# Patient Record
Sex: MALE | Race: WHITE | NOT HISPANIC OR LATINO | Employment: UNEMPLOYED | URBAN - METROPOLITAN AREA
[De-identification: names, ages, dates, MRNs, and addresses within clinical notes are randomized per-mention and may not be internally consistent; named-entity substitution may affect disease eponyms.]

---

## 2018-12-26 ENCOUNTER — HOSPITAL ENCOUNTER (INPATIENT)
Facility: MEDICAL CENTER | Age: 43
LOS: 14 days | DRG: 233 | End: 2019-01-10
Attending: EMERGENCY MEDICINE | Admitting: INTERNAL MEDICINE
Payer: MEDICAID

## 2018-12-26 ENCOUNTER — APPOINTMENT (OUTPATIENT)
Dept: RADIOLOGY | Facility: MEDICAL CENTER | Age: 43
DRG: 233 | End: 2018-12-26
Attending: EMERGENCY MEDICINE
Payer: MEDICAID

## 2018-12-26 DIAGNOSIS — I21.4 NSTEMI (NON-ST ELEVATED MYOCARDIAL INFARCTION) (HCC): ICD-10-CM

## 2018-12-26 DIAGNOSIS — Z95.1 S/P CABG X 3: ICD-10-CM

## 2018-12-26 LAB
ANION GAP SERPL CALC-SCNC: 13 MMOL/L (ref 0–11.9)
BASOPHILS # BLD AUTO: 0.4 % (ref 0–1.8)
BASOPHILS # BLD: 0.05 K/UL (ref 0–0.12)
BNP SERPL-MCNC: 197 PG/ML (ref 0–100)
BUN SERPL-MCNC: 15 MG/DL (ref 8–22)
CA-I SERPL-SCNC: 1.2 MMOL/L (ref 1.1–1.3)
CALCIUM SERPL-MCNC: 9.1 MG/DL (ref 8.5–10.5)
CHLORIDE SERPL-SCNC: 104 MMOL/L (ref 96–112)
CO2 SERPL-SCNC: 20 MMOL/L (ref 20–33)
CREAT SERPL-MCNC: 0.59 MG/DL (ref 0.5–1.4)
EOSINOPHIL # BLD AUTO: 0.12 K/UL (ref 0–0.51)
EOSINOPHIL NFR BLD: 0.9 % (ref 0–6.9)
ERYTHROCYTE [DISTWIDTH] IN BLOOD BY AUTOMATED COUNT: 43.9 FL (ref 35.9–50)
GLUCOSE SERPL-MCNC: 232 MG/DL (ref 65–99)
HCT VFR BLD AUTO: 43.7 % (ref 42–52)
HGB BLD-MCNC: 14.7 G/DL (ref 14–18)
IMM GRANULOCYTES # BLD AUTO: 0.06 K/UL (ref 0–0.11)
IMM GRANULOCYTES NFR BLD AUTO: 0.4 % (ref 0–0.9)
LYMPHOCYTES # BLD AUTO: 3.35 K/UL (ref 1–4.8)
LYMPHOCYTES NFR BLD: 24 % (ref 22–41)
MAGNESIUM SERPL-MCNC: 1.9 MG/DL (ref 1.5–2.5)
MCH RBC QN AUTO: 31.2 PG (ref 27–33)
MCHC RBC AUTO-ENTMCNC: 33.6 G/DL (ref 33.7–35.3)
MCV RBC AUTO: 92.8 FL (ref 81.4–97.8)
MONOCYTES # BLD AUTO: 1.05 K/UL (ref 0–0.85)
MONOCYTES NFR BLD AUTO: 7.5 % (ref 0–13.4)
NEUTROPHILS # BLD AUTO: 9.35 K/UL (ref 1.82–7.42)
NEUTROPHILS NFR BLD: 66.8 % (ref 44–72)
NRBC # BLD AUTO: 0 K/UL
NRBC BLD-RTO: 0 /100 WBC
PLATELET # BLD AUTO: 220 K/UL (ref 164–446)
PMV BLD AUTO: 11.6 FL (ref 9–12.9)
POTASSIUM SERPL-SCNC: 3.4 MMOL/L (ref 3.6–5.5)
RBC # BLD AUTO: 4.71 M/UL (ref 4.7–6.1)
SODIUM SERPL-SCNC: 137 MMOL/L (ref 135–145)
TROPONIN I SERPL-MCNC: 6.02 NG/ML (ref 0–0.04)
WBC # BLD AUTO: 14 K/UL (ref 4.8–10.8)

## 2018-12-26 PROCEDURE — 84484 ASSAY OF TROPONIN QUANT: CPT

## 2018-12-26 PROCEDURE — 83735 ASSAY OF MAGNESIUM: CPT

## 2018-12-26 PROCEDURE — 80048 BASIC METABOLIC PNL TOTAL CA: CPT

## 2018-12-26 PROCEDURE — 85025 COMPLETE CBC W/AUTO DIFF WBC: CPT

## 2018-12-26 PROCEDURE — 82330 ASSAY OF CALCIUM: CPT

## 2018-12-26 PROCEDURE — 83880 ASSAY OF NATRIURETIC PEPTIDE: CPT

## 2018-12-26 PROCEDURE — 99285 EMERGENCY DEPT VISIT HI MDM: CPT

## 2018-12-26 PROCEDURE — 93005 ELECTROCARDIOGRAM TRACING: CPT | Performed by: EMERGENCY MEDICINE

## 2018-12-26 PROCEDURE — 99244 OFF/OP CNSLTJ NEW/EST MOD 40: CPT | Performed by: INTERNAL MEDICINE

## 2018-12-26 PROCEDURE — 71045 X-RAY EXAM CHEST 1 VIEW: CPT

## 2018-12-26 ASSESSMENT — PAIN SCALES - GENERAL: PAINLEVEL_OUTOF10: 0

## 2018-12-27 ENCOUNTER — APPOINTMENT (OUTPATIENT)
Dept: RADIOLOGY | Facility: MEDICAL CENTER | Age: 43
DRG: 233 | End: 2018-12-27
Attending: NURSE PRACTITIONER
Payer: MEDICAID

## 2018-12-27 ENCOUNTER — APPOINTMENT (OUTPATIENT)
Dept: CARDIOLOGY | Facility: MEDICAL CENTER | Age: 43
DRG: 233 | End: 2018-12-27
Attending: EMERGENCY MEDICINE
Payer: MEDICAID

## 2018-12-27 PROBLEM — R55 POSTURAL DIZZINESS WITH PRESYNCOPE: Status: ACTIVE | Noted: 2018-12-27

## 2018-12-27 PROBLEM — E87.6 HYPOKALEMIA: Status: ACTIVE | Noted: 2018-12-27

## 2018-12-27 PROBLEM — R42 POSTURAL DIZZINESS WITH PRESYNCOPE: Status: ACTIVE | Noted: 2018-12-27

## 2018-12-27 PROBLEM — I50.20 SYSTOLIC CONGESTIVE HEART FAILURE (HCC): Status: ACTIVE | Noted: 2018-12-27

## 2018-12-27 PROBLEM — E11.9 TYPE 2 DIABETES MELLITUS WITHOUT COMPLICATION (HCC): Status: ACTIVE | Noted: 2018-12-27

## 2018-12-27 PROBLEM — I21.4 NSTEMI (NON-ST ELEVATED MYOCARDIAL INFARCTION) (HCC): Status: ACTIVE | Noted: 2018-12-27

## 2018-12-27 PROBLEM — I47.10 SVT (SUPRAVENTRICULAR TACHYCARDIA) (HCC): Status: ACTIVE | Noted: 2018-12-27

## 2018-12-27 LAB
ABO GROUP BLD: NORMAL
ABO GROUP BLD: NORMAL
ACT BLD: 169 SEC (ref 74–137)
ALBUMIN SERPL BCP-MCNC: 3.3 G/DL (ref 3.2–4.9)
ALBUMIN/GLOB SERPL: 0.8 G/DL
ALP SERPL-CCNC: 99 U/L (ref 30–99)
ALT SERPL-CCNC: 37 U/L (ref 2–50)
AMPHET UR QL SCN: NEGATIVE
ANION GAP SERPL CALC-SCNC: 9 MMOL/L (ref 0–11.9)
APTT PPP: 46.5 SEC (ref 24.7–36)
APTT PPP: 73.1 SEC (ref 24.7–36)
AST SERPL-CCNC: 24 U/L (ref 12–45)
BARBITURATES UR QL SCN: NEGATIVE
BASOPHILS # BLD AUTO: 0.3 % (ref 0–1.8)
BASOPHILS # BLD: 0.03 K/UL (ref 0–0.12)
BENZODIAZ UR QL SCN: NEGATIVE
BILIRUB SERPL-MCNC: 0.7 MG/DL (ref 0.1–1.5)
BLD GP AB SCN SERPL QL: NORMAL
BUN SERPL-MCNC: 14 MG/DL (ref 8–22)
BZE UR QL SCN: NEGATIVE
CALCIUM SERPL-MCNC: 8.6 MG/DL (ref 8.5–10.5)
CANNABINOIDS UR QL SCN: POSITIVE
CHLORIDE SERPL-SCNC: 106 MMOL/L (ref 96–112)
CHOLEST SERPL-MCNC: 79 MG/DL (ref 100–199)
CO2 SERPL-SCNC: 21 MMOL/L (ref 20–33)
CREAT SERPL-MCNC: 0.51 MG/DL (ref 0.5–1.4)
EKG IMPRESSION: NORMAL
EOSINOPHIL # BLD AUTO: 0.1 K/UL (ref 0–0.51)
EOSINOPHIL NFR BLD: 0.9 % (ref 0–6.9)
ERYTHROCYTE [DISTWIDTH] IN BLOOD BY AUTOMATED COUNT: 44 FL (ref 35.9–50)
EST. AVERAGE GLUCOSE BLD GHB EST-MCNC: 298 MG/DL
GLOBULIN SER CALC-MCNC: 3.9 G/DL (ref 1.9–3.5)
GLUCOSE BLD-MCNC: 187 MG/DL (ref 65–99)
GLUCOSE BLD-MCNC: 214 MG/DL (ref 65–99)
GLUCOSE BLD-MCNC: 260 MG/DL (ref 65–99)
GLUCOSE SERPL-MCNC: 251 MG/DL (ref 65–99)
HBA1C MFR BLD: 12 % (ref 0–5.6)
HCT VFR BLD AUTO: 37.4 % (ref 42–52)
HDLC SERPL-MCNC: 22 MG/DL
HGB BLD-MCNC: 12.8 G/DL (ref 14–18)
IMM GRANULOCYTES # BLD AUTO: 0.05 K/UL (ref 0–0.11)
IMM GRANULOCYTES NFR BLD AUTO: 0.5 % (ref 0–0.9)
INR PPP: 1.14 (ref 0.87–1.13)
LDLC SERPL CALC-MCNC: 42 MG/DL
LV EJECT FRACT  99904: 25
LV EJECT FRACT MOD 2C 99903: 23.49
LV EJECT FRACT MOD 4C 99902: 27.47
LV EJECT FRACT MOD BP 99901: 25.14
LYMPHOCYTES # BLD AUTO: 2.93 K/UL (ref 1–4.8)
LYMPHOCYTES NFR BLD: 27.2 % (ref 22–41)
MAGNESIUM SERPL-MCNC: 1.7 MG/DL (ref 1.5–2.5)
MCH RBC QN AUTO: 31.6 PG (ref 27–33)
MCHC RBC AUTO-ENTMCNC: 34.2 G/DL (ref 33.7–35.3)
MCV RBC AUTO: 92.3 FL (ref 81.4–97.8)
METHADONE UR QL SCN: NEGATIVE
MONOCYTES # BLD AUTO: 0.85 K/UL (ref 0–0.85)
MONOCYTES NFR BLD AUTO: 7.9 % (ref 0–13.4)
NEUTROPHILS # BLD AUTO: 6.81 K/UL (ref 1.82–7.42)
NEUTROPHILS NFR BLD: 63.2 % (ref 44–72)
NRBC # BLD AUTO: 0 K/UL
NRBC BLD-RTO: 0 /100 WBC
OPIATES UR QL SCN: NEGATIVE
OXYCODONE UR QL SCN: NEGATIVE
PCP UR QL SCN: NEGATIVE
PLATELET # BLD AUTO: 183 K/UL (ref 164–446)
PMV BLD AUTO: 11.9 FL (ref 9–12.9)
POTASSIUM SERPL-SCNC: 3.3 MMOL/L (ref 3.6–5.5)
PROPOXYPH UR QL SCN: NEGATIVE
PROT SERPL-MCNC: 7.2 G/DL (ref 6–8.2)
PROTHROMBIN TIME: 14.7 SEC (ref 12–14.6)
RBC # BLD AUTO: 4.05 M/UL (ref 4.7–6.1)
RH BLD: NORMAL
RH BLD: NORMAL
SODIUM SERPL-SCNC: 136 MMOL/L (ref 135–145)
TRIGL SERPL-MCNC: 73 MG/DL (ref 0–149)
TROPONIN I SERPL-MCNC: 6.12 NG/ML (ref 0–0.04)
TROPONIN I SERPL-MCNC: 6.13 NG/ML (ref 0–0.04)
TROPONIN I SERPL-MCNC: 6.57 NG/ML (ref 0–0.04)
TSH SERPL DL<=0.005 MIU/L-ACNC: 1.49 UIU/ML (ref 0.38–5.33)
WBC # BLD AUTO: 10.8 K/UL (ref 4.8–10.8)

## 2018-12-27 PROCEDURE — 96365 THER/PROPH/DIAG IV INF INIT: CPT

## 2018-12-27 PROCEDURE — 93571 IV DOP VEL&/PRESS C FLO 1ST: CPT

## 2018-12-27 PROCEDURE — 96366 THER/PROPH/DIAG IV INF ADDON: CPT

## 2018-12-27 PROCEDURE — 82962 GLUCOSE BLOOD TEST: CPT

## 2018-12-27 PROCEDURE — 700102 HCHG RX REV CODE 250 W/ 637 OVERRIDE(OP): Performed by: NURSE PRACTITIONER

## 2018-12-27 PROCEDURE — 86901 BLOOD TYPING SEROLOGIC RH(D): CPT

## 2018-12-27 PROCEDURE — 99152 MOD SED SAME PHYS/QHP 5/>YRS: CPT | Performed by: INTERNAL MEDICINE

## 2018-12-27 PROCEDURE — 93458 L HRT ARTERY/VENTRICLE ANGIO: CPT

## 2018-12-27 PROCEDURE — 4A023N7 MEASUREMENT OF CARDIAC SAMPLING AND PRESSURE, LEFT HEART, PERCUTANEOUS APPROACH: ICD-10-PCS | Performed by: INTERNAL MEDICINE

## 2018-12-27 PROCEDURE — 700111 HCHG RX REV CODE 636 W/ 250 OVERRIDE (IP): Performed by: STUDENT IN AN ORGANIZED HEALTH CARE EDUCATION/TRAINING PROGRAM

## 2018-12-27 PROCEDURE — 99223 1ST HOSP IP/OBS HIGH 75: CPT | Mod: AI,GC | Performed by: INTERNAL MEDICINE

## 2018-12-27 PROCEDURE — 96368 THER/DIAG CONCURRENT INF: CPT

## 2018-12-27 PROCEDURE — 80053 COMPREHEN METABOLIC PANEL: CPT

## 2018-12-27 PROCEDURE — 86900 BLOOD TYPING SEROLOGIC ABO: CPT

## 2018-12-27 PROCEDURE — 360979 HCHG DIAGNOSTIC CATH

## 2018-12-27 PROCEDURE — 80061 LIPID PANEL: CPT

## 2018-12-27 PROCEDURE — 700117 HCHG RX CONTRAST REV CODE 255: Performed by: INTERNAL MEDICINE

## 2018-12-27 PROCEDURE — 99153 MOD SED SAME PHYS/QHP EA: CPT

## 2018-12-27 PROCEDURE — 86850 RBC ANTIBODY SCREEN: CPT

## 2018-12-27 PROCEDURE — 93571 IV DOP VEL&/PRESS C FLO 1ST: CPT | Mod: 26,LD | Performed by: INTERNAL MEDICINE

## 2018-12-27 PROCEDURE — 700105 HCHG RX REV CODE 258: Performed by: INTERNAL MEDICINE

## 2018-12-27 PROCEDURE — 93880 EXTRACRANIAL BILAT STUDY: CPT

## 2018-12-27 PROCEDURE — 83036 HEMOGLOBIN GLYCOSYLATED A1C: CPT

## 2018-12-27 PROCEDURE — 85025 COMPLETE CBC W/AUTO DIFF WBC: CPT

## 2018-12-27 PROCEDURE — 85730 THROMBOPLASTIN TIME PARTIAL: CPT

## 2018-12-27 PROCEDURE — 96372 THER/PROPH/DIAG INJ SC/IM: CPT

## 2018-12-27 PROCEDURE — 93306 TTE W/DOPPLER COMPLETE: CPT | Mod: 26 | Performed by: INTERNAL MEDICINE

## 2018-12-27 PROCEDURE — 4A033BC MEASUREMENT OF ARTERIAL PRESSURE, CORONARY, PERCUTANEOUS APPROACH: ICD-10-PCS | Performed by: INTERNAL MEDICINE

## 2018-12-27 PROCEDURE — 96375 TX/PRO/DX INJ NEW DRUG ADDON: CPT

## 2018-12-27 PROCEDURE — C1894 INTRO/SHEATH, NON-LASER: HCPCS

## 2018-12-27 PROCEDURE — 93306 TTE W/DOPPLER COMPLETE: CPT

## 2018-12-27 PROCEDURE — 700111 HCHG RX REV CODE 636 W/ 250 OVERRIDE (IP): Performed by: NURSE PRACTITIONER

## 2018-12-27 PROCEDURE — 700111 HCHG RX REV CODE 636 W/ 250 OVERRIDE (IP)

## 2018-12-27 PROCEDURE — 770022 HCHG ROOM/CARE - ICU (200)

## 2018-12-27 PROCEDURE — C1769 GUIDE WIRE: HCPCS

## 2018-12-27 PROCEDURE — 307093 HCHG TR BAND RADIAL

## 2018-12-27 PROCEDURE — 700111 HCHG RX REV CODE 636 W/ 250 OVERRIDE (IP): Performed by: INTERNAL MEDICINE

## 2018-12-27 PROCEDURE — 84443 ASSAY THYROID STIM HORMONE: CPT

## 2018-12-27 PROCEDURE — C1887 CATHETER, GUIDING: HCPCS

## 2018-12-27 PROCEDURE — A9270 NON-COVERED ITEM OR SERVICE: HCPCS | Performed by: NURSE PRACTITIONER

## 2018-12-27 PROCEDURE — 700102 HCHG RX REV CODE 250 W/ 637 OVERRIDE(OP): Performed by: EMERGENCY MEDICINE

## 2018-12-27 PROCEDURE — 71046 X-RAY EXAM CHEST 2 VIEWS: CPT

## 2018-12-27 PROCEDURE — 84484 ASSAY OF TROPONIN QUANT: CPT

## 2018-12-27 PROCEDURE — 80307 DRUG TEST PRSMV CHEM ANLYZR: CPT

## 2018-12-27 PROCEDURE — A9270 NON-COVERED ITEM OR SERVICE: HCPCS | Performed by: STUDENT IN AN ORGANIZED HEALTH CARE EDUCATION/TRAINING PROGRAM

## 2018-12-27 PROCEDURE — 93458 L HRT ARTERY/VENTRICLE ANGIO: CPT | Mod: 26 | Performed by: INTERNAL MEDICINE

## 2018-12-27 PROCEDURE — 93970 EXTREMITY STUDY: CPT

## 2018-12-27 PROCEDURE — 99152 MOD SED SAME PHYS/QHP 5/>YRS: CPT

## 2018-12-27 PROCEDURE — 83735 ASSAY OF MAGNESIUM: CPT

## 2018-12-27 PROCEDURE — 85610 PROTHROMBIN TIME: CPT

## 2018-12-27 PROCEDURE — A9270 NON-COVERED ITEM OR SERVICE: HCPCS | Performed by: EMERGENCY MEDICINE

## 2018-12-27 PROCEDURE — 700101 HCHG RX REV CODE 250

## 2018-12-27 PROCEDURE — 85347 COAGULATION TIME ACTIVATED: CPT

## 2018-12-27 PROCEDURE — B2111ZZ FLUOROSCOPY OF MULTIPLE CORONARY ARTERIES USING LOW OSMOLAR CONTRAST: ICD-10-PCS | Performed by: INTERNAL MEDICINE

## 2018-12-27 PROCEDURE — 700102 HCHG RX REV CODE 250 W/ 637 OVERRIDE(OP): Performed by: STUDENT IN AN ORGANIZED HEALTH CARE EDUCATION/TRAINING PROGRAM

## 2018-12-27 RX ORDER — ATORVASTATIN CALCIUM 80 MG/1
80 TABLET, FILM COATED ORAL DAILY
Status: DISCONTINUED | OUTPATIENT
Start: 2018-12-27 | End: 2018-12-29

## 2018-12-27 RX ORDER — DEXMEDETOMIDINE HYDROCHLORIDE 4 UG/ML
0-1.5 INJECTION, SOLUTION INTRAVENOUS CONTINUOUS
Status: DISCONTINUED | OUTPATIENT
Start: 2018-12-28 | End: 2018-12-28

## 2018-12-27 RX ORDER — POTASSIUM CHLORIDE 20 MEQ/1
40 TABLET, EXTENDED RELEASE ORAL ONCE
Status: COMPLETED | OUTPATIENT
Start: 2018-12-27 | End: 2018-12-27

## 2018-12-27 RX ORDER — ONDANSETRON 2 MG/ML
INJECTION INTRAMUSCULAR; INTRAVENOUS
Status: COMPLETED
Start: 2018-12-27 | End: 2018-12-27

## 2018-12-27 RX ORDER — ALPRAZOLAM 0.25 MG/1
0.25 TABLET ORAL EVERY 6 HOURS PRN
Status: DISCONTINUED | OUTPATIENT
Start: 2018-12-27 | End: 2018-12-29

## 2018-12-27 RX ORDER — SODIUM CHLORIDE 9 MG/ML
INJECTION, SOLUTION INTRAVENOUS
Status: ACTIVE
Start: 2018-12-27 | End: 2018-12-28

## 2018-12-27 RX ORDER — LIDOCAINE HYDROCHLORIDE 20 MG/ML
INJECTION, SOLUTION INFILTRATION; PERINEURAL
Status: COMPLETED
Start: 2018-12-27 | End: 2018-12-27

## 2018-12-27 RX ORDER — MAGNESIUM SULFATE 1 G/100ML
1 INJECTION INTRAVENOUS ONCE
Status: COMPLETED | OUTPATIENT
Start: 2018-12-27 | End: 2018-12-27

## 2018-12-27 RX ORDER — INSULIN GLARGINE 100 [IU]/ML
5 INJECTION, SOLUTION SUBCUTANEOUS EVERY EVENING
Status: DISCONTINUED | OUTPATIENT
Start: 2018-12-27 | End: 2018-12-28

## 2018-12-27 RX ORDER — INSULIN GLARGINE 100 [IU]/ML
7 INJECTION, SOLUTION SUBCUTANEOUS EVERY EVENING
Status: DISCONTINUED | OUTPATIENT
Start: 2018-12-27 | End: 2018-12-27

## 2018-12-27 RX ORDER — SODIUM CHLORIDE 9 MG/ML
INJECTION, SOLUTION INTRAVENOUS CONTINUOUS
Status: DISCONTINUED | OUTPATIENT
Start: 2018-12-27 | End: 2018-12-27

## 2018-12-27 RX ORDER — ADENOSINE 3 MG/ML
INJECTION, SOLUTION INTRAVENOUS
Status: COMPLETED
Start: 2018-12-27 | End: 2018-12-27

## 2018-12-27 RX ORDER — VERAPAMIL HYDROCHLORIDE 2.5 MG/ML
INJECTION, SOLUTION INTRAVENOUS
Status: COMPLETED
Start: 2018-12-27 | End: 2018-12-27

## 2018-12-27 RX ORDER — HEPARIN SODIUM,PORCINE 1000/ML
VIAL (ML) INJECTION
Status: COMPLETED
Start: 2018-12-27 | End: 2018-12-27

## 2018-12-27 RX ORDER — HYDRALAZINE HYDROCHLORIDE 20 MG/ML
10 INJECTION INTRAMUSCULAR; INTRAVENOUS EVERY 4 HOURS PRN
Status: DISCONTINUED | OUTPATIENT
Start: 2018-12-27 | End: 2018-12-28

## 2018-12-27 RX ORDER — ACETAMINOPHEN 325 MG/1
650 TABLET ORAL EVERY 6 HOURS PRN
Status: DISCONTINUED | OUTPATIENT
Start: 2018-12-27 | End: 2018-12-28

## 2018-12-27 RX ORDER — POLYETHYLENE GLYCOL 3350 17 G/17G
1 POWDER, FOR SOLUTION ORAL
Status: DISCONTINUED | OUTPATIENT
Start: 2018-12-27 | End: 2018-12-28

## 2018-12-27 RX ORDER — BISACODYL 10 MG
10 SUPPOSITORY, RECTAL RECTAL
Status: DISCONTINUED | OUTPATIENT
Start: 2018-12-27 | End: 2018-12-28

## 2018-12-27 RX ORDER — GUAIFENESIN/DEXTROMETHORPHAN 100-10MG/5
10 SYRUP ORAL EVERY 6 HOURS PRN
Status: DISCONTINUED | OUTPATIENT
Start: 2018-12-27 | End: 2018-12-28

## 2018-12-27 RX ORDER — MIDAZOLAM HYDROCHLORIDE 1 MG/ML
INJECTION INTRAMUSCULAR; INTRAVENOUS
Status: COMPLETED
Start: 2018-12-27 | End: 2018-12-27

## 2018-12-27 RX ORDER — NICOTINE 21 MG/24HR
21 PATCH, TRANSDERMAL 24 HOURS TRANSDERMAL
Status: DISCONTINUED | OUTPATIENT
Start: 2018-12-27 | End: 2018-12-28

## 2018-12-27 RX ORDER — SODIUM CHLORIDE 9 MG/ML
INJECTION, SOLUTION INTRAVENOUS CONTINUOUS
Status: DISCONTINUED | OUTPATIENT
Start: 2018-12-27 | End: 2018-12-28

## 2018-12-27 RX ORDER — POTASSIUM CHLORIDE 7.45 MG/ML
10 INJECTION INTRAVENOUS
Status: COMPLETED | OUTPATIENT
Start: 2018-12-27 | End: 2018-12-27

## 2018-12-27 RX ORDER — SODIUM CHLORIDE 9 MG/ML
INJECTION, SOLUTION INTRAVENOUS CONTINUOUS
Status: ACTIVE | OUTPATIENT
Start: 2018-12-27 | End: 2018-12-27

## 2018-12-27 RX ORDER — AMOXICILLIN 250 MG
2 CAPSULE ORAL 2 TIMES DAILY
Status: DISCONTINUED | OUTPATIENT
Start: 2018-12-27 | End: 2018-12-28

## 2018-12-27 RX ORDER — HEPARIN SODIUM 1000 [USP'U]/ML
6000 INJECTION, SOLUTION INTRAVENOUS; SUBCUTANEOUS ONCE
Status: COMPLETED | OUTPATIENT
Start: 2018-12-27 | End: 2018-12-27

## 2018-12-27 RX ORDER — HEPARIN SODIUM 1000 [USP'U]/ML
3200 INJECTION, SOLUTION INTRAVENOUS; SUBCUTANEOUS PRN
Status: DISCONTINUED | OUTPATIENT
Start: 2018-12-27 | End: 2018-12-28

## 2018-12-27 RX ORDER — DEXTROSE MONOHYDRATE 25 G/50ML
25 INJECTION, SOLUTION INTRAVENOUS
Status: DISCONTINUED | OUTPATIENT
Start: 2018-12-27 | End: 2018-12-27

## 2018-12-27 RX ORDER — ASPIRIN 81 MG/1
81 TABLET, CHEWABLE ORAL DAILY
Status: DISCONTINUED | OUTPATIENT
Start: 2018-12-27 | End: 2018-12-29

## 2018-12-27 RX ORDER — DEXTROSE MONOHYDRATE 25 G/50ML
25 INJECTION, SOLUTION INTRAVENOUS
Status: DISCONTINUED | OUTPATIENT
Start: 2018-12-27 | End: 2018-12-28

## 2018-12-27 RX ADMIN — SODIUM CHLORIDE: 9 INJECTION, SOLUTION INTRAVENOUS at 15:15

## 2018-12-27 RX ADMIN — LIDOCAINE HYDROCHLORIDE 30 ML: 20 SOLUTION OROPHARYNGEAL at 02:00

## 2018-12-27 RX ADMIN — INSULIN GLARGINE 5 UNITS: 100 INJECTION, SOLUTION SUBCUTANEOUS at 17:34

## 2018-12-27 RX ADMIN — MIDAZOLAM HYDROCHLORIDE 2 MG: 1 INJECTION, SOLUTION INTRAMUSCULAR; INTRAVENOUS at 13:54

## 2018-12-27 RX ADMIN — POTASSIUM CHLORIDE 10 MEQ: 7.46 INJECTION, SOLUTION INTRAVENOUS at 07:00

## 2018-12-27 RX ADMIN — INSULIN GLARGINE 5 UNITS: 100 INJECTION, SOLUTION SUBCUTANEOUS at 05:31

## 2018-12-27 RX ADMIN — ACETAMINOPHEN 650 MG: 325 TABLET, FILM COATED ORAL at 20:50

## 2018-12-27 RX ADMIN — FENTANYL CITRATE 100 MCG: 50 INJECTION, SOLUTION INTRAMUSCULAR; INTRAVENOUS at 13:53

## 2018-12-27 RX ADMIN — HEPARIN SODIUM 1450 UNITS/HR: 5000 INJECTION, SOLUTION INTRAVENOUS at 04:23

## 2018-12-27 RX ADMIN — INSULIN LISPRO 5 UNITS: 100 INJECTION, SOLUTION INTRAVENOUS; SUBCUTANEOUS at 23:50

## 2018-12-27 RX ADMIN — VERAPAMIL HYDROCHLORIDE 2.5 MG: 2.5 INJECTION, SOLUTION INTRAVENOUS at 13:39

## 2018-12-27 RX ADMIN — INSULIN LISPRO 5 UNITS: 100 INJECTION, SOLUTION INTRAVENOUS; SUBCUTANEOUS at 17:35

## 2018-12-27 RX ADMIN — INSULIN LISPRO 3 UNITS: 100 INJECTION, SOLUTION INTRAVENOUS; SUBCUTANEOUS at 15:23

## 2018-12-27 RX ADMIN — ADENOSINE 3 MG: 3 INJECTION, SOLUTION INTRAVENOUS at 14:30

## 2018-12-27 RX ADMIN — ATORVASTATIN CALCIUM 80 MG: 80 TABLET, FILM COATED ORAL at 06:09

## 2018-12-27 RX ADMIN — HEPARIN SODIUM 1600 UNITS/HR: 5000 INJECTION, SOLUTION INTRAVENOUS at 23:51

## 2018-12-27 RX ADMIN — ALPRAZOLAM 0.25 MG: 0.25 TABLET ORAL at 20:50

## 2018-12-27 RX ADMIN — IOHEXOL 46 ML: 350 INJECTION, SOLUTION INTRAVENOUS at 14:45

## 2018-12-27 RX ADMIN — NICOTINE 21 MG: 21 PATCH, EXTENDED RELEASE TRANSDERMAL at 06:09

## 2018-12-27 RX ADMIN — HEPARIN SODIUM: 200 INJECTION, SOLUTION INTRAVENOUS at 13:30

## 2018-12-27 RX ADMIN — MIDAZOLAM HYDROCHLORIDE 2 MG: 1 INJECTION, SOLUTION INTRAMUSCULAR; INTRAVENOUS at 13:53

## 2018-12-27 RX ADMIN — ONDANSETRON: 2 INJECTION INTRAMUSCULAR; INTRAVENOUS at 13:30

## 2018-12-27 RX ADMIN — METOPROLOL TARTRATE 25 MG: 25 TABLET, FILM COATED ORAL at 17:32

## 2018-12-27 RX ADMIN — ASPIRIN 81 MG: 81 TABLET, CHEWABLE ORAL at 15:26

## 2018-12-27 RX ADMIN — POTASSIUM CHLORIDE 40 MEQ: 1500 TABLET, EXTENDED RELEASE ORAL at 15:29

## 2018-12-27 RX ADMIN — POTASSIUM CHLORIDE 10 MEQ: 7.46 INJECTION, SOLUTION INTRAVENOUS at 06:15

## 2018-12-27 RX ADMIN — MAGNESIUM SULFATE IN DEXTROSE 1 G: 10 INJECTION, SOLUTION INTRAVENOUS at 06:10

## 2018-12-27 RX ADMIN — POTASSIUM CHLORIDE 10 MEQ: 7.46 INJECTION, SOLUTION INTRAVENOUS at 04:34

## 2018-12-27 RX ADMIN — HEPARIN SODIUM 3200 UNITS: 1000 INJECTION, SOLUTION INTRAVENOUS; SUBCUTANEOUS at 23:52

## 2018-12-27 RX ADMIN — METOPROLOL TARTRATE 25 MG: 25 TABLET, FILM COATED ORAL at 04:27

## 2018-12-27 RX ADMIN — POTASSIUM CHLORIDE 10 MEQ: 7.46 INJECTION, SOLUTION INTRAVENOUS at 05:00

## 2018-12-27 RX ADMIN — LIDOCAINE HYDROCHLORIDE: 20 INJECTION, SOLUTION INFILTRATION; PERINEURAL at 13:39

## 2018-12-27 RX ADMIN — POTASSIUM CHLORIDE 40 MEQ: 1500 TABLET, EXTENDED RELEASE ORAL at 05:31

## 2018-12-27 RX ADMIN — NITROGLYCERIN 10 ML: 20 INJECTION INTRAVENOUS at 13:38

## 2018-12-27 RX ADMIN — HEPARIN SODIUM 6000 UNITS: 1000 INJECTION, SOLUTION INTRAVENOUS; SUBCUTANEOUS at 04:23

## 2018-12-27 RX ADMIN — HEPARIN SODIUM: 1000 INJECTION, SOLUTION INTRAVENOUS; SUBCUTANEOUS at 13:38

## 2018-12-27 ASSESSMENT — ENCOUNTER SYMPTOMS
MYALGIAS: 0
CHOKING: 0
DIZZINESS: 1
VOMITING: 0
FALLS: 0
WEAKNESS: 1
COUGH: 0
APNEA: 0
HEADACHES: 1
FOCAL WEAKNESS: 0
TINGLING: 0
DOUBLE VISION: 0
SHORTNESS OF BREATH: 0
CONSTIPATION: 0
BACK PAIN: 0
SPUTUM PRODUCTION: 0
SPEECH CHANGE: 0
ORTHOPNEA: 0
EYE DISCHARGE: 0
NECK PAIN: 0
PND: 0
HEARTBURN: 0
NAUSEA: 0
HEMOPTYSIS: 0
SORE THROAT: 0
PSYCHIATRIC NEGATIVE: 1
WEIGHT LOSS: 0
LOSS OF CONSCIOUSNESS: 0
ABDOMINAL PAIN: 0
CLAUDICATION: 0
DIAPHORESIS: 0
STRIDOR: 0
EYE PAIN: 0
BLURRED VISION: 0
CHEST TIGHTNESS: 0
WHEEZING: 0
SENSORY CHANGE: 0
DIARRHEA: 0
CHILLS: 0
BLOOD IN STOOL: 0
TREMORS: 0
FEVER: 0
PALPITATIONS: 0

## 2018-12-27 ASSESSMENT — PATIENT HEALTH QUESTIONNAIRE - PHQ9
1. LITTLE INTEREST OR PLEASURE IN DOING THINGS: NOT AT ALL
SUM OF ALL RESPONSES TO PHQ9 QUESTIONS 1 AND 2: 0
2. FEELING DOWN, DEPRESSED, IRRITABLE, OR HOPELESS: NOT AT ALL

## 2018-12-27 ASSESSMENT — PAIN SCALES - GENERAL
PAINLEVEL_OUTOF10: 0
PAINLEVEL_OUTOF10: 4

## 2018-12-27 ASSESSMENT — LIFESTYLE VARIABLES: EVER_SMOKED: YES

## 2018-12-27 NOTE — SENIOR ADMIT NOTE
"[facility] INTERNAL MEDICINE SENIOR ADMIT NOTE:    Patient ID:   Name:             Rikki Khalil   YOB: 1975  Age:                 43 y.o.  male   MRN:               [id number]                                                          Chief Complaint:       Near-syncope    History of Present Illness:    Mr. Khalil is a 43 y.o. male with a PMHx of DM type II (HbA1c was 7.8  2-3 months ago per pt), s/p ileostomy and reversal for diverticulitis and fistula - resolved for 4 years, was transferred form [facility].   On 12/25 - he had intermittent left jaw and left periorbital pain, lasted 20-30 mins overall, slight ache in left shoulder. No associated autonomic disturbances. Non-exertional.   On 12/26, ~ noon while standing by his car, he had non-exertional \"sudden rush of energy going up from his feet to his head, then suddenly lost energy and feel like he was about to fall. Lasted for ~ 7 secs. 4.5 hrs later, he had a similar episode that lasted ~ 7 secs. He went to [facility] ER, where he had 5 other similar near-syncopal episodes each lasting 6-7 secs. A few of these were witnessed by [facility] ER RN/staff, and he was told that he looked pale during these episodes. The last three episodes were noted when he was on cardiac monitor his HR was found to be between 110-212 during these episodes. He was found to be in non-sustained SVT per ERP in [facility]. IV diltiazem vs Flecainide was given. Troponin was elevated at 4 and hence he was transferred to [facility].    No episodes since he has been in [facility] ER. He has been ambulating to the rest room with no similar episodes. Denies palpitations / chest pain / SOB.     ROS: As in HPI. Back pain chronic. Nil else    Vitals : HR 100s, regular.  /92 mm Hg. Sats 96% on RA, RR 12/min    LABS: Positive for elevated Troponin 6.02 --> 6.12. . WBC 14, Plts 220, MCV 92.8, Na 137, K 3.4, Chloride 104, Bicarb 20, glucose 232, BUN/Creat 15/0.59, Magnesium " 1.9, ionized calcium 1.2.  UDS - positive for cannabinoid.      IMAGING: CXR - Linear densities in the LLL and RUL. EKG - Sinus tachycardia, , QTc 479, Q waves in lead II, III, aVF, up-sloping ST in V2-V3, reviewed by Dr. iY.  Urgent Echo done 12/27 here in ER --. EF 25% with global hypokinesia, RVSP 28.    Active Ambulatory Problems     Diagnosis Date Noted   • Intra-abdominal abscess (HCC) 05/21/2015   • Sepsis (HCC) 06/01/2015   • Colonic diverticular abscess 06/01/2015   • Diverticulitis of colon 09/29/2015     Resolved Ambulatory Problems     Diagnosis Date Noted   • No Resolved Ambulatory Problems     Past Medical History:   Diagnosis Date   • Arthritis    • Back pain    • Bowel obstruction    • Glaucoma    • Scoliosis    • Type II or unspecified type diabetes mellitus without mention of complication, not stated as uncontrolled      PAST MEDICAL HISTORY :  Diabetes Type II - diet and exercise control  Nil else    PAST SURGICAL HISTORY :  S/p ostomy following diverticulitis with fistula, ostomy reversed - 4 years ago    MEDICATIONS :   None    SOCIAL HISTORY :  Lives with wife  Smokes Marijuana  Tobacco - 1 ppd, 32 pack-years  EtOH - denies    PHYSICAL EXAM  Vitals:   Weight/BMI: Body mass index is 28.48 kg/m².  Blood pressure 151/89, pulse (!) 103, temperature 36.6 °C (97.9 °F), resp. rate (!) 9, height 1.829 m (6'), weight 95.3 kg (210 lb), SpO2 98 %.  Vitals:    12/27/18 0130 12/27/18 0145 12/27/18 0200 12/27/18 0230   BP:       Pulse:  (!) 108 (!) 104 (!) 103   Resp:  (!) 9     Temp:       SpO2: 96% 99% 99% 98%   Weight:       Height:         Oxygen Therapy:  Pulse Oximetry: 98 %    Physical Exam   Constitutional: He is oriented to person, place, and time. No distress.   HENT:   Head: Normocephalic and atraumatic.   Mouth/Throat: Oropharynx is clear and moist. No oropharyngeal exudate.   Eyes: Pupils are equal, round, and reactive to light. Conjunctivae are normal. Right eye exhibits no discharge.  Left eye exhibits no discharge. No scleral icterus.   Neck: Normal range of motion. Neck supple.   Cardiovascular: Normal heart sounds.    No murmur heard.  Tachycardia, sinus   Pulmonary/Chest: Effort normal and breath sounds normal. No respiratory distress. He has no wheezes. He has no rales.   Abdominal: Soft. Bowel sounds are normal. He exhibits no distension. There is no tenderness. There is no rebound and no guarding.   Musculoskeletal: Normal range of motion. He exhibits no edema or tenderness.   Lymphadenopathy:     He has no cervical adenopathy.   Neurological: He is alert and oriented to person, place, and time. GCS score is 15.   No gross focal deficits   Skin: Skin is warm and dry. No rash noted. He is not diaphoretic. No erythema.   Psychiatric: Mood, memory, affect and judgment normal.        ASSESSMENT:  # NSTEMI  # Elevated Troponin  # ? Supraventricular tachycardia  # Borderline HTN in ER  # Diabetes Mellitus type II with hyperglycemia      PLAN:  # Admit telemetry  # Metoprolol BID  # Echo  # Aspirin (had 325 mg aspirin in [facility]), will start 81 form 12/27   # Atorvastatin  # Heparin  # Possible coronary  angiogram am  # Trend Trops and EKGs   # Cardiology on board  # NPO  # Smoking cessation advised  # Lantus 5 units with ISS and hypoglycemia protocol      Please refer to Intern Dr. Siddiqui's H&P for complete admission details.

## 2018-12-27 NOTE — ASSESSMENT & PLAN NOTE
Currently denies shortness of breath  No lower extremity swelling  U tox screen negative except for cannabinoid  Echo shows ejection fraction of 25%, global hypokinesis  Because of systolic heart failure likely in the setting of ischemic event versus toxin induced versus familiar  Plan:  -Continue management of NSTEMI  -Currently not in acute exacerbation

## 2018-12-27 NOTE — ASSESSMENT & PLAN NOTE
Reported episode of SVT on monitor to 212 at outside facility.  Received dose of diltiazem at outside facility resulted in success for for rate control and cessation of his symptoms.    -Continue metoprolol 25 mg twice daily  -Continue to monitor

## 2018-12-27 NOTE — H&P
"      Internal Medicine Admitting History and Physical    Note Author: Leighton Siddiqui M.D.       Name Rikki Khalil     1975   Age/Sex 43 y.o. male   MRN [id number]   Code Status DNAR/DNI     After 5PM or if no immediate response to page, please call for cross-coverage  Attending/Team: Dr. Quintero/Steven See Patient List for primary contact information  Call [phone] to page    1st Call - Day Intern (R1):   Dr. Tavarez 2nd Call - Day Sr. Resident (R2/R3):   Dr. Adamson       Chief Complaint:   Presyncope/Syncope    HPI:  Mr. Khalil is a 43 y.o. male with past medical history of diabetes type 2, chronic back pain secondary to scoliosis, s/p ileostomy and reversal for diverticulitis and fistula who was was brought in by midflight for SVT from [facility].  Patient says on  he had intermittent left jaw, slight ache in left shoulder and left periorbital pain, which lasted lasted 20-30 mins overall.  He did not have chest pain, nausea vomiting, dizziness, diaphoresis with this episode.  On , he had non-exertional \"sudden wave of energy from my feet up to my head which quickly went back down\".  Episode lasted for 6-7 seconds and he feels lower extremity weakness during the episode. 4.5 hrs later, he had a similar episode that lasted ~ 7 secs. This time, he fell down but did not hit his head.  He went to [facility] ER, where he had 5 other similar near-syncopal episodes each lasting 6-7 secs. A few of these were witnessed by [facility] ER RN/staff, and he was told that he looked pale during these episodes. The last three episodes were noted when he was on cardiac monitor his HR was found to be between 110-212 during these episodes. He was found to be in non-sustained SVT per ERP in [facility]. IV diltiazem was given. Troponin was elevated at 4.  Patient was transferred to [facility] for evaluation of cardiac etiology for symptoms.. No episodes since he has been in [facility] ER. He has " been ambulating to the rest room with no similar episodes. Denies palpitations /chest pain/SOB.     In the ED, /89 HR [id number], RR 20 saturating to 98% on room air, afebrile.  Troponin was 6.12 which trended down to 6.02, BNP was 197, magnesium 1.9.  BMP showed hypokalemia at 3.4, elevated blood glucose 232.  Urine drug screen positive for cannabinoids.  CBC was significant for elevated WBC 14.  EKG showed sinus tachycardia, , Q waves in inferior leads upsloping ST  V2 to V3.  Echocardiography showed EF 25% with global hypokinesia.  Chest x-ray was significant for left lower pole of atelectasis versus scarring.  Cardiology was consulted by the outside facility and also by ERP, who recommended starting metoprolol twice daily, keep him NPO for angiography in the morning.    Review of Systems   Constitutional: Negative for chills, diaphoresis, fever, malaise/fatigue and weight loss.   HENT: Negative for congestion, hearing loss, sore throat and tinnitus.    Eyes: Negative for blurred vision, double vision, pain and discharge.   Respiratory: Negative for cough, hemoptysis, sputum production, shortness of breath, wheezing and stridor.    Cardiovascular: Positive for chest pain. Negative for palpitations, orthopnea, claudication, leg swelling and PND.   Gastrointestinal: Negative for abdominal pain, blood in stool, constipation, diarrhea, heartburn, melena, nausea and vomiting.   Musculoskeletal: Negative for back pain, falls, joint pain, myalgias and neck pain.   Skin: Negative for rash.   Neurological: Positive for dizziness, weakness and headaches. Negative for tingling, tremors, sensory change, speech change, focal weakness and loss of consciousness.   Psychiatric/Behavioral: Negative.              Past Medical History (Chronic medical problem, known complications and current treatment)    Past Medical History:   Diagnosis Date   • Arthritis     All joints   • Back pain    • Bowel obstruction (HCC)    •  "Glaucoma     monika eyes   • NSTEMI (non-ST elevated myocardial infarction) ([facility]) 12/27/2018   • Scoliosis    • Type II or unspecified type diabetes mellitus without mention of complication, not stated as uncontrolled     insulin and oral meds         Past Surgical History:  Past Surgical History:   Procedure Laterality Date   • ILEOSTOMY  9/29/2015    Procedure: ILEOSTOMY TAKE DOWN;  Surgeon: Carter Kumar M.D.;  Location: SURGERY [facility];  Service:    • LOW ANTERIOR RESECTION ROBOTIC XI  7/14/2015    Procedure: Robotic low anterior resection, takedown enterocutaneous fistula, omental flap, ileostomy creation;  Surgeon: Carter Kumar M.D.;  Location: SURGERY [facility];  Service:    • COLOSTOMY CREATION LAPAROSCOPIC  7/14/2015    Procedure: COLOSTOMY CREATION LAPAROSCOPIC FOR: LAP ILEOSTOMY;  Surgeon: Carter Kumar M.D.;  Location: SURGERY [facility];  Service:        Current Outpatient Medications:  Home Medications     Reviewed by Catherine White (Pharmacy Tech) on 12/26/18 at 2304  Med List Status: Complete   Medication Last Dose Status        Patient Latrell Taking any Medications                       Medication Allergy/Sensitivities:  Allergies   Allergen Reactions   • Penicillins Unspecified     \"Blue baby\"  RXN= as a child  Tolerated ceftriaxone 6/2015         Family History (mandatory)   Family History   Problem Relation Age of Onset   • Hypertension Mother    • Heart Disease Mother    • Stroke Mother    • No Known Problems Father        Social History (mandatory)   Social History     Social History   • Marital status:      Spouse name: N/A   • Number of children: N/A   • Years of education: N/A     Occupational History   • Not on file.     Social History Main Topics   • Smoking status: Current Every Day Smoker     Packs/day: 1.50     Years: 24.00     Types: Cigarettes   • Smokeless tobacco: Never Used   • Alcohol use No   • Drug use: Yes     Types: Inhaled      Comment: " marijuana daily - LAST USED 9/28/2015 0900   • Sexual activity: Not on file     Other Topics Concern   • Not on file     Social History Narrative   • No narrative on file     Living situation: Lives in [place] [facility] with wife.   PCP : Pcp Not In Computer    Physical Exam     Vitals:    12/27/18 0430 12/27/18 0500 12/27/18 0530 12/27/18 0555   BP:    143/92   Pulse: (!) 107 (!) 110 97 68   Resp: (!) 10   18   Temp:    36.8 °C (98.2 °F)   TempSrc:    Temporal   SpO2: 96% 94% 95% 99%   Weight:    90.4 kg (199 lb 4.7 oz)   Height:         Body mass index is 27.03 kg/m².  /92   Pulse 68   Temp 36.8 °C (98.2 °F) (Temporal)   Resp 18   Ht 1.829 m (6')   Wt 90.4 kg (199 lb 4.7 oz)   SpO2 99%   BMI 27.03 kg/m²   O2 therapy: Pulse Oximetry: 99 %, O2 (LPM): 0, O2 Delivery: None (Room Air)    Physical Exam   Constitutional: He is oriented to person, place, and time and well-developed, well-nourished, and in no distress. No distress.   HENT:   Head: Normocephalic and atraumatic.   Right Ear: External ear normal.   Left Ear: External ear normal.   Nose: Nose normal.   Mouth/Throat: Oropharynx is clear and moist. No oropharyngeal exudate.   Eyes: Pupils are equal, round, and reactive to light. Conjunctivae and EOM are normal. Right eye exhibits no discharge. Left eye exhibits no discharge. No scleral icterus.   Neck: Normal range of motion. Neck supple. No JVD present. No tracheal deviation present. No thyromegaly present.   Cardiovascular: Normal rate, regular rhythm and intact distal pulses.  Exam reveals no gallop and no friction rub.    No murmur heard.  Pulmonary/Chest: Effort normal and breath sounds normal. No stridor. No respiratory distress. He has no rales. He exhibits no tenderness.   Abdominal: Soft. Bowel sounds are normal. He exhibits no distension. There is no tenderness. There is no rebound.   Musculoskeletal: Normal range of motion. He exhibits no edema, tenderness or deformity.   Lymphadenopathy:     He  has no cervical adenopathy.   Neurological: He is alert and oriented to person, place, and time. He has normal reflexes. He displays normal reflexes. No cranial nerve deficit. Gait normal. Coordination normal. GCS score is 15.   Skin: Skin is warm and dry. No rash noted. He is not diaphoretic. No erythema. No pallor.   Psychiatric: Mood, memory, affect and judgment normal.   Nursing note and vitals reviewed.        Data Review       Old Records Request:   Completed  Current Records review/summary: Completed    Lab Data Review:  Recent Results (from the past 24 hour(s))   EKG    Collection Time: 18  9:59 PM   Result Value Ref Range    Report       [facility] Emergency Dept.    Test Date:  2018  Pt Name:    SY LEDESMA               Department: ER  MRN:        [id number]                      Room:       [facility]  Gender:     Male                         Technician: [id number]  :        1975                   Requested By:JORGE LUIS HERNANDEZ  Order #:    [id number]                    Reading MD: Jorge Luis Hernandez MD    Measurements  Intervals                                Axis  Rate:       111                          P:          66  UT:         160                          QRS:        6  QRSD:       116                          T:          22  QT:         352  QTc:        479    Interpretive Statements  SINUS TACHYCARDIA    Electronically Signed On 2018 1:57:41 PST by Jorge Luis Hernandez MD     CBC WITH DIFFERENTIAL    Collection Time: 18 10:10 PM   Result Value Ref Range    WBC 14.0 (H) 4.8 - 10.8 K/uL    RBC 4.71 4.70 - 6.10 M/uL    Hemoglobin 14.7 14.0 - 18.0 g/dL    Hematocrit 43.7 42.0 - 52.0 %    MCV 92.8 81.4 - 97.8 fL    MCH 31.2 27.0 - 33.0 pg    MCHC 33.6 (L) 33.7 - 35.3 g/dL    RDW 43.9 35.9 - 50.0 fL    Platelet Count 220 164 - 446 K/uL    MPV 11.6 9.0 - 12.9 fL    Neutrophils-Polys 66.80 44.00 - 72.00 %    Lymphocytes 24.00 22.00 - 41.00 %    Monocytes 7.50 0.00 -  13.40 %    Eosinophils 0.90 0.00 - 6.90 %    Basophils 0.40 0.00 - 1.80 %    Immature Granulocytes 0.40 0.00 - 0.90 %    Nucleated RBC 0.00 /100 WBC    Neutrophils (Absolute) 9.35 (H) 1.82 - 7.42 K/uL    Lymphs (Absolute) 3.35 1.00 - 4.80 K/uL    Monos (Absolute) 1.05 (H) 0.00 - 0.85 K/uL    Eos (Absolute) 0.12 0.00 - 0.51 K/uL    Baso (Absolute) 0.05 0.00 - 0.12 K/uL    Immature Granulocytes (abs) 0.06 0.00 - 0.11 K/uL    NRBC (Absolute) 0.00 K/uL   BASIC METABOLIC PANEL    Collection Time: 12/26/18 10:10 PM   Result Value Ref Range    Sodium 137 135 - 145 mmol/L    Potassium 3.4 (L) 3.6 - 5.5 mmol/L    Chloride 104 96 - 112 mmol/L    Co2 20 20 - 33 mmol/L    Glucose 232 (H) 65 - 99 mg/dL    Bun 15 8 - 22 mg/dL    Creatinine 0.59 0.50 - 1.40 mg/dL    Calcium 9.1 8.5 - 10.5 mg/dL    Anion Gap 13.0 (H) 0.0 - 11.9   BTYPE NATRIURETIC PEPTIDE    Collection Time: 12/26/18 10:10 PM   Result Value Ref Range    B Natriuretic Peptide 197 (H) 0 - 100 pg/mL   MAGNESIUM    Collection Time: 12/26/18 10:10 PM   Result Value Ref Range    Magnesium 1.9 1.5 - 2.5 mg/dL   TROPONIN    Collection Time: 12/26/18 10:10 PM   Result Value Ref Range    Troponin I 6.02 (H) 0.00 - 0.04 ng/mL   IONIZED CALCIUM    Collection Time: 12/26/18 10:10 PM   Result Value Ref Range    Ionized Calcium 1.2 1.1 - 1.3 mmol/L   ESTIMATED GFR    Collection Time: 12/26/18 10:10 PM   Result Value Ref Range    GFR If African American >60 >60 mL/min/1.73 m 2    GFR If Non African American >60 >60 mL/min/1.73 m 2   TROPONIN    Collection Time: 12/27/18 12:10 AM   Result Value Ref Range    Troponin I 6.12 (H) 0.00 - 0.04 ng/mL   EC-ECHOCARDIOGRAM COMPLETE W/O CONT    Collection Time: 12/27/18 12:23 AM   Result Value Ref Range    Eject.Frac. MOD BP 25.14     Eject.Frac. MOD 4C 27.47     Eject.Frac. MOD 2C 23.49     Left Ventrical Ejection Fraction 25    URINE DRUG SCREEN    Collection Time: 12/27/18  1:20 AM   Result Value Ref Range    Amphetamines Urine Negative  Negative    Barbiturates Negative Negative    Benzodiazepines Negative Negative    Cocaine Metabolite Negative Negative    Methadone Negative Negative    Opiates Negative Negative    Oxycodone Negative Negative    Phencyclidine -Pcp Negative Negative    Propoxyphene Negative Negative    Cannabinoid Metab Positive (A) Negative   CBC with Differential    Collection Time: 12/27/18  4:28 AM   Result Value Ref Range    WBC 10.8 4.8 - 10.8 K/uL    RBC 4.05 (L) 4.70 - 6.10 M/uL    Hemoglobin 12.8 (L) 14.0 - 18.0 g/dL    Hematocrit 37.4 (L) 42.0 - 52.0 %    MCV 92.3 81.4 - 97.8 fL    MCH 31.6 27.0 - 33.0 pg    MCHC 34.2 33.7 - 35.3 g/dL    RDW 44.0 35.9 - 50.0 fL    Platelet Count 183 164 - 446 K/uL    MPV 11.9 9.0 - 12.9 fL    Neutrophils-Polys 63.20 44.00 - 72.00 %    Lymphocytes 27.20 22.00 - 41.00 %    Monocytes 7.90 0.00 - 13.40 %    Eosinophils 0.90 0.00 - 6.90 %    Basophils 0.30 0.00 - 1.80 %    Immature Granulocytes 0.50 0.00 - 0.90 %    Nucleated RBC 0.00 /100 WBC    Neutrophils (Absolute) 6.81 1.82 - 7.42 K/uL    Lymphs (Absolute) 2.93 1.00 - 4.80 K/uL    Monos (Absolute) 0.85 0.00 - 0.85 K/uL    Eos (Absolute) 0.10 0.00 - 0.51 K/uL    Baso (Absolute) 0.03 0.00 - 0.12 K/uL    Immature Granulocytes (abs) 0.05 0.00 - 0.11 K/uL    NRBC (Absolute) 0.00 K/uL   Comp Metabolic Panel (CMP)    Collection Time: 12/27/18  4:28 AM   Result Value Ref Range    Sodium 136 135 - 145 mmol/L    Potassium 3.3 (L) 3.6 - 5.5 mmol/L    Chloride 106 96 - 112 mmol/L    Co2 21 20 - 33 mmol/L    Anion Gap 9.0 0.0 - 11.9    Glucose 251 (H) 65 - 99 mg/dL    Bun 14 8 - 22 mg/dL    Creatinine 0.51 0.50 - 1.40 mg/dL    Calcium 8.6 8.5 - 10.5 mg/dL    AST(SGOT) 24 12 - 45 U/L    ALT(SGPT) 37 2 - 50 U/L    Alkaline Phosphatase 99 30 - 99 U/L    Total Bilirubin 0.7 0.1 - 1.5 mg/dL    Albumin 3.3 3.2 - 4.9 g/dL    Total Protein 7.2 6.0 - 8.2 g/dL    Globulin 3.9 (H) 1.9 - 3.5 g/dL    A-G Ratio 0.8 g/dL   TSH (Thyroid Stimulating Hormone)     Collection Time: 12/27/18  4:28 AM   Result Value Ref Range    TSH 1.490 0.380 - 5.330 uIU/mL   Lipid Profile (Lipid Panel) Fasting    Collection Time: 12/27/18  4:28 AM   Result Value Ref Range    Cholesterol,Tot 79 (L) 100 - 199 mg/dL    Triglycerides 73 0 - 149 mg/dL    HDL 22 (A) >=40 mg/dL    LDL 42 <100 mg/dL   Magnesium    Collection Time: 12/27/18  4:28 AM   Result Value Ref Range    Magnesium 1.7 1.5 - 2.5 mg/dL   TROPONIN    Collection Time: 12/27/18  4:28 AM   Result Value Ref Range    Troponin I 6.57 (H) 0.00 - 0.04 ng/mL   ESTIMATED GFR    Collection Time: 12/27/18  4:28 AM   Result Value Ref Range    GFR If African American >60 >60 mL/min/1.73 m 2    GFR If Non African American >60 >60 mL/min/1.73 m 2       Imaging/Procedures Review:    Independant Imaging Review: Completed  EC-ECHOCARDIOGRAM COMPLETE W/O CONT   Final Result      DX-CHEST-PORTABLE (1 VIEW)   Final Result      Linear densities in the left lower lung likely represent atelectasis or scarring.               EKG:   EKG Independant Review: Completed  QTc:479, HR: 111, Normal Sinus Rhythm,  Q waves in inferior leads, and mild ST elevations V1 to V3.    Records reviewed and summarized in current documentation :  Yes  [employer] teaching service handout given to patient:  No         Assessment/Plan     * NSTEMI (non-ST elevated myocardial infarction) (HCC)- (present on admission)   Assessment & Plan    HEART Score: 6 pts. 12-16.6% risk of MACE in 6 weeks.  I do highly suspicious event today prior to presenting complaint onset: Chest pain, left shoulder pain left jaw pain.  Troponin 4.3 at outside facility 6.12 then 6.02.  EKG with evidence of Q waves in inferior leads, and mild ST elevations V1 to V3.   Echo 25% with global hypokinesia.       -Cardiology following.  Recommends angiography in the morning  -Trend troponins and EKGs  -Received aspirin 325 in outside facility, start aspirin 81 mg daily  -Start atorvastatin 80 mg daily  -Start metoprolol  25 mg twice daily  -Patient needs to quit smoking.  Smoking cessation counseling given at bedside he is pre-contemplative  -Continue to monitor     Postural dizziness with presyncope- (present on admission)   Assessment & Plan    Likely secondary to arrhythmias/ischemia.  No recurrence of events since receiving diltiazem at outside facility.  No recurrence of the event here.      -Continue treatment for NSTEMI  -Cardiology following: Appreciate his recommendations  -Continue to monitor     Type 2 diabetes mellitus without complication (HCC)- (present on admission)   Assessment & Plan    Reports to be managed with diet and exercise.  His A1c began the year was 11.8% last check according to him was 4 months ago 7.8%.  Finger stick checks at home, fasting blood glucose usually 120-150.    -NPO for angio in AM  -Diabetic diet afterwards  -Lantus 5 units & low correctional SSI  -Hypoglycemic protocol with Accu-Cheks  -Continue to monitor     SVT (supraventricular tachycardia) (HCC)- (present on admission)   Assessment & Plan    Reported episode of SVT on monitor to 212 at outside facility.  Received dose of diltiazem at outside facility resulted in success for for rate control and cessation of his symptoms.    -Continue metoprolol 25 mg twice daily  -Continue to monitor     Hypokalemia- (present on admission)   Assessment & Plan    Potassium 3.4 on admission.  Received supplementation and also facility.    -10 mg potassium IV push  -K-Dur 40 mg daily  -Continue to monitor  -Keep K above 4           Anticipated Hospital stay:  >2 midnights        Quality Measures  Quality-Core Measures   Reviewed items::  Labs reviewed, EKG reviewed, Medications reviewed and Radiology images reviewed  Thomas catheter::  No Thomas  DVT prophylaxis pharmacological::  Heparin    PCP: Pcp Not In Computer

## 2018-12-27 NOTE — PROCEDURES
Cardiac Catheterization report    12/27/2018  2:32 PM    Referring MD:     Primary Care Provider: Pcp Not In Computer    Indication for procedure: Non-ST elevation myocardial infarction    Procedure:  · Coronary arteriograms  · Left heart catheterization  · FFR of LAD    Complications:  None.    EBL: <10 CC    Specimens: None    Procedure:  The risks and benefits of cardiac catheterization and possible intervention were explained to the patient including death, heart attack, stroke, and emergency surgery.  The patient verbalized understanding and wished to proceed.  The patient was brought to the cardiac catheterization laboratory in the fasting state and prepped and draped in the usual sterile fashion.  The right wrist was locally anesthetized with lidocaine and the right radial artery was cannulated with 5/6-Armenian equipment and standard radial cocktail was given.  Coronary angiography was performed using JR 4 and JL 3.5  diagnostic catheters in the usual fashion, results mentioned below.  JR 4 catheter was used to cross the aortic valve to perform  left heart catheterization.    1.  Left main coronary artery:  Normal.  2.  Left anterior descending artery: mid to distal LAD has 70-80% stenosis. Large first diagonal has 80% stenosis.  3.  Left circumflex coronary artery:  Gives one large first marginal branch which has luminal irregularities. Mid LCX has a chronic total occlusion.  4.  Right coronary artery:  Occluded proximal portion, small trickle of flow is noted. Distal RCA gets collaterals from RCA.  This is a right dominant system.  LVEDP: 21    IFR/FFR  Given the patient presentation we elected to perform FFR. A 6 Fr EBU 3.5 guiding catheter was used to cannulate the left main coronary ostia. A [place] pressure guide wire was introduced into coronary artery and normalized in the proximal portion. Then the wire was advanced to cross the stenosis in the LAD coronary artery. 120 mcg of intracoronary  adenosine was given and FFR measurements were taken in the usual fashion. The measured FFR value was 0.73, significant.    Once all the views were obtained, all wires and catheters were removed from the patient without difficulty.  A Vasc-Band was placed over the right radial artery and the radial artery sheath was removed without difficulty.      Complications:  There was no evidence of hematoma or other complications.  The patient was transferred to the recovery area in stable condition.     Impression:  Triple vessel CAD as described above.    Recommendations:  CABG.  Complex PCI with impella support is an alternative second line option.    Sedation time:  I supervised moderate sedation over a trained independent nursing staff,   Start time 13:45  End time 14:27    MARTINE Briones  [facility]

## 2018-12-27 NOTE — ED NOTES
Lab called with critical result of trop 6.02 at 2304. Critical lab result read back.   Dr. Martini notified of critical lab result at 2304.  Critical lab result read back by Dr. Queen.

## 2018-12-27 NOTE — ASSESSMENT & PLAN NOTE
HEART Score: 6 pts. 12-16.6% risk of MACE in 6 weeks.  eKG with evidence of Q waves in inferior leads, and mild ST elevations V1 to V3.   Echo 25% with global hypokinesia.   Troponin peaked: 6.57 to 6.13,     Plan  -Continue high intensity statins, aspirin, and heparin drip  -Possible cardiac catheterization today,   -Remain n.p.o.

## 2018-12-27 NOTE — PROGRESS NOTES
Cardiology Follow Up Progress Note    Date of Service  12/27/2018    Attending Physician  Delmar Quintero M.D.    Reason for consultation     Non-STEMI, troponin peaked at 6.57, near syncope, echo 12/27/18 revealed LVEF 25%, no significant valvular abnormalities    History of   Diabetes type 2, chronic back pain secondary to scoliosis, diverticulitis status post ileostomy and reversal, tobacco abuse      Admitted for     Transferred from [facility] with supraventricular tachycardia, left jaw pain, shoulder pain lasted 20-30 minutes, no chest pain, no nausea, no vomiting, no dizziness, no diaphoresis    Interim Events    12/27/18, maintaining normal sinus rhythm, no recurrent SVT, LHC revealed 3 v CAD       Review of Systems  Review of Systems   Respiratory: Negative for apnea, cough, choking, chest tightness, shortness of breath, wheezing and stridor.    Cardiovascular: Negative for chest pain.       Vital signs in last 24 hours  Temp:  [36.6 °C (97.9 °F)-37 °C (98.6 °F)] 37 °C (98.6 °F)  Pulse:  [] 105  Resp:  [7-43] 16  BP: (138-151)/(89-93) 138/93    Physical Exam  Physical Exam   Constitutional: He is oriented to person, place, and time.   HENT:   Head: Normocephalic.   Eyes: Conjunctivae are normal.   Neck: No thyromegaly present.   Cardiovascular: Normal rate and regular rhythm.    Pulses:       Carotid pulses are 2+ on the right side, and 2+ on the left side.       Radial pulses are 2+ on the right side, and 2+ on the left side.   Pulmonary/Chest: He has no wheezes.   Abdominal: Soft.   Musculoskeletal: He exhibits no edema.   Neurological: He is alert and oriented to person, place, and time.   Skin: Skin is warm and dry.       Lab Review  Lab Results   Component Value Date/Time    WBC 10.8 12/27/2018 04:28 AM    RBC 4.05 (L) 12/27/2018 04:28 AM    HEMOGLOBIN 12.8 (L) 12/27/2018 04:28 AM    HEMATOCRIT 37.4 (L) 12/27/2018 04:28 AM    MCV 92.3 12/27/2018 04:28 AM    MCH 31.6 12/27/2018 04:28  AM    MCHC 34.2 12/27/2018 04:28 AM    MPV 11.9 12/27/2018 04:28 AM      Lab Results   Component Value Date/Time    SODIUM 136 12/27/2018 04:28 AM    POTASSIUM 3.3 (L) 12/27/2018 04:28 AM    CHLORIDE 106 12/27/2018 04:28 AM    CO2 21 12/27/2018 04:28 AM    GLUCOSE 251 (H) 12/27/2018 04:28 AM    BUN 14 12/27/2018 04:28 AM    CREATININE 0.51 12/27/2018 04:28 AM      Lab Results   Component Value Date/Time    ASTSGOT 24 12/27/2018 04:28 AM    ALTSGPT 37 12/27/2018 04:28 AM     Lab Results   Component Value Date/Time    CHOLSTRLTOT 79 (L) 12/27/2018 04:28 AM    LDL 42 12/27/2018 04:28 AM    HDL 22 (A) 12/27/2018 04:28 AM    TRIGLYCERIDE 73 12/27/2018 04:28 AM    TROPONINI 6.13 (H) 12/27/2018 08:02 AM       Recent Labs      12/26/18   2210   BNPBTYPENAT  197*         Assessment  Non-STEMI, troponin peaked at 6  New cardiomyopathy, LVEF 25%  Near syncope  Nonsustained supraventricular tachycardia  Diabetes type 2, poorly controlled, A1c 12  Utox positive for cannabinoid  TSH normal  Low K, replete, repeat BMP      Plan  S/p [facility] 12/27/18, triple vessel disease  CABG tomorrow at 7:30 am, appreciate CTS  Replete potassium

## 2018-12-27 NOTE — PROGRESS NOTES
Pt transported to floor from ED @ 0550. Pt placed on tele monitor. Monitor room notified. Pt admitted and assessed. Call light and belongings within reach. POC discussed with patient and patient wife at bedside.

## 2018-12-27 NOTE — ED PROVIDER NOTES
ED Provider Note    Scribed for Jorge Luis Queen M.D. by Braulio Paez. 12/26/2018  10:42 PM    Means of arrival: Med Flight  History obtained from: Patient  History limited by: None    CHIEF COMPLAINT  Chief Complaint   Patient presents with   • Chest Pain     transfer s/p SVT; in ST now       HPI    Rikki Khalil is a 43 y.o. male with history of chronic back pain secondary to scoliosis and diabetes presenting with intermittent dizziness onset this morning at 11:30 AM. He states he has had over 3 episodes of the dizziness with associated near syncopal events. No alleviating or exacerbating factors are identified at this time. The patient initially went [place] [facility] [place] and was found to be having intermittent episodes of SVT, causing his dizziness. He states prior to the dizziness today, he experienced diffuse left sided pain and worsening back pain last night, but reports it resolved on its own. The patient denies any recent associated fevers or leg swelling. He reports to smoke marijuana.     REVIEW OF SYSTEMS  See HPI for further details.   Pertinent positives include: dizziness or near syncope  Pertinent negative include: fevers or leg swelling.  10 + review of systems otherwise negative     PAST MEDICAL HISTORY   has a past medical history of Arthritis; Back pain; Bowel obstruction; Glaucoma; Scoliosis; and Type II or unspecified type diabetes mellitus without mention of complication, not stated as uncontrolled.    SOCIAL HISTORY  Social History     Social History Main Topics   • Smoking status: Current Every Day Smoker     Packs/day: 1.50     Years: 24.00     Types: Cigarettes   • Smokeless tobacco: Never Used   • Alcohol use No   • Drug use: Yes     Types: Inhaled      Comment: marijuana daily - LAST USED 9/28/2015 0900       SURGICAL HISTORY   has a past surgical history that includes low anterior resection robotic xi (7/14/2015); colostomy creation laparoscopic (7/14/2015);  "and ileostomy (9/29/2015).    CURRENT MEDICATIONS  Home Medications     Reviewed by Catherine White (Pharmacy Tech) on 12/26/18 at 2304  Med List Status: Complete   Medication Last Dose Status        Patient Latrell Taking any Medications                       ALLERGIES  Allergies   Allergen Reactions   • Penicillins Unspecified     \"Blue baby\"  RXN= as a child  Tolerated ceftriaxone 6/2015       PHYSICAL EXAM  VITAL SIGNS: /89   Pulse (!) 110   Temp 36.6 °C (97.9 °F)   Resp 20   Ht 1.829 m (6')   Wt 95.3 kg (210 lb)   BMI 28.48 kg/m²     SpO2: I interpret this pulse oximetry as normal  Constitutional: Well developed, Well nourished, No distress, Non-toxic appearance.   HENT: Normocephalic, Atraumatic, Bilateral external ears normal, Oropharynx moist, No oral exudates.   Eyes: PERRLA, EOMI, Conjunctiva normal, No discharge.   Neck: No tenderness, Supple, No stridor. No gross thyroid megaly.   Lymphatic: No lymphadenopathy noted.   Cardiovascular: Distant heart sounds, Tachycardic heart rate, Normal rhythm.   Thorax & Lungs: Clear to auscultation bilaterally, No respiratory distress, No wheezing, No crackles.   Abdomen: Soft, No tenderness, No masses, No pulsatile masses.   Skin: Warm, Dry, No erythema, No rash.   Extremities:, No edema No cyanosis.   Musculoskeletal: No tenderness to palpation or major deformities noted.  Intact distal pulses  Neurologic: Awake, alert. Moves all extremities spontaneously.  Psychiatric: Affect normal, Judgment normal, Mood normal.       DIAGNOSTIC STUDIES / PROCEDURES    EKG  EKG Interpretation:  EKG (my interpretation): Sinus Tachycardia, rate 111, axis normal, no ST or T-wave abnormalities,  normal EKG, is similar to prior.  Relevant clinical issues: Near Syncope    LABORATORY  Results for orders placed or performed during the hospital encounter of 12/26/18   CBC WITH DIFFERENTIAL   Result Value Ref Range    WBC 14.0 (H) 4.8 - 10.8 K/uL    RBC 4.71 4.70 - 6.10 M/uL    " Hemoglobin 14.7 14.0 - 18.0 g/dL    Hematocrit 43.7 42.0 - 52.0 %    MCV 92.8 81.4 - 97.8 fL    MCH 31.2 27.0 - 33.0 pg    MCHC 33.6 (L) 33.7 - 35.3 g/dL    RDW 43.9 35.9 - 50.0 fL    Platelet Count 220 164 - 446 K/uL    MPV 11.6 9.0 - 12.9 fL    Neutrophils-Polys 66.80 44.00 - 72.00 %    Lymphocytes 24.00 22.00 - 41.00 %    Monocytes 7.50 0.00 - 13.40 %    Eosinophils 0.90 0.00 - 6.90 %    Basophils 0.40 0.00 - 1.80 %    Immature Granulocytes 0.40 0.00 - 0.90 %    Nucleated RBC 0.00 /100 WBC    Neutrophils (Absolute) 9.35 (H) 1.82 - 7.42 K/uL    Lymphs (Absolute) 3.35 1.00 - 4.80 K/uL    Monos (Absolute) 1.05 (H) 0.00 - 0.85 K/uL    Eos (Absolute) 0.12 0.00 - 0.51 K/uL    Baso (Absolute) 0.05 0.00 - 0.12 K/uL    Immature Granulocytes (abs) 0.06 0.00 - 0.11 K/uL    NRBC (Absolute) 0.00 K/uL   BASIC METABOLIC PANEL   Result Value Ref Range    Sodium 137 135 - 145 mmol/L    Potassium 3.4 (L) 3.6 - 5.5 mmol/L    Chloride 104 96 - 112 mmol/L    Co2 20 20 - 33 mmol/L    Glucose 232 (H) 65 - 99 mg/dL    Bun 15 8 - 22 mg/dL    Creatinine 0.59 0.50 - 1.40 mg/dL    Calcium 9.1 8.5 - 10.5 mg/dL    Anion Gap 13.0 (H) 0.0 - 11.9   BTYPE NATRIURETIC PEPTIDE   Result Value Ref Range    B Natriuretic Peptide 197 (H) 0 - 100 pg/mL   MAGNESIUM   Result Value Ref Range    Magnesium 1.9 1.5 - 2.5 mg/dL   TROPONIN   Result Value Ref Range    Troponin I 6.02 (H) 0.00 - 0.04 ng/mL   IONIZED CALCIUM   Result Value Ref Range    Ionized Calcium 1.2 1.1 - 1.3 mmol/L   ESTIMATED GFR   Result Value Ref Range    GFR If African American >60 >60 mL/min/1.73 m 2    GFR If Non African American >60 >60 mL/min/1.73 m 2   EKG   Result Value Ref Range    Report       [facility]    Test Date:  2018  Pt Name:    SY LEDESMA               Department: ER  MRN:        [id number]                      Room:        11  Gender:     Male                         Technician: [id number]  :        1975                    Requested By:THEODORA HERNANDEZ  Order #:    [id number]                    Reading MD:    Measurements  Intervals                                Axis  Rate:       111                          P:          66  ND:         160                          QRS:        6  QRSD:       116                          T:          22  QT:         352  QTc:        479    Interpretive Statements  SINUS TACHYCARDIA  NONSPECIFIC INTRAVENTRICULAR CONDUCTION DELAY  INFERIOR INFARCT, AGE INDETERMINATE  Compared to ECG 06/03/2015 08:06:26  Intraventricular conduction delay now present  Myocardial infarct finding now present  Sinus rhythm no longer present         RADIOLOGY    DX-CHEST-PORTABLE (1 VIEW)   Final Result      Linear densities in the left lower lung likely represent atelectasis or scarring.      EC-ECHOCARDIOGRAM COMPLETE W/ CONT    (Results Pending)     Chest XR, 1 view (my read): No focal infiltrates or large effusion.  Normal mediastinum. No prior films were immediately available for comparison.  Impression: Normal chest x-ray      CHART REVIEW  Pertinent medical chart information was reviewed and reveals: Patient was found to have Troponin of 4.13 at transferring facility. He additionally had a potassium of 3.2, repleated at outside hospital, otherwise unremarkable.       COURSE & MEDICAL DECISION MAKING  Pertinent Labs & Imaging studies reviewed. (See chart for details)    Differential diagnoses include but not limited to: Arrhythmia, ACS, cardiomyopathy, drug abuse, anemia, orthostatic hypotension, electrolyte abnormality    10:42 PM - Patient seen and examined at bedside. Discussed plan of care, including labs, imaging, and admit. Patient agrees to the plan of care. Ordered for DX-Chest, Estimated GFR, Magnesium, Troponin, Ionized Calcium, CBC with differential, BMP, BNP, and EKG to evaluate his symptoms.     10:53 PM - Paged Cardiology.    10:56 PM - Consult with Dr. Yi, Cardiology, who agrees to see the patient. He  also recommends echocardiogram.     10:57 PM - Paged [facility] .     11:06 PM - Consult with [facility] , who agrees to admit the patient.     Vitals:    12/26/18 2159 12/26/18 2201 12/26/18 2308   BP:  151/89    Pulse:  (!) 110    Resp:  20 (!) 10   Temp:  36.6 °C (97.9 °F)    SpO2:   98%   Weight: 95.3 kg (210 lb)     Height: 1.829 m (6')         Medications - No data to display    43-year-old male presenting as transfer from outside facility for elevated troponin.  Presented to outside hospital for dizziness and near syncope, found to have elevated troponin, also in SVT which resolved with diltiazem.  On arrival, patient hemodynamic stable, slight tachycardia, no acute exam findings.  Patient denies any chest pain or acute symptoms at this time.  States he could walk to the hospital right now as he feels fine.  EKG without any ischemic changes, sinus tachycardia, no evidence of SVT.  Troponin elevated to 6, up from 4 at outside facility.  Patient denies drug abuse however history reportedly endorse methamphetamine abuse to other providers.  Currently without signs of myocarditis, heart failure, infectious process, arrhythmia.  Dr. Yi with cardiology was consulted by outside hospital prior to arrival, consulted again here, will follow patient, recommends echocardiogram.  Consulted medicine for admission.  Patient hemoglobin stable and comfortable at time of admission.    DISPOSITION  Patient will be admitted to [facility] , in guarded condition.    FINAL IMPRESSION  Visit Diagnoses     ICD-10-CM   1. NSTEMI (non-ST elevated myocardial infarction) ([facility]) I21.4        Braulio DOOLEY (Francois), am scribing for, and in the presence of, Jorge Luis Queen M.D..    Electronically signed by: Braulio Paez (Francois), 12/26/2018    Jorge Luis DOOLEY M.D. personally performed the services described in this documentation, as scribed by Braulio Paez in my presence, and it is both accurate and  complete.    C.    The note accurately reflects work and decisions made by me.  Jorge Luis Queen  12/27/2018  12:38 AM

## 2018-12-27 NOTE — ASSESSMENT & PLAN NOTE
Reports to be managed with diet and exercise.  His A1c began the year was 11.8% last check according to him was 4 months ago 7.8%.  Finger stick checks at home, fasting blood glucose usually 120-150.  Plan  -Lantus 5 units & low correctional SSI  -Hypoglycemic protocol with Accu-Cheks  -Continue to monitor

## 2018-12-27 NOTE — PROGRESS NOTES
Patient back from cath lab at 1500  Instructions to restart heparin drip at 1630.  TR band to right wrist with 13 ml of air. Post op vital started.

## 2018-12-27 NOTE — PROGRESS NOTES
Films reviewed  Patient seen at bedside, family present    Scheduled for CABG in the AM 0730  Pre-op orders placed. Patient to transfer to [facility] for further prep and education    See consult note from Dr. Barrios for details.

## 2018-12-27 NOTE — PROGRESS NOTES
Patient to go to cath lab today. [facility] IM team are diagnosing NSTEMI, Cardiology consult last night diagnosing the same.    Echocardiogram posted at 0023 this morning reveals an ejection fraction of 25%, global hypokinesis. There is no HF diagnosis as of the time of this note's filing.    Fariba ALVARADO RN, [facility] ext. [phone] M-F

## 2018-12-27 NOTE — CONSULTS
"Cardiology Consult Note    DOS: 12/26/2018    Consulting physician: Jorge Luis Queen MD    Chief complaint/Reason for consult: Positive cardiac enzymes    HPI:  Patient is a 44 yo WM. He has a history of prior diverticulitis and ileostomy (now s/p take down), type II DM which he says he controls through diet. He was in his usual state of health until yesterday evening around midnight when he had sudden onset L sided jaw ache radiating down to L shoulder and some tingling in the L arm. He said this only lasted a few minutes. Went away on its own. Subsequently fast forward to earlier today around noon. He said \"something washed over me\" and his legs became incredibly week and subsequently had near syncopal event. This was over in a matter of seconds. He had this again a few hours later. Told his friend who works at the [employer] in [facility] with him to take him to ED. There he had a couple more episodes. On telemetry he was found to have nonsustained SVT per ED doc there. IV dilt was given. Troponin was positive and he was transferred to [facility] for further evaluation.    ROS (+ highlighted in red):  Constitutional: Fevers/chills/fatigue/weightloss  HEENT: Blurry vision/eye pain/sore throat/hearing loss  Respiratory: Shortness of breath/cough  Cardiovascular: Chest pain/palpitations/edema/orthopnea/syncope  GI: Nausea/vomitting/diarrhea  MSK: Arthralgias/myagias/muscle weakness  Skin: Rash/sores  Neurological: Numbness/tremors/vertigo  Endocrine: Excessive thirst/polyuria/cold intolerance/heat intolerance  Psych: Depression/anxiety    Past Medical History:   Diagnosis Date   • Arthritis     All joints   • Back pain    • Bowel obstruction    • Glaucoma     monika eyes   • Scoliosis    • Type II or unspecified type diabetes mellitus without mention of complication, not stated as uncontrolled     insulin and oral meds       Past Surgical History:   Procedure Laterality Date   • ILEOSTOMY  9/29/2015    Procedure: " "ILEOSTOMY TAKE DOWN;  Surgeon: Carter Kumar M.D.;  Location: SURGERY [facility];  Service:    • LOW ANTERIOR RESECTION ROBOTIC XI  7/14/2015    Procedure: Robotic low anterior resection, takedown enterocutaneous fistula, omental flap, ileostomy creation;  Surgeon: Carter Kumar M.D.;  Location: SURGERY [facility];  Service:    • COLOSTOMY CREATION LAPAROSCOPIC  7/14/2015    Procedure: COLOSTOMY CREATION LAPAROSCOPIC FOR: LAP ILEOSTOMY;  Surgeon: Carter Kumar M.D.;  Location: SURGERY [facility];  Service:        Social History     Social History   • Marital status:      Spouse name: N/A   • Number of children: N/A   • Years of education: N/A     Occupational History   • Not on file.     Social History Main Topics   • Smoking status: Current Every Day Smoker     Packs/day: 1.50     Years: 24.00     Types: Cigarettes   • Smokeless tobacco: Never Used   • Alcohol use No   • Drug use: Yes     Types: Inhaled      Comment: marijuana daily - LAST USED 9/28/2015 0900   • Sexual activity: Not on file     Other Topics Concern   • Not on file     Social History Narrative   • No narrative on file       No family history on file.    Allergies   Allergen Reactions   • Penicillins Unspecified     \"Blue baby\"  RXN= as a child  Tolerated ceftriaxone 6/2015       No current facility-administered medications for this encounter.      No current outpatient prescriptions on file.       Physical Exam:  Vitals:    12/26/18 2159 12/26/18 2201 12/26/18 2308   BP:  151/89    Pulse:  (!) 110    Resp:  20 (!) 10   Temp:  36.6 °C (97.9 °F)    SpO2:   98%   Weight: 95.3 kg (210 lb)     Height: 1.829 m (6')       General appearance: NAD, conversant, very poor dentition  Eyes: anicteric sclerae, moist conjunctivae; no lid-lag; PERRLA  HENT: Atraumatic; oropharynx clear with moist mucous membranes and no mucosal ulcerations; normal hard and soft palate  Neck: Trachea midline; FROM, supple, no thyromegaly or " lymphadenopathy  Lungs: CTA, with normal respiratory effort and no intercostal retractions  CV: RRR, no MRGs, no JVD   Abdomen: Soft, non-tender; no masses or HSM  Extremities: No peripheral edema or extremity lymphadenopathy  Skin: Normal temperature, turgor and texture; no rash, ulcers or subcutaneous nodules  Psych: Appropriate affect, alert and oriented to person, place and time    Data:  Labs reviewed    CXR interpreted by me:  WNL    EKG interpreted by me:   Sinus tachycardia    Impression/Plan:  1)NSTEMI  2)?SVT  3)T2DM    -Unclear cause of elevated troponin, but significantly elevated and had some event causing jaw pain about midnight last night  -I would recommend empiric treatment for ACS with ASA/statin/parenteral anticoagulation  -Echo  -Agree with Utox  -I am also unable to find any tracings of his nonsustained SVT  -Not unreasonable to start PO beta blockers (i.e. Metop BID) for him  -If his troponins continue to trend up consistent with ACS event, then I think reasonable to pursue coronary angiography to define his coronary anatomy    Rain Yi MD

## 2018-12-27 NOTE — ED TRIAGE NOTES
[place] [facility] [place] [facility] [place] multiple episodes of SVT.  Pt was seen in ER for evaluation after he was near syncopal at home the previous night.  He describes feeling very weak and  diaphoretic at that time.  Pt was found to go in and out of SVT intermittently at transferring facility; he received a total of 25mg diltiazem and on arrival is in sinus tach rate 106.  Trop 4.1 at transferring facility.  Pt received 325 ASA prior to arrival.  Pt denies chest pain, SOB and denies feeling dizzy or weak at this time.

## 2018-12-27 NOTE — PROGRESS NOTES
me Rikki Khalil     1975   Age/Sex 43 y.o. male   MRN [id number]   Code Status DNAR/DNI      After 5PM or if no immediate response to page, please call for cross-coverage  Attending/Team: Dr. Quintero/Steven See Patient List for primary contact information  Call [phone] to page    1st Call - Day Intern (R1):   Dr. Tavarez 2nd Call - Day Sr. Resident (R2/R3):   Dr. Adamson          Reason for interval visit  (Principal Problem)   NSTEMI (non-ST elevated myocardial infarction) (HCC)    Interval Problem Daily Status Update  (24 hours)   No acute overnight event  Has history of heart attack in the mother in the young age (?),  Who passed at age 65  No any other family history of cardiac disease  Denies use of alcohol, no illegal drugs  Takes marijuana and has been doing this since age 15  History of diabetes type 2 but not on medication    Cardiac echo shows ejection fraction of 25%, with global hypokinesis    Plan is cardiac catheterization today  QTc 479  Patient is on appropriate medications including statins beta-blockers and heparin drip    ROS  Pertinent negatives: No chest pain, shortness of breath, vomiting, lower extremity swelling  Patient admits to mild to moderate nausea  10 point review of system done and negative otherwise    Consultants/Specialty  Cardiology  PCP: @PCP    Disposition  Likely will go home after cardiac catheterization      Quality-Core Measures  Reviewed items::  Labs reviewed, EKG reviewed, Medications reviewed and Radiology images reviewed  Thomas catheter::  No Thomas  DVT prophylaxis pharmacological::  Heparin         Physical Exam       Vitals:    18 0500 18 0530 18 0555 18 0817   BP:   143/92 138/93   Pulse: (!) 110 97 68 (!) 105   Resp:   18 16   Temp:   36.8 °C (98.2 °F) 37 °C (98.6 °F)   TempSrc:   Temporal Temporal   SpO2: 94% 95% 99% 99%   Weight:   90.4 kg (199 lb 4.7 oz)    Height:         Body mass index is 27.03 kg/m². Weight: 90.4 kg  (199 lb 4.7 oz)  Oxygen Therapy:  Pulse Oximetry: 99 %, O2 (LPM): 0, O2 Delivery: None (Room Air)    Physical Exam  General: Fairly young male, not in distress,   HEENT: Normocephalic, atraumatic, no jaundice or scleral icterus, no pallor, No cervical lymphadenopathy, no thyromegaly,  No tonsillar exudate, no throat erythyema,    Respiratory:lungs clear to auscultation b/l, breath sounds vesicular, air entry adequate b/l,no wheezing, rales or crackles  Cardiac:Regular, rate and rhythm,  S1/S2 present, no M/R/G  GI: abdomen non distended, soft, non tender, no organomegaly, bowel sounds normoactive  Neuro: AAOX4,   Psychiatry: Good judgment, good mood  Msk/Extremities: radial, dorsalis pedis pulses 2+b/l, no joint swelling or redness, ROM intact, no lower  extremity edema  Skin: No rash      Lab Data Review:       12/27/2018  11:34 AM    Recent Labs      12/26/18 2210 12/27/18 0428   SODIUM  137  136   POTASSIUM  3.4*  3.3*   CHLORIDE  104  106   CO2  20  21   BUN  15  14   CREATININE  0.59  0.51   MAGNESIUM  1.9  1.7   CALCIUM  9.1  8.6       Recent Labs      12/26/18 2210 12/27/18 0428   ALTSGPT   --   37   ASTSGOT   --   24   ALKPHOSPHAT   --   99   TBILIRUBIN   --   0.7   GLUCOSE  232*  251*       Recent Labs      12/26/18 2210 12/27/18 0428 12/27/18   0802   RBC  4.71  4.05*   --    HEMOGLOBIN  14.7  12.8*   --    HEMATOCRIT  43.7  37.4*   --    PLATELETCT  220  183   --    PROTHROMBTM   --    --   14.7*   APTT   --    --   73.1*   INR   --    --   1.14*       Recent Labs      12/26/18 2210 12/27/18 0428   WBC  14.0*  10.8   NEUTSPOLYS  66.80  63.20   LYMPHOCYTES  24.00  27.20   MONOCYTES  7.50  7.90   EOSINOPHILS  0.90  0.90   BASOPHILS  0.40  0.30   ASTSGOT   --   24   ALTSGPT   --   37   ALKPHOSPHAT   --   99   TBILIRUBIN   --   0.7           Assessment/Plan     * NSTEMI (non-ST elevated myocardial infarction) (HCC)- (present on admission)   Assessment & Plan    HEART Score: 6 pts.  12-16.6% risk of MACE in 6 weeks.  eKG with evidence of Q waves in inferior leads, and mild ST elevations V1 to V3.   Echo 25% with global hypokinesia.   Troponin peaked: 6.57 to 6.13,     Plan  -Continue high intensity statins, aspirin, and heparin drip  -Possible cardiac catheterization today,   -Remain n.p.o.     Systolic congestive heart failure (HCC)   Assessment & Plan    Currently denies shortness of breath  No lower extremity swelling  U tox screen negative except for cannabinoid  Echo shows ejection fraction of 25%, global hypokinesis  Because of systolic heart failure likely in the setting of ischemic event versus toxin induced versus familiar  Plan:  -Continue management of NSTEMI  -Currently not in acute exacerbation         Postural dizziness with presyncope- (present on admission)   Assessment & Plan    Likely secondary to arrhythmias/ischemia.    Currently denies any symptoms  Plan  -Treatment of N STEMI  -Continue to monitor on     Type 2 diabetes mellitus without complication (HCC)- (present on admission)   Assessment & Plan    Reports to be managed with diet and exercise.  His A1c began the year was 11.8% last check according to him was 4 months ago 7.8%.  Finger stick checks at home, fasting blood glucose usually 120-150.  Plan  -Lantus 5 units & low correctional SSI  -Hypoglycemic protocol with Accu-Cheks  -Continue to monitor     SVT (supraventricular tachycardia) (HCC)- (present on admission)   Assessment & Plan    Reported episode of SVT on monitor to 212 at outside facility.  Received dose of diltiazem at outside facility resulted in success for for rate control and cessation of his symptoms.    -Continue metoprolol 25 mg twice daily  -Continue to monitor     Hypokalemia- (present on admission)   Assessment & Plan    Potassium of 3.3 today  Plan  -We will replete, keep potassium greater than 4

## 2018-12-27 NOTE — ASSESSMENT & PLAN NOTE
Likely secondary to arrhythmias/ischemia.    Currently denies any symptoms  Plan  -Treatment of N STEMI  -Continue to monitor on

## 2018-12-28 ENCOUNTER — APPOINTMENT (OUTPATIENT)
Dept: CARDIOLOGY | Facility: MEDICAL CENTER | Age: 43
DRG: 233 | End: 2018-12-28
Attending: THORACIC SURGERY (CARDIOTHORACIC VASCULAR SURGERY)
Payer: MEDICAID

## 2018-12-28 ENCOUNTER — APPOINTMENT (OUTPATIENT)
Dept: RADIOLOGY | Facility: MEDICAL CENTER | Age: 43
DRG: 233 | End: 2018-12-28
Attending: THORACIC SURGERY (CARDIOTHORACIC VASCULAR SURGERY)
Payer: MEDICAID

## 2018-12-28 PROBLEM — Z99.11 VENTILATOR DEPENDENT (HCC): Status: ACTIVE | Noted: 2018-12-28

## 2018-12-28 PROBLEM — I50.21 ACUTE SYSTOLIC CONGESTIVE HEART FAILURE (HCC): Status: ACTIVE | Noted: 2018-12-27

## 2018-12-28 PROBLEM — Z95.1 S/P CABG X 3: Status: ACTIVE | Noted: 2018-12-28

## 2018-12-28 LAB
ACT BLD: 120 SEC (ref 74–137)
ACT BLD: 362 SEC (ref 74–137)
ACTION RANGE TRIGGERED IACRT: NO
ACTION RANGE TRIGGERED IACRT: YES
ACTION RANGE TRIGGERED IACRT: YES
ANION GAP SERPL CALC-SCNC: 5 MMOL/L (ref 0–11.9)
APTT PPP: 31.6 SEC (ref 24.7–36)
APTT PPP: 34 SEC (ref 24.7–36)
BARCODED ABORH UBTYP: 6200
BARCODED PRD CODE UBPRD: NORMAL
BARCODED UNIT NUM UBUNT: NORMAL
BASE EXCESS BLDA CALC-SCNC: -2 MMOL/L (ref -4–3)
BASE EXCESS BLDA CALC-SCNC: -3 MMOL/L (ref -4–3)
BASE EXCESS BLDA CALC-SCNC: -3 MMOL/L (ref -4–3)
BASE EXCESS BLDA CALC-SCNC: -4 MMOL/L (ref -4–3)
BASE EXCESS BLDA CALC-SCNC: -5 MMOL/L (ref -4–3)
BASE EXCESS BLDA CALC-SCNC: -5 MMOL/L (ref -4–3)
BASE EXCESS BLDA CALC-SCNC: -6 MMOL/L (ref -4–3)
BASE EXCESS BLDA CALC-SCNC: -7 MMOL/L (ref -4–3)
BASOPHILS # BLD AUTO: 0.3 % (ref 0–1.8)
BASOPHILS # BLD: 0.03 K/UL (ref 0–0.12)
BODY TEMPERATURE: ABNORMAL DEGREES
BUN SERPL-MCNC: 14 MG/DL (ref 8–22)
CA-I BLD ISE-SCNC: 1.1 MMOL/L (ref 1.1–1.3)
CA-I BLD ISE-SCNC: 1.14 MMOL/L (ref 1.1–1.3)
CA-I BLD ISE-SCNC: 1.17 MMOL/L (ref 1.1–1.3)
CA-I BLD ISE-SCNC: 1.26 MMOL/L (ref 1.1–1.3)
CA-I BLD ISE-SCNC: 1.26 MMOL/L (ref 1.1–1.3)
CA-I BLD ISE-SCNC: 1.51 MMOL/L (ref 1.1–1.3)
CALCIUM SERPL-MCNC: 8.9 MG/DL (ref 8.5–10.5)
CHLORIDE SERPL-SCNC: 105 MMOL/L (ref 96–112)
CO2 BLDA-SCNC: 20 MMOL/L (ref 20–33)
CO2 BLDA-SCNC: 20 MMOL/L (ref 20–33)
CO2 BLDA-SCNC: 21 MMOL/L (ref 20–33)
CO2 BLDA-SCNC: 22 MMOL/L (ref 20–33)
CO2 BLDA-SCNC: 23 MMOL/L (ref 20–33)
CO2 BLDA-SCNC: 24 MMOL/L (ref 20–33)
CO2 BLDA-SCNC: 24 MMOL/L (ref 20–33)
CO2 BLDA-SCNC: 25 MMOL/L (ref 20–33)
CO2 SERPL-SCNC: 22 MMOL/L (ref 20–33)
COMPONENT P 8504P: NORMAL
CREAT SERPL-MCNC: 0.57 MG/DL (ref 0.5–1.4)
EKG IMPRESSION: NORMAL
EOSINOPHIL # BLD AUTO: 0.2 K/UL (ref 0–0.51)
EOSINOPHIL NFR BLD: 2.1 % (ref 0–6.9)
ERYTHROCYTE [DISTWIDTH] IN BLOOD BY AUTOMATED COUNT: 44.7 FL (ref 35.9–50)
ERYTHROCYTE [DISTWIDTH] IN BLOOD BY AUTOMATED COUNT: 46 FL (ref 35.9–50)
GLUCOSE BLD-MCNC: 105 MG/DL (ref 65–99)
GLUCOSE BLD-MCNC: 141 MG/DL (ref 65–99)
GLUCOSE BLD-MCNC: 157 MG/DL (ref 65–99)
GLUCOSE BLD-MCNC: 184 MG/DL (ref 65–99)
GLUCOSE BLD-MCNC: 202 MG/DL (ref 65–99)
GLUCOSE BLD-MCNC: 214 MG/DL (ref 65–99)
GLUCOSE BLD-MCNC: 220 MG/DL (ref 65–99)
GLUCOSE BLD-MCNC: 229 MG/DL (ref 65–99)
GLUCOSE BLD-MCNC: 239 MG/DL (ref 65–99)
GLUCOSE BLD-MCNC: 286 MG/DL (ref 65–99)
GLUCOSE BLD-MCNC: 59 MG/DL (ref 65–99)
GLUCOSE BLD-MCNC: 95 MG/DL (ref 65–99)
GLUCOSE SERPL-MCNC: 235 MG/DL (ref 65–99)
HCO3 BLDA-SCNC: 18.9 MMOL/L (ref 17–25)
HCO3 BLDA-SCNC: 19.4 MMOL/L (ref 17–25)
HCO3 BLDA-SCNC: 19.8 MMOL/L (ref 17–25)
HCO3 BLDA-SCNC: 20.6 MMOL/L (ref 17–25)
HCO3 BLDA-SCNC: 21.9 MMOL/L (ref 17–25)
HCO3 BLDA-SCNC: 22.2 MMOL/L (ref 17–25)
HCO3 BLDA-SCNC: 22.9 MMOL/L (ref 17–25)
HCO3 BLDA-SCNC: 23.5 MMOL/L (ref 17–25)
HCT VFR BLD AUTO: 36.7 % (ref 42–52)
HCT VFR BLD AUTO: 37.9 % (ref 42–52)
HCT VFR BLD CALC: 23 % (ref 42–52)
HCT VFR BLD CALC: 27 % (ref 42–52)
HCT VFR BLD CALC: 32 % (ref 42–52)
HCT VFR BLD CALC: 35 % (ref 42–52)
HCT VFR BLD CALC: 37 % (ref 42–52)
HCT VFR BLD CALC: 38 % (ref 42–52)
HGB BLD-MCNC: 10.9 G/DL (ref 14–18)
HGB BLD-MCNC: 11.9 G/DL (ref 14–18)
HGB BLD-MCNC: 12 G/DL (ref 14–18)
HGB BLD-MCNC: 12.6 G/DL (ref 14–18)
HGB BLD-MCNC: 12.6 G/DL (ref 14–18)
HGB BLD-MCNC: 12.9 G/DL (ref 14–18)
HGB BLD-MCNC: 7.8 G/DL (ref 14–18)
HGB BLD-MCNC: 9.2 G/DL (ref 14–18)
HOROWITZ INDEX BLDA+IHG-RTO: 119 MM[HG]
HOROWITZ INDEX BLDA+IHG-RTO: 129 MM[HG]
HOROWITZ INDEX BLDA+IHG-RTO: 174 MM[HG]
HOROWITZ INDEX BLDA+IHG-RTO: 255 MM[HG]
HOROWITZ INDEX BLDA+IHG-RTO: 264 MM[HG]
HOROWITZ INDEX BLDA+IHG-RTO: 338 MM[HG]
HOROWITZ INDEX BLDA+IHG-RTO: 60 MM[HG]
HOROWITZ INDEX BLDA+IHG-RTO: 83 MM[HG]
IMM GRANULOCYTES # BLD AUTO: 0.04 K/UL (ref 0–0.11)
IMM GRANULOCYTES NFR BLD AUTO: 0.4 % (ref 0–0.9)
INR PPP: 1.3 (ref 0.87–1.13)
INST. QUALIFIED PATIENT IIQPT: YES
LV EJECT FRACT  99904: 25
LV EJECT FRACT MOD 2C 99903: 25.18
LV EJECT FRACT MOD 4C 99902: 29.43
LV EJECT FRACT MOD BP 99901: 21.72
LYMPHOCYTES # BLD AUTO: 3.16 K/UL (ref 1–4.8)
LYMPHOCYTES NFR BLD: 32.4 % (ref 22–41)
MAGNESIUM SERPL-MCNC: 2.9 MG/DL (ref 1.5–2.5)
MCH RBC QN AUTO: 30.9 PG (ref 27–33)
MCH RBC QN AUTO: 31.6 PG (ref 27–33)
MCHC RBC AUTO-ENTMCNC: 32.7 G/DL (ref 33.7–35.3)
MCHC RBC AUTO-ENTMCNC: 33.2 G/DL (ref 33.7–35.3)
MCV RBC AUTO: 94.6 FL (ref 81.4–97.8)
MCV RBC AUTO: 95 FL (ref 81.4–97.8)
MONOCYTES # BLD AUTO: 0.65 K/UL (ref 0–0.85)
MONOCYTES NFR BLD AUTO: 6.7 % (ref 0–13.4)
NEUTROPHILS # BLD AUTO: 5.66 K/UL (ref 1.82–7.42)
NEUTROPHILS NFR BLD: 58.1 % (ref 44–72)
NRBC # BLD AUTO: 0 K/UL
NRBC BLD-RTO: 0 /100 WBC
O2/TOTAL GAS SETTING VFR VENT: 100 %
O2/TOTAL GAS SETTING VFR VENT: 100 %
O2/TOTAL GAS SETTING VFR VENT: 21 %
O2/TOTAL GAS SETTING VFR VENT: 50 %
O2/TOTAL GAS SETTING VFR VENT: 65 %
O2/TOTAL GAS SETTING VFR VENT: 80 %
PCO2 BLDA: 32 MMHG (ref 26–37)
PCO2 BLDA: 38.8 MMHG (ref 26–37)
PCO2 BLDA: 39.4 MMHG (ref 26–37)
PCO2 BLDA: 40.2 MMHG (ref 26–37)
PCO2 BLDA: 40.4 MMHG (ref 26–37)
PCO2 BLDA: 40.5 MMHG (ref 26–37)
PCO2 BLDA: 42.8 MMHG (ref 26–37)
PCO2 BLDA: 47.4 MMHG (ref 26–37)
PCO2 TEMP ADJ BLDA: 32 MMHG (ref 26–37)
PCO2 TEMP ADJ BLDA: 37.8 MMHG (ref 26–37)
PCO2 TEMP ADJ BLDA: 38.7 MMHG (ref 26–37)
PCO2 TEMP ADJ BLDA: 39.4 MMHG (ref 26–37)
PCO2 TEMP ADJ BLDA: 39.5 MMHG (ref 26–37)
PCO2 TEMP ADJ BLDA: 40.4 MMHG (ref 26–37)
PCO2 TEMP ADJ BLDA: 42.8 MMHG (ref 26–37)
PCO2 TEMP ADJ BLDA: 47.4 MMHG (ref 26–37)
PH BLDA: 7.28 [PH] (ref 7.4–7.5)
PH BLDA: 7.3 [PH] (ref 7.4–7.5)
PH BLDA: 7.32 [PH] (ref 7.4–7.5)
PH BLDA: 7.35 [PH] (ref 7.4–7.5)
PH BLDA: 7.36 [PH] (ref 7.4–7.5)
PH BLDA: 7.39 [PH] (ref 7.4–7.5)
PH TEMP ADJ BLDA: 7.29 [PH] (ref 7.4–7.5)
PH TEMP ADJ BLDA: 7.3 [PH] (ref 7.4–7.5)
PH TEMP ADJ BLDA: 7.32 [PH] (ref 7.4–7.5)
PH TEMP ADJ BLDA: 7.32 [PH] (ref 7.4–7.5)
PH TEMP ADJ BLDA: 7.33 [PH] (ref 7.4–7.5)
PH TEMP ADJ BLDA: 7.36 [PH] (ref 7.4–7.5)
PH TEMP ADJ BLDA: 7.37 [PH] (ref 7.4–7.5)
PH TEMP ADJ BLDA: 7.39 [PH] (ref 7.4–7.5)
PLATELET # BLD AUTO: 143 K/UL (ref 164–446)
PLATELET # BLD AUTO: 160 K/UL (ref 164–446)
PLATELET # BLD AUTO: 178 K/UL (ref 164–446)
PLATELET # BLD AUTO: 183 K/UL (ref 164–446)
PMV BLD AUTO: 11.4 FL (ref 9–12.9)
PMV BLD AUTO: 11.7 FL (ref 9–12.9)
PO2 BLDA: 103 MMHG (ref 64–87)
PO2 BLDA: 119 MMHG (ref 64–87)
PO2 BLDA: 166 MMHG (ref 64–87)
PO2 BLDA: 211 MMHG (ref 64–87)
PO2 BLDA: 48 MMHG (ref 64–87)
PO2 BLDA: 71 MMHG (ref 64–87)
PO2 BLDA: 83 MMHG (ref 64–87)
PO2 BLDA: 87 MMHG (ref 64–87)
PO2 TEMP ADJ BLDA: 100 MMHG (ref 64–87)
PO2 TEMP ADJ BLDA: 115 MMHG (ref 64–87)
PO2 TEMP ADJ BLDA: 166 MMHG (ref 64–87)
PO2 TEMP ADJ BLDA: 211 MMHG (ref 64–87)
PO2 TEMP ADJ BLDA: 48 MMHG (ref 64–87)
PO2 TEMP ADJ BLDA: 71 MMHG (ref 64–87)
PO2 TEMP ADJ BLDA: 80 MMHG (ref 64–87)
PO2 TEMP ADJ BLDA: 85 MMHG (ref 64–87)
POTASSIUM BLD-SCNC: 4 MMOL/L (ref 3.6–5.5)
POTASSIUM BLD-SCNC: 4.1 MMOL/L (ref 3.6–5.5)
POTASSIUM BLD-SCNC: 4.2 MMOL/L (ref 3.6–5.5)
POTASSIUM BLD-SCNC: 4.5 MMOL/L (ref 3.6–5.5)
POTASSIUM BLD-SCNC: 5 MMOL/L (ref 3.6–5.5)
POTASSIUM BLD-SCNC: 5.8 MMOL/L (ref 3.6–5.5)
POTASSIUM SERPL-SCNC: 4.1 MMOL/L (ref 3.6–5.5)
POTASSIUM SERPL-SCNC: 4.5 MMOL/L (ref 3.6–5.5)
PRODUCT TYPE UPROD: NORMAL
PROTHROMBIN TIME: 16.3 SEC (ref 12–14.6)
RBC # BLD AUTO: 3.88 M/UL (ref 4.7–6.1)
RBC # BLD AUTO: 3.99 M/UL (ref 4.7–6.1)
SAO2 % BLDA: 100 % (ref 93–99)
SAO2 % BLDA: 80 % (ref 93–99)
SAO2 % BLDA: 94 % (ref 93–99)
SAO2 % BLDA: 95 % (ref 93–99)
SAO2 % BLDA: 96 % (ref 93–99)
SAO2 % BLDA: 98 % (ref 93–99)
SAO2 % BLDA: 98 % (ref 93–99)
SAO2 % BLDA: 99 % (ref 93–99)
SODIUM BLD-SCNC: 133 MMOL/L (ref 135–145)
SODIUM BLD-SCNC: 135 MMOL/L (ref 135–145)
SODIUM BLD-SCNC: 137 MMOL/L (ref 135–145)
SODIUM BLD-SCNC: 138 MMOL/L (ref 135–145)
SODIUM BLD-SCNC: 139 MMOL/L (ref 135–145)
SODIUM BLD-SCNC: 140 MMOL/L (ref 135–145)
SODIUM SERPL-SCNC: 132 MMOL/L (ref 135–145)
SPECIMEN DRAWN FROM PATIENT: ABNORMAL
UNIT STATUS USTAT: NORMAL
WBC # BLD AUTO: 23 K/UL (ref 4.8–10.8)
WBC # BLD AUTO: 9.7 K/UL (ref 4.8–10.8)

## 2018-12-28 PROCEDURE — 700105 HCHG RX REV CODE 258: Performed by: PHYSICIAN ASSISTANT

## 2018-12-28 PROCEDURE — 82330 ASSAY OF CALCIUM: CPT | Mod: 91

## 2018-12-28 PROCEDURE — 500734 HCHG INSERT, STEALTH: Performed by: THORACIC SURGERY (CARDIOTHORACIC VASCULAR SURGERY)

## 2018-12-28 PROCEDURE — 84295 ASSAY OF SERUM SODIUM: CPT | Mod: 91

## 2018-12-28 PROCEDURE — 700102 HCHG RX REV CODE 250 W/ 637 OVERRIDE(OP): Performed by: CLINICAL NURSE SPECIALIST

## 2018-12-28 PROCEDURE — 93010 ELECTROCARDIOGRAM REPORT: CPT | Performed by: INTERNAL MEDICINE

## 2018-12-28 PROCEDURE — C1894 INTRO/SHEATH, NON-LASER: HCPCS | Performed by: THORACIC SURGERY (CARDIOTHORACIC VASCULAR SURGERY)

## 2018-12-28 PROCEDURE — 71045 X-RAY EXAM CHEST 1 VIEW: CPT

## 2018-12-28 PROCEDURE — 99291 CRITICAL CARE FIRST HOUR: CPT | Performed by: INTERNAL MEDICINE

## 2018-12-28 PROCEDURE — 500312 HCHG CONNECTOR, BLAKE DRAIN 1-WAY: Performed by: THORACIC SURGERY (CARDIOTHORACIC VASCULAR SURGERY)

## 2018-12-28 PROCEDURE — 700101 HCHG RX REV CODE 250: Performed by: THORACIC SURGERY (CARDIOTHORACIC VASCULAR SURGERY)

## 2018-12-28 PROCEDURE — 85027 COMPLETE CBC AUTOMATED: CPT

## 2018-12-28 PROCEDURE — 501519 HCHG SUTURE, E PACK: Performed by: THORACIC SURGERY (CARDIOTHORACIC VASCULAR SURGERY)

## 2018-12-28 PROCEDURE — 160048 HCHG OR STATISTICAL LEVEL 1-5: Performed by: THORACIC SURGERY (CARDIOTHORACIC VASCULAR SURGERY)

## 2018-12-28 PROCEDURE — 700102 HCHG RX REV CODE 250 W/ 637 OVERRIDE(OP): Performed by: NURSE PRACTITIONER

## 2018-12-28 PROCEDURE — P9045 ALBUMIN (HUMAN), 5%, 250 ML: HCPCS | Performed by: NURSE PRACTITIONER

## 2018-12-28 PROCEDURE — 06BP4ZZ EXCISION OF RIGHT SAPHENOUS VEIN, PERCUTANEOUS ENDOSCOPIC APPROACH: ICD-10-PCS | Performed by: THORACIC SURGERY (CARDIOTHORACIC VASCULAR SURGERY)

## 2018-12-28 PROCEDURE — 02100Z9 BYPASS CORONARY ARTERY, ONE ARTERY FROM LEFT INTERNAL MAMMARY, OPEN APPROACH: ICD-10-PCS | Performed by: THORACIC SURGERY (CARDIOTHORACIC VASCULAR SURGERY)

## 2018-12-28 PROCEDURE — 700111 HCHG RX REV CODE 636 W/ 250 OVERRIDE (IP): Performed by: CLINICAL NURSE SPECIALIST

## 2018-12-28 PROCEDURE — 36430 TRANSFUSION BLD/BLD COMPNT: CPT

## 2018-12-28 PROCEDURE — 5A02110 ASSISTANCE WITH CARDIAC OUTPUT USING BALLOON PUMP, INTERMITTENT: ICD-10-PCS | Performed by: THORACIC SURGERY (CARDIOTHORACIC VASCULAR SURGERY)

## 2018-12-28 PROCEDURE — C9248 INJ, CLEVIDIPINE BUTYRATE: HCPCS

## 2018-12-28 PROCEDURE — 700105 HCHG RX REV CODE 258: Performed by: THORACIC SURGERY (CARDIOTHORACIC VASCULAR SURGERY)

## 2018-12-28 PROCEDURE — 85347 COAGULATION TIME ACTIVATED: CPT

## 2018-12-28 PROCEDURE — P9034 PLATELETS, PHERESIS: HCPCS

## 2018-12-28 PROCEDURE — 160042 HCHG SURGERY MINUTES - EA ADDL 1 MIN LEVEL 5: Performed by: THORACIC SURGERY (CARDIOTHORACIC VASCULAR SURGERY)

## 2018-12-28 PROCEDURE — 85610 PROTHROMBIN TIME: CPT

## 2018-12-28 PROCEDURE — 110372 HCHG SHELL REV 278: Performed by: THORACIC SURGERY (CARDIOTHORACIC VASCULAR SURGERY)

## 2018-12-28 PROCEDURE — 021109W BYPASS CORONARY ARTERY, TWO ARTERIES FROM AORTA WITH AUTOLOGOUS VENOUS TISSUE, OPEN APPROACH: ICD-10-PCS | Performed by: THORACIC SURGERY (CARDIOTHORACIC VASCULAR SURGERY)

## 2018-12-28 PROCEDURE — 93005 ELECTROCARDIOGRAM TRACING: CPT | Performed by: THORACIC SURGERY (CARDIOTHORACIC VASCULAR SURGERY)

## 2018-12-28 PROCEDURE — 94150 VITAL CAPACITY TEST: CPT

## 2018-12-28 PROCEDURE — 503000 HCHG SUTURE, OHS: Performed by: THORACIC SURGERY (CARDIOTHORACIC VASCULAR SURGERY)

## 2018-12-28 PROCEDURE — 700102 HCHG RX REV CODE 250 W/ 637 OVERRIDE(OP): Performed by: THORACIC SURGERY (CARDIOTHORACIC VASCULAR SURGERY)

## 2018-12-28 PROCEDURE — C1725 CATH, TRANSLUMIN NON-LASER: HCPCS | Performed by: THORACIC SURGERY (CARDIOTHORACIC VASCULAR SURGERY)

## 2018-12-28 PROCEDURE — A6402 STERILE GAUZE <= 16 SQ IN: HCPCS | Performed by: THORACIC SURGERY (CARDIOTHORACIC VASCULAR SURGERY)

## 2018-12-28 PROCEDURE — 700111 HCHG RX REV CODE 636 W/ 250 OVERRIDE (IP)

## 2018-12-28 PROCEDURE — C1898 LEAD, PMKR, OTHER THAN TRANS: HCPCS | Performed by: THORACIC SURGERY (CARDIOTHORACIC VASCULAR SURGERY)

## 2018-12-28 PROCEDURE — 85730 THROMBOPLASTIN TIME PARTIAL: CPT

## 2018-12-28 PROCEDURE — 85025 COMPLETE CBC W/AUTO DIFF WBC: CPT

## 2018-12-28 PROCEDURE — 84132 ASSAY OF SERUM POTASSIUM: CPT | Mod: 91

## 2018-12-28 PROCEDURE — 82947 ASSAY GLUCOSE BLOOD QUANT: CPT

## 2018-12-28 PROCEDURE — 700101 HCHG RX REV CODE 250: Performed by: NURSE PRACTITIONER

## 2018-12-28 PROCEDURE — 82962 GLUCOSE BLOOD TEST: CPT | Mod: 91

## 2018-12-28 PROCEDURE — 700105 HCHG RX REV CODE 258

## 2018-12-28 PROCEDURE — 770022 HCHG ROOM/CARE - ICU (200)

## 2018-12-28 PROCEDURE — 160031 HCHG SURGERY MINUTES - 1ST 30 MINS LEVEL 5: Performed by: THORACIC SURGERY (CARDIOTHORACIC VASCULAR SURGERY)

## 2018-12-28 PROCEDURE — 93325 DOPPLER ECHO COLOR FLOW MAPG: CPT

## 2018-12-28 PROCEDURE — 37799 UNLISTED PX VASCULAR SURGERY: CPT

## 2018-12-28 PROCEDURE — 500378 HCHG DRAIN, J-VAC ROUND 19FR: Performed by: THORACIC SURGERY (CARDIOTHORACIC VASCULAR SURGERY)

## 2018-12-28 PROCEDURE — 700111 HCHG RX REV CODE 636 W/ 250 OVERRIDE (IP): Performed by: THORACIC SURGERY (CARDIOTHORACIC VASCULAR SURGERY)

## 2018-12-28 PROCEDURE — A9270 NON-COVERED ITEM OR SERVICE: HCPCS | Performed by: CLINICAL NURSE SPECIALIST

## 2018-12-28 PROCEDURE — 500896 HCHG PACK, VASCULAR (FOR ROOM 10): Performed by: THORACIC SURGERY (CARDIOTHORACIC VASCULAR SURGERY)

## 2018-12-28 PROCEDURE — 503001 HCHG PERFUSION: Performed by: THORACIC SURGERY (CARDIOTHORACIC VASCULAR SURGERY)

## 2018-12-28 PROCEDURE — 700111 HCHG RX REV CODE 636 W/ 250 OVERRIDE (IP): Performed by: ANESTHESIOLOGY

## 2018-12-28 PROCEDURE — A9270 NON-COVERED ITEM OR SERVICE: HCPCS | Performed by: THORACIC SURGERY (CARDIOTHORACIC VASCULAR SURGERY)

## 2018-12-28 PROCEDURE — A9270 NON-COVERED ITEM OR SERVICE: HCPCS | Performed by: NURSE PRACTITIONER

## 2018-12-28 PROCEDURE — 500890 HCHG PACK, OPEN HEART: Performed by: THORACIC SURGERY (CARDIOTHORACIC VASCULAR SURGERY)

## 2018-12-28 PROCEDURE — 700105 HCHG RX REV CODE 258: Performed by: NURSE PRACTITIONER

## 2018-12-28 PROCEDURE — 160009 HCHG ANES TIME/MIN: Performed by: THORACIC SURGERY (CARDIOTHORACIC VASCULAR SURGERY)

## 2018-12-28 PROCEDURE — 85014 HEMATOCRIT: CPT | Mod: 91

## 2018-12-28 PROCEDURE — 80048 BASIC METABOLIC PNL TOTAL CA: CPT

## 2018-12-28 PROCEDURE — C1751 CATH, INF, PER/CENT/MIDLINE: HCPCS | Performed by: THORACIC SURGERY (CARDIOTHORACIC VASCULAR SURGERY)

## 2018-12-28 PROCEDURE — 5A1221Z PERFORMANCE OF CARDIAC OUTPUT, CONTINUOUS: ICD-10-PCS | Performed by: THORACIC SURGERY (CARDIOTHORACIC VASCULAR SURGERY)

## 2018-12-28 PROCEDURE — 500767 HCHG KIT, ENDOSCOPIC VEIN HARVEST: Performed by: THORACIC SURGERY (CARDIOTHORACIC VASCULAR SURGERY)

## 2018-12-28 PROCEDURE — 500359 HCHG DILATORS, PARSONNET (OHS): Performed by: THORACIC SURGERY (CARDIOTHORACIC VASCULAR SURGERY)

## 2018-12-28 PROCEDURE — 700101 HCHG RX REV CODE 250: Performed by: CLINICAL NURSE SPECIALIST

## 2018-12-28 PROCEDURE — B24BZZ4 ULTRASONOGRAPHY OF HEART WITH AORTA, TRANSESOPHAGEAL: ICD-10-PCS | Performed by: THORACIC SURGERY (CARDIOTHORACIC VASCULAR SURGERY)

## 2018-12-28 PROCEDURE — 501745 HCHG WIRE, SURGICAL STEEL: Performed by: THORACIC SURGERY (CARDIOTHORACIC VASCULAR SURGERY)

## 2018-12-28 PROCEDURE — 30233H0 TRANSFUSION OF AUTOLOGOUS WHOLE BLOOD INTO PERIPHERAL VEIN, PERCUTANEOUS APPROACH: ICD-10-PCS | Performed by: THORACIC SURGERY (CARDIOTHORACIC VASCULAR SURGERY)

## 2018-12-28 PROCEDURE — 94002 VENT MGMT INPAT INIT DAY: CPT

## 2018-12-28 PROCEDURE — 82803 BLOOD GASES ANY COMBINATION: CPT | Mod: 91

## 2018-12-28 PROCEDURE — C1729 CATH, DRAINAGE: HCPCS | Performed by: THORACIC SURGERY (CARDIOTHORACIC VASCULAR SURGERY)

## 2018-12-28 PROCEDURE — 502240 HCHG MISC OR SUPPLY RC 0272: Performed by: THORACIC SURGERY (CARDIOTHORACIC VASCULAR SURGERY)

## 2018-12-28 PROCEDURE — P9047 ALBUMIN (HUMAN), 25%, 50ML: HCPCS

## 2018-12-28 PROCEDURE — 85049 AUTOMATED PLATELET COUNT: CPT | Mod: 91

## 2018-12-28 PROCEDURE — 83735 ASSAY OF MAGNESIUM: CPT

## 2018-12-28 PROCEDURE — 500385 HCHG DRAIN, PLEUROVAC ADUL: Performed by: THORACIC SURGERY (CARDIOTHORACIC VASCULAR SURGERY)

## 2018-12-28 PROCEDURE — 5A1223Z PERFORMANCE OF CARDIAC PACING, CONTINUOUS: ICD-10-PCS | Performed by: THORACIC SURGERY (CARDIOTHORACIC VASCULAR SURGERY)

## 2018-12-28 PROCEDURE — 700101 HCHG RX REV CODE 250

## 2018-12-28 PROCEDURE — 501445 HCHG STAPLER, SKIN DISP: Performed by: THORACIC SURGERY (CARDIOTHORACIC VASCULAR SURGERY)

## 2018-12-28 PROCEDURE — 700111 HCHG RX REV CODE 636 W/ 250 OVERRIDE (IP): Performed by: NURSE PRACTITIONER

## 2018-12-28 DEVICE — LOCATORS AC VEIN GRAFT (OHS) - 10/BX  SCANLAN #250-1001-83: Type: IMPLANTABLE DEVICE | Status: FUNCTIONAL

## 2018-12-28 RX ORDER — ACETAMINOPHEN 650 MG/1
650 SUPPOSITORY RECTAL EVERY 4 HOURS PRN
Status: DISCONTINUED | OUTPATIENT
Start: 2018-12-28 | End: 2018-12-29

## 2018-12-28 RX ORDER — MIDAZOLAM HYDROCHLORIDE 1 MG/ML
INJECTION INTRAMUSCULAR; INTRAVENOUS
Status: COMPLETED
Start: 2018-12-28 | End: 2018-12-28

## 2018-12-28 RX ORDER — ALUMINA, MAGNESIA, AND SIMETHICONE 2400; 2400; 240 MG/30ML; MG/30ML; MG/30ML
30 SUSPENSION ORAL EVERY 4 HOURS PRN
Status: DISCONTINUED | OUTPATIENT
Start: 2018-12-28 | End: 2018-12-29

## 2018-12-28 RX ORDER — PROMETHAZINE HYDROCHLORIDE 25 MG/1
25 SUPPOSITORY RECTAL EVERY 6 HOURS PRN
Status: DISCONTINUED | OUTPATIENT
Start: 2018-12-28 | End: 2019-01-10 | Stop reason: HOSPADM

## 2018-12-28 RX ORDER — OXYCODONE HYDROCHLORIDE 10 MG/1
10 TABLET ORAL
Status: DISCONTINUED | OUTPATIENT
Start: 2018-12-28 | End: 2018-12-28

## 2018-12-28 RX ORDER — NITROGLYCERIN 20 MG/100ML
0-100 INJECTION INTRAVENOUS CONTINUOUS
Status: DISCONTINUED | OUTPATIENT
Start: 2018-12-28 | End: 2018-12-31

## 2018-12-28 RX ORDER — TRAMADOL HYDROCHLORIDE 50 MG/1
50 TABLET ORAL EVERY 4 HOURS PRN
Status: DISCONTINUED | OUTPATIENT
Start: 2018-12-28 | End: 2018-12-30

## 2018-12-28 RX ORDER — AMOXICILLIN 250 MG
2 CAPSULE ORAL 2 TIMES DAILY
Status: DISCONTINUED | OUTPATIENT
Start: 2018-12-29 | End: 2018-12-29

## 2018-12-28 RX ORDER — BACLOFEN 10 MG/1
10 TABLET ORAL ONCE
Status: COMPLETED | OUTPATIENT
Start: 2018-12-28 | End: 2018-12-28

## 2018-12-28 RX ORDER — POTASSIUM CHLORIDE 7.45 MG/ML
10 INJECTION INTRAVENOUS ONCE
Status: COMPLETED | OUTPATIENT
Start: 2018-12-28 | End: 2018-12-28

## 2018-12-28 RX ORDER — ACETAMINOPHEN 325 MG/1
650 TABLET ORAL EVERY 4 HOURS PRN
Status: DISCONTINUED | OUTPATIENT
Start: 2018-12-28 | End: 2018-12-29

## 2018-12-28 RX ORDER — ALBUMIN, HUMAN INJ 5% 5 %
25 SOLUTION INTRAVENOUS ONCE
Status: COMPLETED | OUTPATIENT
Start: 2018-12-28 | End: 2018-12-28

## 2018-12-28 RX ORDER — SODIUM CHLORIDE 9 MG/ML
INJECTION, SOLUTION INTRAVENOUS
Status: COMPLETED
Start: 2018-12-28 | End: 2018-12-28

## 2018-12-28 RX ORDER — POLYETHYLENE GLYCOL 3350 17 G/17G
1 POWDER, FOR SOLUTION ORAL
Status: DISCONTINUED | OUTPATIENT
Start: 2018-12-29 | End: 2018-12-29

## 2018-12-28 RX ORDER — SODIUM CHLORIDE, SODIUM GLUCONATE, SODIUM ACETATE, POTASSIUM CHLORIDE AND MAGNESIUM CHLORIDE 526; 502; 368; 37; 30 MG/100ML; MG/100ML; MG/100ML; MG/100ML; MG/100ML
INJECTION, SOLUTION INTRAVENOUS PRN
Status: DISCONTINUED | OUTPATIENT
Start: 2018-12-28 | End: 2019-01-05

## 2018-12-28 RX ORDER — DEXTROSE MONOHYDRATE 25 G/50ML
25 INJECTION, SOLUTION INTRAVENOUS PRN
Status: ACTIVE | OUTPATIENT
Start: 2018-12-28 | End: 2018-12-29

## 2018-12-28 RX ORDER — MIDAZOLAM HYDROCHLORIDE 1 MG/ML
2 INJECTION INTRAMUSCULAR; INTRAVENOUS ONCE
Status: COMPLETED | OUTPATIENT
Start: 2018-12-28 | End: 2018-12-28

## 2018-12-28 RX ORDER — BISACODYL 10 MG
10 SUPPOSITORY, RECTAL RECTAL
Status: DISCONTINUED | OUTPATIENT
Start: 2018-12-29 | End: 2018-12-29

## 2018-12-28 RX ORDER — CLOPIDOGREL BISULFATE 75 MG/1
75 TABLET ORAL DAILY
Status: DISCONTINUED | OUTPATIENT
Start: 2018-12-29 | End: 2018-12-29

## 2018-12-28 RX ORDER — DEXMEDETOMIDINE HYDROCHLORIDE 4 UG/ML
0-1.5 INJECTION, SOLUTION INTRAVENOUS CONTINUOUS
Status: DISCONTINUED | OUTPATIENT
Start: 2018-12-28 | End: 2018-12-29

## 2018-12-28 RX ORDER — MAGNESIUM SULFATE 1 G/100ML
1 INJECTION INTRAVENOUS
Status: COMPLETED | OUTPATIENT
Start: 2018-12-28 | End: 2018-12-30

## 2018-12-28 RX ORDER — MORPHINE SULFATE 4 MG/ML
4 INJECTION, SOLUTION INTRAMUSCULAR; INTRAVENOUS
Status: DISCONTINUED | OUTPATIENT
Start: 2018-12-28 | End: 2018-12-28

## 2018-12-28 RX ORDER — MIDAZOLAM HYDROCHLORIDE 1 MG/ML
2 INJECTION INTRAMUSCULAR; INTRAVENOUS
Status: DISCONTINUED | OUTPATIENT
Start: 2018-12-28 | End: 2018-12-28

## 2018-12-28 RX ORDER — SODIUM CHLORIDE, SODIUM LACTATE, POTASSIUM CHLORIDE, CALCIUM CHLORIDE 600; 310; 30; 20 MG/100ML; MG/100ML; MG/100ML; MG/100ML
INJECTION, SOLUTION INTRAVENOUS
Status: ACTIVE
Start: 2018-12-28 | End: 2018-12-29

## 2018-12-28 RX ORDER — MAGNESIUM HYDROXIDE 1200 MG/15ML
LIQUID ORAL
Status: COMPLETED | OUTPATIENT
Start: 2018-12-28 | End: 2018-12-28

## 2018-12-28 RX ORDER — PAPAVERINE HYDROCHLORIDE 30 MG/ML
INJECTION INTRAMUSCULAR; INTRAVENOUS
Status: DISCONTINUED | OUTPATIENT
Start: 2018-12-28 | End: 2018-12-28 | Stop reason: HOSPADM

## 2018-12-28 RX ORDER — BACLOFEN 10 MG/1
10 TABLET ORAL 3 TIMES DAILY
Status: DISCONTINUED | OUTPATIENT
Start: 2018-12-29 | End: 2018-12-29

## 2018-12-28 RX ORDER — SODIUM CHLORIDE 9 MG/ML
INJECTION, SOLUTION INTRAVENOUS CONTINUOUS
Status: DISCONTINUED | OUTPATIENT
Start: 2018-12-28 | End: 2019-01-10 | Stop reason: HOSPADM

## 2018-12-28 RX ORDER — DIPHENHYDRAMINE HCL 25 MG
25 TABLET ORAL
Status: DISCONTINUED | OUTPATIENT
Start: 2018-12-28 | End: 2018-12-29

## 2018-12-28 RX ORDER — ONDANSETRON 2 MG/ML
4 INJECTION INTRAMUSCULAR; INTRAVENOUS EVERY 6 HOURS PRN
Status: DISCONTINUED | OUTPATIENT
Start: 2018-12-28 | End: 2019-01-10 | Stop reason: HOSPADM

## 2018-12-28 RX ORDER — MORPHINE SULFATE 10 MG/ML
5 INJECTION, SOLUTION INTRAMUSCULAR; INTRAVENOUS EVERY 4 HOURS PRN
Status: DISCONTINUED | OUTPATIENT
Start: 2018-12-28 | End: 2018-12-29

## 2018-12-28 RX ORDER — SODIUM CHLORIDE 9 MG/ML
INJECTION, SOLUTION INTRAVENOUS
Status: ACTIVE
Start: 2018-12-28 | End: 2018-12-28

## 2018-12-28 RX ORDER — LIDOCAINE 50 MG/G
1 PATCH TOPICAL EVERY 24 HOURS
Status: DISCONTINUED | OUTPATIENT
Start: 2018-12-28 | End: 2019-01-10 | Stop reason: HOSPADM

## 2018-12-28 RX ORDER — OXYCODONE HYDROCHLORIDE 5 MG/1
5 TABLET ORAL
Status: DISCONTINUED | OUTPATIENT
Start: 2018-12-28 | End: 2018-12-29

## 2018-12-28 RX ORDER — HYDROCODONE BITARTRATE AND ACETAMINOPHEN 5; 325 MG/1; MG/1
1-2 TABLET ORAL EVERY 6 HOURS PRN
Status: DISCONTINUED | OUTPATIENT
Start: 2018-12-28 | End: 2018-12-29

## 2018-12-28 RX ADMIN — METOPROLOL TARTRATE 12.5 MG: 25 TABLET, FILM COATED ORAL at 05:39

## 2018-12-28 RX ADMIN — SODIUM CHLORIDE: 9 INJECTION, SOLUTION INTRAVENOUS at 05:49

## 2018-12-28 RX ADMIN — MORPHINE SULFATE 2 MG: 4 INJECTION INTRAVENOUS at 15:38

## 2018-12-28 RX ADMIN — POTASSIUM CHLORIDE 10 MEQ: 7.46 INJECTION, SOLUTION INTRAVENOUS at 18:40

## 2018-12-28 RX ADMIN — BACLOFEN 10 MG: 10 TABLET ORAL at 20:58

## 2018-12-28 RX ADMIN — ALBUMIN (HUMAN) 25 G: 12.5 INJECTION, SOLUTION INTRAVENOUS at 15:00

## 2018-12-28 RX ADMIN — ALPRAZOLAM 0.25 MG: 0.25 TABLET ORAL at 20:26

## 2018-12-28 RX ADMIN — INSULIN HUMAN 1 UNITS: 100 INJECTION, SOLUTION PARENTERAL at 23:21

## 2018-12-28 RX ADMIN — INSULIN HUMAN 2 UNITS: 100 INJECTION, SOLUTION PARENTERAL at 22:08

## 2018-12-28 RX ADMIN — MIDAZOLAM HYDROCHLORIDE 1 MG: 1 INJECTION, SOLUTION INTRAMUSCULAR; INTRAVENOUS at 14:01

## 2018-12-28 RX ADMIN — HYDROCODONE BITARTRATE AND ACETAMINOPHEN 2 TABLET: 5; 325 TABLET ORAL at 19:13

## 2018-12-28 RX ADMIN — INSULIN LISPRO 3 UNITS: 100 INJECTION, SOLUTION INTRAVENOUS; SUBCUTANEOUS at 06:00

## 2018-12-28 RX ADMIN — DEXMEDETOMIDINE HYDROCHLORIDE 0.8 MCG/KG/HR: 100 INJECTION, SOLUTION INTRAVENOUS at 14:44

## 2018-12-28 RX ADMIN — SODIUM CHLORIDE, SODIUM GLUCONATE, SODIUM ACETATE, POTASSIUM CHLORIDE AND MAGNESIUM CHLORIDE 1000 ML: 526; 502; 368; 37; 30 INJECTION, SOLUTION INTRAVENOUS at 13:45

## 2018-12-28 RX ADMIN — MUPIROCIN 1 APPLICATION: 20 OINTMENT TOPICAL at 18:39

## 2018-12-28 RX ADMIN — SODIUM CHLORIDE 4 UNITS/HR: 9 INJECTION, SOLUTION INTRAVENOUS at 22:22

## 2018-12-28 RX ADMIN — OXYCODONE HYDROCHLORIDE 10 MG: 10 TABLET ORAL at 17:11

## 2018-12-28 RX ADMIN — PHENYLEPHRINE HYDROCHLORIDE 10 MCG/MIN: 10 INJECTION INTRAVENOUS at 15:26

## 2018-12-28 RX ADMIN — SODIUM CHLORIDE: 9 INJECTION, SOLUTION INTRAVENOUS at 14:05

## 2018-12-28 RX ADMIN — MORPHINE SULFATE 2 MG: 4 INJECTION INTRAVENOUS at 15:11

## 2018-12-28 RX ADMIN — ONDANSETRON 4 MG: 2 INJECTION INTRAMUSCULAR; INTRAVENOUS at 23:11

## 2018-12-28 RX ADMIN — OXYCODONE HYDROCHLORIDE 5 MG: 5 TABLET ORAL at 22:30

## 2018-12-28 RX ADMIN — VANCOMYCIN HYDROCHLORIDE 1500 MG: 100 INJECTION, POWDER, LYOPHILIZED, FOR SOLUTION INTRAVENOUS at 20:02

## 2018-12-28 RX ADMIN — MUPIROCIN 1 APPLICATION: 20 OINTMENT TOPICAL at 05:39

## 2018-12-28 RX ADMIN — SODIUM BICARBONATE 50 MEQ: 84 INJECTION, SOLUTION INTRAVENOUS at 13:27

## 2018-12-28 RX ADMIN — MORPHINE SULFATE 5 MG: 10 INJECTION INTRAVENOUS at 20:57

## 2018-12-28 RX ADMIN — MIDAZOLAM HYDROCHLORIDE 2 MG: 1 INJECTION, SOLUTION INTRAMUSCULAR; INTRAVENOUS at 12:57

## 2018-12-28 RX ADMIN — LIDOCAINE 1 PATCH: 50 PATCH TOPICAL at 21:25

## 2018-12-28 RX ADMIN — MAGNESIUM SULFATE IN DEXTROSE 1 G: 10 INJECTION, SOLUTION INTRAVENOUS at 13:49

## 2018-12-28 RX ADMIN — POTASSIUM CHLORIDE 10 MEQ: 7.46 INJECTION, SOLUTION INTRAVENOUS at 13:30

## 2018-12-28 RX ADMIN — FENTANYL CITRATE 50 MCG: 50 INJECTION, SOLUTION INTRAMUSCULAR; INTRAVENOUS at 18:17

## 2018-12-28 RX ADMIN — TRAMADOL HYDROCHLORIDE 50 MG: 50 TABLET, COATED ORAL at 21:24

## 2018-12-28 RX ADMIN — MIDAZOLAM HYDROCHLORIDE 2 MG: 1 INJECTION, SOLUTION INTRAMUSCULAR; INTRAVENOUS at 13:00

## 2018-12-28 ASSESSMENT — COPD QUESTIONNAIRES
COPD SCREENING SCORE: 2
COPD SCREENING SCORE: 2
DO YOU EVER COUGH UP ANY MUCUS OR PHLEGM?: NO/ONLY WITH OCCASIONAL COLDS OR INFECTIONS
DURING THE PAST 4 WEEKS HOW MUCH DID YOU FEEL SHORT OF BREATH: NONE/LITTLE OF THE TIME
HAVE YOU SMOKED AT LEAST 100 CIGARETTES IN YOUR ENTIRE LIFE: YES
DO YOU EVER COUGH UP ANY MUCUS OR PHLEGM?: NO/ONLY WITH OCCASIONAL COLDS OR INFECTIONS
HAVE YOU SMOKED AT LEAST 100 CIGARETTES IN YOUR ENTIRE LIFE: YES
DURING THE PAST 4 WEEKS HOW MUCH DID YOU FEEL SHORT OF BREATH: NONE/LITTLE OF THE TIME

## 2018-12-28 ASSESSMENT — LIFESTYLE VARIABLES: EVER_SMOKED: YES

## 2018-12-28 ASSESSMENT — PAIN SCALES - GENERAL
PAINLEVEL_OUTOF10: 4
PAINLEVEL_OUTOF10: 8
PAINLEVEL_OUTOF10: 10
PAINLEVEL_OUTOF10: 10
PAINLEVEL_OUTOF10: 0
PAINLEVEL_OUTOF10: 9
PAINLEVEL_OUTOF10: 1
PAINLEVEL_OUTOF10: 10
PAINLEVEL_OUTOF10: 9
PAINLEVEL_OUTOF10: 3
PAINLEVEL_OUTOF10: 9

## 2018-12-28 ASSESSMENT — PULMONARY FUNCTION TESTS: FVC: 1300

## 2018-12-28 NOTE — PROCEDURES
CENTRAL VENOUS/PA CATHETER   Start time 0750  End time 0800    Site:    Subclavian    Laterality:   right    Catheter:   Triple Lumen    Blood return all ports, all ports flushed:  Yes    All elements of maximal sterile barrier techniques met: Yes    Occlusive dressing, antimicrobial disc:  Yes    Ultrasound Guidance & Interpretation:  No     CXR for placement & absence of complications: Pending    Complications:  None    Additional Notes:         Tomas Jeff M.D.  12/28/2018  10:23 AM

## 2018-12-28 NOTE — CONSULTS
PULMONARY AND CRITICAL CARE MEDICINE CONSULTATION    Date of Consultation:  12/28/2018    Requesting Physician:  Tomas Barrios MD    Consulting Physician:  Hieu Vallejo MD    Reason for Consultation: Postoperative ventilator management and critical care management in gentleman who has undergone coronary artery bypass grafting    Chief Complaint: Postoperative ventilator management    History of Present Illness:    I was kindly asked to see and evaluate Rikki Khalil, a 43 y.o. male for evaluation and management of the above problem.    This gentleman has a history of chronic back pain and diabetes mellitus type 2.  He was admitted to [facility] after he presented to an outside facility with chest pain.  He apparently had nonsustained SVT at the outside facility.  He was transferred to [facility] for subspecialty care.  His troponin was elevated.  Echocardiogram was abnormal with an ejection fraction of 25%.  He underwent coronary angiography which revealed significant coronary artery disease.  Today Dr. Barrios has taken to the operating theater and performed three-vessel coronary bypass grafting.  He has now been returned to the cardiac surgery unit.  He is critically ill on full mechanical ventilatory support, inotropic support with epinephrine and an intra-aortic balloon pump is in place.    Medications Prior to Admission:    No current facility-administered medications on file prior to encounter.      No current outpatient prescriptions on file prior to encounter.       Current Medications:      Current Facility-Administered Medications:   •  Respiratory Care per Protocol, , Nebulization, Continuous RT, Tomas Barrios M.D.  •  NS infusion, , Intravenous, Continuous, Tomas Barrios M.D.  •  Potassium Serum (K), , , Q6H(S) **AND** K+ Scale: Goal of 4.5, 1 Each, Intravenous, Q6HRS, Tomas Barrios M.D.  •  Pharmacy Consult Request ...Pain Management Review 1 Each, 1 Each, Other, PRN, Tomas Barrios M.D.  •   [START ON 12/29/2018] senna-docusate (PERICOLACE or SENOKOT S) 8.6-50 MG per tablet 2 Tab, 2 Tab, Oral, BID **AND** [START ON 12/29/2018] polyethylene glycol/lytes (MIRALAX) PACKET 1 Packet, 1 Packet, Oral, QDAY PRN **AND** [START ON 12/29/2018] magnesium hydroxide (MILK OF MAGNESIA) suspension 30 mL, 30 mL, Oral, QDAY PRN **AND** [START ON 12/29/2018] bisacodyl (DULCOLAX) suppository 10 mg, 10 mg, Rectal, QDAY PRN, Tomas Barrios M.D.  •  electrolyte-A (PLASMALYTE-A) infusion, , Intravenous, PRN, Tomas Barrios M.D.  •  [START ON 12/29/2018] enoxaparin (LOVENOX) inj 40 mg, 40 mg, Subcutaneous, QDAY, Tomas Barrios M.D.  •  vancomycin 1,500 mg in  mL IVPB, 1.5 g, Intravenous, Once, Tomas Barrios M.D.  •  mupirocin (BACTROBAN) 2 % ointment 1 Application, 1 Application, Topical, BID, Tomas Barrios M.D.  •  Magnesium Sulfate in D5W IVPB premix 1 g, 1 g, Intravenous, QDAY, Tomas Barrios M.D.  •  [START ON 12/29/2018] metoprolol (LOPRESSOR) tablet 12.5 mg, 12.5 mg, Oral, BID **FOLLOWED BY** [START ON 12/30/2018] metoprolol (LOPRESSOR) tablet 25 mg, 25 mg, Oral, BID, Tomas Barrios M.D.  •  [START ON 12/29/2018] clopidogrel (PLAVIX) tablet 75 mg, 75 mg, Oral, DAILY, Tomas Barrios M.D.  •  clevidipine (CLEVIPREX) IV emulsion, 1-21 mg/hr, Intravenous, Continuous, Tomas Barrios M.D.  •  nitroglycerin 50 mg in D5W 250 ml infusion, 0-100 mcg/min, Intravenous, Continuous, Tomas Barrios M.D.  •  oxyCODONE immediate-release (ROXICODONE) tablet 5 mg, 5 mg, Oral, Q3HRS PRN, Tomas Barrios M.D.  •  oxyCODONE immediate release (ROXICODONE) tablet 10 mg, 10 mg, Oral, Q3HRS PRN, Tomas Barrios M.D.  •  tramadol (ULTRAM) 50 MG tablet 50 mg, 50 mg, Oral, Q4HRS PRN, Tomas Barrios M.D.  •  midazolam (VERSED) 2 MG/2ML injection 2 mg, 2 mg, Intravenous, Q HOUR PRN, Tomas Barrios M.D.  •  dexmedetomidine (PRECEDEX) 400 mcg/100mL NS premix infusion, 0-1.5 mcg/kg/hr, Intravenous, Continuous, Tomas Barrios M.D.  •   sodium bicarbonate 8.4 % injection 50 mEq, 50 mEq, Intravenous, Q HOUR PRN, Tomas Barrios M.D.  •  morphine (pf) 4 mg/ml injection 4 mg, 4 mg, Intravenous, Q HOUR PRN, Tomas Barrios M.D.  •  ondansetron (ZOFRAN) syringe/vial injection 4 mg, 4 mg, Intravenous, Q6HRS PRN **OR** prochlorperazine (COMPAZINE) injection 10 mg, 10 mg, Intravenous, Q6HRS PRN **OR** promethazine (PHENERGAN) suppository 25 mg, 25 mg, Rectal, Q6HRS PRN, Tomas Barrios M.D.  •  acetaminophen (TYLENOL) tablet 650 mg, 650 mg, Oral, Q4HRS PRN **OR** acetaminophen (TYLENOL) suppository 650 mg, 650 mg, Rectal, Q4HRS PRN, Tomas Barrios M.D.  •  mag hydrox-al hydrox-simeth (MAALOX PLUS ES or MYLANTA DS) suspension 30 mL, 30 mL, Oral, Q4HRS PRN, Tomas Barrios M.D.  •  diphenhydrAMINE (BENADRYL) tablet/capsule 25 mg, 25 mg, Oral, HS PRN - MR X 1, Tomas Barrios M.D.  •  HYDROcodone-acetaminophen (NORCO) 5-325 MG per tablet 1-2 Tab, 1-2 Tab, Oral, Q6HRS PRN, Tomas Barrios M.D.  •  SODIUM CHLORIDE 0.9 % IV SOLN, , , ,   •  MIDAZOLAM HCL 5 MG/5ML INJ SOLN, , , ,   •  potassium chloride (KCL) ivpb 10 mEq, 10 mEq, Intravenous, Once, Tabitha Wick, A.P.N.  •  SODIUM CHLORIDE 0.9 % IV SOLN, , , ,   •  LACTATED RINGERS IV SOLN, , , ,   •  atorvastatin (LIPITOR) tablet 80 mg, 80 mg, Oral, DAILY, Yvette Orozco M.D., Stopped at 12/28/18 0600  •  aspirin (ASA) chewable tab 81 mg, 81 mg, Oral, DAILY, Magda Claros A.P.N., Stopped at 12/28/18 0600  •  ALPRAZolam (XANAX) tablet 0.25 mg, 0.25 mg, Oral, Q6HRS PRN, Tabitha Wick, A.P.N., 0.25 mg at 12/27/18 2050    Allergies:    Penicillins    Past Surgical History:    Past Surgical History:   Procedure Laterality Date   • ILEOSTOMY  9/29/2015    Procedure: ILEOSTOMY TAKE DOWN;  Surgeon: Carter Kumar M.D.;  Location: SURGERY [facility];  Service:    • LOW ANTERIOR RESECTION ROBOTIC XI  7/14/2015    Procedure: Robotic low anterior resection, takedown enterocutaneous fistula, omental  flap, ileostomy creation;  Surgeon: Carter Kumar M.D.;  Location: SURGERY [facility];  Service:    • COLOSTOMY CREATION LAPAROSCOPIC  7/14/2015    Procedure: COLOSTOMY CREATION LAPAROSCOPIC FOR: LAP ILEOSTOMY;  Surgeon: Carter Kumar M.D.;  Location: SURGERY [facility];  Service:        Past Medical History:    Past Medical History:   Diagnosis Date   • Arthritis     All joints   • Back pain    • Bowel obstruction ([facility])    • Glaucoma     monika eyes   • NSTEMI (non-ST elevated myocardial infarction) ([facility]) 12/27/2018   • Scoliosis    • Systolic congestive heart failure ([facility]) 12/27/2018   • Type II or unspecified type diabetes mellitus without mention of complication, not stated as uncontrolled     insulin and oral meds       Social History:    Social History     Social History   • Marital status:      Spouse name: N/A   • Number of children: N/A   • Years of education: N/A     Occupational History   • Not on file.     Social History Main Topics   • Smoking status: Current Every Day Smoker     Packs/day: 1.50     Years: 24.00     Types: Cigarettes   • Smokeless tobacco: Never Used   • Alcohol use No   • Drug use: Yes     Types: Inhaled      Comment: marijuana daily - LAST USED 9/28/2015 0900   • Sexual activity: Not on file     Other Topics Concern   • Not on file     Social History Narrative   • No narrative on file       Family History:    Family History   Problem Relation Age of Onset   • Hypertension Mother    • Heart Disease Mother    • Stroke Mother    • No Known Problems Father        Review of System:    Review of Systems   Unable to perform ROS: Acuity of condition       Physical Examination:    /86   Pulse 95   Temp 36.6 °C (97.9 °F) (Temporal)   Resp 14   Ht 1.829 m (6')   Wt 92.4 kg (203 lb 11.3 oz)   SpO2 100%   BMI 27.63 kg/m²   Physical Exam   Constitutional:   On ventilator   HENT:   Head: Normocephalic and atraumatic.   Right Ear: External ear normal.   Left Ear:  External ear normal.   Nose: Nose normal.   Mouth/Throat: Oropharynx is clear and moist.   Eyes: Pupils are equal, round, and reactive to light. Conjunctivae are normal. Right eye exhibits no discharge. Left eye exhibits no discharge.   Neck: Neck supple. No JVD present. No tracheal deviation present.   Cardiovascular: Intact distal pulses.    No murmur heard.  Sinus rhythm   Pulmonary/Chest: He has no wheezes. He has no rales.   Dressings are in place.  Chest tubes are in place.   Abdominal: Soft. Bowel sounds are normal. He exhibits no distension. There is no tenderness. There is no rebound.   Musculoskeletal: He exhibits no edema, tenderness or deformity.   No clubbing or cyanosis   Neurological:   Sedated on dexmedetomidine   Skin: Skin is warm and dry. No rash noted. He is not diaphoretic. No erythema.       Laboratory Data:    Recent Labs      12/28/18   0935  12/28/18   1025  12/28/18   1113   ISTATAPH  7.315*  7.324*  7.303*   ISTATAPCO2  40.4*  42.8*  47.4*   ISTATAPO2  166*  48*  211*   ISTATATCO2  22  24  25   [id number]  99  80*  100*   ISTATARTHCO3  20.6  22.2  23.5   ISTATARTBE  -5*  -4  -3   ISTATTEMP  37.0 C  37.0 C  37.0 C   ISTATFIO2  65  80  80   ISTATSPEC  Arterial  Arterial  Arterial   ISTATAPHTC  7.315*  7.324*  7.303*   [id number]  166*  48*  211*     Recent Labs      12/26/18   2210  12/27/18   0428  12/28/18   0545  12/28/18   1045   WBC  14.0*  10.8  9.7   --    RBC  4.71  4.05*  3.99*   --    HEMOGLOBIN  14.7  12.8*  12.6*   --    HEMATOCRIT  43.7  37.4*  37.9*   --    MCV  92.8  92.3  95.0   --    MCH  31.2  31.6  31.6   --    MCHC  33.6*  34.2  33.2*   --    RDW  43.9  44.0  46.0   --    PLATELETCT  220  183  178  143*   MPV  11.6  11.9  11.7   --      Recent Labs      12/26/18   2210  12/27/18   0428  12/28/18   0545   SODIUM  137  136  132*   POTASSIUM  3.4*  3.3*  4.5   CHLORIDE  104  106  105   CO2  20  21  22   GLUCOSE  232*  251*  235*   BUN  15  14  14   CREATININE  0.59  0.51   0.57   CALCIUM  9.1  8.6  8.9     Recent Labs      12/26/18   2210   BNPBTYPENAT  197*     Recent Labs      12/26/18   2210  12/27/18   0010  12/27/18   0428  12/27/18   0802   TROPONINI  6.02*  6.12*  6.57*  6.13*   BNPBTYPENAT  197*   --    --    --      Recent Labs      12/27/18   0120   METHADONE  Negative   OPIATES  Negative   CANNABINOID  Positive*   AMPHUR  Negative       Imaging:    I personally viewed the CXR and CT scan images as well as reviewed the radiology interpretation reports.    DX-CHEST-PORTABLE (1 VIEW)   Final Result      1. Postop chest with well-positioned lines and tubes.   2. Small left effusion and left basilar atelectasis.      DX-CHEST-2 VIEWS   Final Result      1.  Bibasilar atelectasis.      2.  Borderline cardiomegaly.      US-VEIN MAPPING LOWER EXTREMITY BILAT   Final Result      US-CAROTID DOPPLER BILAT   Final Result      EC-ECHOCARDIOGRAM COMPLETE W/O CONT   Final Result      DX-CHEST-PORTABLE (1 VIEW)   Final Result      Linear densities in the left lower lung likely represent atelectasis or scarring.          Assessment and Plan:    * NSTEMI (non-ST elevated myocardial infarction) (HCC)- (present on admission)   Assessment & Plan    Continue aspirin and Plavix  Anticipate starting metoprolol, 12.5 mg twice daily tomorrow  Continue high intensity statin     Ventilator dependent (HCC)   Assessment & Plan    Continue vent support  All appropriate ventilator bundles are in effect  I am adjusting his ventilator based upon arterial blood gas analysis, analysis of ventilator waveforms and airway mechanics and hemodynamics     S/P CABG x 3   Assessment & Plan    On 12/28 by Dr. Barrios     Acute systolic congestive heart failure ([facility])   Assessment & Plan    EF 25%  Titrating epinephrine drip to maintain blood pressure  Intra-aortic balloon pump is in place     Type 2 diabetes mellitus without complication ([facility])- (present on admission)   Assessment & Plan    Poorly controlled prior to  admission with glycohemoglobin of 12  Titrating insulin drip to achieve glycemic control     SVT (supraventricular tachycardia) (HCC)- (present on admission)   Assessment & Plan    History of       I have assessed and reassessed his respiratory status and made ventilator adjustments based upon arterial blood gas analysis as well as analysis of ventilator waveforms and airway mechanics as well as his hemodynamics.  I have assessed and reassessed his blood pressure, hemodynamics and cardiovascular status with titration of an epinephrine drip.  I have assessed and reassessed his neurologic status with titration of dexmedetomidine.  He is at increased risk for worsening respiratory and cardiovascular system dysfunction.    High risk of deterioration and worsening vital organ dysfunction and death without the above critical care interventions.    Thank you for allowing me to participate in the care of this gentleman.  I will continue to follow him with great interest.    Critical Care Time: 35 minutes  [id number]  No time overlap  Time excludes procedures  Discussed with RN, RT, anesthesiologist    Hieu Vallejo MD  Pulmonary and Critical Care Medicine

## 2018-12-28 NOTE — PROGRESS NOTES
Pt escorted to XRay with transport and RN and to unit via wheelchair.  Pt tolerated well.  Heparin gtt infusing, no signs of bleeding noted.  Right radial cath site clean and soft.  Monitor in place.  All needs met, whiteboards updated.  Call light within reach, hourly rounding in place.

## 2018-12-28 NOTE — PROCEDURES
CENTRAL VENOUS/PA CATHETER   Start time 0800  End time 0810    Site:    Internal Jugular    Laterality:   right    Catheter:   Cordis w/ PA catheter    Blood return all ports, all ports flushed:  Yes    All elements of maximal sterile barrier techniques met: Yes    Occlusive dressing, antimicrobial disc:  Yes    Ultrasound Guidance & Interpretation:  No     CXR for placement & absence of complications: Pending    Complications:  None    Additional Notes:         Tomas Jeff M.D.  12/28/2018  10:25 AM

## 2018-12-28 NOTE — ASSESSMENT & PLAN NOTE
Vent rate well controlled this week but has sinus tach ? Reasons    On beta blockers  Sinus tach is slowing down

## 2018-12-28 NOTE — CARE PLAN
Problem: Pre Op  Goal: Optimal preparation for CABG/Heart Valve surgery    Intervention: Pre Op education to patient/significant other. Provide patient [facility] Patient Guideline for Cardiac Surgery (See Pt. Ed.)  Education provided to patient and wife, Norma, bedside. Given CABG Open Heart Packet for reference. All questions answered at this time.     Baseline IS 3000

## 2018-12-28 NOTE — CARE PLAN
Problem: Pre Op  Goal: Optimal preparation for CABG/Heart Valve surgery  Outcome: PROGRESSING AS EXPECTED    Intervention: Pre Op education to patient/significant other. Provide patient [facility] Patient Guideline for Cardiac Surgery (See Pt. Ed.)  Education provided to patient and wife beside.   Intervention: Consents obtained for Surgery, Anesthesia and Transfusion/Bloodless Program Participant  Blood transfusion and surgery consent signed and in chart. Anesthesia consent will be sent with patient down to pre op.   Intervention: Pre op checklist completed. Admit profile completed. Advanced directive verified.  Completed.   Intervention: Pre op labs per MD order: CBC, CMP, INR, PTT, COD if not done in past 72 hours.  HbA1C if diabetic.  Completed.   Intervention: Pre op diagnostics per MD order (EKG, CXR, Bilateral carotid doppler study and vein mapping)  Completed.   Intervention: Baseline assessment documented to include IS volume, weight, bilateral BP and peripheral pulses.  Baseline IS 3000    Weight and bilateral blood pressures charted.     Peripheral pulses charted.   Intervention: NPO at midnight except cardiac medications. (No ASA, coumadin or Plavix)  Completed.   Intervention: Shower with chlorhexidine x 2  Completed.   Intervention: Prep with clippers  Completed.   Intervention: Remove dentures, valuables and jewelry  Completed.   Intervention: Determine if patient is taking Beta Blockers  Patient given 12.5mg metoprolol this am.   Intervention: Cardiac/BP meds and Mupirocin ointment given prior to surgery. Verify orders for hold or administer of anticoags.  Completed.   Intervention: If diabetic, administer insulin night before surgery  Completed.   Intervention: If diabetic, FSBS in am and follow sliding scale if indicated  Completed.   Intervention: Pre op antibiotics sent to surgery with patient per MD order (surgery to administer)  Will be completed with day shift RN.   Intervention: Medical record sent  to surgery with current H&P  Will be completed with day shift RN, will be send to OR.   Intervention: Transport to surgery with cardiac monitor and oxygen  Will be completed with day shift RN.

## 2018-12-28 NOTE — PROGRESS NOTES
CABG today. Echo reveals EF of 25%. Cardiology are diagnosing cardiomyopathy. No diagnosis of HF in any notes at this time.    Should patient acquire a diagnosis of HF, he will require a seven calendar day HF f/u appointment upon discharge.    Hospital schedulers are here seven days a week, [phone] and can be reached at extension [phone], they will handle the seven day follow up appointment for you if you call them!    Fariba ALVARADO RN, [employer] ext. [phone] M-F

## 2018-12-28 NOTE — PROCEDURES
ARTERIAL LINE  Start time 0735  End time 0740      Site:    Radial    Laterality:   left    Complications: None    Additional notes:  Arrow 20g followed by 6in 20g cath.          Tomas Jeff M.D.  12/28/2018  10:22 AM

## 2018-12-28 NOTE — PROGRESS NOTES
Bedside report received from YAO Godinez. Wife, Norma, at bedside. Plan of care discussed and all questions answered at this time.

## 2018-12-28 NOTE — ASSESSMENT & PLAN NOTE
Doing much better- still slow but gaining strengh  Good candidate for rehab acute post hospitalization

## 2018-12-28 NOTE — OP REPORT
DATE OF SERVICE:  12/28/2018    PREOPERATIVE DIAGNOSES:  Hypertension, diabetes mellitus, hemoglobin A1c 12,   cigarette smoking, myocardial infarction inferior wall, coronary artery   disease, angina.    POSTOPERATIVE DIAGNOSES:  Hypertension, diabetes mellitus, hemoglobin A1c 12,   cigarette smoking, myocardial infarction inferior wall, coronary artery   disease, angina.    OPERATIONS:  Coronary artery bypass grafting x3, left SANDRA to distal LAD,   reverse saphenous vein graft to distal diagonal, reverse saphenous vein graft   to the distal right coronary artery, intraaortic balloon pump, endoscopic vein   harvest, right leg and thigh.    SURGEON:  Tomas Barrios MD    ASSISTANT:  LINDSEY Thomas    COMPLICATIONS:  None.    ANESTHESIA:  General anesthetic.    ANESTHESIOLOGIST:  Tomas Jeff MD    DESCRIPTION OF PROCEDURE:  Patient was brought to the operating room, placed   in a supine position upon the table.  After adequate general anesthesia,   patient was prepped and draped in usual sterile manner.  Elbows were protected   to avoid ulnar neuropathy, chest wall expansion to avoid ulnar neuropathy,   phrenic nerve protectors used to protect the phrenic nerve, removed at the end   of case.    Phrenic nerve was normal and was not damaged.  It was in continuity and was   normal at the end of the case.    Tabitha Wick harvested the vein from the right leg and thigh using endoscopic   vein technique.  Side branch secured using clips, leg closed with multilayer   Vicryl and Dexon technique.    Tabitha Wick assisted the case from start to finish.    Midline sternal skin incision was carried down to sternum, divided with saw.    Left SANDRA was harvested, prepared for anastomosis.  Heparin given, ACT checked.    Patient cannulated with 6.5 arterial cannula, 2-stage venous return   catheter, antegrade cardioplegia began cardiopulmonary bypass, cooled to 34   degrees, crossclamp applied, cardioplegia delivered.   Heart topically cooled   with cold saline, ice slush.  Phrenic nerves were protected.  Good vessels   noted.    Left SANDRA being harvest was skeletonized.  It was good flow, inflow/outflow.    Good arrest was noted.  Cardioplegia technique including 1000 mL antegrade and   then 200-300 mL after each distal anastomosis to the root and to the graft.    We also gave warm blood through the grafts after crossclamp was removed to   give perfusion to ischemic areas.    It should be noted on DONNA that the inferior wall was not moving well   consistent with his inferolateral myocardial infarction.    Reverse saphenous vein grafts were separately placed to the distal right   coronary artery and to the diagonal using 7-0 running Prolene technique.    Left SANDRA was sewn to the distal LAD using 7-0 running Prolene technique.    Crossclamp removed, side clamp placed.  Two proximal anastomoses were   performed using 6-0 running Prolene technique.    Coronary flow was given down the grafts, as we were sewing in each individual   graft from the pump lines.    Patient was weaned from cardiopulmonary bypass first attempt.  Lines were   removed and doubly secured.  Protamine delivered.  Meticulous hemostasis   present.  AC locators and chest tubes, 19 Christian tube, and 2 sets of   ventricular pacers and 1 set of atrial pacers were placed for safety.  First   set of ventricular pacers were in the inferior wall and we felt did not work   quite as well; therefore, anterior wall pacers were placed.    Patient was weaned from cardiopulmonary bypass on the first attempt.  The   lines all were removed and doubly secured.    Meticulous hemostasis was then observed throughout the field x5 mediastinal   wound rotational inspections.    Methodical wound examination was done for foreign objects, none were found.    Counts were correct.    Balloon pump was placed preoperatively before we opened the chest using   Seldinger wire technique in right common  femoral artery.  No difficulty was   encountered.  Good distal pulses maintained.    ST segments are normal, good hemostasis.  Cardiodynamics are excellent and DONNA   of the heart shows that the inferior wall now is moving quite well with the   new right coronary artery bypass graft.  Rescue of this area has been   successful.    With good cardiodynamics, closure begun.    Sternum closed with wire followed by linea alba and pectus fascia closure with   running 0 Vicryl sutures in double-layer technique followed by staples   followed by Prevena dressing.    ESTIMATED BLOOD LOSS:  50 mL Cell Saver protocol.    I am in the room as the Prevena dressing is being placed and I am dictating a   note here in the room with the patient.    Family conference will be held.    Patient will be transferred to ICU.       ____________________________________     MD OMI PIRES / IRIHS    DD:  12/28/2018 12:17:09  DT:  12/28/2018 13:20:51    D#:  [id number]  Job#:  [id number]

## 2018-12-28 NOTE — ASSESSMENT & PLAN NOTE
Continue vent support  All appropriate ventilator bundles are in effect  I am adjusting his ventilator based upon arterial blood gas analysis, analysis of ventilator waveforms and airway mechanics and hemodynamics

## 2018-12-28 NOTE — ASSESSMENT & PLAN NOTE
Poorly controlled prior to admission with glycohemoglobin of 12  Continue sliding scale insulin- goal sugar > 80< 180    Change to outp regimen

## 2018-12-29 ENCOUNTER — APPOINTMENT (OUTPATIENT)
Dept: RADIOLOGY | Facility: MEDICAL CENTER | Age: 43
DRG: 233 | End: 2018-12-29
Attending: NURSE PRACTITIONER
Payer: MEDICAID

## 2018-12-29 ENCOUNTER — APPOINTMENT (OUTPATIENT)
Dept: RADIOLOGY | Facility: MEDICAL CENTER | Age: 43
DRG: 233 | End: 2018-12-29
Attending: INTERNAL MEDICINE
Payer: MEDICAID

## 2018-12-29 ENCOUNTER — APPOINTMENT (OUTPATIENT)
Dept: RADIOLOGY | Facility: MEDICAL CENTER | Age: 43
DRG: 233 | End: 2018-12-29
Attending: THORACIC SURGERY (CARDIOTHORACIC VASCULAR SURGERY)
Payer: MEDICAID

## 2018-12-29 PROBLEM — J96.01 ACUTE HYPOXEMIC RESPIRATORY FAILURE (HCC): Status: ACTIVE | Noted: 2018-12-29

## 2018-12-29 PROBLEM — J98.11 ATELECTASIS: Status: ACTIVE | Noted: 2018-12-29

## 2018-12-29 LAB
ACTION RANGE TRIGGERED IACRT: NO
ACTION RANGE TRIGGERED IACRT: NO
ACTION RANGE TRIGGERED IACRT: YES
ANION GAP SERPL CALC-SCNC: 8 MMOL/L (ref 0–11.9)
BASE EXCESS BLDA CALC-SCNC: -5 MMOL/L (ref -4–3)
BASE EXCESS BLDA CALC-SCNC: -7 MMOL/L (ref -4–3)
BASE EXCESS BLDA CALC-SCNC: -8 MMOL/L (ref -4–3)
BODY TEMPERATURE: ABNORMAL DEGREES
BUN SERPL-MCNC: 14 MG/DL (ref 8–22)
CA-I BLD ISE-SCNC: 1.13 MMOL/L (ref 1.1–1.3)
CALCIUM SERPL-MCNC: 8.3 MG/DL (ref 8.5–10.5)
CHLORIDE SERPL-SCNC: 105 MMOL/L (ref 96–112)
CO2 BLDA-SCNC: 17 MMOL/L (ref 20–33)
CO2 BLDA-SCNC: 18 MMOL/L (ref 20–33)
CO2 BLDA-SCNC: 21 MMOL/L (ref 20–33)
CO2 SERPL-SCNC: 21 MMOL/L (ref 20–33)
CREAT SERPL-MCNC: 0.58 MG/DL (ref 0.5–1.4)
EKG IMPRESSION: NORMAL
ERYTHROCYTE [DISTWIDTH] IN BLOOD BY AUTOMATED COUNT: 45.3 FL (ref 35.9–50)
GLUCOSE BLD-MCNC: 103 MG/DL (ref 65–99)
GLUCOSE BLD-MCNC: 105 MG/DL (ref 65–99)
GLUCOSE BLD-MCNC: 107 MG/DL (ref 65–99)
GLUCOSE BLD-MCNC: 110 MG/DL (ref 65–99)
GLUCOSE BLD-MCNC: 117 MG/DL (ref 65–99)
GLUCOSE BLD-MCNC: 123 MG/DL (ref 65–99)
GLUCOSE BLD-MCNC: 127 MG/DL (ref 65–99)
GLUCOSE BLD-MCNC: 137 MG/DL (ref 65–99)
GLUCOSE BLD-MCNC: 154 MG/DL (ref 65–99)
GLUCOSE BLD-MCNC: 161 MG/DL (ref 65–99)
GLUCOSE BLD-MCNC: 166 MG/DL (ref 65–99)
GLUCOSE BLD-MCNC: 168 MG/DL (ref 65–99)
GLUCOSE BLD-MCNC: 67 MG/DL (ref 65–99)
GLUCOSE BLD-MCNC: 77 MG/DL (ref 65–99)
GLUCOSE BLD-MCNC: 81 MG/DL (ref 65–99)
GLUCOSE BLD-MCNC: 85 MG/DL (ref 65–99)
GLUCOSE BLD-MCNC: 91 MG/DL (ref 65–99)
GLUCOSE BLD-MCNC: 96 MG/DL (ref 65–99)
GLUCOSE SERPL-MCNC: 76 MG/DL (ref 65–99)
HCO3 BLDA-SCNC: 16.5 MMOL/L (ref 17–25)
HCO3 BLDA-SCNC: 17.2 MMOL/L (ref 17–25)
HCO3 BLDA-SCNC: 20 MMOL/L (ref 17–25)
HCT VFR BLD AUTO: 36.3 % (ref 42–52)
HCT VFR BLD CALC: 33 % (ref 42–52)
HGB BLD-MCNC: 11.2 G/DL (ref 14–18)
HGB BLD-MCNC: 12 G/DL (ref 14–18)
HOROWITZ INDEX BLDA+IHG-RTO: 353 MM[HG]
HOROWITZ INDEX BLDA+IHG-RTO: 49 MM[HG]
HOROWITZ INDEX BLDA+IHG-RTO: 73 MM[HG]
INST. QUALIFIED PATIENT IIQPT: YES
MCH RBC QN AUTO: 31.4 PG (ref 27–33)
MCHC RBC AUTO-ENTMCNC: 33.1 G/DL (ref 33.7–35.3)
MCV RBC AUTO: 95 FL (ref 81.4–97.8)
O2/TOTAL GAS SETTING VFR VENT: 100 %
O2/TOTAL GAS SETTING VFR VENT: 100 %
O2/TOTAL GAS SETTING VFR VENT: 15 %
PCO2 BLDA: 27.8 MMHG (ref 26–37)
PCO2 BLDA: 32 MMHG (ref 26–37)
PCO2 BLDA: 35.3 MMHG (ref 26–37)
PCO2 TEMP ADJ BLDA: 28 MMHG (ref 26–37)
PCO2 TEMP ADJ BLDA: 33.1 MMHG (ref 26–37)
PCO2 TEMP ADJ BLDA: 35.3 MMHG (ref 26–37)
PH BLDA: 7.34 [PH] (ref 7.4–7.5)
PH BLDA: 7.36 [PH] (ref 7.4–7.5)
PH BLDA: 7.38 [PH] (ref 7.4–7.5)
PH TEMP ADJ BLDA: 7.33 [PH] (ref 7.4–7.5)
PH TEMP ADJ BLDA: 7.36 [PH] (ref 7.4–7.5)
PH TEMP ADJ BLDA: 7.38 [PH] (ref 7.4–7.5)
PLATELET # BLD AUTO: 176 K/UL (ref 164–446)
PMV BLD AUTO: 11.5 FL (ref 9–12.9)
PO2 BLDA: 49 MMHG (ref 64–87)
PO2 BLDA: 53 MMHG (ref 64–87)
PO2 BLDA: 73 MMHG (ref 64–87)
PO2 TEMP ADJ BLDA: 50 MMHG (ref 64–87)
PO2 TEMP ADJ BLDA: 53 MMHG (ref 64–87)
PO2 TEMP ADJ BLDA: 77 MMHG (ref 64–87)
POTASSIUM BLD-SCNC: 4.4 MMOL/L (ref 3.6–5.5)
POTASSIUM SERPL-SCNC: 4 MMOL/L (ref 3.6–5.5)
POTASSIUM SERPL-SCNC: 4.2 MMOL/L (ref 3.6–5.5)
RBC # BLD AUTO: 3.82 M/UL (ref 4.7–6.1)
SAO2 % BLDA: 85 % (ref 93–99)
SAO2 % BLDA: 86 % (ref 93–99)
SAO2 % BLDA: 94 % (ref 93–99)
SODIUM BLD-SCNC: 131 MMOL/L (ref 135–145)
SODIUM SERPL-SCNC: 134 MMOL/L (ref 135–145)
SPECIMEN DRAWN FROM PATIENT: ABNORMAL
WBC # BLD AUTO: 19.2 K/UL (ref 4.8–10.8)

## 2018-12-29 PROCEDURE — 31645 BRNCHSC W/THER ASPIR 1ST: CPT | Performed by: INTERNAL MEDICINE

## 2018-12-29 PROCEDURE — 302132 K THERMIA MOTOR: Performed by: INTERNAL MEDICINE

## 2018-12-29 PROCEDURE — 0B958ZZ DRAINAGE OF RIGHT MIDDLE LOBE BRONCHUS, VIA NATURAL OR ARTIFICIAL OPENING ENDOSCOPIC: ICD-10-PCS | Performed by: INTERNAL MEDICINE

## 2018-12-29 PROCEDURE — 700105 HCHG RX REV CODE 258

## 2018-12-29 PROCEDURE — 700101 HCHG RX REV CODE 250: Performed by: INTERNAL MEDICINE

## 2018-12-29 PROCEDURE — 94640 AIRWAY INHALATION TREATMENT: CPT

## 2018-12-29 PROCEDURE — 700105 HCHG RX REV CODE 258: Performed by: NURSE PRACTITIONER

## 2018-12-29 PROCEDURE — 71045 X-RAY EXAM CHEST 1 VIEW: CPT

## 2018-12-29 PROCEDURE — 87102 FUNGUS ISOLATION CULTURE: CPT

## 2018-12-29 PROCEDURE — 87070 CULTURE OTHR SPECIMN AEROBIC: CPT

## 2018-12-29 PROCEDURE — 700102 HCHG RX REV CODE 250 W/ 637 OVERRIDE(OP): Performed by: CLINICAL NURSE SPECIALIST

## 2018-12-29 PROCEDURE — 700102 HCHG RX REV CODE 250 W/ 637 OVERRIDE(OP): Performed by: THORACIC SURGERY (CARDIOTHORACIC VASCULAR SURGERY)

## 2018-12-29 PROCEDURE — 99292 CRITICAL CARE ADDL 30 MIN: CPT | Mod: 25 | Performed by: INTERNAL MEDICINE

## 2018-12-29 PROCEDURE — A9270 NON-COVERED ITEM OR SERVICE: HCPCS | Performed by: CLINICAL NURSE SPECIALIST

## 2018-12-29 PROCEDURE — 0B968ZZ DRAINAGE OF RIGHT LOWER LOBE BRONCHUS, VIA NATURAL OR ARTIFICIAL OPENING ENDOSCOPIC: ICD-10-PCS | Performed by: INTERNAL MEDICINE

## 2018-12-29 PROCEDURE — 700102 HCHG RX REV CODE 250 W/ 637 OVERRIDE(OP): Performed by: INTERNAL MEDICINE

## 2018-12-29 PROCEDURE — 84132 ASSAY OF SERUM POTASSIUM: CPT

## 2018-12-29 PROCEDURE — 700111 HCHG RX REV CODE 636 W/ 250 OVERRIDE (IP): Performed by: NURSE PRACTITIONER

## 2018-12-29 PROCEDURE — 0B948ZZ DRAINAGE OF RIGHT UPPER LOBE BRONCHUS, VIA NATURAL OR ARTIFICIAL OPENING ENDOSCOPIC: ICD-10-PCS | Performed by: INTERNAL MEDICINE

## 2018-12-29 PROCEDURE — 87184 SC STD DISK METHOD PER PLATE: CPT

## 2018-12-29 PROCEDURE — 93005 ELECTROCARDIOGRAM TRACING: CPT | Performed by: THORACIC SURGERY (CARDIOTHORACIC VASCULAR SURGERY)

## 2018-12-29 PROCEDURE — 700102 HCHG RX REV CODE 250 W/ 637 OVERRIDE(OP): Performed by: NURSE PRACTITIONER

## 2018-12-29 PROCEDURE — P9047 ALBUMIN (HUMAN), 25%, 50ML: HCPCS

## 2018-12-29 PROCEDURE — 0B9B8ZZ DRAINAGE OF LEFT LOWER LOBE BRONCHUS, VIA NATURAL OR ARTIFICIAL OPENING ENDOSCOPIC: ICD-10-PCS | Performed by: INTERNAL MEDICINE

## 2018-12-29 PROCEDURE — 31500 INSERT EMERGENCY AIRWAY: CPT | Performed by: INTERNAL MEDICINE

## 2018-12-29 PROCEDURE — 84295 ASSAY OF SERUM SODIUM: CPT

## 2018-12-29 PROCEDURE — 87116 MYCOBACTERIA CULTURE: CPT

## 2018-12-29 PROCEDURE — 82330 ASSAY OF CALCIUM: CPT

## 2018-12-29 PROCEDURE — 700111 HCHG RX REV CODE 636 W/ 250 OVERRIDE (IP): Performed by: CLINICAL NURSE SPECIALIST

## 2018-12-29 PROCEDURE — 87015 SPECIMEN INFECT AGNT CONCNTJ: CPT

## 2018-12-29 PROCEDURE — C9248 INJ, CLEVIDIPINE BUTYRATE: HCPCS | Performed by: THORACIC SURGERY (CARDIOTHORACIC VASCULAR SURGERY)

## 2018-12-29 PROCEDURE — 0B988ZZ DRAINAGE OF LEFT UPPER LOBE BRONCHUS, VIA NATURAL OR ARTIFICIAL OPENING ENDOSCOPIC: ICD-10-PCS | Performed by: INTERNAL MEDICINE

## 2018-12-29 PROCEDURE — 31500 INSERT EMERGENCY AIRWAY: CPT

## 2018-12-29 PROCEDURE — 85014 HEMATOCRIT: CPT

## 2018-12-29 PROCEDURE — 5A1945Z RESPIRATORY VENTILATION, 24-96 CONSECUTIVE HOURS: ICD-10-PCS | Performed by: INTERNAL MEDICINE

## 2018-12-29 PROCEDURE — 37799 UNLISTED PX VASCULAR SURGERY: CPT

## 2018-12-29 PROCEDURE — A9270 NON-COVERED ITEM OR SERVICE: HCPCS | Performed by: THORACIC SURGERY (CARDIOTHORACIC VASCULAR SURGERY)

## 2018-12-29 PROCEDURE — 87205 SMEAR GRAM STAIN: CPT

## 2018-12-29 PROCEDURE — 700111 HCHG RX REV CODE 636 W/ 250 OVERRIDE (IP): Performed by: THORACIC SURGERY (CARDIOTHORACIC VASCULAR SURGERY)

## 2018-12-29 PROCEDURE — 93010 ELECTROCARDIOGRAM REPORT: CPT | Performed by: INTERNAL MEDICINE

## 2018-12-29 PROCEDURE — 700111 HCHG RX REV CODE 636 W/ 250 OVERRIDE (IP): Performed by: INTERNAL MEDICINE

## 2018-12-29 PROCEDURE — 88112 CYTOPATH CELL ENHANCE TECH: CPT

## 2018-12-29 PROCEDURE — 87206 SMEAR FLUORESCENT/ACID STAI: CPT

## 2018-12-29 PROCEDURE — A9270 NON-COVERED ITEM OR SERVICE: HCPCS | Performed by: NURSE PRACTITIONER

## 2018-12-29 PROCEDURE — 87186 SC STD MICRODIL/AGAR DIL: CPT

## 2018-12-29 PROCEDURE — A9270 NON-COVERED ITEM OR SERVICE: HCPCS | Performed by: STUDENT IN AN ORGANIZED HEALTH CARE EDUCATION/TRAINING PROGRAM

## 2018-12-29 PROCEDURE — 304861 HCHG PRESS.SUPPORT DEVICE(FEMOSTOP)

## 2018-12-29 PROCEDURE — 82962 GLUCOSE BLOOD TEST: CPT | Mod: 91

## 2018-12-29 PROCEDURE — 302978 HCHG BRONCHOSCOPY-DIAGNOSTIC

## 2018-12-29 PROCEDURE — 31624 DX BRONCHOSCOPE/LAVAGE: CPT | Performed by: INTERNAL MEDICINE

## 2018-12-29 PROCEDURE — 80048 BASIC METABOLIC PNL TOTAL CA: CPT

## 2018-12-29 PROCEDURE — 0B9J8ZX DRAINAGE OF LEFT LOWER LUNG LOBE, VIA NATURAL OR ARTIFICIAL OPENING ENDOSCOPIC, DIAGNOSTIC: ICD-10-PCS | Performed by: INTERNAL MEDICINE

## 2018-12-29 PROCEDURE — 0BH17EZ INSERTION OF ENDOTRACHEAL AIRWAY INTO TRACHEA, VIA NATURAL OR ARTIFICIAL OPENING: ICD-10-PCS | Performed by: INTERNAL MEDICINE

## 2018-12-29 PROCEDURE — 87077 CULTURE AEROBIC IDENTIFY: CPT

## 2018-12-29 PROCEDURE — 94002 VENT MGMT INPAT INIT DAY: CPT

## 2018-12-29 PROCEDURE — 99291 CRITICAL CARE FIRST HOUR: CPT | Mod: 25 | Performed by: INTERNAL MEDICINE

## 2018-12-29 PROCEDURE — 700101 HCHG RX REV CODE 250: Performed by: CLINICAL NURSE SPECIALIST

## 2018-12-29 PROCEDURE — 302155 K THERMIA BLANKET 24X30: Performed by: INTERNAL MEDICINE

## 2018-12-29 PROCEDURE — 700102 HCHG RX REV CODE 250 W/ 637 OVERRIDE(OP): Performed by: STUDENT IN AN ORGANIZED HEALTH CARE EDUCATION/TRAINING PROGRAM

## 2018-12-29 PROCEDURE — 770022 HCHG ROOM/CARE - ICU (200)

## 2018-12-29 PROCEDURE — 0BCB8ZZ EXTIRPATION OF MATTER FROM LEFT LOWER LOBE BRONCHUS, VIA NATURAL OR ARTIFICIAL OPENING ENDOSCOPIC: ICD-10-PCS | Performed by: INTERNAL MEDICINE

## 2018-12-29 PROCEDURE — 94003 VENT MGMT INPAT SUBQ DAY: CPT

## 2018-12-29 PROCEDURE — A9270 NON-COVERED ITEM OR SERVICE: HCPCS | Performed by: INTERNAL MEDICINE

## 2018-12-29 PROCEDURE — 82803 BLOOD GASES ANY COMBINATION: CPT | Mod: 91

## 2018-12-29 PROCEDURE — 700111 HCHG RX REV CODE 636 W/ 250 OVERRIDE (IP)

## 2018-12-29 PROCEDURE — 85027 COMPLETE CBC AUTOMATED: CPT

## 2018-12-29 PROCEDURE — 88305 TISSUE EXAM BY PATHOLOGIST: CPT

## 2018-12-29 RX ORDER — ALPRAZOLAM 0.5 MG/1
0.5 TABLET ORAL 4 TIMES DAILY PRN
Status: DISCONTINUED | OUTPATIENT
Start: 2018-12-29 | End: 2018-12-29

## 2018-12-29 RX ORDER — FUROSEMIDE 10 MG/ML
40 INJECTION INTRAMUSCULAR; INTRAVENOUS
Status: DISCONTINUED | OUTPATIENT
Start: 2018-12-29 | End: 2018-12-30

## 2018-12-29 RX ORDER — ALBUMIN (HUMAN) 12.5 G/50ML
SOLUTION INTRAVENOUS
Status: COMPLETED
Start: 2018-12-29 | End: 2018-12-29

## 2018-12-29 RX ORDER — FUROSEMIDE 10 MG/ML
40 INJECTION INTRAMUSCULAR; INTRAVENOUS ONCE
Status: COMPLETED | OUTPATIENT
Start: 2018-12-29 | End: 2018-12-29

## 2018-12-29 RX ORDER — DEXMEDETOMIDINE HYDROCHLORIDE 4 UG/ML
.1-1.5 INJECTION, SOLUTION INTRAVENOUS CONTINUOUS
Status: DISCONTINUED | OUTPATIENT
Start: 2018-12-29 | End: 2018-12-31

## 2018-12-29 RX ORDER — HYDROCODONE BITARTRATE AND ACETAMINOPHEN 5; 325 MG/1; MG/1
1-2 TABLET ORAL EVERY 6 HOURS PRN
Status: DISCONTINUED | OUTPATIENT
Start: 2018-12-29 | End: 2019-01-03

## 2018-12-29 RX ORDER — ACETAMINOPHEN 325 MG/1
650 TABLET ORAL EVERY 4 HOURS PRN
Status: DISCONTINUED | OUTPATIENT
Start: 2018-12-29 | End: 2019-01-10 | Stop reason: HOSPADM

## 2018-12-29 RX ORDER — POLYETHYLENE GLYCOL 3350 17 G/17G
1 POWDER, FOR SOLUTION ORAL
Status: DISCONTINUED | OUTPATIENT
Start: 2018-12-29 | End: 2019-01-09

## 2018-12-29 RX ORDER — POTASSIUM CHLORIDE 20 MEQ/1
20 TABLET, EXTENDED RELEASE ORAL 2 TIMES DAILY
Status: DISCONTINUED | OUTPATIENT
Start: 2018-12-29 | End: 2018-12-29

## 2018-12-29 RX ORDER — BACLOFEN 10 MG/1
10 TABLET ORAL 3 TIMES DAILY
Status: DISCONTINUED | OUTPATIENT
Start: 2018-12-29 | End: 2019-01-03

## 2018-12-29 RX ORDER — LISINOPRIL 5 MG/1
5 TABLET ORAL
Status: DISCONTINUED | OUTPATIENT
Start: 2018-12-30 | End: 2019-01-01

## 2018-12-29 RX ORDER — BISACODYL 10 MG
10 SUPPOSITORY, RECTAL RECTAL
Status: DISCONTINUED | OUTPATIENT
Start: 2018-12-29 | End: 2019-01-09

## 2018-12-29 RX ORDER — ASPIRIN 81 MG/1
81 TABLET, CHEWABLE ORAL DAILY
Status: DISCONTINUED | OUTPATIENT
Start: 2018-12-30 | End: 2019-01-09

## 2018-12-29 RX ORDER — ATORVASTATIN CALCIUM 80 MG/1
80 TABLET, FILM COATED ORAL DAILY
Status: DISCONTINUED | OUTPATIENT
Start: 2018-12-30 | End: 2019-01-09

## 2018-12-29 RX ORDER — FAMOTIDINE 20 MG/1
20 TABLET, FILM COATED ORAL EVERY 12 HOURS
Status: DISCONTINUED | OUTPATIENT
Start: 2018-12-29 | End: 2019-01-03

## 2018-12-29 RX ORDER — ALBUMIN (HUMAN) 12.5 G/50ML
25 SOLUTION INTRAVENOUS ONCE
Status: COMPLETED | OUTPATIENT
Start: 2018-12-29 | End: 2018-12-29

## 2018-12-29 RX ORDER — ALUMINA, MAGNESIA, AND SIMETHICONE 2400; 2400; 240 MG/30ML; MG/30ML; MG/30ML
30 SUSPENSION ORAL EVERY 4 HOURS PRN
Status: DISCONTINUED | OUTPATIENT
Start: 2018-12-29 | End: 2019-01-10 | Stop reason: HOSPADM

## 2018-12-29 RX ORDER — IPRATROPIUM BROMIDE AND ALBUTEROL SULFATE 2.5; .5 MG/3ML; MG/3ML
3 SOLUTION RESPIRATORY (INHALATION)
Status: DISCONTINUED | OUTPATIENT
Start: 2018-12-29 | End: 2019-01-10 | Stop reason: HOSPADM

## 2018-12-29 RX ORDER — SUCCINYLCHOLINE CHLORIDE 20 MG/ML
100 INJECTION INTRAMUSCULAR; INTRAVENOUS ONCE
Status: COMPLETED | OUTPATIENT
Start: 2018-12-29 | End: 2018-12-29

## 2018-12-29 RX ORDER — ETOMIDATE 2 MG/ML
20 INJECTION INTRAVENOUS ONCE
Status: COMPLETED | OUTPATIENT
Start: 2018-12-29 | End: 2018-12-29

## 2018-12-29 RX ORDER — ACETAMINOPHEN 650 MG/1
650 SUPPOSITORY RECTAL EVERY 4 HOURS PRN
Status: DISCONTINUED | OUTPATIENT
Start: 2018-12-29 | End: 2019-01-10 | Stop reason: HOSPADM

## 2018-12-29 RX ORDER — LISINOPRIL 5 MG/1
5 TABLET ORAL
Status: DISCONTINUED | OUTPATIENT
Start: 2018-12-29 | End: 2018-12-29

## 2018-12-29 RX ORDER — PROPOFOL 10 MG/ML
50 INJECTION, EMULSION INTRAVENOUS ONCE
Status: COMPLETED | OUTPATIENT
Start: 2018-12-29 | End: 2018-12-29

## 2018-12-29 RX ORDER — ALPRAZOLAM 0.5 MG/1
0.5 TABLET ORAL 4 TIMES DAILY PRN
Status: DISCONTINUED | OUTPATIENT
Start: 2018-12-29 | End: 2019-01-03

## 2018-12-29 RX ORDER — POTASSIUM CHLORIDE 7.45 MG/ML
10 INJECTION INTRAVENOUS ONCE
Status: COMPLETED | OUTPATIENT
Start: 2018-12-29 | End: 2018-12-29

## 2018-12-29 RX ORDER — SODIUM CHLORIDE 9 MG/ML
INJECTION, SOLUTION INTRAVENOUS
Status: COMPLETED
Start: 2018-12-29 | End: 2018-12-29

## 2018-12-29 RX ORDER — IPRATROPIUM BROMIDE AND ALBUTEROL SULFATE 2.5; .5 MG/3ML; MG/3ML
3 SOLUTION RESPIRATORY (INHALATION)
Status: DISCONTINUED | OUTPATIENT
Start: 2018-12-29 | End: 2019-01-03

## 2018-12-29 RX ORDER — DIPHENHYDRAMINE HCL 25 MG
25 TABLET ORAL
Status: DISCONTINUED | OUTPATIENT
Start: 2018-12-29 | End: 2019-01-05

## 2018-12-29 RX ORDER — OXYCODONE HYDROCHLORIDE 5 MG/1
5 TABLET ORAL
Status: DISCONTINUED | OUTPATIENT
Start: 2018-12-29 | End: 2019-01-03

## 2018-12-29 RX ORDER — MORPHINE SULFATE 15 MG/1
15 TABLET, FILM COATED, EXTENDED RELEASE ORAL EVERY 12 HOURS
Status: DISCONTINUED | OUTPATIENT
Start: 2018-12-29 | End: 2018-12-29

## 2018-12-29 RX ORDER — CLOPIDOGREL BISULFATE 75 MG/1
75 TABLET ORAL DAILY
Status: DISCONTINUED | OUTPATIENT
Start: 2018-12-30 | End: 2019-01-09

## 2018-12-29 RX ORDER — AMOXICILLIN 250 MG
2 CAPSULE ORAL 2 TIMES DAILY
Status: DISCONTINUED | OUTPATIENT
Start: 2018-12-29 | End: 2019-01-09

## 2018-12-29 RX ADMIN — STANDARDIZED SENNA CONCENTRATE AND DOCUSATE SODIUM 2 TABLET: 8.6; 5 TABLET, FILM COATED ORAL at 17:56

## 2018-12-29 RX ADMIN — POTASSIUM CHLORIDE 10 MEQ: 7.46 INJECTION, SOLUTION INTRAVENOUS at 01:40

## 2018-12-29 RX ADMIN — OXYCODONE HYDROCHLORIDE 5 MG: 5 TABLET ORAL at 06:25

## 2018-12-29 RX ADMIN — LIDOCAINE 1 PATCH: 50 PATCH TOPICAL at 21:15

## 2018-12-29 RX ADMIN — CLOPIDOGREL 75 MG: 75 TABLET, FILM COATED ORAL at 05:33

## 2018-12-29 RX ADMIN — MAGNESIUM SULFATE IN DEXTROSE 1 G: 10 INJECTION, SOLUTION INTRAVENOUS at 12:00

## 2018-12-29 RX ADMIN — ATORVASTATIN CALCIUM 80 MG: 80 TABLET, FILM COATED ORAL at 05:33

## 2018-12-29 RX ADMIN — PHENYLEPHRINE HYDROCHLORIDE 75 MCG/MIN: 10 INJECTION INTRAVENOUS at 17:51

## 2018-12-29 RX ADMIN — PROCHLORPERAZINE EDISYLATE 10 MG: 5 INJECTION INTRAMUSCULAR; INTRAVENOUS at 00:04

## 2018-12-29 RX ADMIN — INSULIN HUMAN 3 UNITS: 100 INJECTION, SUSPENSION SUBCUTANEOUS at 23:22

## 2018-12-29 RX ADMIN — IPRATROPIUM BROMIDE AND ALBUTEROL SULFATE 3 ML: .5; 3 SOLUTION RESPIRATORY (INHALATION) at 22:28

## 2018-12-29 RX ADMIN — MORPHINE SULFATE 5 MG: 10 INJECTION INTRAVENOUS at 03:15

## 2018-12-29 RX ADMIN — MUPIROCIN 1 APPLICATION: 20 OINTMENT TOPICAL at 06:30

## 2018-12-29 RX ADMIN — HYDROCODONE BITARTRATE AND ACETAMINOPHEN 2 TABLET: 5; 325 TABLET ORAL at 02:49

## 2018-12-29 RX ADMIN — DEXMEDETOMIDINE HYDROCHLORIDE 0.5 MCG/KG/HR: 100 INJECTION, SOLUTION INTRAVENOUS at 16:03

## 2018-12-29 RX ADMIN — SODIUM CHLORIDE 500 ML: 9 INJECTION, SOLUTION INTRAVENOUS at 17:37

## 2018-12-29 RX ADMIN — ETOMIDATE 20 MG: 2 INJECTION INTRAVENOUS at 16:02

## 2018-12-29 RX ADMIN — MORPHINE SULFATE 5 MG: 10 INJECTION INTRAVENOUS at 07:23

## 2018-12-29 RX ADMIN — MORPHINE SULFATE 5 MG: 10 INJECTION INTRAVENOUS at 16:07

## 2018-12-29 RX ADMIN — MUPIROCIN 1 APPLICATION: 20 OINTMENT TOPICAL at 17:57

## 2018-12-29 RX ADMIN — BACLOFEN 10 MG: 10 TABLET ORAL at 11:24

## 2018-12-29 RX ADMIN — CLEVIPIDINE 6 MG/HR: 0.5 EMULSION INTRAVENOUS at 10:46

## 2018-12-29 RX ADMIN — ASPIRIN 81 MG: 81 TABLET, CHEWABLE ORAL at 05:33

## 2018-12-29 RX ADMIN — IPRATROPIUM BROMIDE AND ALBUTEROL SULFATE 3 ML: .5; 3 SOLUTION RESPIRATORY (INHALATION) at 18:41

## 2018-12-29 RX ADMIN — FENTANYL CITRATE 50 MCG/HR: 50 INJECTION, SOLUTION INTRAMUSCULAR; INTRAVENOUS at 19:55

## 2018-12-29 RX ADMIN — METOPROLOL TARTRATE 12.5 MG: 25 TABLET, FILM COATED ORAL at 05:20

## 2018-12-29 RX ADMIN — BACLOFEN 10 MG: 10 TABLET ORAL at 05:33

## 2018-12-29 RX ADMIN — INSULIN HUMAN 3 UNITS: 100 INJECTION, SUSPENSION SUBCUTANEOUS at 11:25

## 2018-12-29 RX ADMIN — ALBUMIN (HUMAN) 25 G: 12.5 SOLUTION INTRAVENOUS at 17:10

## 2018-12-29 RX ADMIN — OXYCODONE HYDROCHLORIDE 5 MG: 5 TABLET ORAL at 11:24

## 2018-12-29 RX ADMIN — SODIUM CHLORIDE 250 ML: 9 INJECTION, SOLUTION INTRAVENOUS at 17:36

## 2018-12-29 RX ADMIN — ALPRAZOLAM 0.25 MG: 0.25 TABLET ORAL at 08:48

## 2018-12-29 RX ADMIN — PROPOFOL 20 MCG/KG/MIN: 10 INJECTION, EMULSION INTRAVENOUS at 23:22

## 2018-12-29 RX ADMIN — PROPOFOL 50 MG: 10 INJECTION, EMULSION INTRAVENOUS at 16:14

## 2018-12-29 RX ADMIN — CLEVIPIDINE 6 MG/HR: 0.5 EMULSION INTRAVENOUS at 13:50

## 2018-12-29 RX ADMIN — STANDARDIZED SENNA CONCENTRATE AND DOCUSATE SODIUM 2 TABLET: 8.6; 5 TABLET, FILM COATED ORAL at 05:33

## 2018-12-29 RX ADMIN — HYDROCODONE BITARTRATE AND ACETAMINOPHEN 2 TABLET: 5; 325 TABLET ORAL at 08:47

## 2018-12-29 RX ADMIN — BACLOFEN 10 MG: 10 TABLET ORAL at 17:55

## 2018-12-29 RX ADMIN — POTASSIUM BICARBONATE 25 MEQ: 25 TABLET, EFFERVESCENT ORAL at 17:56

## 2018-12-29 RX ADMIN — FENTANYL CITRATE 100 MCG: 50 INJECTION, SOLUTION INTRAMUSCULAR; INTRAVENOUS at 16:12

## 2018-12-29 RX ADMIN — HYDROCODONE BITARTRATE AND ACETAMINOPHEN 2 TABLET: 5; 325 TABLET ORAL at 15:30

## 2018-12-29 RX ADMIN — SUCCINYLCHOLINE CHLORIDE 100 MG: 20 INJECTION, SOLUTION INTRAMUSCULAR; INTRAVENOUS at 16:04

## 2018-12-29 RX ADMIN — CLEVIPIDINE 10 MG/HR: 0.5 EMULSION INTRAVENOUS at 07:15

## 2018-12-29 RX ADMIN — TRAMADOL HYDROCHLORIDE 50 MG: 50 TABLET, COATED ORAL at 05:33

## 2018-12-29 RX ADMIN — ONDANSETRON 4 MG: 2 INJECTION INTRAMUSCULAR; INTRAVENOUS at 18:31

## 2018-12-29 RX ADMIN — LISINOPRIL 5 MG: 5 TABLET ORAL at 07:30

## 2018-12-29 RX ADMIN — FUROSEMIDE 40 MG: 10 INJECTION, SOLUTION INTRAMUSCULAR; INTRAVENOUS at 17:56

## 2018-12-29 RX ADMIN — ALPRAZOLAM 0.5 MG: 0.5 TABLET ORAL at 15:33

## 2018-12-29 RX ADMIN — MORPHINE SULFATE 15 MG: 15 TABLET, EXTENDED RELEASE ORAL at 07:30

## 2018-12-29 RX ADMIN — FAMOTIDINE 20 MG: 20 TABLET ORAL at 17:55

## 2018-12-29 RX ADMIN — FUROSEMIDE 40 MG: 10 INJECTION, SOLUTION INTRAMUSCULAR; INTRAVENOUS at 08:20

## 2018-12-29 RX ADMIN — POTASSIUM CHLORIDE 10 MEQ: 7.46 INJECTION, SOLUTION INTRAVENOUS at 07:28

## 2018-12-29 RX ADMIN — ALBUMIN (HUMAN) 25 G: 5 SOLUTION INTRAVENOUS at 17:10

## 2018-12-29 RX ADMIN — PROPOFOL 30 MCG/KG/MIN: 10 INJECTION, EMULSION INTRAVENOUS at 16:45

## 2018-12-29 RX ADMIN — PHENYLEPHRINE HYDROCHLORIDE: 10 INJECTION INTRAVENOUS at 16:55

## 2018-12-29 RX ADMIN — ENOXAPARIN SODIUM 40 MG: 100 INJECTION SUBCUTANEOUS at 17:56

## 2018-12-29 ASSESSMENT — PAIN SCALES - GENERAL
PAINLEVEL_OUTOF10: 7
PAINLEVEL_OUTOF10: 8
PAINLEVEL_OUTOF10: 6
PAINLEVEL_OUTOF10: 8
PAINLEVEL_OUTOF10: 6
PAINLEVEL_OUTOF10: 8
PAINLEVEL_OUTOF10: 9
PAINLEVEL_OUTOF10: 7
PAINLEVEL_OUTOF10: 9
PAINLEVEL_OUTOF10: 7
PAINLEVEL_OUTOF10: 9
PAINLEVEL_OUTOF10: 10

## 2018-12-29 ASSESSMENT — COGNITIVE AND FUNCTIONAL STATUS - GENERAL
MOBILITY SCORE: 16
CLIMB 3 TO 5 STEPS WITH RAILING: A LOT
SUGGESTED CMS G CODE MODIFIER MOBILITY: CK
TURNING FROM BACK TO SIDE WHILE IN FLAT BAD: A LITTLE
HELP NEEDED FOR BATHING: A LOT
DRESSING REGULAR LOWER BODY CLOTHING: A LOT
MOVING TO AND FROM BED TO CHAIR: A LITTLE
TOILETING: A LOT
PERSONAL GROOMING: A LOT
STANDING UP FROM CHAIR USING ARMS: A LITTLE
EATING MEALS: A LITTLE
WALKING IN HOSPITAL ROOM: A LOT
DRESSING REGULAR UPPER BODY CLOTHING: A LOT
MOVING FROM LYING ON BACK TO SITTING ON SIDE OF FLAT BED: A LITTLE
SUGGESTED CMS G CODE MODIFIER DAILY ACTIVITY: CL
DAILY ACTIVITIY SCORE: 13

## 2018-12-29 ASSESSMENT — ENCOUNTER SYMPTOMS
MUSCULOSKELETAL NEGATIVE: 1
PSYCHIATRIC NEGATIVE: 1
CONSTITUTIONAL NEGATIVE: 1
RESPIRATORY NEGATIVE: 1
NEUROLOGICAL NEGATIVE: 1
GASTROINTESTINAL NEGATIVE: 1
EYES NEGATIVE: 1
CARDIOVASCULAR NEGATIVE: 1

## 2018-12-29 ASSESSMENT — LIFESTYLE VARIABLES: ALCOHOL_USE: NO

## 2018-12-29 ASSESSMENT — PULMONARY FUNCTION TESTS: EPAP_CMH2O: 8

## 2018-12-29 NOTE — PROGRESS NOTES
Nelly Zazueta, APRN updated on patient's status and Dr. Vallejo wanting to intubate. Nelly Zazueta agrees with treatment plan. Dr. Vallejo at bedside to intubate

## 2018-12-29 NOTE — RESPIRATORY CARE
Extubation    Cuff leak noted yes  Stridor present no        O2 (LPM):2  O2 Daily Delivery Respiratory : NC  Patient tolerating well  RCP Complete? yes  Events/Summary/Plan: RN and RT agree to extubate. pt extubated to 2L NC.

## 2018-12-29 NOTE — PROGRESS NOTES
Critical Care Progress Note    Date of admission  2018    Chief Complaint  43 y.o. male admitted 2018 with chest pain.    Hospital Course    This gentleman was admitted to the hospital with an acute non-STEMI.  He was found to have significant CAD.  He underwent CABG.      Interval Problem Update  Reviewed last 24 hour events:    Insulin 1  ST  IABP 2:1  Epi off  3 L NC - desaturated - 100% NRB - sats improved around 88% - start BiPAP  CXR with slightly increased edema  AB.36/35/53 prior to BiPAP  Will give Lasix - 40 mg    Reassessment: pain control an issue. ST. IABP removed. Insulin 0.5. 775 mL out so far in urine. IS 1250. BiPAP. Start MS Contin    Reassessment: A little bit improved on BiPAP.  Continue BiPAP    Reassessment: No longer tolerating BiPAP well.  I switched him to a HFNC.    Reassessment: Observed on a HFNC.  He remains hypoxemic and complains of dyspnea.  He is emergently intubated.  See separate dictation.  Bronchoscopy reveals occlusion of left lower lobe with thick mucus plugs.    Reassessment: Hypertensive after intubation.  Titrating clevidipine drip.    Reassessment: Developed hypotension.  Clevidipine drip stopped.  CXR without evidence of pneumothorax.  Titrating phenylephrine to maintain blood pressure.      Review of Systems  Review of Systems   Unable to perform ROS: Acuity of condition        Vital Signs for last 24 hours   Temp:  [36.6 °C (97.9 °F)] 36.6 °C (97.9 °F)  Pulse:  [] 109  Resp:  [0-36] 12    Hemodynamic parameters for last 24 hours  CVP:  [7 MM HG-11 MM HG] 11 MM HG  PCWP:  [10 MM HG-17 MM HG] 17 MM HG  CO:  [4.4-7.8] 4.4  CI:  [2.1-3.6] 2.1    Respiratory  [place] Vent Mode: Vent Standby  PEEP/CPAP: 8  FiO2: 40  P MEAN: 10  Control VTE (exp VT): 899    Physical Exam   Physical Exam   Constitutional:   On ventilator   HENT:   Head: Normocephalic and atraumatic.   Right Ear: External ear normal.   Left Ear: External ear normal.   Nose: Nose normal.    Mouth/Throat: Oropharynx is clear and moist.   Eyes: Pupils are equal, round, and reactive to light. Conjunctivae are normal. Right eye exhibits no discharge. Left eye exhibits no discharge.   Neck: Neck supple. No JVD present. No tracheal deviation present.   Cardiovascular: Intact distal pulses.  Exam reveals no gallop.    Sinus tachycardia   Pulmonary/Chest: He is in respiratory distress. He has no wheezes. He has rales (Scattered crackles bilaterally).   Abdominal: Soft. Bowel sounds are normal. He exhibits no distension. There is no tenderness. There is no rebound.   Musculoskeletal: He exhibits no tenderness or deformity.   No clubbing or cyanosis   Neurological:   Awake and alert.  Oriented x4.  Moves all 4 extremities.  Following intubation he will arouse and nod.   Skin: Skin is warm and dry. No rash noted. He is not diaphoretic. No erythema.       Medications  Current Facility-Administered Medications   Medication Dose Route Frequency Provider Last Rate Last Dose   • Respiratory Care per Protocol   Nebulization Continuous RT Tomas Barrios M.D.       • NS infusion   Intravenous Continuous Tomas Barrios M.D. 10 mL/hr at 12/28/18 1405     • K+ Scale: Goal of 4.5  1 Each Intravenous Q6HRS Tomas Barrios M.D.   1 Each at 12/29/18 0000   • Pharmacy Consult Request ...Pain Management Review 1 Each  1 Each Other PRN Tomas Barrios M.D.       • senna-docusate (PERICOLACE or SENOKOT S) 8.6-50 MG per tablet 2 Tab  2 Tab Oral BID Tomas Barrios M.D.        And   • polyethylene glycol/lytes (MIRALAX) PACKET 1 Packet  1 Packet Oral QDAY PRN Tomas Barrios M.D.        And   • magnesium hydroxide (MILK OF MAGNESIA) suspension 30 mL  30 mL Oral QDAY PRN Tomas Barrios M.D.        And   • bisacodyl (DULCOLAX) suppository 10 mg  10 mg Rectal QDAY PRN Tomas Barrios M.D.       • electrolyte-A (PLASMALYTE-A) infusion   Intravenous PRN Tomas Barrios M.D.   1,000 mL at 12/28/18 1345   • enoxaparin (LOVENOX) inj 40  mg  40 mg Subcutaneous QDAY Tomas Barrios M.D.       • mupirocin (BACTROBAN) 2 % ointment 1 Application  1 Application Topical BID Tomas Barrios M.D.   1 Application at 12/28/18 1839   • Magnesium Sulfate in D5W IVPB premix 1 g  1 g Intravenous QDAY Tomas Barrios M.D.   Stopped at 12/28/18 1449   • metoprolol (LOPRESSOR) tablet 12.5 mg  12.5 mg Oral BID Toams Barrios M.D.        Followed by   • [START ON 12/30/2018] metoprolol (LOPRESSOR) tablet 25 mg  25 mg Oral BID Tomas Barrios M.D.       • clopidogrel (PLAVIX) tablet 75 mg  75 mg Oral DAILY Tomas Barrios M.D.       • clevidipine (CLEVIPREX) IV emulsion  1-21 mg/hr Intravenous Continuous Tomsa Barrios M.D.   Stopped at 12/28/18 1315   • nitroglycerin 50 mg in D5W 250 ml infusion  0-100 mcg/min Intravenous Continuous Tomas Barrios M.D.   Stopped at 12/28/18 1300   • oxyCODONE immediate-release (ROXICODONE) tablet 5 mg  5 mg Oral Q3HRS PRN Tomas Barrios M.D.   5 mg at 12/28/18 2230   • tramadol (ULTRAM) 50 MG tablet 50 mg  50 mg Oral Q4HRS PRN Tomas Barrios M.D.   50 mg at 12/28/18 2124   • dexmedetomidine (PRECEDEX) 400 mcg/100mL NS premix infusion  0-1.5 mcg/kg/hr Intravenous Continuous Tomas Barrios M.D.   Stopped at 12/28/18 1725   • sodium bicarbonate 8.4 % injection 50 mEq  50 mEq Intravenous Q HOUR PRN Tomas Barrios M.D.   50 mEq at 12/28/18 1327   • ondansetron (ZOFRAN) syringe/vial injection 4 mg  4 mg Intravenous Q6HRS PRN Tomas Barrios M.D.   4 mg at 12/28/18 2311    Or   • prochlorperazine (COMPAZINE) injection 10 mg  10 mg Intravenous Q6HRS PRN Tomas Barrios M.D.   10 mg at 12/29/18 0004    Or   • promethazine (PHENERGAN) suppository 25 mg  25 mg Rectal Q6HRS PRN Tomas Barrios M.D.       • acetaminophen (TYLENOL) tablet 650 mg  650 mg Oral Q4HRS PRN Tomas Barrios M.D.        Or   • acetaminophen (TYLENOL) suppository 650 mg  650 mg Rectal Q4HRS PRN Tomas Barrios M.D.       • mag hydrox-al hydrox-simeth (MAALOX PLUS ES  or MYLANTA DS) suspension 30 mL  30 mL Oral Q4HRS PRN Tomas Barrios M.D.       • diphenhydrAMINE (BENADRYL) tablet/capsule 25 mg  25 mg Oral HS PRN - MR X 1 Tomas Barrios M.D.       • HYDROcodone-acetaminophen (NORCO) 5-325 MG per tablet 1-2 Tab  1-2 Tab Oral Q6HRS PRN Tomas Barrios M.D.   2 Tab at 12/29/18 0249   • EPINEPHrine 4 mg in  mL Infusion  0-0.2 mcg/kg/min Intravenous Continuous Tabitha Wick, A.P.N.   Stopped at 12/28/18 1705   • insulin regular human (HUMULIN/NOVOLIN R) 62.5 Units in  mL infusion per protocol  1-6 Units/hr Intravenous Continuous Tabitha Elliottt, A.P.N. 16 mL/hr at 12/29/18 0320 4 Units/hr at 12/29/18 0320    And   • insulin regular (HUMULIN R) injection 0-14 Units  0-14 Units Intravenous Once Tabitha Wick, A.P.N.   Stopped at 12/28/18 1430    And   • insulin regular (HUMULIN R) injection 0-10 Units  0-10 Units Intravenous PRN Tabitha Wick, A.P.N.   1 Units at 12/28/18 2321    And   • dextrose 50% (D50W) injection 25 mL  25 mL Intravenous PRN Tabitha Wick, A.P.N.       • insulin lispro (HUMALOG) injection 0-20 Units  0-20 Units Subcutaneous PRN Tabitha Elliottt, A.P.N.       • phenylephrine (RAFIA-SYNEPHRINE) 40,000 mcg in  mL Infusion  0-30 mcg/min Intravenous Continuous Tabitha Wick, A.P.N.   Stopped at 12/28/18 1705   • metoprolol (LOPRESSOR) tablet 12.5 mg  12.5 mg Oral Once Inna Linton, A.P.N.   Stopped at 12/28/18 1900   • morphine (pf) 10 mg/mL injection 5 mg  5 mg Intravenous Q4HRS PRN Inna Linton, A.P.N.   5 mg at 12/29/18 0315   • baclofen (LIORESAL) tablet 10 mg  10 mg Oral TID DIRK AlejoP.N.       • lidocaine (LIDODERM) 5 % 1 Patch  1 Patch Transdermal Q24HR NAKUL Alejo.P.N.   1 Patch at 12/28/18 2125   • atorvastatin (LIPITOR) tablet 80 mg  80 mg Oral DAILY Yvette Orozco M.D.   Stopped at 12/28/18 0600   • aspirin (ASA) chewable tab 81 mg  81 mg Oral DAILY NAKUL Turner.P.NDeepti   Stopped at 12/28/18 0600   •  ALPRAZolam (XANAX) tablet 0.25 mg  0.25 mg Oral Q6HRS PRN DIRK RichardsonPDeeptiNDeepti   0.25 mg at 12/28/18 2026       Fluids    Intake/Output Summary (Last 24 hours) at 12/29/18 0444  Last data filed at 12/29/18 0400   Gross per 24 hour   Intake          7572.72 ml   Output             5855 ml   Net          1717.72 ml       Laboratory  Recent Labs      12/28/18   1320  12/28/18   1433  12/28/18   1536   ISTATAPH  7.278*  7.363*  7.353*   ISTATAPCO2  40.5*  40.2*  39.4*   ISTATAPO2  119*  103*  87   ISTATATCO2  20  24  23   SGBOFBA3UJV  98  98  96   ISTATARTHCO3  18.9  22.9  21.9   ISTATARTBE  -7*  -2  -3   ISTATTEMP  36.4 C  36.6 C  36.6 C   ISTATFIO2  100  80  50   ISTATSPEC  Arterial  Arterial  Arterial   ISTATAPHTC  7.287*  7.369*  7.359*   ZJLBFUPS8DR  115*  100*  85     Recent Labs      12/26/18 2210 12/27/18   0010  12/27/18   0428  12/27/18   0802   TROPONINI  6.02*  6.12*  6.57*  6.13*   BNPBTYPENAT  197*   --    --    --      Recent Labs      12/26/18 2210 12/27/18   0428  12/28/18   0545  12/28/18   1246  12/28/18   1730  12/29/18   0040   SODIUM  137  136  132*   --    --    --    POTASSIUM  3.4*  3.3*  4.5   --   4.1  4.2   CHLORIDE  104  106  105   --    --    --    CO2  20  21  22   --    --    --    BUN  15  14  14   --    --    --    CREATININE  0.59  0.51  0.57   --    --    --    MAGNESIUM  1.9  1.7   --   2.9*   --    --    CALCIUM  9.1  8.6  8.9   --    --    --      Recent Labs      12/26/18   2210  12/27/18   0428  12/28/18   0545   ALTSGPT   --   37   --    ASTSGOT   --   24   --    ALKPHOSPHAT   --   99   --    TBILIRUBIN   --   0.7   --    GLUCOSE  232*  251*  235*     Recent Labs      12/26/18   2210  12/27/18   0428  12/28/18   0545  12/28/18   1830   WBC  14.0*  10.8  9.7  23.0*   NEUTSPOLYS  66.80  63.20  58.10   --    LYMPHOCYTES  24.00  27.20  32.40   --    MONOCYTES  7.50  7.90  6.70   --    EOSINOPHILS  0.90  0.90  2.10   --    BASOPHILS  0.40  0.30  0.30   --    ASTSGOT   --    24   --    --    ALTSGPT   --   37   --    --    ALKPHOSPHAT   --   99   --    --    TBILIRUBIN   --   0.7   --    --      Recent Labs      12/27/18   0428   12/27/18   0802  12/27/18   2248  12/28/18   0545  12/28/18   1045  12/28/18   1246  12/28/18   1830   RBC  4.05*   --    --    --   3.99*   --    --   3.88*   HEMOGLOBIN  12.8*   --    --    --   12.6*   --    --   12.0*   HEMATOCRIT  37.4*   --    --    --   37.9*   --    --   36.7*   PLATELETCT  183   --    --    --   178  143*  183  160*   PROTHROMBTM   --    --   14.7*   --    --    --   16.3*   --    APTT   --    < >  73.1*  46.5*  34.0   --   31.6   --    INR   --    --   1.14*   --    --    --   1.30*   --     < > = values in this interval not displayed.       Imaging  X-Ray:  I have personally reviewed the images and compared with prior images. and My impression is: Right greater than left lung opacities    Assessment/Plan  * NSTEMI (non-ST elevated myocardial infarction) ([facility])- (present on admission)   Assessment & Plan    Continue aspirin, statin and Plavix     Acute hypoxemic respiratory failure ([facility])   Assessment & Plan    Reintubated on 12/29  I initiated full vent support  All appropriate ventilator bundles are in effect     S/P CABG x 3   Assessment & Plan    On 12/28 by Dr. Barrios  Labile blood pressure - I have titrated both clevidipine and phenylephrine for blood pressure control     Acute systolic congestive heart failure ([facility])   Assessment & Plan    EF 25%  Intra-aortic balloon pump has been removed  Force diuresis as tolerated     Type 2 diabetes mellitus without complication ([facility])- (present on admission)   Assessment & Plan    Poorly controlled prior to admission with glycohemoglobin of 12  Titrating insulin drip to achieve glycemic control     SVT (supraventricular tachycardia) ([facility])- (present on admission)   Assessment & Plan    History of          VTE:  Lovenox  Ulcer: H2 Antagonist  Lines: Central Line  Ongoing indication addressed,  Arterial Line  Ongoing indication addressed and Thomas Catheter  Ongoing indication addressed    I have performed a physical exam and reviewed and updated ROS and Plan today (12/29/2018). In review of yesterday's note (12/28/2018), there are no changes except as documented above.     I have assessed and reassessed his respiratory status and made ventilator adjustments based upon arterial blood gas analysis as well as analysis of ventilator waveforms.  I have assessed and reassessed his blood pressure, hemodynamics, cardiovascular status with titration of clevidipine and phenylephrine.  He is at increased risk for worsening respiratory and cardiovascular system dysfunction.    Discussed patient condition and risk of morbidity and/or mortality with Family, RN, RT, Pharmacy, Charge nurse / hot rounds, QA team and CVS     The patient remains critically ill.  Critical care time = 255 minutes in directly providing and coordinating critical care and extensive data review.  No time overlap and excludes procedures.    Hieu Vallejo MD  Pulmonary and Critical Care Medicine

## 2018-12-29 NOTE — PROGRESS NOTES
IABP removed by LINDSEY Archer. Patient tolerated well. Hemodynamics stable. +1 pulses in bilateral lower extremities and cool to touch. Femostop in place.

## 2018-12-29 NOTE — PROGRESS NOTES
Updated Inna Linton concerning patient complaints of pain and continued tachycardia. Patient HR remains 108-113 despite being off epi (devora also stopped) and after the albumin (CI 2.2 with epi off and iabp at 1:2). Patient was extubated at 1630 and CT output has only been 100cc (H/H 10.9/32 at 1430). Orders to recheck H/H and to give 12.5mg of metoprolol now. Patient reports unbearable back pain related to his scoliosis. Oxy 10 given earlier. Order for fentanyl 50mcg IVP Q4 hours PRN.

## 2018-12-29 NOTE — PROGRESS NOTES
Cardiovascular Surgery Progress Note    Name: Rikki Khalil  MRN: [id number]  : 1975  Admit Date: 2018  9:54 PM  Procedure:  Procedure(s) and Anesthesia Type:     * MULTIPLE CORONARY ARTERY BYPASS x 3,  ENDO VEIN HARVEST LEFT LEG - General     * DONNA - General  1 Day Post-Op    Vitals:  Patient Vitals for the past 8 hrs:   Monitored Temp 2 SpO2 O2 (LPM) Pulse Heart Rate (Monitored) Resp NIBP Weight   18 0657 - 92 % 3 (!) 156 (!) 121 18 - -   18 0645 37 °C (98.6 °F) 95 % - (!) 117 (!) 117 12 133/71 -   18 0630 37.1 °C (98.8 °F) 92 % - (!) 115 (!) 115 12 - -   18 0615 37.1 °C (98.8 °F) 93 % - (!) 118 (!) 118 12 118/71 -   18 0600 36.5 °C (97.7 °F) 93 % - (!) 115 (!) 115 (!) 10 125/70 -   18 0545 37.1 °C (98.8 °F) 93 % - (!) 116 (!) 116 18 135/69 -   18 0530 37 °C (98.6 °F) 91 % - (!) 124 (!) 124 (!) 10 - -   18 0515 37.1 °C (98.8 °F) 94 % - (!) 113 (!) 113 17 - -   18 0500 37 °C (98.6 °F) 94 % - (!) 112 (!) 112 14 122/70 -   18 0445 37 °C (98.6 °F) 93 % - (!) 113 (!) 113 16 - -   18 0430 37.1 °C (98.8 °F) 94 % - (!) 113 (!) 113 13 - -   18 0415 37 °C (98.6 °F) 95 % - (!) 110 (!) 110 15 - -   18 0400 37 °C (98.6 °F) - - (!) 111 (!) 111 12 113/67 -   18 0345 37 °C (98.6 °F) - - (!) 109 (!) 109 12 - -   18 0330 37 °C (98.6 °F) - - (!) 108 (!) 108 16 - -   18 0315 36.9 °C (98.4 °F) 95 % - (!) 106 (!) 106 15 - -   18 0300 36.9 °C (98.4 °F) 94 % - (!) 108 (!) 108 16 107/69 -   18 0245 36.8 °C (98.2 °F) 95 % - (!) 112 (!) 112 16 - -   18 0235 - - - - - - - 92.4 kg (203 lb 11.3 oz)   18 0230 36.7 °C (98.1 °F) 95 % - (!) 107 (!) 107 (!) 11 - -   18 0215 36.7 °C (98.1 °F) 96 % - (!) 105 (!) 105 14 115/68 -   18 0200 36.6 °C (97.9 °F) 96 % - (!) 104 (!) 104 (!) 10 - -   18 0145 36.6 °C (97.9 °F) 97 % - (!) 105 (!) 105 12 - -   18 0130 36.6 °C (97.9 °F) 96 % - (!) 103 (!) 103  (!) 7 - -   18 0115 36.6 °C (97.9 °F) 97 % - (!) 103 (!) 103 16 / -   18 0100 36.5 °C (97.7 °F) 96 % - (!) 102 (!) 102 (!) 11 103/66 -   18 0045 36.4 °C (97.5 °F) 97 % - 100 100 12 - -   18 0030 36.4 °C (97.5 °F) 98 % - 100 100 14 - -   18 0015 36.3 °C (97.3 °F) 93 % - (!) 103 (!) 103 (!) 11 123/ -   18 0000 36.4 °C (97.5 °F) 95 % - (!) 101 (!) 101 17 - -   18 2345 36.4 °C (97.5 °F) 96 % - (!) 107 (!) 107 17 - -   18 2330 36.3 °C (97.3 °F) 98 % - 100 100 12 128/ -   18 2315 36.3 °C (97.3 °F) 95 % - (!) 105 (!) 105 (!) 10 - -     Temp (24hrs), Av.6 °C (97.9 °F), Min:36.6 °C (97.9 °F), Max:36.6 °C (97.9 °F)      Respiratory:  [place] Vent Mode: Vent Standby, PEEP/CPAP: 8, FiO2: 40, P Peak (PIP): 18, P MEAN: 10 Respiration: 18, Pulse Oximetry: 92 %, O2 Daily Delivery Respiratory : Silicone Nasal Cannula     Chest Tube Drains:          Fluids:    Intake/Output Summary (Last 24 hours) at 18 0702  Last data filed at 18 0600   Gross per 24 hour   Intake          7876.72 ml   Output             6050 ml   Net          1826.72 ml     Admit weight: Weight: 95.3 kg (210 lb)  Current weight: Weight: 92.4 kg (203 lb 11.3 oz) (18 0235)    Labs:  Recent Labs      18   0545   18   1246  18   1830  18   0543   WBC  9.7   --    --   23.0*  19.2*   RBC  3.99*   --    --   3.88*  3.82*   HEMOGLOBIN  12.6*   --    --   12.0*  12.0*   HEMATOCRIT  37.9*   --    --   36.7*  36.3*   MCV  95.0   --    --   94.6  95.0   MCH  31.6   --    --   30.9  31.4   MCHC  33.2*   --    --   32.7*  33.1*   RDW  46.0   --    --   44.7  45.3   PLATELETCT  178   < >  183  160*  176   MPV  11.7   --    --   11.4  11.5    < > = values in this interval not displayed.     Recent Labs      18   2210  18   0428  18   0545   NEUTSPOLYS  66.80  63.20  58.10   LYMPHOCYTES  24.00  27.20  32.40   MONOCYTES  7.50  7.90  6.70   EOSINOPHILS  0.90   0.90  2.10   BASOPHILS  0.40  0.30  0.30     Recent Labs      12/27/18   0428  12/28/18   0545  12/28/18   1730  12/29/18   0040  12/29/18   0543   SODIUM  136  132*   --    --   134*   POTASSIUM  3.3*  4.5  4.1  4.2  4.0   CHLORIDE  106  105   --    --   105   CO2  21  22   --    --   21   GLUCOSE  251*  235*   --    --   76   BUN  14  14   --    --   14   CREATININE  0.51  0.57   --    --   0.58   CALCIUM  8.6  8.9   --    --   8.3*     Recent Labs      12/27/18   0802  12/27/18   2248  12/28/18   0545  12/28/18   1246   APTT  73.1*  46.5*  34.0  31.6   INR  1.14*   --    --   1.30*       Medications:  • potassium chloride (KCL-CENTRAL) IV *Administer in ICU only*  10 mEq     • morphine ER  15 mg     • senna-docusate  2 Tab     • enoxaparin  40 mg     • mupirocin  1 Application     • magnesium sulfate  1 g Stopped (12/28/18 1449)   • metoprolol  12.5 mg      Followed by   • [START ON 12/30/2018] metoprolol  25 mg     • clopidogrel  75 mg     • insulin regular  0-14 Units     • metoprolol  12.5 mg     • baclofen  10 mg     • lidocaine  1 Patch     • atorvastatin  80 mg     • aspirin  81 mg          Ordered Medications:    ASA - Yes    Plavix - Yes    Post-operative Beta Blockers - Yes    Ace Inhibitor -yes      Statin - Yes         Exam:   Review of Systems   Constitutional: Negative.    HENT: Negative.    Eyes: Negative.    Respiratory: Negative.    Cardiovascular: Negative.    Gastrointestinal: Negative.    Genitourinary: Negative.    Musculoskeletal: Negative.    Skin: Negative.    Neurological: Negative.    Endo/Heme/Allergies: Negative.    Psychiatric/Behavioral: Negative.        Physical Exam    Quality Measures:   Quality-Core Measures   Reviewed items::  EKG reviewed, Labs reviewed, Medications reviewed and Radiology images reviewed  Thomas catheter::  One or Two Days Post Surgery (Day of Surgery being Day 0)  Central line in place:  Need for access  DVT prophylaxis pharmacological::  Contraindicated - High  bleeding risk  DVT prophylaxis - mechanical:  SCDs  Ulcer Prophylaxis::  Yes  Assessed for rehabilitation services:  Patient returned to prior level of function, rehabilitation not indicated at this time      Assessment/Plan:  POD 1 HDS- no gtts- dc IABP, ST start metorolol, low EF add ace, Resp insuff- currently on bi-pap- diurese- pulm following pain- add ms contin, D/c Ct after OOB, keep solitario for strict I&O, enc IS    Active Hospital Problems    Diagnosis   • S/P CABG x 3 [Z95.1]     Priority: High   • Ventilator dependent (HCC) [Z99.11]     Priority: High   • NSTEMI (non-ST elevated myocardial infarction) ([facility]) [I21.4]     Priority: High   • Acute systolic congestive heart failure (HCC) [I50.21]     Priority: High   • SVT (supraventricular tachycardia) ([facility]) [I47.1]     Priority: Medium   • Type 2 diabetes mellitus without complication (HCC) [E11.9]     Priority: Medium   • Hypokalemia [E87.6]     Priority: Low   • Postural dizziness with presyncope [R42, R55]     Priority: Low

## 2018-12-29 NOTE — PROGRESS NOTES
LINDSEY Archer at bedside and updated on overnight and morning events. IABP to be removed by PAWEL Archer.

## 2018-12-29 NOTE — PROGRESS NOTES
Patient requiring increase oxygen. Finger probed changed to check accuracy. Patient placed on 10 L oxymask and RT notified. RT at bedside to place patient on 15L no-rebreather mask. STAT CXR ordered.

## 2018-12-29 NOTE — CARE PLAN
Problem: Post Op Day 1 CABG/Heart Valve Replacement  Goal: Optimal care of the post op CABG/heart valve replacement Post Op Day 1  In progress    Intervention: EKG and CXR completed  Complete    Intervention: Antibiotics are discontinued within 24 hours of anesthesia end time unless indication documented for continuation beyond 24 hours  Completed    Intervention: Up in chair for all meals  IABP in place.  Unable to mobilize at this time    Intervention: Ambulate in am if stable. First ambulation is 25 feet. Repeat x 3 as tolerated.  Ambulate again before bed.  IABP in place.  Unable to mobilize at this time    Intervention: Discontinue solitario catheter unless documented reason for continuation  APN will evaluate need for solitario   Intervention: Remove original surgical dressing after 24 hrs, leave open to air unless otherwise specified by physician  Day nurse to remove dressing    Intervention: Consider chest tube removal by MD PORTILLO to determine if chest tubes can come out    Intervention: IS q 1 hour while awake and record best IS volume  RT attempting to work with pt.  Pt in a lot of pain.

## 2018-12-29 NOTE — PROGRESS NOTES
Patient arrived with OR team and connected to monitor. Report received from anesthesia and gtts verified. Chest tubes to suction, pacing wires connected to pacer box (no pacing), swan wedging, and IABP timing correctly. Heart rate discussed with Dr. Jeff, will target epi weaning.

## 2018-12-29 NOTE — PROGRESS NOTES
Received bedside report from YAO Godoy. Assumed care of patient. All continuous infusions verified. Patient having 8/10 back pain and medicated with Morphine IVP. Patient repositioned in bed with 2 RN's.

## 2018-12-29 NOTE — PROGRESS NOTES
Inna PORTILLO notified of pt unmanaged back pain.  Updated her on current pain control meds ordered.  New orders received and orders read back and placed into EPIC.

## 2018-12-29 NOTE — CARE PLAN
Problem: Day of surgery post CABG/Heart valve replacement  Goal: Stabilization in immediate post op period    Intervention: VS q 15 min x 4 hours, then q 1 hour. Include temperature immediately upon arrival. Check CO/CI q 2-4 hours and PRN  Filled in epic  Intervention: If radial artery used, elevate arm, no BP checks or needle sticks from affected arm, monitor ulnar pulse and capillary refill  nibp on right arm  Intervention: First post op hour labs and diagnostics per MD order  Completed per order  Intervention: Serum K q 6 hours x 24 hours.  ABG and CBC prn.  ongoing  Intervention: For FSBS greater than 130, start Post Cardiac Surgery Insulin Drip Protocol  Patient arrived with insulin infusing  Intervention: FSBS frequency as per Cardiac Surgery Insulin Drip Protocol  Done hourly  Intervention: Chest tube to 20 cm suction, record CT drainage with VS  Both mediastinal and janee to suction  Intervention: For CT drainage > 300 cc in first post op hour and/or 150 cc in subsequent hours: platelets, coag screen, fibrinogen, H&H per order  Low output from both  Intervention: Titrate and wean off vasoactive drips per patient's condition and per MD order while maintaining SBP  mmHg per MD order  Casper and epi weaned by 1700  Intervention: Wean from vent per protocol (see protocol), extubation goal with 2-6 hours post op.  Weaned and extubated from vent per protocol  Intervention: IS q 1 hour while awake post extubation  Encouraged IS  Intervention: Bedrest until extubated and groin lines out  iabp in place, bedrest

## 2018-12-29 NOTE — PROGRESS NOTES
Tabitha updated on patients continued tachycardia () despite cutting epi down to 0.05mcg. CI 3.6 and . 1.5 liters given thus far and heavy UOP. Orders for 500ml albumin and to add devora to facilitate epi weaning.

## 2018-12-30 ENCOUNTER — APPOINTMENT (OUTPATIENT)
Dept: RADIOLOGY | Facility: MEDICAL CENTER | Age: 43
DRG: 233 | End: 2018-12-30
Attending: INTERNAL MEDICINE
Payer: MEDICAID

## 2018-12-30 PROBLEM — J18.9 PNEUMONIA: Status: ACTIVE | Noted: 2018-12-30

## 2018-12-30 LAB
ACTION RANGE TRIGGERED IACRT: NO
ANION GAP SERPL CALC-SCNC: 7 MMOL/L (ref 0–11.9)
BASE EXCESS BLDA CALC-SCNC: -3 MMOL/L (ref -4–3)
BODY TEMPERATURE: ABNORMAL DEGREES
BUN SERPL-MCNC: 19 MG/DL (ref 8–22)
CALCIUM SERPL-MCNC: 8.5 MG/DL (ref 8.5–10.5)
CHLORIDE SERPL-SCNC: 100 MMOL/L (ref 96–112)
CO2 BLDA-SCNC: 23 MMOL/L (ref 20–33)
CO2 SERPL-SCNC: 23 MMOL/L (ref 20–33)
CREAT SERPL-MCNC: 0.71 MG/DL (ref 0.5–1.4)
CRP SERPL HS-MCNC: 26.87 MG/DL (ref 0–0.75)
ERYTHROCYTE [DISTWIDTH] IN BLOOD BY AUTOMATED COUNT: 45.8 FL (ref 35.9–50)
EST. AVERAGE GLUCOSE BLD GHB EST-MCNC: 286 MG/DL
GLUCOSE BLD-MCNC: 109 MG/DL (ref 65–99)
GLUCOSE BLD-MCNC: 125 MG/DL (ref 65–99)
GLUCOSE BLD-MCNC: 129 MG/DL (ref 65–99)
GLUCOSE BLD-MCNC: 131 MG/DL (ref 65–99)
GLUCOSE BLD-MCNC: 135 MG/DL (ref 65–99)
GLUCOSE SERPL-MCNC: 128 MG/DL (ref 65–99)
GRAM STN SPEC: ABNORMAL
HBA1C MFR BLD: 11.6 % (ref 0–5.6)
HCO3 BLDA-SCNC: 22.2 MMOL/L (ref 17–25)
HCT VFR BLD AUTO: 32 % (ref 42–52)
HGB BLD-MCNC: 10.8 G/DL (ref 14–18)
HOROWITZ INDEX BLDA+IHG-RTO: 140 MM[HG]
INST. QUALIFIED PATIENT IIQPT: YES
MAGNESIUM SERPL-MCNC: 2 MG/DL (ref 1.5–2.5)
MCH RBC QN AUTO: 32 PG (ref 27–33)
MCHC RBC AUTO-ENTMCNC: 33.8 G/DL (ref 33.7–35.3)
MCV RBC AUTO: 95 FL (ref 81.4–97.8)
O2/TOTAL GAS SETTING VFR VENT: 60 %
PCO2 BLDA: 37.7 MMHG (ref 26–37)
PCO2 TEMP ADJ BLDA: 39.6 MMHG (ref 26–37)
PH BLDA: 7.38 [PH] (ref 7.4–7.5)
PH TEMP ADJ BLDA: 7.36 [PH] (ref 7.4–7.5)
PHOSPHATE SERPL-MCNC: 3.6 MG/DL (ref 2.5–4.5)
PLATELET # BLD AUTO: 151 K/UL (ref 164–446)
PMV BLD AUTO: 12.5 FL (ref 9–12.9)
PO2 BLDA: 84 MMHG (ref 64–87)
PO2 TEMP ADJ BLDA: 90 MMHG (ref 64–87)
POTASSIUM SERPL-SCNC: 4.7 MMOL/L (ref 3.6–5.5)
PREALB SERPL-MCNC: 8 MG/DL (ref 18–38)
PROCALCITONIN SERPL-MCNC: 2.58 NG/ML
RBC # BLD AUTO: 3.37 M/UL (ref 4.7–6.1)
RHODAMINE-AURAMINE STN SPEC: NORMAL
SAO2 % BLDA: 96 % (ref 93–99)
SIGNIFICANT IND 70042: ABNORMAL
SIGNIFICANT IND 70042: NORMAL
SITE SITE: ABNORMAL
SITE SITE: NORMAL
SODIUM SERPL-SCNC: 130 MMOL/L (ref 135–145)
SOURCE SOURCE: ABNORMAL
SOURCE SOURCE: NORMAL
SPECIMEN DRAWN FROM PATIENT: ABNORMAL
WBC # BLD AUTO: 15.7 K/UL (ref 4.8–10.8)

## 2018-12-30 PROCEDURE — 700111 HCHG RX REV CODE 636 W/ 250 OVERRIDE (IP): Performed by: INTERNAL MEDICINE

## 2018-12-30 PROCEDURE — P9045 ALBUMIN (HUMAN), 5%, 250 ML: HCPCS | Performed by: NURSE PRACTITIONER

## 2018-12-30 PROCEDURE — 700111 HCHG RX REV CODE 636 W/ 250 OVERRIDE (IP): Performed by: THORACIC SURGERY (CARDIOTHORACIC VASCULAR SURGERY)

## 2018-12-30 PROCEDURE — 83735 ASSAY OF MAGNESIUM: CPT

## 2018-12-30 PROCEDURE — 84145 PROCALCITONIN (PCT): CPT

## 2018-12-30 PROCEDURE — 700105 HCHG RX REV CODE 258: Performed by: NURSE PRACTITIONER

## 2018-12-30 PROCEDURE — 700102 HCHG RX REV CODE 250 W/ 637 OVERRIDE(OP): Performed by: INTERNAL MEDICINE

## 2018-12-30 PROCEDURE — 99291 CRITICAL CARE FIRST HOUR: CPT | Performed by: INTERNAL MEDICINE

## 2018-12-30 PROCEDURE — 700105 HCHG RX REV CODE 258: Performed by: INTERNAL MEDICINE

## 2018-12-30 PROCEDURE — 94640 AIRWAY INHALATION TREATMENT: CPT

## 2018-12-30 PROCEDURE — 85027 COMPLETE CBC AUTOMATED: CPT

## 2018-12-30 PROCEDURE — 770022 HCHG ROOM/CARE - ICU (200)

## 2018-12-30 PROCEDURE — 71045 X-RAY EXAM CHEST 1 VIEW: CPT

## 2018-12-30 PROCEDURE — 74018 RADEX ABDOMEN 1 VIEW: CPT

## 2018-12-30 PROCEDURE — 700101 HCHG RX REV CODE 250: Performed by: INTERNAL MEDICINE

## 2018-12-30 PROCEDURE — 37799 UNLISTED PX VASCULAR SURGERY: CPT

## 2018-12-30 PROCEDURE — 82803 BLOOD GASES ANY COMBINATION: CPT

## 2018-12-30 PROCEDURE — 700111 HCHG RX REV CODE 636 W/ 250 OVERRIDE (IP): Performed by: NURSE PRACTITIONER

## 2018-12-30 PROCEDURE — 94003 VENT MGMT INPAT SUBQ DAY: CPT

## 2018-12-30 PROCEDURE — A9270 NON-COVERED ITEM OR SERVICE: HCPCS | Performed by: INTERNAL MEDICINE

## 2018-12-30 PROCEDURE — 84134 ASSAY OF PREALBUMIN: CPT

## 2018-12-30 PROCEDURE — 84100 ASSAY OF PHOSPHORUS: CPT

## 2018-12-30 PROCEDURE — 80048 BASIC METABOLIC PNL TOTAL CA: CPT

## 2018-12-30 PROCEDURE — 99292 CRITICAL CARE ADDL 30 MIN: CPT | Performed by: INTERNAL MEDICINE

## 2018-12-30 PROCEDURE — 700101 HCHG RX REV CODE 250: Performed by: CLINICAL NURSE SPECIALIST

## 2018-12-30 PROCEDURE — 99232 SBSQ HOSP IP/OBS MODERATE 35: CPT | Performed by: INTERNAL MEDICINE

## 2018-12-30 PROCEDURE — 82962 GLUCOSE BLOOD TEST: CPT | Mod: 91

## 2018-12-30 PROCEDURE — 83036 HEMOGLOBIN GLYCOSYLATED A1C: CPT

## 2018-12-30 PROCEDURE — 86140 C-REACTIVE PROTEIN: CPT

## 2018-12-30 RX ORDER — ALBUMIN, HUMAN INJ 5% 5 %
25 SOLUTION INTRAVENOUS ONCE
Status: COMPLETED | OUTPATIENT
Start: 2018-12-30 | End: 2018-12-30

## 2018-12-30 RX ORDER — TRAMADOL HYDROCHLORIDE 50 MG/1
50 TABLET ORAL EVERY 4 HOURS PRN
Status: DISCONTINUED | OUTPATIENT
Start: 2018-12-30 | End: 2019-01-03

## 2018-12-30 RX ADMIN — INSULIN HUMAN 3 UNITS: 100 INJECTION, SUSPENSION SUBCUTANEOUS at 21:59

## 2018-12-30 RX ADMIN — IPRATROPIUM BROMIDE AND ALBUTEROL SULFATE 3 ML: .5; 3 SOLUTION RESPIRATORY (INHALATION) at 19:19

## 2018-12-30 RX ADMIN — PHENYLEPHRINE HYDROCHLORIDE 100 MCG/MIN: 10 INJECTION INTRAVENOUS at 07:45

## 2018-12-30 RX ADMIN — MUPIROCIN 1 APPLICATION: 20 OINTMENT TOPICAL at 05:52

## 2018-12-30 RX ADMIN — IPRATROPIUM BROMIDE AND ALBUTEROL SULFATE 3 ML: .5; 3 SOLUTION RESPIRATORY (INHALATION) at 06:48

## 2018-12-30 RX ADMIN — FUROSEMIDE 40 MG: 10 INJECTION, SOLUTION INTRAMUSCULAR; INTRAVENOUS at 05:51

## 2018-12-30 RX ADMIN — FAMOTIDINE 20 MG: 20 TABLET ORAL at 17:37

## 2018-12-30 RX ADMIN — STANDARDIZED SENNA CONCENTRATE AND DOCUSATE SODIUM 2 TABLET: 8.6; 5 TABLET, FILM COATED ORAL at 05:51

## 2018-12-30 RX ADMIN — ALBUMIN (HUMAN) 25 G: 2.5 SOLUTION INTRAVENOUS at 09:54

## 2018-12-30 RX ADMIN — BACLOFEN 10 MG: 10 TABLET ORAL at 17:37

## 2018-12-30 RX ADMIN — FAMOTIDINE 20 MG: 20 TABLET ORAL at 05:51

## 2018-12-30 RX ADMIN — MUPIROCIN 1 APPLICATION: 20 OINTMENT TOPICAL at 17:37

## 2018-12-30 RX ADMIN — IPRATROPIUM BROMIDE AND ALBUTEROL SULFATE 3 ML: .5; 3 SOLUTION RESPIRATORY (INHALATION) at 02:46

## 2018-12-30 RX ADMIN — BACLOFEN 10 MG: 10 TABLET ORAL at 05:51

## 2018-12-30 RX ADMIN — PROPOFOL 20 MCG/KG/MIN: 10 INJECTION, EMULSION INTRAVENOUS at 07:46

## 2018-12-30 RX ADMIN — HYDROCODONE BITARTRATE AND ACETAMINOPHEN 2 TABLET: 5; 325 TABLET ORAL at 11:51

## 2018-12-30 RX ADMIN — MAGNESIUM SULFATE IN DEXTROSE 1 G: 10 INJECTION, SOLUTION INTRAVENOUS at 12:00

## 2018-12-30 RX ADMIN — VANCOMYCIN HYDROCHLORIDE 2300 MG: 100 INJECTION, POWDER, LYOPHILIZED, FOR SOLUTION INTRAVENOUS at 21:45

## 2018-12-30 RX ADMIN — ATORVASTATIN CALCIUM 80 MG: 80 TABLET, FILM COATED ORAL at 05:51

## 2018-12-30 RX ADMIN — PROPOFOL 20 MCG/KG/MIN: 10 INJECTION, EMULSION INTRAVENOUS at 16:55

## 2018-12-30 RX ADMIN — INSULIN HUMAN 3 UNITS: 100 INJECTION, SUSPENSION SUBCUTANEOUS at 14:16

## 2018-12-30 RX ADMIN — IPRATROPIUM BROMIDE AND ALBUTEROL SULFATE 3 ML: .5; 3 SOLUTION RESPIRATORY (INHALATION) at 23:32

## 2018-12-30 RX ADMIN — PHENYLEPHRINE HYDROCHLORIDE 100 MCG/MIN: 10 INJECTION INTRAVENOUS at 01:39

## 2018-12-30 RX ADMIN — IPRATROPIUM BROMIDE AND ALBUTEROL SULFATE 3 ML: .5; 3 SOLUTION RESPIRATORY (INHALATION) at 14:35

## 2018-12-30 RX ADMIN — ENOXAPARIN SODIUM 40 MG: 100 INJECTION SUBCUTANEOUS at 17:37

## 2018-12-30 RX ADMIN — IPRATROPIUM BROMIDE AND ALBUTEROL SULFATE 3 ML: .5; 3 SOLUTION RESPIRATORY (INHALATION) at 12:23

## 2018-12-30 RX ADMIN — CLOPIDOGREL 75 MG: 75 TABLET, FILM COATED ORAL at 05:51

## 2018-12-30 RX ADMIN — ASPIRIN 81 MG 81 MG: 81 TABLET ORAL at 05:51

## 2018-12-30 RX ADMIN — POTASSIUM BICARBONATE 25 MEQ: 25 TABLET, EFFERVESCENT ORAL at 05:50

## 2018-12-30 RX ADMIN — HYDROCODONE BITARTRATE AND ACETAMINOPHEN 2 TABLET: 5; 325 TABLET ORAL at 03:07

## 2018-12-30 RX ADMIN — HYDROCODONE BITARTRATE AND ACETAMINOPHEN 2 TABLET: 5; 325 TABLET ORAL at 23:15

## 2018-12-30 RX ADMIN — STANDARDIZED SENNA CONCENTRATE AND DOCUSATE SODIUM 2 TABLET: 8.6; 5 TABLET, FILM COATED ORAL at 17:37

## 2018-12-30 RX ADMIN — BACLOFEN 10 MG: 10 TABLET ORAL at 11:51

## 2018-12-30 RX ADMIN — PHENYLEPHRINE HYDROCHLORIDE 100 MCG/MIN: 10 INJECTION INTRAVENOUS at 14:20

## 2018-12-30 NOTE — DIETARY
"Nutrition Support Assessment:  Day 3 of admit.  Rikki Khalil is a 43 y.o. male with admitting DX of NSTEMI     Current problem list:  1. NSTEMI  2. Acute Hypoxemic Respiratory Failure  3. S/P CABG x 3  4. Type 2 DM  5. Acute Systolic CHF  6. SVT     Assessment:  Estimated Nutritional Needs based on:   Height: 182.9 cm (6' 0\"), Weight: 92.4 kg (203 lb)  Weight to Use in Calculations: 92.4 kg (203 lb)  Body mass index is 27.62 kg/m². (Overweight classification)    Calculation/Equation:    -REE per MSJ x 1.2 = 2218   -RMR per PSU  =  (Ve=9.4, Tmax=37)  Total Calories / day: 2000 - 2300  (Calories / k - 25)  Total Grams Protein / day: 110 - 140  (Grams Protein / k.2 - 1.5)     Evaluation:   1. Pt currently intubated, on the vent, and in need of nutrition support  2. Pt with Gastric Cortrak for enteral access  3. Na: 130, Glucose: 128, Pre-Albumin: 8.0, CRP: 26.87. HgbA1C : 12.0    4. Pertinent Meds: SSI (Pressor: Neosynephrine @ 37.5 mL/hr)  5. Propofol @ 11.1 mL/hr, providing 293 kcals from lipids per day  6. Pt is appropriate for glucose controlled TF formula 2' to DM type 2, uncontrolled per A1C of 12. Pt also appropriate for fiber-free specialized TF 2' to pressor support.     Noted cardiac diet education consult received. Pt current intubated, diet ed not appropriate. RD to monitor and provide education when appropriate. Will also include DM diet ed as A1C is elevated and DM is not controlled.        Recommendations/Plan:  1. Initiate Nutrition Support: Impact Peptide 1.5 @ 25 mL/hr, and advance per protocol to goal rate of 55 mL/hr with and without propofol  2. Goal rate to provide 1980 kcals, 124 gm protein, and 1016 ml free water per day  3. Fluids per MD    RD following              "

## 2018-12-30 NOTE — PROGRESS NOTES
Nelly walker paged in regards to patient maintaining SBP in the 70's. Received verbal orders to have Casper gtt with a MAX rate of 100 mcg/min. If patient continues to be hypotensive and requiring more pressure support orders received from Nelly to re-page surgeons.

## 2018-12-30 NOTE — CARE PLAN
Problem: Respiratory:  Goal: Respiratory status will improve  Intubated at this time.  Vent management per RT    Problem: Post op day 2 CABG/Heart Valve Replacement  Goal: Optimal care of the post op CABG/heart valve replacement post op day 2  Pt currently intubated.  Unable to follow post op day 2 care plan at this time      Problem: Pain Management  Goal: Pain level will decrease to patient's comfort goal  Pt on fentanyl drip and PRN PO meds.  NG tube in place.  Utilizing CPOT     Problem: Safety - Medical Restraint  Goal: Remains free of injury from restraints (Restraint for Interference with Medical Device)  INTERVENTIONS:  1. Determine that other, less restrictive measures have been tried or would not be effective before applying the restraint  2. Evaluate the patient's condition at the time of restraint application  3. Inform patient/family regarding the reason for restraint  4. Q2H: Monitor safety, psychosocial status, comfort, nutrition and hydration     Restraints checked every 2 hrs.  No s/s of injury.  ROM performed.

## 2018-12-30 NOTE — CARE PLAN
Problem: Ventilation Defect:  Goal: Ability to achieve and maintain unassisted ventilation or tolerate decreased levels of ventilator support    Intervention: Support and monitor invasive and noninvasive mechanical ventilation  Adult Ventilation Update    Total Vent Days: 3    CMV 18 470 8+ 50%     Patient Lines/Drains/Airways Status    Active Airway     Name: Placement date: Placement time: Site: Days:    Airway ETT Oral 8.0 @24   12/29/18   1605   Oral   less than 1

## 2018-12-30 NOTE — PROGRESS NOTES
Cortrak Placement    Tube Team verified patient name and medical record number prior to tube placement.  Cortrak tube (43 inches, 10 Mauritian) placed at 55 cm in right nare.  Per Cortrak picture, tube appears to be in the stomach.  Nursing Instructions: Awaiting KUB to confirm placement before use for medications or feeding. Once placement confirmed, flush tube with 30 ml of water, and then remove and save stylet, in patient medication drawer.

## 2018-12-30 NOTE — PROGRESS NOTES
1602 Etomidate given, see MAR  1603 Succ given, see MAR  1604 patient intubated  1606 Bronchoscopy completed.

## 2018-12-30 NOTE — PROCEDURES
Date of Procedure:  12/29/2018    Title of Procedure:  Diagnostic and therapeutic flexible fiberoptic bronchoscopy with bronchoalveolar lavage    Indication for Procedure:   Atelectasis    Post-procedure Diagnoses:    1.  Normal endobronchial anatomy  2.  No endobronchial tumor identified  3.  Copious amounts of very thick, tenacious, juicy yellowish tan secretions seen bilaterally.  Mucous plugging of the left lower lobe.  All secretions suctioned until clear.    Narrative:    The patient was sedated, intubated and ventilated at the time of this procedure.  The flexible fiberoptic bronchoscope was inserted through the lumen of the endotracheal tube and advanced into the distal trachea without difficulty.  The airways were examined to the subsegmental bronchus level bilaterally.    The endobronchial anatomy was normal.  No tumor was identified.    There was copious amounts of very thick, tenacious, juicy yellowish tan secretions seen bilaterally.  There was mucus plugging of the left lower lobe.  All the secretions were suctioned until clear.    Bronchoalveolar lavage was carried out in the basilar segments of the left lower lobe using the standard technique with good fluid return.    Bronchoalveolar lavage fluid from the left lower lobe is submitted to the laboratory for cytology, gram stain, culture and sensitivity, acid fast bacilli smear and culture and fungal culture.    The patient tolerated the procedure quite nicely.  No complications were apparent.  The heart rate and rhythm, blood pressure and oxygenation saturation were continuously monitored.      Hieu Vallejo MD  Pulmonary and Critical Care Medicine

## 2018-12-30 NOTE — RESPIRATORY CARE
COPD EDUCATION by COPD CLINICAL EDUCATOR  12/30/2018 at 8:40 AM by Natalie Rosen     Patient reviewed by COPD education team. Patient does not qualify for COPD program.

## 2018-12-30 NOTE — CARE PLAN
Problem: Post op day 2 CABG/Heart Valve Replacement  Goal: Optimal care of the post op CABG/heart valve replacement post op day 2    Intervention: Daily Weights  Completed by NOC  Intervention: Up in chair for all meals  Unable to complete, patient intubated  Intervention: Ambulate, increasing the distance each time x 3 and before bed  Unable to complete. Patient intubated and sedated  Intervention: Stand at sink and wash up with assistance.  Clean incisions twice daily with soap and water.  EVH site cleansed with soap and water.

## 2018-12-30 NOTE — PROGRESS NOTES
[facility] Cardiology Follow-up Consult Note    Date of note:    12/30/2018      Consulting Physician: Tomas Barrios M.D.    Patient ID:  Name:   Rikki Khalil   YOB: 1975  Age:   43 y.o.  male   MRN:   [id number]    Chief Complaint   Patient presents with   • Chest Pain     transfer s/p SVT; in ST now       ID: 43-year-old man with premature coronary artery disease who presented with a non-ST elevation myocardial infarction, and is now status post 3-V CABG (LIMA-LAD, SVG-diagonal, SVG-RCA), which was done on 12/28/2018, POD #2.  Postoperative course was complicated by reintubation for respiratory failure.    Interim Events:  Reintubated yesterday, on Casepr-Synephrine drip, attempting diuresis, elevated pulmonary artery diastolic pressure      ROS  No NV, No Bleeding, No dizziness   All other review of systems reviewed and negative.    Past medical, surgical, social, and family history reviewed and unchanged from admission except as noted in assessment and plan.    Medications: Reviewed in MAR  Current Facility-Administered Medications   Medication Dose Frequency Provider Last Rate Last Dose   • insulin lispro (HUMALOG) injection 1 Units  1 Units PRN Tomas Barrios M.D.       • insulin NPH (HUMULIN,NOVOLIN) injection 3 Units  3 Units Q8HRS Tomas Barrios M.D.   Stopped at 12/30/18 0600   • Respiratory Care per Protocol   Continuous RT Hieu Vallejo M.D.       • famotidine (PEPCID) tablet 20 mg  20 mg Q12HRS Hieu Vallejo M.D.   20 mg at 12/30/18 0551    Or   • famotidine (PEPCID) injection 20 mg  20 mg Q12HRS Hieu Vallejo M.D.       • senna-docusate (PERICOLACE or SENOKOT S) 8.6-50 MG per tablet 2 Tab  2 Tab BID Hieu Vallejo M.D.   2 Tab at 12/30/18 0551    And   • polyethylene glycol/lytes (MIRALAX) PACKET 1 Packet  1 Packet QDAY PRN Hieu Vallejo M.D.        And   • magnesium hydroxide (MILK OF MAGNESIA) suspension 30 mL  30 mL QDAY PRN Hieu Mccullough  DUY Mathew        And   • bisacodyl (DULCOLAX) suppository 10 mg  10 mg QDAY PRN Hieu Vallejo M.D.       • fentaNYL (SUBLIMAZE) injection 25 mcg  25 mcg Q HOUR PRN Hieu Vallejo M.D.        Or   • fentaNYL (SUBLIMAZE) injection 50 mcg  50 mcg Q HOUR PRN Hieu Vallejo M.D.        Or   • fentaNYL (SUBLIMAZE) injection 100 mcg  100 mcg Q HOUR PRN Hieu Vallejo M.D.   100 mcg at 12/29/18 1612   • fentaNYL (SUBLIMAZE) 50 mcg/mL in 50mL   Continuous Hieu Vallejo M.D. 1 mL/hr at 12/30/18 0700 50 mcg/hr at 12/30/18 0700   • ipratropium-albuterol (DUONEB) nebulizer solution  3 mL Q2HRS PRN (RT) Hieu Vallejo M.D.       • ipratropium-albuterol (DUONEB) nebulizer solution  3 mL Q4HRS (RT) Hieu Vallejo M.D.   3 mL at 12/30/18 1223   • dexmedetomidine (PRECEDEX) 400 mcg/100mL NS premix infusion  0.1-1.5 mcg/kg/hr Continuous Hieu Vallejo M.D.   Stopped at 12/29/18 2000   • Pharmacy Consult: Enteral tube feeding - review meds/change route/product selection   PRN Hieu Vallejo M.D.       • acetaminophen (TYLENOL) tablet 650 mg  650 mg Q4HRS PRN Hieu Vallejo M.D.        Or   • acetaminophen (TYLENOL) suppository 650 mg  650 mg Q4HRS PRN Hieu Vallejo M.D.       • ALPRAZolam (XANAX) tablet 0.5 mg  0.5 mg 4X/DAY PRN Hieu Vallejo M.D.       • aspirin (ASA) chewable tab 81 mg  81 mg DAILY Hieu Vallejo M.D.   81 mg at 12/30/18 0551   • atorvastatin (LIPITOR) tablet 80 mg  80 mg DAILY Hieu Vallejo M.D.   80 mg at 12/30/18 0551   • baclofen (LIORESAL) tablet 10 mg  10 mg TID Hieu Vallejo M.D.   10 mg at 12/30/18 1151   • clopidogrel (PLAVIX) tablet 75 mg  75 mg DAILY Hieu Vallejo M.D.   75 mg at 12/30/18 0551   • diphenhydrAMINE (BENADRYL) tablet/capsule 25 mg  25 mg HS PRN - MR X 1 Hieu Vallejo M.D.       • HYDROcodone-acetaminophen (NORCO) 5-325 MG per tablet 1-2 Tab  1-2  Tab Q6HRS PRN Hieu Vallejo M.D.   2 Tab at 12/30/18 1151   • lisinopril (PRINIVIL) tablet 5 mg  5 mg Q DAY Hieu Vallejo M.D.   Stopped at 12/30/18 0600   • mag hydrox-al hydrox-simeth (MAALOX PLUS ES or MYLANTA DS) suspension 30 mL  30 mL Q4HRS PRN Hieu Vallejo M.D.       • metoprolol (LOPRESSOR) tablet 25 mg  25 mg BID Hieu Vallejo M.D.   Stopped at 12/30/18 0600   • oxyCODONE immediate-release (ROXICODONE) tablet 5 mg  5 mg Q3HRS PRN Hieu Vallejo M.D.       • propofol (DIPRIVAN) injection  0-80 mcg/kg/min Continuous Hieu Vallejo M.D. 11.1 mL/hr at 12/30/18 0746 20 mcg/kg/min at 12/30/18 0746   • insulin lispro (HUMALOG) injection 0-10 Units  0-10 Units Q6HRS Nelly Zazueta A.P.NDeetpi   Stopped at 12/30/18 0600   • phenylephrine (RAFIA-SYNEPHRINE) 40,000 mcg in  mL Infusion  0-100 mcg/min Continuous Nelly Zazueta A.P.N. 37.5 mL/hr at 12/30/18 0745 100 mcg/min at 12/30/18 0745   • NS infusion   Continuous Tomas Barrios M.D.   Stopped at 12/29/18 1000   • Pharmacy Consult Request ...Pain Management Review 1 Each  1 Each PRN Tomas Barrios M.D.       • electrolyte-A (PLASMALYTE-A) infusion   PRN Tomas Barrios M.D.   1,000 mL at 12/28/18 1345   • enoxaparin (LOVENOX) inj 40 mg  40 mg QDAY Tomas Barrios M.D.   40 mg at 12/29/18 1756   • mupirocin (BACTROBAN) 2 % ointment 1 Application  1 Application BID Tomas Barrios M.D.   1 Application at 12/30/18 0563   • Magnesium Sulfate in D5W IVPB premix 1 g  1 g QDAY Tomas Barrios M.D. 0 mL/hr at 12/29/18 1300 1 g at 12/30/18 1200   • clevidipine (CLEVIPREX) IV emulsion  1-21 mg/hr Continuous Tomas Barrios M.D.   Stopped at 12/29/18 1626   • nitroglycerin 50 mg in D5W 250 ml infusion  0-100 mcg/min Continuous Tomas Barrios M.D.   Stopped at 12/28/18 1300   • tramadol (ULTRAM) 50 MG tablet 50 mg  50 mg Q4HRS PRN Tomas Barrios M.D.   50 mg at 12/29/18 1218   • sodium bicarbonate 8.4 % injection 50  "mEq  50 mEq Q HOUR PRN Tomas Barrios M.D.   50 mEq at 12/28/18 1327   • ondansetron (ZOFRAN) syringe/vial injection 4 mg  4 mg Q6HRS PRN Tomas Barrios M.D.   4 mg at 12/29/18 1831    Or   • prochlorperazine (COMPAZINE) injection 10 mg  10 mg Q6HRS PRN Tomas Barrios M.D.   10 mg at 12/29/18 0004    Or   • promethazine (PHENERGAN) suppository 25 mg  25 mg Q6HRS PRN Tomas Barrios M.D.       • EPINEPHrine 4 mg in  mL Infusion  0-0.2 mcg/kg/min Continuous Tabitha Wick A.P.NDeepti   Stopped at 12/28/18 1705   • lidocaine (LIDODERM) 5 % 1 Patch  1 Patch Q24HR DIRK AlejoPDeeptiNDeepti   1 Patch at 12/29/18 2115     Last reviewed on 12/28/2018  7:12 AM by Annmarie Harrington R.N.  Allergies   Allergen Reactions   • Penicillins Unspecified     \"Blue baby\"  RXN= as a child  Tolerated ceftriaxone 6/2015       Physical Exam  Body mass index is 27.62 kg/m². Blood pressure 125/86, pulse 99, temperature 36.6 °C (97.9 °F), temperature source Temporal, resp. rate 18, height 1.829 m (6' 0.01\"), weight 92.4 kg (203 lb 11.3 oz), SpO2 97 %. Vitals:    12/30/18 1100 12/30/18 1151 12/30/18 1200 12/30/18 1219   BP:       Pulse: 99      Resp: 18 18 18    Temp:       TempSrc:       SpO2: 97%   97%   Weight:       Height:        Oxygen Therapy:  Pulse Oximetry: 97 %, O2 (LPM): 60, FiO2%: 50 %, O2 Delivery: Ventilator    General: Intubated, sedated, younger appearing man in the ICU  Eyes: nl conjunctiva  ENT: OP clear  Neck: no JVD.  Right IJ Ford-Corina catheter noted.  Lungs: Coarse bilaterally, mechanically ventilated  Heart: RRR, no murmurs, no rubs or gallops,   EXT 2+ edema x4 extremities. 2+ pedal pulses. no cyanosis  Abdomen: soft, non tender, non distended,  Neurological: Intubated and sedated, deferred  Psychiatric: Deferred  Skin: Warm extremities    Labs (personally reviewed and notable for):   Recent Results (from the past 24 hour(s))   ISTAT ARTERIAL BLOOD GAS    Collection Time: 12/29/18 12:43 PM   Result Value Ref Range    " Ph 7.380 (L) 7.400 - 7.500    Pco2 27.8 26.0 - 37.0 mmHg    Po2 49 (LL) 64 - 87 mmHg    Tco2 17 (L) 20 - 33 mmol/L    S02 85 (L) 93 - 99 %    Hco3 16.5 (L) 17.0 - 25.0 mmol/L    BE -7 (L) -4 - 3 mmol/L    Body Temp 37.1 C degrees    O2 Therapy 100 %    iPF Ratio 49     Ph Temp Jessica 7.379 (L) 7.400 - 7.500    Pco2 Temp Co 28.0 26.0 - 37.0 mmHg    Po2 Temp Cor 50 (L) 64 - 87 mmHg    Specimen Arterial     Action Range Triggered YES     Inst. Qualified Patient YES    GRAM STAIN    Collection Time: 12/29/18  4:20 PM   Result Value Ref Range    Significant Indicator .     Source RESP     Site Quantitative BAL LLL     Gram Stain Result       Moderate WBCs.  Moderate Gram positive rods.  Few Gram positive cocci.  Few Gram negative cocci.  <5% intracellular organisms.     ISTAT ARTERIAL BLOOD GAS    Collection Time: 12/29/18  5:17 PM   Result Value Ref Range    Ph 7.339 (L) 7.400 - 7.500    Pco2 32.0 26.0 - 37.0 mmHg    Po2 73 64 - 87 mmHg    Tco2 18 (L) 20 - 33 mmol/L    S02 94 93 - 99 %    Hco3 17.2 17.0 - 25.0 mmol/L    BE -8 (L) -4 - 3 mmol/L    Body Temp 37.8 C degrees    O2 Therapy 100 %    iPF Ratio 73     Ph Temp Jessica 7.327 (L) 7.400 - 7.500    Pco2 Temp Co 33.1 26.0 - 37.0 mmHg    Po2 Temp Cor 77 64 - 87 mmHg    Specimen Arterial     Action Range Triggered NO     Inst. Qualified Patient YES    ISTAT SODIUM    Collection Time: 12/29/18  5:17 PM   Result Value Ref Range    Istat Sodium 131 (L) 135 - 145 mmol/L   ISTAT POTASSIUM    Collection Time: 12/29/18  5:17 PM   Result Value Ref Range    Istat Potassium 4.4 3.6 - 5.5 mmol/L   ISTAT IONIZED CA    Collection Time: 12/29/18  5:17 PM   Result Value Ref Range    Istat Ionized Calcium 1.13 1.10 - 1.30 mmol/L   ISTAT HEMATOCRIT AND HEMOGLOBIN    Collection Time: 12/29/18  5:17 PM   Result Value Ref Range    Istat Hematocrit 33 (L) 42 - 52 %    Istat Hemoglobin 11.2 (L) 14.0 - 18.0 g/dL   ACCU-CHEK GLUCOSE    Collection Time: 12/29/18  5:21 PM   Result Value Ref Range     Glucose - Accu-Ck 168 (H) 65 - 99 mg/dL   ACCU-CHEK GLUCOSE    Collection Time: 12/30/18 12:52 AM   Result Value Ref Range    Glucose - Accu-Ck 131 (H) 65 - 99 mg/dL   CBC without Differential Critical Care 0130    Collection Time: 12/30/18  4:17 AM   Result Value Ref Range    WBC 15.7 (H) 4.8 - 10.8 K/uL    RBC 3.37 (L) 4.70 - 6.10 M/uL    Hemoglobin 10.8 (L) 14.0 - 18.0 g/dL    Hematocrit 32.0 (L) 42.0 - 52.0 %    MCV 95.0 81.4 - 97.8 fL    MCH 32.0 27.0 - 33.0 pg    MCHC 33.8 33.7 - 35.3 g/dL    RDW 45.8 35.9 - 50.0 fL    Platelet Count 151 (L) 164 - 446 K/uL    MPV 12.5 9.0 - 12.9 fL   Basic Metabolic Panel (BMP) Critical Care 0130    Collection Time: 12/30/18  4:17 AM   Result Value Ref Range    Sodium 130 (L) 135 - 145 mmol/L    Potassium 4.7 3.6 - 5.5 mmol/L    Chloride 100 96 - 112 mmol/L    Co2 23 20 - 33 mmol/L    Glucose 128 (H) 65 - 99 mg/dL    Bun 19 8 - 22 mg/dL    Creatinine 0.71 0.50 - 1.40 mg/dL    Calcium 8.5 8.5 - 10.5 mg/dL    Anion Gap 7.0 0.0 - 11.9   Magnesium    Collection Time: 12/30/18  4:17 AM   Result Value Ref Range    Magnesium 2.0 1.5 - 2.5 mg/dL   Phosphorus    Collection Time: 12/30/18  4:17 AM   Result Value Ref Range    Phosphorus 3.6 2.5 - 4.5 mg/dL   CRP QUANTITIVE (NON-CARDIAC)    Collection Time: 12/30/18  4:17 AM   Result Value Ref Range    Stat C-Reactive Protein 26.87 (H) 0.00 - 0.75 mg/dL   PREALBUMIN    Collection Time: 12/30/18  4:17 AM   Result Value Ref Range    Pre-Albumin 8.0 (L) 18.0 - 38.0 mg/dL   HEMOGLOBIN A1C    Collection Time: 12/30/18  4:17 AM   Result Value Ref Range    Glycohemoglobin 11.6 (H) 0.0 - 5.6 %    Est Avg Glucose 286 mg/dL   ESTIMATED GFR    Collection Time: 12/30/18  4:17 AM   Result Value Ref Range    GFR If African American >60 >60 mL/min/1.73 m 2    GFR If Non African American >60 >60 mL/min/1.73 m 2   ISTAT ARTERIAL BLOOD GAS    Collection Time: 12/30/18  4:18 AM   Result Value Ref Range    Ph 7.377 (L) 7.400 - 7.500    Pco2 37.7 (H) 26.0 -  37.0 mmHg    Po2 84 64 - 87 mmHg    Tco2 23 20 - 33 mmol/L    S02 96 93 - 99 %    Hco3 22.2 17.0 - 25.0 mmol/L    BE -3 -4 - 3 mmol/L    Body Temp 38.1 C degrees    O2 Therapy 60 %    iPF Ratio 140     Ph Temp Jessica 7.361 (L) 7.400 - 7.500    Pco2 Temp Co 39.6 (H) 26.0 - 37.0 mmHg    Po2 Temp Cor 90 (H) 64 - 87 mmHg    Specimen Arterial     Action Range Triggered NO     Inst. Qualified Patient YES    ACCU-CHEK GLUCOSE    Collection Time: 12/30/18  6:20 AM   Result Value Ref Range    Glucose - Accu-Ck 109 (H) 65 - 99 mg/dL       Cardiac Imaging and Procedures Review:    EKG and telemetry tracings personally reviewed.  I reviewed the images and report of his chest x-ray.    Impression and Medical Decision Making:  Principal Problem:    NSTEMI (non-ST elevated myocardial infarction) (formerly [facility]) POA: Yes  Active Problems:    Acute systolic congestive heart failure (HCC) POA: Unknown    S/P CABG x 3 POA: Unknown    Ventilator dependent (formerly [facility]) POA: Unknown    Acute hypoxemic respiratory failure (formerly [facility]) POA: Unknown    SVT (supraventricular tachycardia) (formerly [facility]) POA: Yes    Type 2 diabetes mellitus without complication (formerly [facility]) POA: Yes    Atelectasis POA: Unknown    Hypokalemia POA: Yes    Postural dizziness with presyncope POA: Yes  Resolved Problems:    * No resolved hospital problems. *    Non-STEMI: Systolic dysfunction noted with multivessel coronary artery disease.  He is surgically revascularized, and we certainly very much appreciate the help of the cardiothoracic surgeons.  He remains on dual antiplatelet therapy including aspirin, and Plavix.  Lisinopril and metoprolol appropriately have been held in the setting of shock requiring pressor support.  I will take metoprolol off of his medication list as I would prefer to initiate afterload reduction with an ACE inhibitor prior to challenging him with a beta-blocker once his shock resolves.    Shock: Predominantly appears cardiogenic in the setting of systolic heart failure,  multivessel coronary disease, and recovering from bypass surgery.  Wean Casper-Synephrine as possible.    Systolic CHF: As above, ACE inhibitor and beta-blocker contraindicated.  Aldosterone antagonist similarly contraindicated.  Continue diuresis with Lasix as tolerated.      Thank you for allowing me to participate in the care of this patient.  Please call or text via [employer] if clarification would be useful.    Matheus Queen MD  Cardiologist, [facility] [place] -

## 2018-12-30 NOTE — PROGRESS NOTES
Cardiovascular Surgery Progress Note    Name: Rikki Khalil  MRN: [id number]  : 1975  Admit Date: 2018  9:54 PM  Procedure:  Procedure(s) and Anesthesia Type:     * MULTIPLE CORONARY ARTERY BYPASS x 3,  ENDO VEIN HARVEST LEFT LEG - General     * DONNA - General  2 Day Post-Op    Vitals:  Patient Vitals for the past 8 hrs:   Monitored Temp 2 SpO2 O2 Delivery Pulse Heart Rate (Monitored) Resp NIBP   18 0653 - 97 % - - (!) 110 - -   18 0646 - 99 % - - (!) 105 - -   18 0630 - - - (!) 106 (!) 107 19 -   18 0615 - - - (!) 104 (!) 105 18 -   18 0600 37.6 °C (99.7 °F) - - (!) 104 (!) 104 17 (!) 93/65   18 0545 - - - (!) 104 (!) 104 18 -   18 0530 - - - (!) 105 (!) 105 18 -   18 0515 37.7 °C (99.9 °F) - - (!) 105 (!) 105 17 -   18 0500 37.7 °C (99.9 °F) 97 % - (!) 105 (!) 104 18 (!) 95/66   18 0445 37.9 °C (100.2 °F) 97 % - (!) 105 (!) 105 16 -   18 0430 38 °C (100.4 °F) 98 % - (!) 106 (!) 107 18 -   18 0415 38.1 °C (100.6 °F) 99 % - (!) 107 (!) 107 18 -   18 0400 38.1 °C (100.6 °F) 98 % - 70 (!) 111 14 -   18 0345 37.8 °C (100 °F) 98 % - 96 (!) 112 (!) 10 -   18 0330 36.3 °C (97.3 °F) 92 % - (!) 108 (!) 108 19 -   18 0315 37.9 °C (100.2 °F) 93 % - (!) 107 (!) 107 16 -   18 0300 38.2 °C (100.8 °F) 93 % - (!) 109 (!) 109 (!) 22 103/74   18 0246 - 95 % - - (!) 105 - -   18 0245 38.2 °C (100.8 °F) - - (!) 107 (!) 107 (!) 22 -   18 0230 38.3 °C (100.9 °F) - - (!) 106 (!) 106 19 -   18 0215 38.2 °C (100.8 °F) 97 % - (!) 107 (!) 107 20 -   18 0200 35.6 °C (96.1 °F) 96 % - (!) 106 (!) 106 (!) 22 (!) 91/71   18 0145 37.6 °C (99.7 °F) 93 % - (!) 113 (!) 117 (!) 32 -   18 0130 34.7 °C (94.5 °F) 95 % - (!) 107 (!) 107 (!) 23 -   18 0115 37.1 °C (98.8 °F) 96 % - (!) 107 (!) 107 (!) 22 -   18 0100 37.9 °C (100.2 °F) 96 % - (!) 106 (!) 106 20 (!) 92/68   18 0045 37.7  °C (99.9 °F) 95 % - (!) 107 (!) 106 15 -   12/30/18 0030 37.6 °C (99.7 °F) 96 % - (!) 105 (!) 105 17 -   12/30/18 0015 37.5 °C (99.5 °F) 96 % - (!) 104 (!) 105 18 -   12/30/18 0000 37.6 °C (99.7 °F) 97 % Ventilator (!) 105 (!) 105 (!) 21 (!) 92/67   12/29/18 2345 37.6 °C (99.7 °F) 96 % - (!) 106 (!) 106 19 -   12/29/18 2330 37.6 °C (99.7 °F) 97 % - (!) 106 (!) 106 18 -   12/29/18 2315 37.5 °C (99.5 °F) 97 % - (!) 106 (!) 106 20 -     No data recorded.      Respiratory:  [place] Vent Mode: Spont, Rate (breaths/min): 18, PEEP/CPAP: 8, FiO2: 50, P Peak (PIP): 16, P MEAN: 10 Respiration: 19, Pulse Oximetry: 97 %, O2 Daily Delivery Respiratory : Highflow Nasal Cannula     Chest Tube Drains:          Fluids:    Intake/Output Summary (Last 24 hours) at 12/30/18 0704  Last data filed at 12/30/18 0400   Gross per 24 hour   Intake          1976.86 ml   Output             2120 ml   Net          -143.14 ml     Admit weight: Weight: 95.3 kg (210 lb)  Current weight: Weight: 92.4 kg (203 lb 11.3 oz) (12/29/18 0235)    Labs:  Recent Labs      12/28/18   1830  12/29/18   0543  12/30/18   0417   WBC  23.0*  19.2*  15.7*   RBC  3.88*  3.82*  3.37*   HEMOGLOBIN  12.0*  12.0*  10.8*   HEMATOCRIT  36.7*  36.3*  32.0*   MCV  94.6  95.0  95.0   MCH  30.9  31.4  32.0   MCHC  32.7*  33.1*  33.8   RDW  44.7  45.3  45.8   PLATELETCT  160*  176  151*   MPV  11.4  11.5  12.5     Recent Labs      12/28/18   0545   NEUTSPOLYS  58.10   LYMPHOCYTES  32.40   MONOCYTES  6.70   EOSINOPHILS  2.10   BASOPHILS  0.30     Recent Labs      12/28/18   0545   12/29/18   0040  12/29/18   0543  12/30/18   0417   SODIUM  132*   --    --   134*  130*   POTASSIUM  4.5   < >  4.2  4.0  4.7   CHLORIDE  105   --    --   105  100   CO2  22   --    --   21  23   GLUCOSE  235*   --    --   76  128*   BUN  14   --    --   14  19   CREATININE  0.57   --    --   0.58  0.71   CALCIUM  8.9   --    --   8.3*  8.5    < > = values in this interval not displayed.     Recent  Labs      12/27/18   0802  12/27/18   2248  12/28/18   0545  12/28/18   1246   APTT  73.1*  46.5*  34.0  31.6   INR  1.14*   --    --   1.30*       Medications:  • albumin human 5%  25 g     • insulin NPH  3 Units     • insulin lispro  2 Units     • famotidine  20 mg      Or   • famotidine  20 mg     • senna-docusate  2 Tab     • ipratropium-albuterol  3 mL     • aspirin  81 mg     • atorvastatin  80 mg     • baclofen  10 mg     • clopidogrel  75 mg     • lisinopril  5 mg     • metoprolol  25 mg     • insulin lispro  0-10 Units     • enoxaparin  40 mg     • mupirocin  1 Application     • magnesium sulfate  1 g Stopped (12/29/18 1300)   • lidocaine  1 Patch          Ordered Medications:    ASA - Yes    Plavix - Yes    Post-operative Beta Blockers - Yes    Ace Inhibitor -yes      Statin - Yes         Exam:   Review of Systems   Unable to perform ROS: Intubated       Physical Exam    Quality Measures:   Quality-Core Measures   Reviewed items::  EKG reviewed, Labs reviewed, Medications reviewed and Radiology images reviewed  Solitario catheter::  Strict Intake and Output During Sepisis or Shock  Central line in place:  Need for access and Vasopressors  DVT prophylaxis pharmacological::  Contraindicated - High bleeding risk  DVT prophylaxis - mechanical:  SCDs  Ulcer Prophylaxis::  Yes  Assessed for rehabilitation services:  Patient returned to prior level of function, rehabilitation not indicated at this time      Assessment/Plan:  POD 1 HDS- no gtts- dc IABP, ST start metorolol, low EF add ace, Resp insuff- currently on bi-pap- diurese- pulm following pain- add ms contin, D/c Ct after OOB, keep solitario for strict I&O, enc IS  POD 2 HOTN - on devora gtt- give albumin this am, ST, re-intubated yesterday for resp distress, sedated, pain on fentanyl gtt, hyponatremia- hold diuresis today, solitario for strict I&O, CPM    Active Hospital Problems    Diagnosis   • Acute hypoxemic respiratory failure (HCC) [J96.01]     Priority: High   •  S/P CABG x 3 [Z95.1]     Priority: High   • Ventilator dependent ([facility]) [Z99.11]     Priority: High   • NSTEMI (non-ST elevated myocardial infarction) ([facility]) [I21.4]     Priority: High   • Acute systolic congestive heart failure ([facility]) [I50.21]     Priority: High   • Atelectasis [J98.11]     Priority: Medium   • SVT (supraventricular tachycardia) ([facility]) [I47.1]     Priority: Medium   • Type 2 diabetes mellitus without complication ([facility]) [E11.9]     Priority: Medium   • Hypokalemia [E87.6]     Priority: Low   • Postural dizziness with presyncope [R42, R55]     Priority: Low

## 2018-12-30 NOTE — CARE PLAN
Problem: Post Op Day 1 CABG/Heart Valve Replacement  Goal: Optimal care of the post op CABG/heart valve replacement Post Op Day 1    Intervention: EKG and CXR completed  Completed  Intervention: Up in chair for all meals  Unable to complete due to increase O2 demand. Patient jacobo-ntubated at 1604  Intervention: Ambulate in am if stable. First ambulation is 25 feet. Repeat x 3 as tolerated.  Ambulate again before bed.  Unable to complete. Patient intubated  Intervention: Discontinue solitario catheter unless documented reason for continuation  Patient intubated  Intervention: Remove original surgical dressing after 24 hrs, leave open to air unless otherwise specified by physician  Left EV surgical dressing removed. Prevena dressing in place on surgical chest incision  Intervention: Consider chest tube removal by MD  MS CT removed per orders from APRN.   Intervention: Transfer to [facility] status, begin VS q 4 hours  Patient re-intubated. Unable to complete.

## 2018-12-30 NOTE — PROGRESS NOTES
Bedside report received from YAO Godoy. Assumed care of patient. Restraints and continuous infusions verified. Patient repositioned in bed.

## 2018-12-30 NOTE — PROGRESS NOTES
Critical Care Progress Note    Date of admission  12/26/2018    Chief Complaint  43 y.o. male admitted 12/26/2018 with chest pain.    Hospital Course    This gentleman was admitted to the hospital with an acute non-STEMI.  He was found to have significant CAD.  He underwent CABG.      Interval Problem Update  Reviewed last 24 hour events:      SR  Casper 100  Prop 20  Fent 50  NG to suction - 750 out last night  Advance ET 2 cm  Arouses, follows, nods, moves all 4  Agitation not controlled with dex - will increase fent    Reassessment:  Casper still at 100.  Fent 50.  Vent 3.  PEEP 8.  SBT.  50%.  Stop Lasix and K.  Give 25 grams of albumin    Reassessment:  Requires Casper at 100.  NG output has decreased.  Abdomen is soft.    Reassessment:  Urine output has improved.  Remains on Casper at 100.    Reassessment:  Agitated with decrease in propofol.  Will increase fentanyl drip.    Reassessment:  Sputum culture with significant Staphylococcus aureus.  Sensitivities pending.  Check pro-calcitonin.  Start empiric vancomycin.      Review of Systems  Review of Systems   Unable to perform ROS: Acuity of condition        Vital Signs for last 24 hours   Pulse:  [] 106  Resp:  [10-47] 18    Hemodynamic parameters for last 24 hours  CVP:  [6 MM HG-13 MM HG] 11 MM HG  PCWP:  [9 MM HG-18 MM HG] 17 MM HG  CO:  [4.8-7.4] 5  CI:  [2.2-3.4] 2.3    Respiratory  [place] Vent Mode: APVCMV  Rate (breaths/min): 18  Vt Target (mL): 470  PEEP/CPAP: 8  FiO2: 60  P Support: 12  P MEAN: 10  Control VTE (exp VT): 486    Physical Exam   Physical Exam   Constitutional:   On ventilator   HENT:   Head: Normocephalic.   Right Ear: External ear normal.   Left Ear: External ear normal.   Mouth/Throat: No oropharyngeal exudate.   Eyes: Pupils are equal, round, and reactive to light. Right eye exhibits no discharge. Left eye exhibits no discharge. No scleral icterus.   Neck: Normal range of motion. Neck supple. No JVD present. No tracheal deviation  present.   Cardiovascular: Intact distal pulses.  Exam reveals no gallop.    Sinus tachycardia   Pulmonary/Chest: He has no wheezes. He has rales (Coarse and fine crackles bilaterally).   Abdominal: Soft. Bowel sounds are normal. He exhibits no distension. There is no tenderness. There is no guarding.   Gastric tube to suction   Musculoskeletal: He exhibits no tenderness or deformity.   No clubbing or cyanosis   Neurological:   Sedated.  Will arouse and nod.  Moves all 4 extremities.   Skin: Skin is warm and dry. No rash noted. He is not diaphoretic.       Medications  Current Facility-Administered Medications   Medication Dose Route Frequency Provider Last Rate Last Dose   • furosemide (LASIX) injection 40 mg  40 mg Intravenous BID DIURETIC NAKUL Monge.PDeeptiNDeepti   40 mg at 12/29/18 1756   • insulin lispro (HUMALOG) injection 1 Units  1 Units Subcutaneous PRN Tomas Barrios M.D.       • insulin NPH (HUMULIN,NOVOLIN) injection 3 Units  3 Units Subcutaneous Q8HRS Tomas Barrios M.D.   3 Units at 12/29/18 2322   • insulin lispro (HUMALOG) injection 2 Units  2 Units Subcutaneous TID AC Tomas Barrios M.D.   Stopped at 12/29/18 1700   • Respiratory Care per Protocol   Nebulization Continuous RT Hieu Vallejo M.D.       • famotidine (PEPCID) tablet 20 mg  20 mg Feeding Tube Q12HRS Hieu Vallejo M.D.   20 mg at 12/29/18 1755    Or   • famotidine (PEPCID) injection 20 mg  20 mg Intravenous Q12HRS Hieu Vallejo M.D.       • senna-docusate (PERICOLACE or SENOKOT S) 8.6-50 MG per tablet 2 Tab  2 Tab Feeding Tube BID Hieu Vallejo M.D.   2 Tab at 12/29/18 1756    And   • polyethylene glycol/lytes (MIRALAX) PACKET 1 Packet  1 Packet Feeding Tube QDAY PRN Hieu Vallejo M.D.        And   • magnesium hydroxide (MILK OF MAGNESIA) suspension 30 mL  30 mL Feeding Tube QDAY PRN Hieu Vallejo M.D.        And   • bisacodyl (DULCOLAX) suppository 10 mg  10 mg Rectal QDAY PRN Hieu  KARLY Vallejo M.D.       • MD Alert...ICU Electrolyte Replacement per Pharmacy   Other pharmacy to dose Hieu Vallejo M.D.       • fentaNYL (SUBLIMAZE) injection 25 mcg  25 mcg Intravenous Q HOUR PRN Hieu Vallejo M.D.        Or   • fentaNYL (SUBLIMAZE) injection 50 mcg  50 mcg Intravenous Q HOUR PRN Hieu Vallejo M.D.        Or   • fentaNYL (SUBLIMAZE) injection 100 mcg  100 mcg Intravenous Q HOUR PRN Hieu Vallejo M.D.   100 mcg at 12/29/18 1612   • fentaNYL (SUBLIMAZE) 50 mcg/mL in 50mL   Intravenous Continuous Hieu Vallejo M.D. 1 mL/hr at 12/29/18 1955 50 mcg/hr at 12/29/18 1955   • ipratropium-albuterol (DUONEB) nebulizer solution  3 mL Nebulization Q2HRS PRN (RT) Hieu Vallejo M.D.       • ipratropium-albuterol (DUONEB) nebulizer solution  3 mL Nebulization Q4HRS (RT) Hieu Vallejo M.D.   3 mL at 12/30/18 0246   • dexmedetomidine (PRECEDEX) 400 mcg/100mL NS premix infusion  0.1-1.5 mcg/kg/hr Intravenous Continuous Hieu Vallejo M.D.   Stopped at 12/29/18 2000   • potassium bicarbonate (KLYTE) effervescent tablet 25 mEq  25 mEq Feeding Tube BID Hieu Vallejo M.D.   25 mEq at 12/29/18 1756   • Pharmacy Consult: Enteral tube feeding - review meds/change route/product selection   Other PRN Hieu Vallejo M.D.       • acetaminophen (TYLENOL) tablet 650 mg  650 mg Feeding Tube Q4HRS PRN Hieu Vallejo M.D.        Or   • acetaminophen (TYLENOL) suppository 650 mg  650 mg Rectal Q4HRS PRN Hieu Vallejo M.D.       • ALPRAZolam (XANAX) tablet 0.5 mg  0.5 mg Feeding Tube 4X/DAY PRN Hieu Vallejo M.D.       • aspirin (ASA) chewable tab 81 mg  81 mg Per NG Tube DAILY Hieu Vallejo M.D.       • atorvastatin (LIPITOR) tablet 80 mg  80 mg Per NG Tube DAILY Hieu Vallejo M.D.       • baclofen (LIORESAL) tablet 10 mg  10 mg Per NG Tube TID Hieu Vallejo M.D.   10 mg at 12/29/18 2798    • clopidogrel (PLAVIX) tablet 75 mg  75 mg Feeding Tube DAILY Hieu Vallejo M.D.       • diphenhydrAMINE (BENADRYL) tablet/capsule 25 mg  25 mg Feeding Tube HS PRN - MR X 1 Hieu Vallejo M.D.       • HYDROcodone-acetaminophen (NORCO) 5-325 MG per tablet 1-2 Tab  1-2 Tab Feeding Tube Q6HRS PRN Hieu Vallejo M.D.   2 Tab at 12/30/18 0307   • lisinopril (PRINIVIL) tablet 5 mg  5 mg Feeding Tube Q DAY Hieu Vallejo M.D.       • mag hydrox-al hydrox-simeth (MAALOX PLUS ES or MYLANTA DS) suspension 30 mL  30 mL Per NG Tube Q4HRS PRN Hieu Vallejo M.D.       • metoprolol (LOPRESSOR) tablet 12.5 mg  12.5 mg Feeding Tube BID Hieu Vallejo M.D.   Stopped at 12/29/18 1800    Followed by   • metoprolol (LOPRESSOR) tablet 25 mg  25 mg Feeding Tube BID Hieu Vallejo M.D.       • oxyCODONE immediate-release (ROXICODONE) tablet 5 mg  5 mg Feeding Tube Q3HRS PRN Hieu Vallejo M.D.       • propofol (DIPRIVAN) injection  0-80 mcg/kg/min Intravenous Continuous Hieu Vallejo M.D. 11.1 mL/hr at 12/29/18 2322 20 mcg/kg/min at 12/29/18 2322   • insulin lispro (HUMALOG) injection 0-10 Units  0-10 Units Subcutaneous Q6HRS Nelly Zazueta A.P.N.   1 Units at 12/30/18 0136   • phenylephrine (RAFIA-SYNEPHRINE) 40,000 mcg in  mL Infusion  0-100 mcg/min Intravenous Continuous NAKUL Monge.P.N. 37.5 mL/hr at 12/30/18 0139 100 mcg/min at 12/30/18 0139   • NS infusion   Intravenous Continuous Tomas Barrios M.D.   Stopped at 12/29/18 1000   • Pharmacy Consult Request ...Pain Management Review 1 Each  1 Each Other PRN Tomas Barrios M.D.       • electrolyte-A (PLASMALYTE-A) infusion   Intravenous PRN Tomas Barrios M.D.   1,000 mL at 12/28/18 1345   • enoxaparin (LOVENOX) inj 40 mg  40 mg Subcutaneous QDAY Tomas Barrios M.D.   40 mg at 12/29/18 2153   • mupirocin (BACTROBAN) 2 % ointment 1 Application  1 Application Topical BID Tomas Barrios M.D.   1  Application at 12/29/18 1757   • Magnesium Sulfate in D5W IVPB premix 1 g  1 g Intravenous QDAY Tomas Barrios M.D.   Stopped at 12/29/18 1300   • clevidipine (CLEVIPREX) IV emulsion  1-21 mg/hr Intravenous Continuous Tomas Barrios M.D.   Stopped at 12/29/18 1626   • nitroglycerin 50 mg in D5W 250 ml infusion  0-100 mcg/min Intravenous Continuous Tomas Barrios M.D.   Stopped at 12/28/18 1300   • tramadol (ULTRAM) 50 MG tablet 50 mg  50 mg Oral Q4HRS PRN Tomas Barrios M.D.   50 mg at 12/29/18 0533   • sodium bicarbonate 8.4 % injection 50 mEq  50 mEq Intravenous Q HOUR PRN Tomas Barrios M.D.   50 mEq at 12/28/18 1327   • ondansetron (ZOFRAN) syringe/vial injection 4 mg  4 mg Intravenous Q6HRS PRN Tomas Barrios M.D.   4 mg at 12/29/18 1831    Or   • prochlorperazine (COMPAZINE) injection 10 mg  10 mg Intravenous Q6HRS PRN Tomas Barrios M.D.   10 mg at 12/29/18 0004    Or   • promethazine (PHENERGAN) suppository 25 mg  25 mg Rectal Q6HRS PRN Tomas Barrios M.D.       • EPINEPHrine 4 mg in  mL Infusion  0-0.2 mcg/kg/min Intravenous Continuous Tabitha Wick, A.P.NDeepti   Stopped at 12/28/18 1705   • lidocaine (LIDODERM) 5 % 1 Patch  1 Patch Transdermal Q24HR Inna Linton A.P.N.   1 Patch at 12/29/18 2115       Fluids    Intake/Output Summary (Last 24 hours) at 12/30/18 0503  Last data filed at 12/30/18 0200   Gross per 24 hour   Intake          1923.66 ml   Output             2135 ml   Net          -211.34 ml       Laboratory  Recent Labs      12/29/18   1243  12/29/18   1717  12/30/18   0418   ISTATAPH  7.380*  7.339*  7.377*   ISTATAPCO2  27.8  32.0  37.7*   ISTATAPO2  49*  73  84   ISTATATCO2  17*  18*  23   JBECATQ9TIN  85*  94  96   ISTATARTHCO3  16.5*  17.2  22.2   ISTATARTBE  -7*  -8*  -3   ISTATTEMP  37.1 C  37.8 C  38.1 C   ISTATFIO2  100  100  60   ISTATSPEC  Arterial  Arterial  Arterial   ISTATAPHTC  7.379*  7.327*  7.361*   URDLVXBE2IF  50*  77  90*     Recent Labs      12/27/18   0802    TROPONINI  6.13*     Recent Labs      12/28/18   0545  12/28/18   1246  12/28/18   1730  12/29/18   0040  12/29/18   0543   SODIUM  132*   --    --    --   134*   POTASSIUM  4.5   --   4.1  4.2  4.0   CHLORIDE  105   --    --    --   105   CO2  22   --    --    --   21   BUN  14   --    --    --   14   CREATININE  0.57   --    --    --   0.58   MAGNESIUM   --   2.9*   --    --    --    CALCIUM  8.9   --    --    --   8.3*     Recent Labs      12/28/18   0545  12/29/18   0543   GLUCOSE  235*  76     Recent Labs      12/28/18   0545  12/28/18   1830  12/29/18   0543   WBC  9.7  23.0*  19.2*   NEUTSPOLYS  58.10   --    --    LYMPHOCYTES  32.40   --    --    MONOCYTES  6.70   --    --    EOSINOPHILS  2.10   --    --    BASOPHILS  0.30   --    --      Recent Labs      12/27/18   0802  12/27/18   2248  12/28/18   0545   12/28/18   1246  12/28/18   1830  12/29/18   0543   RBC   --    --   3.99*   --    --   3.88*  3.82*   HEMOGLOBIN   --    --   12.6*   --    --   12.0*  12.0*   HEMATOCRIT   --    --   37.9*   --    --   36.7*  36.3*   PLATELETCT   --    --   178   < >  183  160*  176   PROTHROMBTM  14.7*   --    --    --   16.3*   --    --    APTT  73.1*  46.5*  34.0   --   31.6   --    --    INR  1.14*   --    --    --   1.30*   --    --     < > = values in this interval not displayed.       Imaging  X-Ray:  I have personally reviewed the images and compared with prior images. and My impression is: Diffuse right and left lower lobe opacities    Assessment/Plan  * NSTEMI (non-ST elevated myocardial infarction) (HCC)- (present on admission)   Assessment & Plan    Continue aspirin, statin and Plavix     Acute hypoxemic respiratory failure (HCC)   Assessment & Plan    Reintubated on 12/29  Continue full vent support  All appropriate ventilator bundles are in place     S/P CABG x 3   Assessment & Plan    On 12/28 by Dr. Barrios  Continue aspirin, statin and Plavix     Acute systolic congestive heart failure (HCC)    Assessment & Plan    EF 25%  Intra-aortic balloon pump removed on 12/29     Pneumonia   Assessment & Plan    Query pneumonia  Check pro-calcitonin  Significant Staphylococcus aureus growing from sputum  Start empiric vancomycin     Type 2 diabetes mellitus without complication (HCC)- (present on admission)   Assessment & Plan    Poorly controlled prior to admission with glycohemoglobin of 12  Continue sliding scale insulin     SVT (supraventricular tachycardia) (HCC)- (present on admission)   Assessment & Plan    History of          VTE:  Lovenox  Ulcer: H2 Antagonist  Lines: Central Line  Ongoing indication addressed, Arterial Line  Ongoing indication addressed and Thomas Catheter  Ongoing indication addressed    I have performed a physical exam and reviewed and updated ROS and Plan today (12/30/2018). In review of yesterday's note (12/29/2018), there are no changes except as documented above.     I have assessed and reassessed his respiratory status with ventilator adjustments, ventilator waveforms, airway mechanics, blood pressure, hemodynamics, cardiovascular status with titration of phenylephrine and neurologic status with titration of propofol.  He is at increased risk for worsening respiratory and cardiovascular system dysfunction.    Discussed patient condition and risk of morbidity and/or mortality with Family, RN, RT, Pharmacy, Charge nurse / hot rounds, QA team and CVS     The patient remains critically ill.  Critical care time = 215 minutes in directly providing and coordinating critical care and extensive data review.  No time overlap and excludes procedures.    Hieu Vallejo MD  Pulmonary and Critical Care Medicine

## 2018-12-30 NOTE — PROGRESS NOTES
Dr. Vallejo notified of patient vomiting. Received verbal orders to remove Cortrak and place NG/OG to suction.

## 2018-12-30 NOTE — PROCEDURES
Date of service:  12/29/2018    Title:  Emergent endotracheal intubation    Indication:  Respiratory failure    Narrative:    The patient was sedated and paralyzed with 20 mg of IV etomidate and 100 mg of IV succinylcholine.  A 8.0 Cymro endotracheal tube was placed directly into the trachea using a #4 Glidescope without difficulty or apparent complication.  The CO2 detector made the appropriate color change, bilateral symmetrical breath sounds are present and fogging is present in the endotracheal tube.  All of this confirms that the endotracheal tube is indeed in the patient's trachea.  The patient tolerated the procedure quite nicely.  No complications are apparent.      Hieu Vallejo MD  Pulmonary and Critical Care Medicine

## 2018-12-31 ENCOUNTER — APPOINTMENT (OUTPATIENT)
Dept: CARDIOLOGY | Facility: MEDICAL CENTER | Age: 43
DRG: 233 | End: 2018-12-31
Attending: INTERNAL MEDICINE
Payer: MEDICAID

## 2018-12-31 ENCOUNTER — APPOINTMENT (OUTPATIENT)
Dept: RADIOLOGY | Facility: MEDICAL CENTER | Age: 43
DRG: 233 | End: 2018-12-31
Attending: INTERNAL MEDICINE
Payer: MEDICAID

## 2018-12-31 LAB
ACTION RANGE TRIGGERED IACRT: NO
ALBUMIN SERPL BCP-MCNC: 2.8 G/DL (ref 3.2–4.9)
ALBUMIN/GLOB SERPL: 1.1 G/DL
ALP SERPL-CCNC: 71 U/L (ref 30–99)
ALT SERPL-CCNC: 158 U/L (ref 2–50)
ANION GAP SERPL CALC-SCNC: 6 MMOL/L (ref 0–11.9)
AST SERPL-CCNC: 122 U/L (ref 12–45)
BASE EXCESS BLDA CALC-SCNC: -1 MMOL/L (ref -4–3)
BILIRUB SERPL-MCNC: 0.8 MG/DL (ref 0.1–1.5)
BODY TEMPERATURE: ABNORMAL DEGREES
BUN SERPL-MCNC: 16 MG/DL (ref 8–22)
CALCIUM SERPL-MCNC: 8.5 MG/DL (ref 8.5–10.5)
CHLORIDE SERPL-SCNC: 103 MMOL/L (ref 96–112)
CO2 BLDA-SCNC: 26 MMOL/L (ref 20–33)
CO2 SERPL-SCNC: 24 MMOL/L (ref 20–33)
CREAT SERPL-MCNC: 0.52 MG/DL (ref 0.5–1.4)
CYTOLOGY REG CYTOL: NORMAL
ERYTHROCYTE [DISTWIDTH] IN BLOOD BY AUTOMATED COUNT: 46.5 FL (ref 35.9–50)
GLOBULIN SER CALC-MCNC: 2.6 G/DL (ref 1.9–3.5)
GLUCOSE BLD-MCNC: 132 MG/DL (ref 65–99)
GLUCOSE BLD-MCNC: 136 MG/DL (ref 65–99)
GLUCOSE BLD-MCNC: 179 MG/DL (ref 65–99)
GLUCOSE BLD-MCNC: 192 MG/DL (ref 65–99)
GLUCOSE BLD-MCNC: 96 MG/DL (ref 65–99)
GLUCOSE BLD-MCNC: 98 MG/DL (ref 65–99)
GLUCOSE SERPL-MCNC: 121 MG/DL (ref 65–99)
HCO3 BLDA-SCNC: 24.4 MMOL/L (ref 17–25)
HCT VFR BLD AUTO: 28.1 % (ref 42–52)
HGB BLD-MCNC: 9.3 G/DL (ref 14–18)
HOROWITZ INDEX BLDA+IHG-RTO: 188 MM[HG]
INST. QUALIFIED PATIENT IIQPT: YES
MAGNESIUM SERPL-MCNC: 1.8 MG/DL (ref 1.5–2.5)
MCH RBC QN AUTO: 31.6 PG (ref 27–33)
MCHC RBC AUTO-ENTMCNC: 33.1 G/DL (ref 33.7–35.3)
MCV RBC AUTO: 95.6 FL (ref 81.4–97.8)
O2/TOTAL GAS SETTING VFR VENT: 40 %
PCO2 BLDA: 42.9 MMHG (ref 26–37)
PCO2 TEMP ADJ BLDA: 43.2 MMHG (ref 26–37)
PH BLDA: 7.36 [PH] (ref 7.4–7.5)
PH TEMP ADJ BLDA: 7.36 [PH] (ref 7.4–7.5)
PHOSPHATE SERPL-MCNC: 2.6 MG/DL (ref 2.5–4.5)
PLATELET # BLD AUTO: 150 K/UL (ref 164–446)
PMV BLD AUTO: 11.7 FL (ref 9–12.9)
PO2 BLDA: 75 MMHG (ref 64–87)
PO2 TEMP ADJ BLDA: 76 MMHG (ref 64–87)
POTASSIUM SERPL-SCNC: 4.1 MMOL/L (ref 3.6–5.5)
PROT SERPL-MCNC: 5.4 G/DL (ref 6–8.2)
RBC # BLD AUTO: 2.94 M/UL (ref 4.7–6.1)
SAO2 % BLDA: 94 % (ref 93–99)
SODIUM SERPL-SCNC: 133 MMOL/L (ref 135–145)
SPECIMEN DRAWN FROM PATIENT: ABNORMAL
TRIGL SERPL-MCNC: 78 MG/DL (ref 0–149)
WBC # BLD AUTO: 11.5 K/UL (ref 4.8–10.8)

## 2018-12-31 PROCEDURE — 99233 SBSQ HOSP IP/OBS HIGH 50: CPT | Performed by: INTERNAL MEDICINE

## 2018-12-31 PROCEDURE — A9270 NON-COVERED ITEM OR SERVICE: HCPCS | Performed by: INTERNAL MEDICINE

## 2018-12-31 PROCEDURE — 700101 HCHG RX REV CODE 250: Performed by: CLINICAL NURSE SPECIALIST

## 2018-12-31 PROCEDURE — 770022 HCHG ROOM/CARE - ICU (200)

## 2018-12-31 PROCEDURE — 700105 HCHG RX REV CODE 258: Performed by: INTERNAL MEDICINE

## 2018-12-31 PROCEDURE — 71045 X-RAY EXAM CHEST 1 VIEW: CPT

## 2018-12-31 PROCEDURE — 700117 HCHG RX CONTRAST REV CODE 255: Performed by: INTERNAL MEDICINE

## 2018-12-31 PROCEDURE — 80053 COMPREHEN METABOLIC PANEL: CPT

## 2018-12-31 PROCEDURE — 94150 VITAL CAPACITY TEST: CPT

## 2018-12-31 PROCEDURE — 83735 ASSAY OF MAGNESIUM: CPT

## 2018-12-31 PROCEDURE — 94640 AIRWAY INHALATION TREATMENT: CPT

## 2018-12-31 PROCEDURE — 84100 ASSAY OF PHOSPHORUS: CPT

## 2018-12-31 PROCEDURE — 85027 COMPLETE CBC AUTOMATED: CPT

## 2018-12-31 PROCEDURE — 82962 GLUCOSE BLOOD TEST: CPT | Mod: 91

## 2018-12-31 PROCEDURE — 302136 NUTRITION PUMP: Performed by: INTERNAL MEDICINE

## 2018-12-31 PROCEDURE — 93325 DOPPLER ECHO COLOR FLOW MAPG: CPT | Mod: 26 | Performed by: INTERNAL MEDICINE

## 2018-12-31 PROCEDURE — 93325 DOPPLER ECHO COLOR FLOW MAPG: CPT

## 2018-12-31 PROCEDURE — 93321 DOPPLER ECHO F-UP/LMTD STD: CPT | Mod: 26 | Performed by: INTERNAL MEDICINE

## 2018-12-31 PROCEDURE — 700102 HCHG RX REV CODE 250 W/ 637 OVERRIDE(OP): Performed by: INTERNAL MEDICINE

## 2018-12-31 PROCEDURE — 700111 HCHG RX REV CODE 636 W/ 250 OVERRIDE (IP): Performed by: NURSE PRACTITIONER

## 2018-12-31 PROCEDURE — 82803 BLOOD GASES ANY COMBINATION: CPT

## 2018-12-31 PROCEDURE — 84478 ASSAY OF TRIGLYCERIDES: CPT

## 2018-12-31 PROCEDURE — 700111 HCHG RX REV CODE 636 W/ 250 OVERRIDE (IP): Performed by: THORACIC SURGERY (CARDIOTHORACIC VASCULAR SURGERY)

## 2018-12-31 PROCEDURE — 99291 CRITICAL CARE FIRST HOUR: CPT | Performed by: INTERNAL MEDICINE

## 2018-12-31 PROCEDURE — 93308 TTE F-UP OR LMTD: CPT | Mod: 26 | Performed by: INTERNAL MEDICINE

## 2018-12-31 PROCEDURE — 94003 VENT MGMT INPAT SUBQ DAY: CPT

## 2018-12-31 PROCEDURE — 51798 US URINE CAPACITY MEASURE: CPT

## 2018-12-31 PROCEDURE — 700111 HCHG RX REV CODE 636 W/ 250 OVERRIDE (IP): Performed by: INTERNAL MEDICINE

## 2018-12-31 PROCEDURE — 700101 HCHG RX REV CODE 250: Performed by: INTERNAL MEDICINE

## 2018-12-31 RX ORDER — CEFAZOLIN SODIUM 2 G/100ML
2 INJECTION, SOLUTION INTRAVENOUS EVERY 8 HOURS
Status: COMPLETED | OUTPATIENT
Start: 2018-12-31 | End: 2019-01-07

## 2018-12-31 RX ORDER — FUROSEMIDE 10 MG/ML
40 INJECTION INTRAMUSCULAR; INTRAVENOUS ONCE
Status: COMPLETED | OUTPATIENT
Start: 2018-12-31 | End: 2018-12-31

## 2018-12-31 RX ADMIN — STANDARDIZED SENNA CONCENTRATE AND DOCUSATE SODIUM 2 TABLET: 8.6; 5 TABLET, FILM COATED ORAL at 17:44

## 2018-12-31 RX ADMIN — HYDROCODONE BITARTRATE AND ACETAMINOPHEN 2 TABLET: 5; 325 TABLET ORAL at 13:35

## 2018-12-31 RX ADMIN — BACLOFEN 10 MG: 10 TABLET ORAL at 17:44

## 2018-12-31 RX ADMIN — PROPOFOL 20 MCG/KG/MIN: 10 INJECTION, EMULSION INTRAVENOUS at 03:29

## 2018-12-31 RX ADMIN — FENTANYL CITRATE 500 MCG/HR: 50 INJECTION, SOLUTION INTRAMUSCULAR; INTRAVENOUS at 19:21

## 2018-12-31 RX ADMIN — ASPIRIN 81 MG 81 MG: 81 TABLET ORAL at 06:00

## 2018-12-31 RX ADMIN — BACLOFEN 10 MG: 10 TABLET ORAL at 13:04

## 2018-12-31 RX ADMIN — FENTANYL CITRATE 100 MCG: 50 INJECTION, SOLUTION INTRAMUSCULAR; INTRAVENOUS at 15:05

## 2018-12-31 RX ADMIN — LIDOCAINE 1 PATCH: 50 PATCH TOPICAL at 21:36

## 2018-12-31 RX ADMIN — IPRATROPIUM BROMIDE AND ALBUTEROL SULFATE 3 ML: .5; 3 SOLUTION RESPIRATORY (INHALATION) at 15:13

## 2018-12-31 RX ADMIN — CLOPIDOGREL 75 MG: 75 TABLET, FILM COATED ORAL at 06:00

## 2018-12-31 RX ADMIN — STANDARDIZED SENNA CONCENTRATE AND DOCUSATE SODIUM 2 TABLET: 8.6; 5 TABLET, FILM COATED ORAL at 06:00

## 2018-12-31 RX ADMIN — BACLOFEN 10 MG: 10 TABLET ORAL at 06:28

## 2018-12-31 RX ADMIN — ALPRAZOLAM 0.5 MG: 0.5 TABLET ORAL at 21:35

## 2018-12-31 RX ADMIN — FENTANYL CITRATE 100 MCG: 50 INJECTION, SOLUTION INTRAMUSCULAR; INTRAVENOUS at 12:56

## 2018-12-31 RX ADMIN — FAMOTIDINE 20 MG: 10 INJECTION INTRAVENOUS at 17:44

## 2018-12-31 RX ADMIN — MUPIROCIN 1 APPLICATION: 20 OINTMENT TOPICAL at 05:59

## 2018-12-31 RX ADMIN — IPRATROPIUM BROMIDE AND ALBUTEROL SULFATE 3 ML: .5; 3 SOLUTION RESPIRATORY (INHALATION) at 07:24

## 2018-12-31 RX ADMIN — HYDROCODONE BITARTRATE AND ACETAMINOPHEN 2 TABLET: 5; 325 TABLET ORAL at 08:00

## 2018-12-31 RX ADMIN — ENOXAPARIN SODIUM 40 MG: 100 INJECTION SUBCUTANEOUS at 17:44

## 2018-12-31 RX ADMIN — FUROSEMIDE 40 MG: 10 INJECTION, SOLUTION INTRAMUSCULAR; INTRAVENOUS at 06:36

## 2018-12-31 RX ADMIN — IPRATROPIUM BROMIDE AND ALBUTEROL SULFATE 3 ML: .5; 3 SOLUTION RESPIRATORY (INHALATION) at 19:40

## 2018-12-31 RX ADMIN — MUPIROCIN 1 APPLICATION: 20 OINTMENT TOPICAL at 17:44

## 2018-12-31 RX ADMIN — INSULIN HUMAN 3 UNITS: 100 INJECTION, SUSPENSION SUBCUTANEOUS at 21:37

## 2018-12-31 RX ADMIN — VANCOMYCIN HYDROCHLORIDE 1500 MG: 100 INJECTION, POWDER, LYOPHILIZED, FOR SOLUTION INTRAVENOUS at 06:00

## 2018-12-31 RX ADMIN — HUMAN ALBUMIN MICROSPHERES AND PERFLUTREN 3 ML: 10; .22 INJECTION, SOLUTION INTRAVENOUS at 11:19

## 2018-12-31 RX ADMIN — IPRATROPIUM BROMIDE AND ALBUTEROL SULFATE 3 ML: .5; 3 SOLUTION RESPIRATORY (INHALATION) at 23:06

## 2018-12-31 RX ADMIN — FENTANYL CITRATE 50 MCG: 50 INJECTION, SOLUTION INTRAMUSCULAR; INTRAVENOUS at 07:24

## 2018-12-31 RX ADMIN — FENTANYL CITRATE 100 MCG: 50 INJECTION, SOLUTION INTRAMUSCULAR; INTRAVENOUS at 20:55

## 2018-12-31 RX ADMIN — FENTANYL CITRATE 50 MCG/HR: 50 INJECTION, SOLUTION INTRAMUSCULAR; INTRAVENOUS at 03:29

## 2018-12-31 RX ADMIN — HYDROCODONE BITARTRATE AND ACETAMINOPHEN 2 TABLET: 5; 325 TABLET ORAL at 19:38

## 2018-12-31 RX ADMIN — ATORVASTATIN CALCIUM 80 MG: 80 TABLET, FILM COATED ORAL at 05:59

## 2018-12-31 RX ADMIN — FENTANYL CITRATE 50 MCG: 50 INJECTION, SOLUTION INTRAMUSCULAR; INTRAVENOUS at 03:46

## 2018-12-31 RX ADMIN — FAMOTIDINE 20 MG: 10 INJECTION INTRAVENOUS at 05:59

## 2018-12-31 RX ADMIN — CEFAZOLIN SODIUM 2 G: 2 INJECTION, SOLUTION INTRAVENOUS at 21:52

## 2018-12-31 RX ADMIN — FENTANYL CITRATE 100 MCG: 50 INJECTION, SOLUTION INTRAMUSCULAR; INTRAVENOUS at 23:11

## 2018-12-31 RX ADMIN — CEFAZOLIN SODIUM 2 G: 2 INJECTION, SOLUTION INTRAVENOUS at 15:00

## 2018-12-31 RX ADMIN — FENTANYL CITRATE 600 MCG/HR: 50 INJECTION, SOLUTION INTRAMUSCULAR; INTRAVENOUS at 23:32

## 2018-12-31 RX ADMIN — FENTANYL CITRATE 100 MCG: 50 INJECTION, SOLUTION INTRAMUSCULAR; INTRAVENOUS at 22:01

## 2018-12-31 RX ADMIN — FENTANYL CITRATE 100 MCG: 50 INJECTION, SOLUTION INTRAMUSCULAR; INTRAVENOUS at 14:00

## 2018-12-31 RX ADMIN — IPRATROPIUM BROMIDE AND ALBUTEROL SULFATE 3 ML: .5; 3 SOLUTION RESPIRATORY (INHALATION) at 03:09

## 2018-12-31 RX ADMIN — IPRATROPIUM BROMIDE AND ALBUTEROL SULFATE 3 ML: .5; 3 SOLUTION RESPIRATORY (INHALATION) at 11:22

## 2018-12-31 ASSESSMENT — PULMONARY FUNCTION TESTS: FVC: 1.76

## 2018-12-31 NOTE — PROGRESS NOTES
Nelly PORTILLO at bedside, Thomas flushed with significant resistance; bladder scan obtained with evidence of > 900 mL; Orders to replace Thomas catheter.

## 2018-12-31 NOTE — THERAPY
PT order received. Pt is POD #3 CABG x3, vented and sedated. RN requesting to hold will attempt back as able and as appropriate

## 2018-12-31 NOTE — PROGRESS NOTES
Critical Care Progress Note    Date of admission  12/26/2018    Chief Complaint  43 y.o. male admitted 12/26/2018 with chest pain.    Hospital Course    This gentleman was admitted to the hospital with an acute non-STEMI.  He was found to have significant CAD.  He underwent CABG.      Interval Problem Update  Reviewed last 24 hour events:  ARDS  On vent  Propofol plus fent  Propofol d/c, on fent alone  Vent day 4  CAB 12/26  Casper infusion at 50 for shock    Prior 24 hours  SR  Casper 100  Prop 20  Fent 50  NG to suction - 750 out last night  Advance ET 2 cm  Arouses, follows, nods, moves all 4  Agitation not controlled with dex - will increase fent    Reassessment:  Casper still at 100.  Fent 50.  Vent 3.  PEEP 8.  SBT.  50%.  Stop Lasix and K.  Give 25 grams of albumin    Reassessment:  Requires Casper at 100.  NG output has decreased.  Abdomen is soft.    Reassessment:  Urine output has improved.  Remains on Casper at 100.    Reassessment:  Agitated with decrease in propofol.  Will increase fentanyl drip.    Reassessment:  Sputum culture with significant Staphylococcus aureus.  Sensitivities pending.  Check pro-calcitonin.  Start empiric vancomycin.      Review of Systems  Review of Systems   Unable to perform ROS: Intubated        Vital Signs for last 24 hours   Temp:  [36.5 °C (97.7 °F)-37.7 °C (99.9 °F)] 36.5 °C (97.7 °F)  Pulse:  [] 121  Resp:  [0-22] 20    Hemodynamic parameters for last 24 hours  CVP:  [4 MM HG-17 MM HG] 12 MM HG  PCWP:  [7 MM HG-13 MM HG] 9 MM HG  CO:  [5-6.9] 6.5  CI:  [2.3-3] 3    Respiratory  [place] Vent Mode: APVCMV  Rate (breaths/min): 18  Vt Target (mL): 470  PEEP/CPAP: 8  FiO2: 40  P Support: 5  P MEAN: 10  Length of Weaning Trial (Hours): 1  Control VTE (exp VT): 675    Physical Exam   Physical Exam   Constitutional: He appears well-developed and well-nourished.   On ventilator   HENT:   Head: Normocephalic.   Right Ear: External ear normal.   Left Ear: External ear normal.    Mouth/Throat: No oropharyngeal exudate.   Eyes: Pupils are equal, round, and reactive to light. Right eye exhibits no discharge. Left eye exhibits no discharge. No scleral icterus.   Neck: Normal range of motion. Neck supple. No JVD present. No tracheal deviation present.   Cardiovascular: Intact distal pulses.  Exam reveals no gallop.    Sinus tachycardia   Pulmonary/Chest: He has no wheezes. He has rales (Coarse and fine crackles bilaterally).   On vent   Abdominal: Soft. Bowel sounds are normal. He exhibits no distension. There is no tenderness. There is no guarding.   Gastric tube to suction   Musculoskeletal: He exhibits no tenderness or deformity.   No clubbing or cyanosis   Neurological:   Sedated to Rass minus 2   Skin: Skin is warm and dry. No rash noted. He is not diaphoretic.   Nursing note and vitals reviewed.      Medications  Current Facility-Administered Medications   Medication Dose Route Frequency Provider Last Rate Last Dose   • fentaNYL (SUBLIMAZE) injection 200 mcg  200 mcg Intravenous Q HOUR PRN Rikki Cross M.D.       • fentaNYL (SUBLIMAZE) 50 mcg/mL in 50mL   Intravenous Continuous Rikki Cross M.D. 10 mL/hr at 12/31/18 1900 500 mcg/hr at 12/31/18 1900   • ceFAZolin in dextrose (ANCEF) IVPB premix 2 g  2 g Intravenous Q8HRS Rikki Cross M.D.   Stopped at 12/31/18 1530   • tramadol (ULTRAM) 50 MG tablet 50 mg  50 mg Feeding Tube Q4HRS PRN Tomas Barrios M.D.       • insulin lispro (HUMALOG) injection 1 Units  1 Units Subcutaneous PRN Tomas Barrios M.D.       • insulin NPH (HUMULIN,NOVOLIN) injection 3 Units  3 Units Subcutaneous Q8HRS Tomas Barrios M.D.   Stopped at 12/31/18 0600   • Respiratory Care per Protocol   Nebulization Continuous RT Hieu Vallejo M.D.       • famotidine (PEPCID) tablet 20 mg  20 mg Feeding Tube Q12HRS Hieu Vallejo M.D.   20 mg at 12/30/18 1737    Or   • famotidine (PEPCID) injection 20 mg  20 mg Intravenous Q12HRS Hieu Mccullough  DUY Mathew   20 mg at 12/31/18 1744   • senna-docusate (PERICOLACE or SENOKOT S) 8.6-50 MG per tablet 2 Tab  2 Tab Feeding Tube BID Heiu Vallejo M.D.   2 Tab at 12/31/18 1744    And   • polyethylene glycol/lytes (MIRALAX) PACKET 1 Packet  1 Packet Feeding Tube QDAY PRN Hieu Vallejo M.D.        And   • magnesium hydroxide (MILK OF MAGNESIA) suspension 30 mL  30 mL Feeding Tube QDAY PRN Hieu Vallejo M.D.        And   • bisacodyl (DULCOLAX) suppository 10 mg  10 mg Rectal QDAY PRN Hieu Vallejo M.D.       • fentaNYL (SUBLIMAZE) injection 25 mcg  25 mcg Intravenous Q HOUR PRN Hieu Vallejo M.D.        Or   • fentaNYL (SUBLIMAZE) injection 50 mcg  50 mcg Intravenous Q HOUR PRN Hieu Vallejo M.D.   50 mcg at 12/31/18 0724    Or   • fentaNYL (SUBLIMAZE) injection 100 mcg  100 mcg Intravenous Q HOUR PRN Hieu Vallejo M.D.   100 mcg at 12/31/18 1505   • ipratropium-albuterol (DUONEB) nebulizer solution  3 mL Nebulization Q2HRS PRN (RT) Hieu Vallejo M.D.       • ipratropium-albuterol (DUONEB) nebulizer solution  3 mL Nebulization Q4HRS (RT) Hieu Vallejo M.D.   3 mL at 12/31/18 1513   • Pharmacy Consult: Enteral tube feeding - review meds/change route/product selection   Other PRN Hieu Vallejo M.D.       • acetaminophen (TYLENOL) tablet 650 mg  650 mg Feeding Tube Q4HRS PRN Hieu Vallejo M.D.        Or   • acetaminophen (TYLENOL) suppository 650 mg  650 mg Rectal Q4HRS PRN Hieu Vallejo M.D.       • ALPRAZolam (XANAX) tablet 0.5 mg  0.5 mg Feeding Tube 4X/DAY PRN Hieu Vallejo M.D.       • aspirin (ASA) chewable tab 81 mg  81 mg Per NG Tube DAILY Hieu Vallejo M.D.   81 mg at 12/31/18 0600   • atorvastatin (LIPITOR) tablet 80 mg  80 mg Per NG Tube DAILY Hieu Vallejo M.D.   80 mg at 12/31/18 0559   • baclofen (LIORESAL) tablet 10 mg  10 mg Per NG Tube TID Hieu Vallejo M.D.    10 mg at 12/31/18 1744   • clopidogrel (PLAVIX) tablet 75 mg  75 mg Feeding Tube DAILY Hieu Vallejo M.D.   75 mg at 12/31/18 0600   • diphenhydrAMINE (BENADRYL) tablet/capsule 25 mg  25 mg Feeding Tube HS PRN - MR X 1 Hieu Vallejo M.D.       • HYDROcodone-acetaminophen (NORCO) 5-325 MG per tablet 1-2 Tab  1-2 Tab Feeding Tube Q6HRS PRN Hieu Vallejo M.D.   2 Tab at 12/31/18 1335   • lisinopril (PRINIVIL) tablet 5 mg  5 mg Feeding Tube Q DAY Hieu Vallejo M.D.   Stopped at 12/30/18 0600   • mag hydrox-al hydrox-simeth (MAALOX PLUS ES or MYLANTA DS) suspension 30 mL  30 mL Per NG Tube Q4HRS PRN Hieu Vallejo M.D.       • oxyCODONE immediate-release (ROXICODONE) tablet 5 mg  5 mg Feeding Tube Q3HRS PRN Hieu Vallejo M.D.       • insulin lispro (HUMALOG) injection 0-10 Units  0-10 Units Subcutaneous Q6HRS DIRK MongeP.NDeepti   1 Units at 12/31/18 1756   • phenylephrine (RAFIA-SYNEPHRINE) 40,000 mcg in  mL Infusion  0-100 mcg/min Intravenous Continuous NAKUL Monge.P.N.   Stopped at 12/31/18 1353   • NS infusion   Intravenous Continuous Tomas Barrios M.D.   Stopped at 12/29/18 1000   • Pharmacy Consult Request ...Pain Management Review 1 Each  1 Each Other PRN Tomas Barrios M.D.       • electrolyte-A (PLASMALYTE-A) infusion   Intravenous PRN Tomas Barrios M.D.   1,000 mL at 12/28/18 1345   • enoxaparin (LOVENOX) inj 40 mg  40 mg Subcutaneous QDAY Tomas Barrios M.D.   40 mg at 12/31/18 1744   • mupirocin (BACTROBAN) 2 % ointment 1 Application  1 Application Topical BID Tomas Barrios M.D.   1 Application at 12/31/18 1744   • clevidipine (CLEVIPREX) IV emulsion  1-21 mg/hr Intravenous Continuous Tmoas Barrios M.D.   Stopped at 12/29/18 1626   • sodium bicarbonate 8.4 % injection 50 mEq  50 mEq Intravenous Q HOUR PRN Tomas Barrios M.D.   50 mEq at 12/28/18 1327   • ondansetron (ZOFRAN) syringe/vial injection 4 mg  4 mg Intravenous Q6HRS PRN  Tomas Barrios M.D.   4 mg at 12/29/18 1831    Or   • prochlorperazine (COMPAZINE) injection 10 mg  10 mg Intravenous Q6HRS PRN Tomas Barrios M.D.   10 mg at 12/29/18 0004    Or   • promethazine (PHENERGAN) suppository 25 mg  25 mg Rectal Q6HRS PRN Tomas Barrios M.D.       • EPINEPHrine 4 mg in  mL Infusion  0-0.2 mcg/kg/min Intravenous Continuous Tabitha Wick A.P.N.   Stopped at 12/28/18 1705   • lidocaine (LIDODERM) 5 % 1 Patch  1 Patch Transdermal Q24HR Inna Linton, A.P.N.   1 Patch at 12/29/18 2115       Fluids    Intake/Output Summary (Last 24 hours) at 12/31/18 1917  Last data filed at 12/31/18 1800   Gross per 24 hour   Intake          2168.21 ml   Output             3772 ml   Net         -1603.79 ml       Laboratory  Recent Labs      12/29/18   1717  12/30/18   0418  12/31/18   0431   ISTATAPH  7.339*  7.377*  7.363*   ISTATAPCO2  32.0  37.7*  42.9*   ISTATAPO2  73  84  75   ISTATATCO2  18*  23  26   [id number]  94  96  94   ISTATARTHCO3  17.2  22.2  24.4   ISTATARTBE  -8*  -3  -1   ISTATTEMP  37.8 C  38.1 C  37.2 C   ISTATFIO2  100  60  40   ISTATSPEC  Arterial  Arterial  Arterial   ISTATAPHTC  7.327*  7.361*  7.360*   [id number]  77  90*  76         Recent Labs      12/29/18   0543  12/30/18   0417  12/31/18   0504   SODIUM  134*  130*  133*   POTASSIUM  4.0  4.7  4.1   CHLORIDE  105  100  103   CO2  21  23  24   BUN  14  19  16   CREATININE  0.58  0.71  0.52   MAGNESIUM   --   2.0  1.8   PHOSPHORUS   --   3.6  2.6   CALCIUM  8.3*  8.5  8.5     Recent Labs      12/29/18   0543  12/30/18   0417  12/31/18   0504   ALTSGPT   --    --   158*   ASTSGOT   --    --   122*   ALKPHOSPHAT   --    --   71   TBILIRUBIN   --    --   0.8   PREALBUMIN   --   8.0*   --    GLUCOSE  76  128*  121*     Recent Labs      12/29/18   0543  12/30/18   0417  12/31/18   0504   WBC  19.2*  15.7*  11.5*   ASTSGOT   --    --   122*   ALTSGPT   --    --   158*   ALKPHOSPHAT   --    --   71   TBILIRUBIN   --    --    0.8     Recent Labs      12/29/18   0543  12/30/18   0417  12/31/18   0504   RBC  3.82*  3.37*  2.94*   HEMOGLOBIN  12.0*  10.8*  9.3*   HEMATOCRIT  36.3*  32.0*  28.1*   PLATELETCT  176  151*  150*       Imaging  X-Ray:  I have personally reviewed the images and compared with prior images. and My impression is: Diffuse right and left lower lobe opacities    Assessment/Plan  * NSTEMI (non-ST elevated myocardial infarction) (HCC)- (present on admission)   Assessment & Plan    Continue aspirin, statin and Plavix     Acute hypoxemic respiratory failure (HCC)   Assessment & Plan    Reintubated on 12/29  Continue full vent support  All appropriate ventilator bundles are in place     Ventilator dependent (HCC)   Assessment & Plan    ARDS, may take many days to several weeks. Daily SBTs     S/P CABG x 3   Assessment & Plan    On 12/28 by Dr. Barrios  Continue aspirin, statin and Plavix     Acute systolic congestive heart failure (HCC)   Assessment & Plan    EF 25%  Intra-aortic balloon pump removed on 12/29     Pneumonia   Assessment & Plan    Query pneumonia  Check pro-calcitonin  Significant Staphylococcus aureus growing from sputum  MSSA- may stop vanco     Type 2 diabetes mellitus without complication (HCC)- (present on admission)   Assessment & Plan    Poorly controlled prior to admission with glycohemoglobin of 12  Continue sliding scale insulin- goal sugar > 80< 180     SVT (supraventricular tachycardia) (HCC)- (present on admission)   Assessment & Plan    History of          VTE:  Lovenox  Ulcer: H2 Antagonist  Lines: Central Line  Ongoing indication addressed, Arterial Line  Ongoing indication addressed and Thomas Catheter  Ongoing indication addressed    I have performed a physical exam and reviewed and updated ROS and Plan today (12/31/2018). In review of yesterday's note (12/30/2018), there are no changes except as documented above.     I have assessed and reassessed his respiratory status with ventilator  adjustments, ventilator waveforms, airway mechanics, blood pressure, hemodynamics, cardiovascular status with titration of phenylephrine and neurologic status with titration of propofol.  He is at increased risk for worsening respiratory and cardiovascular system dysfunction.    Discussed patient condition and risk of morbidity and/or mortality with Family, RN, RT, Pharmacy, Charge nurse / hot rounds, QA team and CVS     The patient remains critically ill.  Critical care time = 33  minutes in directly providing and coordinating critical care and extensive data review.  No time overlap and excludes procedures.

## 2018-12-31 NOTE — CARE PLAN
Problem: Ventilation Defect:  Goal: Ability to achieve and maintain unassisted ventilation or tolerate decreased levels of ventilator support  Outcome: PROGRESSING AS EXPECTED    Intervention: Support and monitor invasive and noninvasive mechanical ventilation  Adult Ventilation Update    Total Vent Days: 3  APVcmv 18/470/8/50%    Patient Lines/Drains/Airways Status    Active Airway     Name: Placement date: Placement time: Site: Days:    Airway ETT Oral 8.0 12/29/18   1605   Oral   2              In the last 24 hours, the patient tolerated SBT for 2 hours on settings of 5/8.    #FVC / Vital Capacity (liters) :  (does not follow) (12/30/18 1530)  NIF (cm H2O) :  (does not follow) (12/30/18 1530)  Rapid Shallow Breathing Index (RR/VT): 36 (12/30/18 1530)     Static Compliance (ml / cm H2O): 118 (12/30/18 1433)      Barriers to SBT Weaning Trial Stopped due to:: Pt weaned for 1 hour and returned to rest settings per protocol (12/30/18 1530)    Cough: Productive (12/30/18 1433)  Sputum Amount: Small (12/30/18 1433)  Sputum Color: Clear;Tan;White (12/30/18 1433)  Sputum Consistency: Thick (12/30/18 1433)    Mobility  Level of Mobility: Level IV (12/30/18 0800)  Activity Performed: Unable to mobilize (12/30/18 1433)  Assistance / Tolerance: Assistance of Two or More (12/30/18 0800)  Reason Not Mobilized: Unstable condition (12/30/18 1433)  Mobilization Comments: FiO2 50%, patient agitated with lowered sedation (12/30/18 0800)

## 2018-12-31 NOTE — CARE PLAN
Problem: Safety  Goal: Will remain free from injury  No evidence of injury related to restraints noted.  ROM exercises performed.  Wife at bedside calming pt.    Problem: Urinary Elimination:  Goal: Ability to reestablish a normal urinary elimination pattern will improve  Solitario catheter not draining properly.  Day shift RN to replace solitario

## 2018-12-31 NOTE — PROGRESS NOTES
Cardiovascular Surgery Progress Note    Name: Rikki Khalil  MRN: [id number]  : 1975  Admit Date: 2018  9:54 PM  Procedure:  Procedure(s) and Anesthesia Type:     * MULTIPLE CORONARY ARTERY BYPASS x 3,  ENDO VEIN HARVEST LEFT LEG - General     * DONNA - General  4 Day Post-Op    Vitals:  Patient Vitals for the past 8 hrs:   Monitored Temp 2 SpO2 O2 Delivery Pulse Heart Rate (Monitored) Resp NIBP   18 0630 - 99 % - (!) 101 (!) 101 (!) 9 -   18 0615 - 98 % - 97 97 (!) 9 -   18 0600 37.3 °C (99.1 °F) 98 % - 98 98 (!) 7 105/72   18 0545 - 98 % - 97 97 (!) 9 -   18 0530 - 98 % - 96 96 (!) 10 -   18 0515 - 99 % - 97 98 (!) 5 -   18 0500 - 99 % Ventilator 98 98 (!) 2 101/69   18 0445 - 98 % - - - - -   18 0430 - 98 % - - - - -   18 0415 - 99 % - - - - -   18 0400 - 95 % Ventilator (!) 107 - - -   18 0345 - 96 % - (!) 122 100 17 -   18 0330 - 96 % - 99 99 13 -   18 0315 - 97 % - 98 98 14 -   18 0310 - 99 % - - 96 - -   18 0300 - 99 % Ventilator 93 93 (!) 0 109/70   18 0245 - 99 % - 93 93 (!) 0 (!) 99/65   18 0230 - 98 % - 95 96 (!) 0 -   18 0215 - 98 % - 94 94 (!) 2 -   18 0200 37 °C (98.6 °F) 98 % Ventilator 99 95 (!) 1 (!) 99/65   18 0145 - 98 % - 93 93 (!) 4 -   18 0130 - 98 % - 94 94 (!) 9 -   18 0115 - 99 % - 94 95  -   18 0100 - 99 % Ventilator 98 98 (!) 4 (!) 99/70   18 0045 - 99 % - 99 99 12 -   18 0030 - 99 % - (!) 102 (!) 103 16 -   18 0015 - 99 % - (!) 102 (!) 102 19 -   18 0000 37.6 °C (99.7 °F) 98 % Ventilator (!) 107 (!) 107 19 112/72   18 2345 - 98 % - (!) 105 (!) 105 16 -   18 2334 - 98 % - - (!) 105 - -   18 2330 - 97 % - (!) 103 (!) 104 17 -     No data recorded.      Respiratory:  [place] Vent Mode: APVCMV, Rate (breaths/min): 18, PEEP/CPAP: 8, FiO2: 40, P Peak (PIP): 23, P MEAN: 11 Respiration: (!) 9, Pulse  Oximetry: 99 %     Chest Tube Drains:          Fluids:    Intake/Output Summary (Last 24 hours) at 12/31/18 0719  Last data filed at 12/31/18 0400   Gross per 24 hour   Intake          2207.64 ml   Output             2235 ml   Net           -27.36 ml     Admit weight: Weight: 95.3 kg (210 lb)  Current weight: Weight: 92.4 kg (203 lb 11.3 oz) (12/29/18 0235)    Labs:  Recent Labs      12/29/18   0543  12/30/18   0417  12/31/18   0504   WBC  19.2*  15.7*  11.5*   RBC  3.82*  3.37*  2.94*   HEMOGLOBIN  12.0*  10.8*  9.3*   HEMATOCRIT  36.3*  32.0*  28.1*   MCV  95.0  95.0  95.6   MCH  31.4  32.0  31.6   MCHC  33.1*  33.8  33.1*   RDW  45.3  45.8  46.5   PLATELETCT  176  151*  150*   MPV  11.5  12.5  11.7         Recent Labs      12/29/18   0543  12/30/18   0417  12/31/18   0504   SODIUM  134*  130*  133*   POTASSIUM  4.0  4.7  4.1   CHLORIDE  105  100  103   CO2  21  23  24   GLUCOSE  76  128*  121*   BUN  14  19  16   CREATININE  0.58  0.71  0.52   CALCIUM  8.3*  8.5  8.5     Recent Labs      12/28/18   1246   APTT  31.6   INR  1.30*       Medications:  • vancomycin  16 mg/kg 1,500 mg (12/31/18 0600)   • MD Alert...Vancomycin per Pharmacy       • insulin NPH  3 Units     • famotidine  20 mg      Or   • famotidine  20 mg     • senna-docusate  2 Tab     • ipratropium-albuterol  3 mL     • aspirin  81 mg     • atorvastatin  80 mg     • baclofen  10 mg     • clopidogrel  75 mg     • lisinopril  5 mg     • insulin lispro  0-10 Units     • enoxaparin  40 mg     • mupirocin  1 Application     • lidocaine  1 Patch          Ordered Medications:    ASA - Yes    Plavix - Yes    Post-operative Beta Blockers - Yes    Ace Inhibitor -yes      Statin - Yes         Exam:   Review of Systems   Unable to perform ROS: Intubated       Physical Exam    Quality Measures:   Quality-Core Measures   Reviewed items::  EKG reviewed, Labs reviewed, Medications reviewed and Radiology images reviewed  Thomas catheter::  Strict Intake and Output  During Sepisis or Shock  Central line in place:  Need for access and Vasopressors  DVT prophylaxis pharmacological::  Contraindicated - High bleeding risk  DVT prophylaxis - mechanical:  SCDs  Ulcer Prophylaxis::  Yes  Assessed for rehabilitation services:  Patient returned to prior level of function, rehabilitation not indicated at this time      Assessment/Plan:  POD 1 HDS- no gtts- dc IABP, ST start metorolol, low EF add ace, Resp insuff- currently on bi-pap- diurese- pulm following pain- add ms contin, D/c Ct after OOB, keep solitario for strict I&O, enc IS  POD 2 HOTN - on devora gtt- give albumin this am, ST, re-intubated yesterday for resp distress, sedated, pain on fentanyl gtt, hyponatremia- hold diuresis today, solitario for strict I&O, CPM  POD 3 HOTN- devora gtt, vent- broch w/ copious amt of secretions yesterday, sedated - on propofol/fentanyl for pain, low UO- check solitario/gently diurese  POD 4 HTN- increase po meds, 's- start esmolol, vent- pulm following, sedated, given lasix this am, CPM    Patient seen, examined and plan reviewed with midlevel provider. I agree with the plan.      Active Hospital Problems    Diagnosis   • Acute hypoxemic respiratory failure (HCC) [J96.01]     Priority: High   • S/P CABG x 3 [Z95.1]     Priority: High   • Ventilator dependent (HCC) [Z99.11]     Priority: High   • NSTEMI (non-ST elevated myocardial infarction) ([facility]) [I21.4]     Priority: High   • Acute systolic congestive heart failure (HCC) [I50.21]     Priority: High   • Pneumonia [J18.9]     Priority: Medium   • Atelectasis [J98.11]     Priority: Medium   • SVT (supraventricular tachycardia) ([facility]) [I47.1]     Priority: Medium   • Type 2 diabetes mellitus without complication ([facility]) [E11.9]     Priority: Medium   • Hypokalemia [E87.6]     Priority: Low   • Postural dizziness with presyncope [R42, R55]     Priority: Low

## 2018-12-31 NOTE — PROGRESS NOTES
"Pharmacy Kinetics 43 y.o. male on vancomycin day # 1 12/31/2018    Currently on Vancomycin 2300 mg iv load, given 12/30@2200    Indication for Treatment: PNA    Pertinent history per medical record:   44 yo male on hospital day 4 with PMH of bowel obstruction, T2DM admitted on 12/26/2018 for NSTEMI.  Pt underwent CABG and was found to have significant CAD and is currently intubated.    Other antibiotics:   - None    Allergies: Penicillins     List concerns for renal function:   - Leukocytosis  - Current intubation  - Hypotension requiring vasopressor support    Pertinent cultures to date:   - 12/29 Resp (+) S. aureus    Recent Labs      12/28/18   0545  12/28/18   1830  12/29/18   0543  12/30/18   0417   WBC  9.7  23.0*  19.2*  15.7*   NEUTSPOLYS  58.10   --    --    --      Recent Labs      12/28/18   0545  12/29/18   0543  12/30/18   0417   BUN  14  14  19   CREATININE  0.57  0.58  0.71     No results for input(s): VANCOTROUGH, VANCOPEAK, VANCORANDOM in the last 72 hours.  Intake/Output Summary (Last 24 hours) at 12/31/18 0116  Last data filed at 12/31/18 0000   Gross per 24 hour   Intake          1821.54 ml   Output             3330 ml   Net         -1508.46 ml      Blood pressure 125/86, pulse 98, temperature 36.6 °C (97.9 °F), temperature source Temporal, resp. rate (!) 4, height 1.829 m (6' 0.01\"), weight 92.4 kg (203 lb 11.3 oz), SpO2 99 %. No data recorded.      A/P   1. Vancomycin dose change: 1500 mg iv q8h, start 12/31@0600  2. Next vancomycin level: Prior to the 4th dose (not ordered)  3. Goal trough: 16 to 20 mcg/mL  4. Comments: Some concern for drug accumulation, but due to critical nature of pt, will dose at 16 mg/kg q8h.  Trough to be drawn at steady state.  Pt had received a single post-op prophylaxis dose of vancomycin 1500 mg on 12/28@2000.  Pharmacy to follow and make dose adjustments as appropriate.      López Bond, PharmD      "

## 2018-12-31 NOTE — THERAPY
Occupational Therapy Contact Note    OT order received and attempted. Pt is POD #3 CABG x3, vented and sedated. RN requesting to hold today and attempt tomorrow.    Janna Colilns, OTR/L

## 2018-12-31 NOTE — ASSESSMENT & PLAN NOTE
Staph/Strep bilateral pneumonia (MSSA)- 7 days of Ancef  Finished antibiotic  No evidence active infection

## 2018-12-31 NOTE — PROGRESS NOTES
Cardiology Follow Up Progress Note    Date of Service  12/31/2018    Attending Physician  Tomas Barrios M.D.    Chief Complaint     SVT transferred from [place] ER for troponin >4    HPI  iRkki Khalil is a 43 y.o. male w poorly controlled DMII who lives in [place] with his wife, admitted 12/26/2018 with NSTEMI/cp and SVT -- echo at admit showed EF ~25%, angio showed severe 3VD and he went for CABG x3 on 12/28 (LIMA/LAD, SVG/dx, SVG/dRCA).  Reintubated for respiratory reasons POD1.    Interim Events    Still intubated and sedated  Still on devora gtt    Review of Systems  Review of Systems   Unable to perform ROS: Intubated       Vital signs in last 24 hours  Temp:  [37.7 °C (99.9 °F)] 37.7 °C (99.9 °F)  Pulse:  [] 113  Resp:  [0-21] 16    Physical Exam  Physical Exam   Constitutional: He appears well-developed and well-nourished.   HENT:   Head: Normocephalic and atraumatic.   Eyes: Pupils are equal, round, and reactive to light. EOM are normal. No scleral icterus.   Neck: Neck supple. No JVD present.   Cardiovascular: Normal rate, regular rhythm and intact distal pulses.  Exam reveals no gallop and no friction rub.    Pulmonary/Chest: Breath sounds normal. No respiratory distress.   woundvac intact   Abdominal: Soft. Bowel sounds are normal.   Musculoskeletal: He exhibits no edema or tenderness.   Lymphadenopathy:     He has no cervical adenopathy.   Neurological: He exhibits normal muscle tone. Coordination normal.   Skin: Skin is warm and dry. No rash noted.       Lab Review  Lab Results   Component Value Date/Time    WBC 11.5 (H) 12/31/2018 05:04 AM    RBC 2.94 (L) 12/31/2018 05:04 AM    HEMOGLOBIN 9.3 (L) 12/31/2018 05:04 AM    HEMATOCRIT 28.1 (L) 12/31/2018 05:04 AM    MCV 95.6 12/31/2018 05:04 AM    MCH 31.6 12/31/2018 05:04 AM    MCHC 33.1 (L) 12/31/2018 05:04 AM    MPV 11.7 12/31/2018 05:04 AM      Lab Results   Component Value Date/Time    SODIUM 133 (L) 12/31/2018 05:04 AM    POTASSIUM 4.1  12/31/2018 05:04 AM    CHLORIDE 103 12/31/2018 05:04 AM    CO2 24 12/31/2018 05:04 AM    GLUCOSE 121 (H) 12/31/2018 05:04 AM    BUN 16 12/31/2018 05:04 AM    CREATININE 0.52 12/31/2018 05:04 AM      Lab Results   Component Value Date/Time    ASTSGOT 122 (H) 12/31/2018 05:04 AM    ALTSGPT 158 (H) 12/31/2018 05:04 AM     Lab Results   Component Value Date/Time    CHOLSTRLTOT 79 (L) 12/27/2018 04:28 AM    LDL 42 12/27/2018 04:28 AM    HDL 22 (A) 12/27/2018 04:28 AM    TRIGLYCERIDE 78 12/31/2018 05:04 AM    TROPONINI 6.13 (H) 12/27/2018 08:02 AM             Cardiac Imaging and Procedures Review  EKG:  My personal interpretation of the EKG dated 12/29 - mild sinus tach. No change in mild ST of inf leads    Echocardiogram:  EF ~25%.  No noted valve disease.    Cardiac Catheterization:  See HPI    Imaging  Chest X-Ray:  No new infiltrates    Assessment/Plan    -CAD/STEMI  -severe 3VD requiring urgent revasc with CABG  -respiratory failure  -CHF, acute, systolic    CAD  Asa/plavix  Statin  Holding BB with still on pressors    CHF  Plan repeat echo 48h to reassess function  Acute with STEMI  Holding meds as above - lasix pending I/O    Shock  Acute, severe on pressors as above    D/w wife at bedside  Prognosis still very guarded    Thank you for allowing me to participate in the care of this patient.  Please contact me with any questions.    Herminia Queen M.D.   Cardiologist, [facility]  [phone]

## 2019-01-01 ENCOUNTER — APPOINTMENT (OUTPATIENT)
Dept: RADIOLOGY | Facility: MEDICAL CENTER | Age: 44
DRG: 233 | End: 2019-01-01
Attending: INTERNAL MEDICINE
Payer: MEDICAID

## 2019-01-01 LAB
ACTION RANGE TRIGGERED IACRT: NO
ANION GAP SERPL CALC-SCNC: 9 MMOL/L (ref 0–11.9)
BASE EXCESS BLDA CALC-SCNC: 1 MMOL/L (ref -4–3)
BODY TEMPERATURE: ABNORMAL DEGREES
BUN SERPL-MCNC: 15 MG/DL (ref 8–22)
CALCIUM SERPL-MCNC: 8.9 MG/DL (ref 8.5–10.5)
CHLORIDE SERPL-SCNC: 99 MMOL/L (ref 96–112)
CO2 BLDA-SCNC: 28 MMOL/L (ref 20–33)
CO2 SERPL-SCNC: 29 MMOL/L (ref 20–33)
CREAT SERPL-MCNC: 0.48 MG/DL (ref 0.5–1.4)
EKG IMPRESSION: NORMAL
EKG IMPRESSION: NORMAL
ERYTHROCYTE [DISTWIDTH] IN BLOOD BY AUTOMATED COUNT: 45.1 FL (ref 35.9–50)
GLUCOSE BLD-MCNC: 158 MG/DL (ref 65–99)
GLUCOSE BLD-MCNC: 168 MG/DL (ref 65–99)
GLUCOSE BLD-MCNC: 171 MG/DL (ref 65–99)
GLUCOSE BLD-MCNC: 210 MG/DL (ref 65–99)
GLUCOSE SERPL-MCNC: 159 MG/DL (ref 65–99)
HCO3 BLDA-SCNC: 26.3 MMOL/L (ref 17–25)
HCT VFR BLD AUTO: 31.1 % (ref 42–52)
HGB BLD-MCNC: 10.4 G/DL (ref 14–18)
HOROWITZ INDEX BLDA+IHG-RTO: 247 MM[HG]
INST. QUALIFIED PATIENT IIQPT: YES
LV EJECT FRACT  99904: 25
LV EJECT FRACT MOD 2C 99903: 29.69
LV EJECT FRACT MOD 4C 99902: 33.03
LV EJECT FRACT MOD BP 99901: 32.15
MAGNESIUM SERPL-MCNC: 1.6 MG/DL (ref 1.5–2.5)
MCH RBC QN AUTO: 31.7 PG (ref 27–33)
MCHC RBC AUTO-ENTMCNC: 33.4 G/DL (ref 33.7–35.3)
MCV RBC AUTO: 94.8 FL (ref 81.4–97.8)
O2/TOTAL GAS SETTING VFR VENT: 30 %
PCO2 BLDA: 45.3 MMHG (ref 26–37)
PCO2 TEMP ADJ BLDA: 45.1 MMHG (ref 26–37)
PH BLDA: 7.37 [PH] (ref 7.4–7.5)
PH TEMP ADJ BLDA: 7.37 [PH] (ref 7.4–7.5)
PHOSPHATE SERPL-MCNC: 2.8 MG/DL (ref 2.5–4.5)
PLATELET # BLD AUTO: 187 K/UL (ref 164–446)
PMV BLD AUTO: 11.1 FL (ref 9–12.9)
PO2 BLDA: 74 MMHG (ref 64–87)
PO2 TEMP ADJ BLDA: 74 MMHG (ref 64–87)
POTASSIUM SERPL-SCNC: 3.8 MMOL/L (ref 3.6–5.5)
RBC # BLD AUTO: 3.28 M/UL (ref 4.7–6.1)
SAO2 % BLDA: 94 % (ref 93–99)
SODIUM SERPL-SCNC: 137 MMOL/L (ref 135–145)
SPECIMEN DRAWN FROM PATIENT: ABNORMAL
WBC # BLD AUTO: 10.7 K/UL (ref 4.8–10.8)

## 2019-01-01 PROCEDURE — 93010 ELECTROCARDIOGRAM REPORT: CPT | Mod: 76 | Performed by: INTERNAL MEDICINE

## 2019-01-01 PROCEDURE — 71045 X-RAY EXAM CHEST 1 VIEW: CPT

## 2019-01-01 PROCEDURE — 94640 AIRWAY INHALATION TREATMENT: CPT

## 2019-01-01 PROCEDURE — 93005 ELECTROCARDIOGRAM TRACING: CPT | Performed by: NURSE PRACTITIONER

## 2019-01-01 PROCEDURE — A9270 NON-COVERED ITEM OR SERVICE: HCPCS | Performed by: INTERNAL MEDICINE

## 2019-01-01 PROCEDURE — 99233 SBSQ HOSP IP/OBS HIGH 50: CPT | Performed by: INTERNAL MEDICINE

## 2019-01-01 PROCEDURE — 80048 BASIC METABOLIC PNL TOTAL CA: CPT

## 2019-01-01 PROCEDURE — 700101 HCHG RX REV CODE 250: Performed by: INTERNAL MEDICINE

## 2019-01-01 PROCEDURE — 700102 HCHG RX REV CODE 250 W/ 637 OVERRIDE(OP): Performed by: NURSE PRACTITIONER

## 2019-01-01 PROCEDURE — 93005 ELECTROCARDIOGRAM TRACING: CPT | Performed by: INTERNAL MEDICINE

## 2019-01-01 PROCEDURE — 700102 HCHG RX REV CODE 250 W/ 637 OVERRIDE(OP): Performed by: INTERNAL MEDICINE

## 2019-01-01 PROCEDURE — 99291 CRITICAL CARE FIRST HOUR: CPT | Performed by: INTERNAL MEDICINE

## 2019-01-01 PROCEDURE — 700111 HCHG RX REV CODE 636 W/ 250 OVERRIDE (IP): Performed by: INTERNAL MEDICINE

## 2019-01-01 PROCEDURE — 700111 HCHG RX REV CODE 636 W/ 250 OVERRIDE (IP): Performed by: THORACIC SURGERY (CARDIOTHORACIC VASCULAR SURGERY)

## 2019-01-01 PROCEDURE — 84100 ASSAY OF PHOSPHORUS: CPT

## 2019-01-01 PROCEDURE — 770022 HCHG ROOM/CARE - ICU (200)

## 2019-01-01 PROCEDURE — 82803 BLOOD GASES ANY COMBINATION: CPT

## 2019-01-01 PROCEDURE — 700111 HCHG RX REV CODE 636 W/ 250 OVERRIDE (IP): Performed by: NURSE PRACTITIONER

## 2019-01-01 PROCEDURE — 82962 GLUCOSE BLOOD TEST: CPT

## 2019-01-01 PROCEDURE — 85027 COMPLETE CBC AUTOMATED: CPT

## 2019-01-01 PROCEDURE — 37799 UNLISTED PX VASCULAR SURGERY: CPT

## 2019-01-01 PROCEDURE — 83735 ASSAY OF MAGNESIUM: CPT

## 2019-01-01 PROCEDURE — 93010 ELECTROCARDIOGRAM REPORT: CPT | Performed by: INTERNAL MEDICINE

## 2019-01-01 PROCEDURE — 700101 HCHG RX REV CODE 250: Performed by: CLINICAL NURSE SPECIALIST

## 2019-01-01 PROCEDURE — A9270 NON-COVERED ITEM OR SERVICE: HCPCS | Performed by: NURSE PRACTITIONER

## 2019-01-01 PROCEDURE — 94003 VENT MGMT INPAT SUBQ DAY: CPT

## 2019-01-01 RX ORDER — METOPROLOL TARTRATE 1 MG/ML
5 INJECTION, SOLUTION INTRAVENOUS EVERY 4 HOURS
Status: DISCONTINUED | OUTPATIENT
Start: 2019-01-01 | End: 2019-01-03

## 2019-01-01 RX ORDER — ESMOLOL HYDROCHLORIDE 10 MG/ML
0-200 INJECTION, SOLUTION INTRAVENOUS CONTINUOUS
Status: DISCONTINUED | OUTPATIENT
Start: 2019-01-01 | End: 2019-01-01

## 2019-01-01 RX ORDER — ENALAPRILAT 1.25 MG/ML
2.5 INJECTION INTRAVENOUS EVERY 6 HOURS PRN
Status: DISCONTINUED | OUTPATIENT
Start: 2019-01-01 | End: 2019-01-10 | Stop reason: HOSPADM

## 2019-01-01 RX ORDER — FUROSEMIDE 10 MG/ML
80 INJECTION INTRAMUSCULAR; INTRAVENOUS ONCE
Status: COMPLETED | OUTPATIENT
Start: 2019-01-01 | End: 2019-01-01

## 2019-01-01 RX ORDER — MAGNESIUM SULFATE HEPTAHYDRATE 40 MG/ML
2 INJECTION, SOLUTION INTRAVENOUS ONCE
Status: COMPLETED | OUTPATIENT
Start: 2019-01-01 | End: 2019-01-01

## 2019-01-01 RX ORDER — LISINOPRIL 10 MG/1
10 TABLET ORAL
Status: DISCONTINUED | OUTPATIENT
Start: 2019-01-02 | End: 2019-01-02

## 2019-01-01 RX ADMIN — STANDARDIZED SENNA CONCENTRATE AND DOCUSATE SODIUM 2 TABLET: 8.6; 5 TABLET, FILM COATED ORAL at 17:33

## 2019-01-01 RX ADMIN — FENTANYL CITRATE 200 MCG: 50 INJECTION, SOLUTION INTRAMUSCULAR; INTRAVENOUS at 21:28

## 2019-01-01 RX ADMIN — ALPRAZOLAM 0.5 MG: 0.5 TABLET ORAL at 04:17

## 2019-01-01 RX ADMIN — FENTANYL CITRATE 200 MCG/HR: 50 INJECTION, SOLUTION INTRAMUSCULAR; INTRAVENOUS at 23:00

## 2019-01-01 RX ADMIN — HYDROCODONE BITARTRATE AND ACETAMINOPHEN 2 TABLET: 5; 325 TABLET ORAL at 10:38

## 2019-01-01 RX ADMIN — BACLOFEN 10 MG: 10 TABLET ORAL at 11:42

## 2019-01-01 RX ADMIN — STANDARDIZED SENNA CONCENTRATE AND DOCUSATE SODIUM 2 TABLET: 8.6; 5 TABLET, FILM COATED ORAL at 06:00

## 2019-01-01 RX ADMIN — OXYCODONE HYDROCHLORIDE 5 MG: 5 TABLET ORAL at 04:17

## 2019-01-01 RX ADMIN — IPRATROPIUM BROMIDE AND ALBUTEROL SULFATE 3 ML: .5; 3 SOLUTION RESPIRATORY (INHALATION) at 22:02

## 2019-01-01 RX ADMIN — MUPIROCIN 1 APPLICATION: 20 OINTMENT TOPICAL at 17:32

## 2019-01-01 RX ADMIN — ENOXAPARIN SODIUM 40 MG: 100 INJECTION SUBCUTANEOUS at 18:00

## 2019-01-01 RX ADMIN — METOPROLOL TARTRATE 5 MG: 5 INJECTION, SOLUTION INTRAVENOUS at 17:31

## 2019-01-01 RX ADMIN — POTASSIUM BICARBONATE 50 MEQ: 25 TABLET, EFFERVESCENT ORAL at 10:38

## 2019-01-01 RX ADMIN — BACLOFEN 10 MG: 10 TABLET ORAL at 17:31

## 2019-01-01 RX ADMIN — IPRATROPIUM BROMIDE AND ALBUTEROL SULFATE 3 ML: .5; 3 SOLUTION RESPIRATORY (INHALATION) at 11:56

## 2019-01-01 RX ADMIN — LISINOPRIL 5 MG: 5 TABLET ORAL at 05:50

## 2019-01-01 RX ADMIN — INSULIN HUMAN 3 UNITS: 100 INJECTION, SUSPENSION SUBCUTANEOUS at 21:42

## 2019-01-01 RX ADMIN — CEFAZOLIN SODIUM 2 G: 2 INJECTION, SOLUTION INTRAVENOUS at 16:29

## 2019-01-01 RX ADMIN — ASPIRIN 81 MG 81 MG: 81 TABLET ORAL at 05:49

## 2019-01-01 RX ADMIN — FENTANYL CITRATE 100 MCG: 50 INJECTION, SOLUTION INTRAMUSCULAR; INTRAVENOUS at 05:16

## 2019-01-01 RX ADMIN — METOPROLOL TARTRATE 5 MG: 5 INJECTION, SOLUTION INTRAVENOUS at 09:01

## 2019-01-01 RX ADMIN — FENTANYL CITRATE 650 MCG/HR: 50 INJECTION, SOLUTION INTRAMUSCULAR; INTRAVENOUS at 11:36

## 2019-01-01 RX ADMIN — METOPROLOL TARTRATE 5 MG: 5 INJECTION, SOLUTION INTRAVENOUS at 13:55

## 2019-01-01 RX ADMIN — FENTANYL CITRATE 650 MCG/HR: 50 INJECTION, SOLUTION INTRAMUSCULAR; INTRAVENOUS at 02:49

## 2019-01-01 RX ADMIN — FENTANYL CITRATE 100 MCG: 50 INJECTION, SOLUTION INTRAMUSCULAR; INTRAVENOUS at 07:38

## 2019-01-01 RX ADMIN — IPRATROPIUM BROMIDE AND ALBUTEROL SULFATE 3 ML: .5; 3 SOLUTION RESPIRATORY (INHALATION) at 15:44

## 2019-01-01 RX ADMIN — MUPIROCIN 1 APPLICATION: 20 OINTMENT TOPICAL at 05:50

## 2019-01-01 RX ADMIN — INSULIN HUMAN 3 UNITS: 100 INJECTION, SUSPENSION SUBCUTANEOUS at 14:03

## 2019-01-01 RX ADMIN — FENTANYL CITRATE 100 MCG: 50 INJECTION, SOLUTION INTRAMUSCULAR; INTRAVENOUS at 03:57

## 2019-01-01 RX ADMIN — ATORVASTATIN CALCIUM 80 MG: 80 TABLET, FILM COATED ORAL at 05:49

## 2019-01-01 RX ADMIN — FENTANYL CITRATE 650 MCG/HR: 50 INJECTION, SOLUTION INTRAMUSCULAR; INTRAVENOUS at 05:53

## 2019-01-01 RX ADMIN — FAMOTIDINE 20 MG: 20 TABLET ORAL at 18:00

## 2019-01-01 RX ADMIN — HYDROCODONE BITARTRATE AND ACETAMINOPHEN 2 TABLET: 5; 325 TABLET ORAL at 02:54

## 2019-01-01 RX ADMIN — LIDOCAINE 1 PATCH: 50 PATCH TOPICAL at 20:07

## 2019-01-01 RX ADMIN — CLOPIDOGREL 75 MG: 75 TABLET, FILM COATED ORAL at 05:49

## 2019-01-01 RX ADMIN — FAMOTIDINE 20 MG: 20 TABLET ORAL at 06:00

## 2019-01-01 RX ADMIN — CEFAZOLIN SODIUM 2 G: 2 INJECTION, SOLUTION INTRAVENOUS at 21:34

## 2019-01-01 RX ADMIN — IPRATROPIUM BROMIDE AND ALBUTEROL SULFATE 3 ML: .5; 3 SOLUTION RESPIRATORY (INHALATION) at 03:37

## 2019-01-01 RX ADMIN — POLYETHYLENE GLYCOL 3350 1 PACKET: 17 POWDER, FOR SOLUTION ORAL at 12:48

## 2019-01-01 RX ADMIN — HYDROCODONE BITARTRATE AND ACETAMINOPHEN 2 TABLET: 5; 325 TABLET ORAL at 19:23

## 2019-01-01 RX ADMIN — CEFAZOLIN SODIUM 2 G: 2 INJECTION, SOLUTION INTRAVENOUS at 05:49

## 2019-01-01 RX ADMIN — IPRATROPIUM BROMIDE AND ALBUTEROL SULFATE 3 ML: .5; 3 SOLUTION RESPIRATORY (INHALATION) at 18:42

## 2019-01-01 RX ADMIN — IPRATROPIUM BROMIDE AND ALBUTEROL SULFATE 3 ML: .5; 3 SOLUTION RESPIRATORY (INHALATION) at 07:49

## 2019-01-01 RX ADMIN — FUROSEMIDE 80 MG: 10 INJECTION, SOLUTION INTRAMUSCULAR; INTRAVENOUS at 08:30

## 2019-01-01 RX ADMIN — ALPRAZOLAM 0.5 MG: 0.5 TABLET ORAL at 16:29

## 2019-01-01 RX ADMIN — FENTANYL CITRATE 100 MCG: 50 INJECTION, SOLUTION INTRAMUSCULAR; INTRAVENOUS at 00:33

## 2019-01-01 RX ADMIN — FENTANYL CITRATE 650 MCG/HR: 50 INJECTION, SOLUTION INTRAMUSCULAR; INTRAVENOUS at 08:43

## 2019-01-01 RX ADMIN — MAGNESIUM SULFATE IN WATER 2 G: 40 INJECTION, SOLUTION INTRAVENOUS at 11:43

## 2019-01-01 RX ADMIN — BACLOFEN 10 MG: 10 TABLET ORAL at 05:50

## 2019-01-01 RX ADMIN — METOPROLOL TARTRATE 5 MG: 5 INJECTION, SOLUTION INTRAVENOUS at 21:03

## 2019-01-01 RX ADMIN — INSULIN HUMAN 3 UNITS: 100 INJECTION, SUSPENSION SUBCUTANEOUS at 06:48

## 2019-01-01 RX ADMIN — FENTANYL CITRATE 450 MCG/HR: 50 INJECTION, SOLUTION INTRAMUSCULAR; INTRAVENOUS at 15:03

## 2019-01-01 NOTE — CARE PLAN
Problem: Post Op Day 3 CABG/Heart Valve replacement  Goal: Optimal care of the post op CABG/Heart Valve replacement post op day 3    Intervention: Daily Weights  Done. 95.9 kg via bed scale.  Intervention: Shower daily and clean incisions twice daily with soap and water  Unable to complete; patient remains intubated at this time.  Intervention: Up in chair for all meals  Unable to complete; patient remains intubated at this time. Tube feeds initiated today.  Intervention: Ambulate, increasing the distance each time x 3 and before bed  Unable to complete; patient remains intubated at this time. Pt sat at edge of bed for five minutes.  Intervention: IS q 1 hour while awake and record best IS volume  Unable to complete; patient remains intubated at this time.  Intervention: Consider removal of solitario, chest tube and pacer wire if not already done  Unable to complete; Solitario and pleural janee drain remain in place as patient remains intubated at this time.  Intervention: Case management and  for discharge needs  In progress.

## 2019-01-01 NOTE — PROGRESS NOTES
Cardiology Follow Up Progress Note    Date of Service  1/1/2019    Attending Physician  Tomas Barrios M.D.    Chief Complaint     SVT transferred from [place] ER for troponin >4    HPI  Rikki Khalil is a 43 y.o. male w poorly controlled DMII who lives in [place] with his wife, admitted 12/26/2018 with NSTEMI/cp and SVT -- echo at admit showed EF ~25%, angio showed severe 3VD and he went for CABG x3 on 12/28 (LIMA/LAD, SVG/dx, SVG/dRCA).  Reintubated for respiratory reasons POD1.    Interim Events    Still intubated and sedated  Off pressors, agitated with decreasing sedation, on high-dose fentanyl  Wife at bedside  Hypertensive and tachycardic    Review of Systems  Review of Systems   Unable to perform ROS: Intubated       Vital signs in last 24 hours  Temp:  [36.5 °C (97.7 °F)] 36.5 °C (97.7 °F)  Pulse:  [] 140  Resp:  [2-21] 14    Physical Exam  Physical Exam   Constitutional: He appears well-developed and well-nourished.   Intubated sedated, not orienting to commands   HENT:   Head: Normocephalic and atraumatic.   Eyes: Pupils are equal, round, and reactive to light. EOM are normal. No scleral icterus.   Neck: Neck supple. No JVD present.   Cardiovascular: Regular rhythm and intact distal pulses.  Exam reveals no gallop and no friction rub.    Sinus tachycardia at 120   Pulmonary/Chest: Breath sounds normal. No respiratory distress.   woundvac intact   Abdominal: Soft. Bowel sounds are normal.   Musculoskeletal: He exhibits no edema or tenderness.   Lymphadenopathy:     He has no cervical adenopathy.   Neurological: He exhibits normal muscle tone. Coordination normal.   Skin: Skin is warm and dry. No rash noted.       Lab Review  Lab Results   Component Value Date/Time    WBC 10.7 01/01/2019 06:00 AM    RBC 3.28 (L) 01/01/2019 06:00 AM    HEMOGLOBIN 10.4 (L) 01/01/2019 06:00 AM    HEMATOCRIT 31.1 (L) 01/01/2019 06:00 AM    MCV 94.8 01/01/2019 06:00 AM    MCH 31.7 01/01/2019 06:00 AM    MCHC 33.4  (L) 01/01/2019 06:00 AM    MPV 11.1 01/01/2019 06:00 AM      Lab Results   Component Value Date/Time    SODIUM 137 01/01/2019 06:00 AM    POTASSIUM 3.8 01/01/2019 06:00 AM    CHLORIDE 99 01/01/2019 06:00 AM    CO2 29 01/01/2019 06:00 AM    GLUCOSE 159 (H) 01/01/2019 06:00 AM    BUN 15 01/01/2019 06:00 AM    CREATININE 0.48 (L) 01/01/2019 06:00 AM      Lab Results   Component Value Date/Time    ASTSGOT 122 (H) 12/31/2018 05:04 AM    ALTSGPT 158 (H) 12/31/2018 05:04 AM     Lab Results   Component Value Date/Time    CHOLSTRLTOT 79 (L) 12/27/2018 04:28 AM    LDL 42 12/27/2018 04:28 AM    HDL 22 (A) 12/27/2018 04:28 AM    TRIGLYCERIDE 78 12/31/2018 05:04 AM    TROPONINI 6.13 (H) 12/27/2018 08:02 AM             Cardiac Imaging and Procedures Review  EKG:  My personal interpretation of the EKG dated today shows ectopic atrial tachycardia, unlikely atypical flutter    Echocardiogram: Repeated again yesterday: EF ~25%.  No change at all from priors.  Akinesis of the anterior wall and significant right ventricular dilation dysfunction, pulmonary pressures not estimated    Cardiac Catheterization:  See HPI    Imaging  Chest X-Ray:  No new infiltrates; bilateral fluffy    Assessment/Plan    -CAD/STEMI  -severe 3VD requiring urgent revasc with CABG; see below especially with heart failure  -respiratory failure  -CHF, , systolic    CAD  Status post aggressive revascularization in the setting of late presenting non-STEMI  Asa/plavix  Statin  Holding BB with still on pressors    Tachycardia  EKG reveals no evidence that would be conclusive for atypical flutter  Treat as if it is sinus, differential listed below, doubt PE as he has been on prophylaxis  Volume overload, withdrawal, agitation/pain    CHF, systolic, now subacute  No change on repeat echo yesterday  Hemodynamics concerning for volume overload, 1 dose of aggressive Lasix 80 mg to see if it improves hemodynamics  Discussed with pulmonary who agrees in the setting of ARDS,  GFR still normal   Low-dose afterload reduction in the form of enalapril low-dose short acting beta-blocker, very precarious -will discontinue if this appears to be acute heart failure     holding meds as above - lasix pending I/O    Shock  Resolving slightly, likely multifactorial with right and left ventricular failure as well as ARDS  Gentle heart failure treatment    D/w wife at bedside, discussed with her nursing and pulmonary, Dr. Cross  Prognosis still very guarded    Thank you for allowing me to participate in the care of this patient.  Please contact me with any questions.    Herminia Queen M.D.   Cardiologist, [facility]  [phone]

## 2019-01-01 NOTE — CARE PLAN
Problem: Ventilation Defect:  Goal: Ability to achieve and maintain unassisted ventilation or tolerate decreased levels of ventilator support  Outcome: PROGRESSING AS EXPECTED    Intervention: Support and monitor invasive and noninvasive mechanical ventilation  Adult Ventilation Update    Total Vent Days: 4  APVcmv 18/470/8/40    Patient Lines/Drains/Airways Status    Active Airway     Name: Placement date: Placement time: Site: Days:    Airway ETT Oral 8.0 12/29/18   1605   Oral   3              Cough: Productive (12/31/18 1514)  Sputum Amount: Small (12/31/18 1514)  Sputum Color: Clear;White (12/31/18 1514)  Sputum Consistency: Thin;Thick (12/31/18 1514)    Mobility  Level of Mobility: Level IV (12/31/18 1400)  Activity Performed: Edge of bed (12/31/18 1400)  Assistance / Tolerance: Assistance of Two or More;Tolerates Well (12/31/18 1400)  Pt Calls for Assistance: Yes (12/31/18 1400)  Staff Present for Mobilization: RN;RT (12/31/18 1400)  Gait: Steady (12/28/18 1600)  Assistive Devices: Hand held assist (12/31/18 1400)  Reason Not Mobilized: Unstable condition (12/31/18 0800)  Mobilization Comments: will clarify with MD (12/31/18 0800)

## 2019-01-01 NOTE — RESPIRATORY CARE
Ventilator Weaning Update    Patient is on vent day 4.  SBT was tolerated for a minimum of 1 hours on settings of 5/8.    Wean parameters for this SBT were:  #FVC / Vital Capacity (liters) : 1.76 (12/31/18 1615)  NIF (cm H2O) : -38 (12/31/18 1615)  Rapid Shallow Breathing Index (RR/VT): 21 (12/31/18 1615)  RR (bpm): 16 (12/31/18 1615)  Spontaneous VE: 10.3 (12/31/18 1615)  Spontaneous VT: 672 (12/31/18 1615)

## 2019-01-01 NOTE — PROGRESS NOTES
Monitor Summary: Sinus rhythm-sinus tachycardia, rates , frequent PVCs; 0.16/0.10/0.30    12 hour chart check.

## 2019-01-01 NOTE — CARE PLAN
Problem: Bowel/Gastric:  Goal: Normal bowel function is maintained or improved  Outcome: PROGRESSING SLOWER THAN EXPECTED  Bowel protocol advanced today. Patient has not had a BM for 5 days now.

## 2019-01-01 NOTE — CARE PLAN
Problem: Nutritional:  Goal: Nutrition support tolerated and meeting greater than 85% of estimated needs  Outcome: MET Date Met: 01/01/19  TF @ goal: Impact Peptide @ 55 mL/hr.

## 2019-01-01 NOTE — RESPIRATORY CARE
Vent Day: 5  Vent: APVCMV 18/470/+8/30%  ETT: 8.0@25    Fi02 was titrated down overnight.  No further remarkable respiratory events.

## 2019-01-01 NOTE — CARE PLAN
Problem: Post Op Day 4 CABG/Heart Valve Replacement  Goal: Optimal care of the Post Op CABG/Heart Valve replacement Post Op Day 4    Intervention: Daily Weights  Complete.  Intervention: Shower daily and clean incisions twice daily with soap and water  Patient intubated so bed bath and CHG complete.  Intervention: Up in chair for all meals  Patient still intubated.    Intervention: Ambulate, increasing the distance each time x 3 and before bed  Patient intubated, edge of bed.  Intervention: IS q 1 hour while awake and record best IS volume  Unable to do while intubated.   Intervention: Consider removal of solitario, chest tube and pacer wire if not already done  APNs to consider in am.

## 2019-01-01 NOTE — CARE PLAN
Problem: Pain Management  Goal: Pain level will decrease to patient's comfort goal  Outcome: PROGRESSING SLOWER THAN EXPECTED  Fentanyl drip decreased from 650 to 500 over the past several hours. Will continue to decrease IV fentanyl drip as tolerated to improve patients ability to trial spont breathing time

## 2019-01-02 ENCOUNTER — APPOINTMENT (OUTPATIENT)
Dept: RADIOLOGY | Facility: MEDICAL CENTER | Age: 44
DRG: 233 | End: 2019-01-02
Attending: INTERNAL MEDICINE
Payer: MEDICAID

## 2019-01-02 LAB
ACT BLD: 109 SEC (ref 74–137)
ACT BLD: 488 SEC (ref 74–137)
ACTION RANGE TRIGGERED IACRT: NO
ACTION RANGE TRIGGERED IACRT: NO
ACTION RANGE TRIGGERED IACRT: YES
ANION GAP SERPL CALC-SCNC: 8 MMOL/L (ref 0–11.9)
BACTERIA BRONCH AEROBE CULT: ABNORMAL
BASE EXCESS BLDA CALC-SCNC: -1 MMOL/L (ref -4–3)
BASE EXCESS BLDA CALC-SCNC: -6 MMOL/L (ref -4–3)
BASE EXCESS BLDA CALC-SCNC: 9 MMOL/L (ref -4–3)
BODY TEMPERATURE: ABNORMAL DEGREES
BUN SERPL-MCNC: 20 MG/DL (ref 8–22)
CA-I BLD ISE-SCNC: 1.11 MMOL/L (ref 1.1–1.3)
CA-I BLD ISE-SCNC: 1.25 MMOL/L (ref 1.1–1.3)
CALCIUM SERPL-MCNC: 8.8 MG/DL (ref 8.5–10.5)
CHLORIDE SERPL-SCNC: 97 MMOL/L (ref 96–112)
CO2 BLDA-SCNC: 21 MMOL/L (ref 20–33)
CO2 BLDA-SCNC: 26 MMOL/L (ref 20–33)
CO2 BLDA-SCNC: 36 MMOL/L (ref 20–33)
CO2 SERPL-SCNC: 34 MMOL/L (ref 20–33)
CREAT SERPL-MCNC: 0.52 MG/DL (ref 0.5–1.4)
EKG IMPRESSION: NORMAL
ERYTHROCYTE [DISTWIDTH] IN BLOOD BY AUTOMATED COUNT: 44.9 FL (ref 35.9–50)
GLUCOSE BLD-MCNC: 182 MG/DL (ref 65–99)
GLUCOSE BLD-MCNC: 213 MG/DL (ref 65–99)
GLUCOSE BLD-MCNC: 227 MG/DL (ref 65–99)
GLUCOSE BLD-MCNC: 231 MG/DL (ref 65–99)
GLUCOSE BLD-MCNC: 257 MG/DL (ref 65–99)
GLUCOSE BLD-MCNC: 270 MG/DL (ref 65–99)
GLUCOSE SERPL-MCNC: 199 MG/DL (ref 65–99)
GRAM STN SPEC: ABNORMAL
HCO3 BLDA-SCNC: 19.9 MMOL/L (ref 17–25)
HCO3 BLDA-SCNC: 25 MMOL/L (ref 17–25)
HCO3 BLDA-SCNC: 34.7 MMOL/L (ref 17–25)
HCT VFR BLD AUTO: 32.9 % (ref 42–52)
HCT VFR BLD CALC: 27 % (ref 42–52)
HCT VFR BLD CALC: 30 % (ref 42–52)
HGB BLD-MCNC: 10.2 G/DL (ref 14–18)
HGB BLD-MCNC: 10.8 G/DL (ref 14–18)
HGB BLD-MCNC: 9.2 G/DL (ref 14–18)
HOROWITZ INDEX BLDA+IHG-RTO: 118 MM[HG]
HOROWITZ INDEX BLDA+IHG-RTO: 266 MM[HG]
HOROWITZ INDEX BLDA+IHG-RTO: 273 MM[HG]
INST. QUALIFIED PATIENT IIQPT: YES
MAGNESIUM SERPL-MCNC: 1.8 MG/DL (ref 1.5–2.5)
MCH RBC QN AUTO: 31.3 PG (ref 27–33)
MCHC RBC AUTO-ENTMCNC: 32.8 G/DL (ref 33.7–35.3)
MCV RBC AUTO: 95.4 FL (ref 81.4–97.8)
O2/TOTAL GAS SETTING VFR VENT: 100 %
O2/TOTAL GAS SETTING VFR VENT: 30 %
O2/TOTAL GAS SETTING VFR VENT: 80 %
PCO2 BLDA: 38.5 MMHG (ref 26–37)
PCO2 BLDA: 46.8 MMHG (ref 26–37)
PCO2 BLDA: 50 MMHG (ref 26–37)
PCO2 TEMP ADJ BLDA: 38.5 MMHG (ref 26–37)
PCO2 TEMP ADJ BLDA: 46.8 MMHG (ref 26–37)
PCO2 TEMP ADJ BLDA: 51.8 MMHG (ref 26–37)
PH BLDA: 7.32 [PH] (ref 7.4–7.5)
PH BLDA: 7.33 [PH] (ref 7.4–7.5)
PH BLDA: 7.45 [PH] (ref 7.4–7.5)
PH TEMP ADJ BLDA: 7.32 [PH] (ref 7.4–7.5)
PH TEMP ADJ BLDA: 7.33 [PH] (ref 7.4–7.5)
PH TEMP ADJ BLDA: 7.44 [PH] (ref 7.4–7.5)
PHOSPHATE SERPL-MCNC: 2.5 MG/DL (ref 2.5–4.5)
PLATELET # BLD AUTO: 266 K/UL (ref 164–446)
PMV BLD AUTO: 10.5 FL (ref 9–12.9)
PO2 BLDA: 118 MMHG (ref 64–87)
PO2 BLDA: 213 MMHG (ref 64–87)
PO2 BLDA: 82 MMHG (ref 64–87)
PO2 TEMP ADJ BLDA: 118 MMHG (ref 64–87)
PO2 TEMP ADJ BLDA: 213 MMHG (ref 64–87)
PO2 TEMP ADJ BLDA: 86 MMHG (ref 64–87)
POTASSIUM BLD-SCNC: 4.4 MMOL/L (ref 3.6–5.5)
POTASSIUM BLD-SCNC: 6 MMOL/L (ref 3.6–5.5)
POTASSIUM SERPL-SCNC: 4 MMOL/L (ref 3.6–5.5)
RBC # BLD AUTO: 3.45 M/UL (ref 4.7–6.1)
SAO2 % BLDA: 100 % (ref 93–99)
SAO2 % BLDA: 96 % (ref 93–99)
SAO2 % BLDA: 98 % (ref 93–99)
SIGNIFICANT IND 70042: ABNORMAL
SITE SITE: ABNORMAL
SODIUM BLD-SCNC: 136 MMOL/L (ref 135–145)
SODIUM BLD-SCNC: 137 MMOL/L (ref 135–145)
SODIUM SERPL-SCNC: 139 MMOL/L (ref 135–145)
SOURCE SOURCE: ABNORMAL
SPECIMEN DRAWN FROM PATIENT: ABNORMAL
WBC # BLD AUTO: 9.6 K/UL (ref 4.8–10.8)

## 2019-01-02 PROCEDURE — 700101 HCHG RX REV CODE 250: Performed by: INTERNAL MEDICINE

## 2019-01-02 PROCEDURE — 94640 AIRWAY INHALATION TREATMENT: CPT

## 2019-01-02 PROCEDURE — 85027 COMPLETE CBC AUTOMATED: CPT

## 2019-01-02 PROCEDURE — A9270 NON-COVERED ITEM OR SERVICE: HCPCS | Performed by: THORACIC SURGERY (CARDIOTHORACIC VASCULAR SURGERY)

## 2019-01-02 PROCEDURE — 97162 PT EVAL MOD COMPLEX 30 MIN: CPT

## 2019-01-02 PROCEDURE — 99291 CRITICAL CARE FIRST HOUR: CPT | Performed by: INTERNAL MEDICINE

## 2019-01-02 PROCEDURE — 94150 VITAL CAPACITY TEST: CPT

## 2019-01-02 PROCEDURE — 37799 UNLISTED PX VASCULAR SURGERY: CPT

## 2019-01-02 PROCEDURE — 302172 SOFT BED BELT: Performed by: INTERNAL MEDICINE

## 2019-01-02 PROCEDURE — 83735 ASSAY OF MAGNESIUM: CPT

## 2019-01-02 PROCEDURE — 99233 SBSQ HOSP IP/OBS HIGH 50: CPT | Performed by: INTERNAL MEDICINE

## 2019-01-02 PROCEDURE — A9270 NON-COVERED ITEM OR SERVICE: HCPCS | Performed by: INTERNAL MEDICINE

## 2019-01-02 PROCEDURE — 94003 VENT MGMT INPAT SUBQ DAY: CPT

## 2019-01-02 PROCEDURE — 700111 HCHG RX REV CODE 636 W/ 250 OVERRIDE (IP): Performed by: INTERNAL MEDICINE

## 2019-01-02 PROCEDURE — 700102 HCHG RX REV CODE 250 W/ 637 OVERRIDE(OP): Performed by: INTERNAL MEDICINE

## 2019-01-02 PROCEDURE — 700101 HCHG RX REV CODE 250: Performed by: CLINICAL NURSE SPECIALIST

## 2019-01-02 PROCEDURE — 93005 ELECTROCARDIOGRAM TRACING: CPT | Performed by: INTERNAL MEDICINE

## 2019-01-02 PROCEDURE — 80048 BASIC METABOLIC PNL TOTAL CA: CPT

## 2019-01-02 PROCEDURE — 82803 BLOOD GASES ANY COMBINATION: CPT

## 2019-01-02 PROCEDURE — 700111 HCHG RX REV CODE 636 W/ 250 OVERRIDE (IP): Performed by: THORACIC SURGERY (CARDIOTHORACIC VASCULAR SURGERY)

## 2019-01-02 PROCEDURE — 93010 ELECTROCARDIOGRAM REPORT: CPT | Performed by: INTERNAL MEDICINE

## 2019-01-02 PROCEDURE — 82962 GLUCOSE BLOOD TEST: CPT | Mod: 91

## 2019-01-02 PROCEDURE — 770022 HCHG ROOM/CARE - ICU (200)

## 2019-01-02 PROCEDURE — 71045 X-RAY EXAM CHEST 1 VIEW: CPT

## 2019-01-02 PROCEDURE — 84100 ASSAY OF PHOSPHORUS: CPT

## 2019-01-02 PROCEDURE — 700102 HCHG RX REV CODE 250 W/ 637 OVERRIDE(OP): Performed by: THORACIC SURGERY (CARDIOTHORACIC VASCULAR SURGERY)

## 2019-01-02 PROCEDURE — 97167 OT EVAL HIGH COMPLEX 60 MIN: CPT

## 2019-01-02 RX ORDER — SODIUM CHLORIDE 9 MG/ML
INJECTION, SOLUTION INTRAVENOUS
Status: ACTIVE
Start: 2019-01-02 | End: 2019-01-03

## 2019-01-02 RX ORDER — SODIUM CHLORIDE 9 MG/ML
INJECTION, SOLUTION INTRAVENOUS
Status: ACTIVE
Start: 2019-01-02 | End: 2019-01-02

## 2019-01-02 RX ORDER — LISINOPRIL 20 MG/1
20 TABLET ORAL
Status: DISCONTINUED | OUTPATIENT
Start: 2019-01-03 | End: 2019-01-03

## 2019-01-02 RX ORDER — FUROSEMIDE 10 MG/ML
80 INJECTION INTRAMUSCULAR; INTRAVENOUS
Status: DISCONTINUED | OUTPATIENT
Start: 2019-01-02 | End: 2019-01-03

## 2019-01-02 RX ORDER — HYDRALAZINE HYDROCHLORIDE 20 MG/ML
20 INJECTION INTRAMUSCULAR; INTRAVENOUS EVERY 6 HOURS
Status: DISCONTINUED | OUTPATIENT
Start: 2019-01-02 | End: 2019-01-03

## 2019-01-02 RX ORDER — ENALAPRILAT 1.25 MG/ML
1.25 INJECTION INTRAVENOUS EVERY 6 HOURS
Status: DISCONTINUED | OUTPATIENT
Start: 2019-01-02 | End: 2019-01-05

## 2019-01-02 RX ADMIN — IPRATROPIUM BROMIDE AND ALBUTEROL SULFATE 3 ML: .5; 3 SOLUTION RESPIRATORY (INHALATION) at 07:03

## 2019-01-02 RX ADMIN — BACLOFEN 10 MG: 10 TABLET ORAL at 10:47

## 2019-01-02 RX ADMIN — METOPROLOL TARTRATE 5 MG: 5 INJECTION, SOLUTION INTRAVENOUS at 05:45

## 2019-01-02 RX ADMIN — LISINOPRIL 10 MG: 10 TABLET ORAL at 05:45

## 2019-01-02 RX ADMIN — TRAMADOL HYDROCHLORIDE 50 MG: 50 TABLET, FILM COATED ORAL at 10:53

## 2019-01-02 RX ADMIN — ENALAPRILAT 2.5 MG: 2.5 INJECTION INTRAVENOUS at 11:05

## 2019-01-02 RX ADMIN — IPRATROPIUM BROMIDE AND ALBUTEROL SULFATE 3 ML: .5; 3 SOLUTION RESPIRATORY (INHALATION) at 02:18

## 2019-01-02 RX ADMIN — CEFAZOLIN SODIUM 2 G: 2 INJECTION, SOLUTION INTRAVENOUS at 05:48

## 2019-01-02 RX ADMIN — FENTANYL CITRATE 100 MCG: 50 INJECTION, SOLUTION INTRAMUSCULAR; INTRAVENOUS at 02:34

## 2019-01-02 RX ADMIN — POLYETHYLENE GLYCOL 3350 1 PACKET: 17 POWDER, FOR SOLUTION ORAL at 10:54

## 2019-01-02 RX ADMIN — ENALAPRILAT 1.25 MG: 2.5 INJECTION INTRAVENOUS at 21:44

## 2019-01-02 RX ADMIN — CLOPIDOGREL 75 MG: 75 TABLET, FILM COATED ORAL at 05:45

## 2019-01-02 RX ADMIN — HYDRALAZINE HYDROCHLORIDE 20 MG: 20 INJECTION INTRAMUSCULAR; INTRAVENOUS at 23:44

## 2019-01-02 RX ADMIN — INSULIN HUMAN 3 UNITS: 100 INJECTION, SUSPENSION SUBCUTANEOUS at 12:40

## 2019-01-02 RX ADMIN — MUPIROCIN 1 APPLICATION: 20 OINTMENT TOPICAL at 05:52

## 2019-01-02 RX ADMIN — ASPIRIN 81 MG 81 MG: 81 TABLET ORAL at 05:45

## 2019-01-02 RX ADMIN — FUROSEMIDE 80 MG: 10 INJECTION, SOLUTION INTRAMUSCULAR; INTRAVENOUS at 17:28

## 2019-01-02 RX ADMIN — INSULIN HUMAN 3 UNITS: 100 INJECTION, SUSPENSION SUBCUTANEOUS at 06:03

## 2019-01-02 RX ADMIN — STANDARDIZED SENNA CONCENTRATE AND DOCUSATE SODIUM 2 TABLET: 8.6; 5 TABLET, FILM COATED ORAL at 05:45

## 2019-01-02 RX ADMIN — METOPROLOL TARTRATE 5 MG: 5 INJECTION, SOLUTION INTRAVENOUS at 15:00

## 2019-01-02 RX ADMIN — ATORVASTATIN CALCIUM 80 MG: 80 TABLET, FILM COATED ORAL at 05:45

## 2019-01-02 RX ADMIN — ENOXAPARIN SODIUM 40 MG: 100 INJECTION SUBCUTANEOUS at 17:28

## 2019-01-02 RX ADMIN — CEFAZOLIN SODIUM 2 G: 2 INJECTION, SOLUTION INTRAVENOUS at 22:19

## 2019-01-02 RX ADMIN — METOPROLOL TARTRATE 5 MG: 5 INJECTION, SOLUTION INTRAVENOUS at 09:30

## 2019-01-02 RX ADMIN — CEFAZOLIN SODIUM 2 G: 2 INJECTION, SOLUTION INTRAVENOUS at 12:50

## 2019-01-02 RX ADMIN — FENTANYL CITRATE 200 MCG: 50 INJECTION, SOLUTION INTRAMUSCULAR; INTRAVENOUS at 04:09

## 2019-01-02 RX ADMIN — HYDROCODONE BITARTRATE AND ACETAMINOPHEN 2 TABLET: 5; 325 TABLET ORAL at 09:30

## 2019-01-02 RX ADMIN — BACLOFEN 10 MG: 10 TABLET ORAL at 05:45

## 2019-01-02 RX ADMIN — HYDRALAZINE HYDROCHLORIDE 20 MG: 20 INJECTION INTRAMUSCULAR; INTRAVENOUS at 17:27

## 2019-01-02 RX ADMIN — IPRATROPIUM BROMIDE AND ALBUTEROL SULFATE 3 ML: .5; 3 SOLUTION RESPIRATORY (INHALATION) at 11:04

## 2019-01-02 RX ADMIN — FAMOTIDINE 20 MG: 20 TABLET ORAL at 05:45

## 2019-01-02 RX ADMIN — HYDROCODONE BITARTRATE AND ACETAMINOPHEN 2 TABLET: 5; 325 TABLET ORAL at 03:05

## 2019-01-02 RX ADMIN — ENALAPRILAT 1.25 MG: 2.5 INJECTION INTRAVENOUS at 17:55

## 2019-01-02 RX ADMIN — FENTANYL CITRATE 100 MCG: 50 INJECTION, SOLUTION INTRAMUSCULAR; INTRAVENOUS at 00:19

## 2019-01-02 RX ADMIN — LIDOCAINE 1 PATCH: 50 PATCH TOPICAL at 21:46

## 2019-01-02 RX ADMIN — METOPROLOL TARTRATE 5 MG: 5 INJECTION, SOLUTION INTRAVENOUS at 02:34

## 2019-01-02 RX ADMIN — METOPROLOL TARTRATE 5 MG: 5 INJECTION, SOLUTION INTRAVENOUS at 17:28

## 2019-01-02 RX ADMIN — ENALAPRILAT 2.5 MG: 2.5 INJECTION INTRAVENOUS at 03:28

## 2019-01-02 RX ADMIN — INSULIN HUMAN 3 UNITS: 100 INJECTION, SUSPENSION SUBCUTANEOUS at 23:43

## 2019-01-02 RX ADMIN — ALPRAZOLAM 0.5 MG: 0.5 TABLET ORAL at 00:09

## 2019-01-02 RX ADMIN — FUROSEMIDE 80 MG: 10 INJECTION, SOLUTION INTRAMUSCULAR; INTRAVENOUS at 10:47

## 2019-01-02 RX ADMIN — METOPROLOL TARTRATE 5 MG: 5 INJECTION, SOLUTION INTRAVENOUS at 21:45

## 2019-01-02 RX ADMIN — FENTANYL CITRATE 100 MCG: 50 INJECTION, SOLUTION INTRAMUSCULAR; INTRAVENOUS at 05:44

## 2019-01-02 ASSESSMENT — PAIN SCALES - GENERAL
PAINLEVEL_OUTOF10: 0

## 2019-01-02 ASSESSMENT — COGNITIVE AND FUNCTIONAL STATUS - GENERAL
HELP NEEDED FOR BATHING: A LOT
CLIMB 3 TO 5 STEPS WITH RAILING: TOTAL
SUGGESTED CMS G CODE MODIFIER DAILY ACTIVITY: CL
DRESSING REGULAR LOWER BODY CLOTHING: A LOT
WALKING IN HOSPITAL ROOM: TOTAL
PERSONAL GROOMING: A LITTLE
STANDING UP FROM CHAIR USING ARMS: A LOT
TURNING FROM BACK TO SIDE WHILE IN FLAT BAD: UNABLE
MOVING FROM LYING ON BACK TO SITTING ON SIDE OF FLAT BED: UNABLE
MOVING TO AND FROM BED TO CHAIR: UNABLE
SUGGESTED CMS G CODE MODIFIER MOBILITY: CM
DAILY ACTIVITIY SCORE: 12
DRESSING REGULAR UPPER BODY CLOTHING: A LOT
TOILETING: A LOT
EATING MEALS: TOTAL
MOBILITY SCORE: 7

## 2019-01-02 ASSESSMENT — PULMONARY FUNCTION TESTS: FVC: 2

## 2019-01-02 ASSESSMENT — GAIT ASSESSMENTS: GAIT LEVEL OF ASSIST: UNABLE TO PARTICIPATE

## 2019-01-02 NOTE — PROGRESS NOTES
12 hour chart check  ST:  with occasional to rare PVC's  MS: 0.16 / 0.08 / 0.42 SBP: 120-170's    · 2 RN skin check complete with YAO Briscoe.  · Devices in place SCD's, continuous pulse ox and cardiac monitoring, BP cuff, soft wrist restraints.  · Skin assessed under devices.  · Midline prevena with dressing intact, mediastinal CT sites covered with new dressing and well approximated, Christian CT intact, R groin site soft and clean, L leg EVH sites well approximated  · The following interventions in place: pillows for support and repositioning, mepelex to sacrum, waffle cushion

## 2019-01-02 NOTE — RESPIRATORY CARE
Extubation    Cuff leak noted yes  Stridor present no       O2 (LPM): 5 lpm     Patient toleration well, no complications  RCP Complete? yes

## 2019-01-02 NOTE — HEART FAILURE PROGRAM
Cardiovascular Nurse Navigator () Advanced Heart Failure Program Inpatient Progress Note:    Late presenting NSTEMI, CABG 12/28/18, EF 25%, HFrEF. Reintubated POD1.    Volume overload, tachycardia, HTN, shock sputum cx with JACQUELINE curry.    Patient is currently too acute for quality review of medications and initiation of education and follow up.    Patient lives in [place], [place] and has Medicaid coverage.    Will follow. Order placed for hospital schedulers to follow as well.    Thank you and please call [facility] with questions M-F

## 2019-01-02 NOTE — THERAPY
"Physical Therapy Evaluation completed.   Bed Mobility:  Supine to Sit: Maximal Assist (of 2)  Transfers: Sit to Stand: Unable to Participate  Gait: Level Of Assist: Unable to Participate      Plan of Care: Will benefit from Physical Therapy 2 times per week  Discharge Recommendations: Equipment: Will Continue to Assess for Equipment Needs.     Pt presents with impaired arousal, communication, activity tolerance and HDR s/p CABG x 3. Pt is most limited by arousal, no evidence of pain throughout; following commands in all extremities after prepped/stimulated for attention to command; HR peaking at 112, BP to 174/112 at max, diastolic trending up throughout EOB and terminated session; will follow at low frequency until pt able to participate in phase I cardiac rehab; pt is quite strong considering current condition and should progress well pending medical/cardiac stability; will update dc plan when extubated.            See \"Rehab Therapy-Acute\" Patient Summary Report for complete documentation.     "

## 2019-01-02 NOTE — CARE PLAN
Problem: Post Op Day 4 CABG/Heart Valve Replacement  Goal: Optimal care of the Post Op CABG/Heart Valve replacement Post Op Day 4    Intervention: Daily Weights  Complete.   Intervention: Shower daily and clean incisions twice daily with soap and water  Patient still intubated full bed bath with CHG  Intervention: Up in chair for all meals  Patient still intubated getting edge of bed at this point in time.   Intervention: Ambulate, increasing the distance each time x 3 and before bed  Edge of bed on ventilator is most patient tolerates.   Intervention: IS q 1 hour while awake and record best IS volume  Still intubated.   Intervention: Consider removal of solitario, chest tube and pacer wire if not already done  Patient still has solitario due to being intubated, janee tube still in per APRN, Pacer wires capped.

## 2019-01-02 NOTE — PROGRESS NOTES
Pts BP has been running high up to the 180s, Inna Linton notified and will review meds and update as necessary

## 2019-01-02 NOTE — PROGRESS NOTES
Dr Queen to bedside to reassess patient's hemodynamics, poorly controlled BP further discussed, new isaac made. RN to report to cardiology if heart rate increases with lowered BP

## 2019-01-02 NOTE — CARE PLAN
Problem: Safety  Goal: Will remain free from injury    Intervention: Provide assistance with mobility  Patient x2 assist      Problem: Urinary Elimination:  Goal: Ability to reestablish a normal urinary elimination pattern will improve    Intervention: Evaluate need to continue indwelling urinary catheter  Pt sedated on vent

## 2019-01-02 NOTE — FLOWSHEET NOTE
Adult Ventilation Update    Total Vent Days: 5    Patient Lines/Drains/Airways Status    Active Airway     Name: Placement date: Placement time: Site: Days:    Airway ETT Oral 8.0 12/29/18 1605   Oral   3              In the last 24 hours, the patient tolerated SBT for 2.5 hrs on settings of 5/8.    #FVC / Vital Capacity (liters) : 1.76 (12/31/18 1615)  NIF (cm H2O) : -38 (12/31/18 1615)  Rapid Shallow Breathing Index (RR/VT): 21 (12/31/18 1615)     Static Compliance (ml / cm H2O): 36 (01/01/19 1539)    Patient failed trials because of Barriers to Wean: Other (Comments) (pt. having panic attack. increased HR to 146 RR to 35. ) (12/31/18 0716)  Barriers to SBT Weaning Trial Stopped due to:: Pt weaned for 1 hour and returned to rest settings per protocol (12/31/18 1615)  Length of Weaning Trial Length of Weaning Trial (Hours): 1 (12/31/18 1615)          (ASV) % MIN VOL: 120 (12/28/18 1537)  ASV Inspiratory Pressures        Cough: Productive (01/01/19 1539)  Sputum Amount: Small (01/01/19 1539)  Sputum Color: Clear;Tan;White (01/01/19 1539)  Sputum Consistency: Thick;Thin (01/01/19 1539)    Mobility  Level of Mobility: Level IV (12/31/18 1600)  Activity Performed: Edge of bed (01/01/19 0600)  Time Activity Tolerated: 5 min (01/01/19 0600)  Distance Per Occurrence (ft.): 0 feet (01/01/19 0600)  # of Times Distance was Traveled: 2 (01/01/19 0600)  Assistance / Tolerance: Assistance of Two or More;Tolerates Poorly;General Weakness (01/01/19 0600)  Pt Calls for Assistance: No (01/01/19 0600)  Staff Present for Mobilization: RN;CNA (01/01/19 0600)  Gait: Steady (12/28/18 1600)  Assistive Devices: Hand held assist (01/01/19 0600)  Reason Not Mobilized: Unstable condition (12/31/18 0800)  Mobilization Comments: will clarify with MD (12/31/18 0800)    Events/Summary/Plan: Pt. resting on CMV w/o distress at this time.  Will continue to SBT as pt. is able. (01/01/19 7398)

## 2019-01-02 NOTE — PROGRESS NOTES
Cardiology Follow Up Progress Note    Date of Service  1/2/2019    Attending Physician  Tomas Barrios M.D.    Chief Complaint   SVT transferred from [place] ER for troponin >4    HPI  Rikki Khalil is a 43 y.o. male w poorly controlled DMII who lives in [place] with his wife, admitted 12/26/2018 with NSTEMI/cp and SVT -- echo at admit showed EF ~25%, angio showed severe 3VD and he went for CABG x3 on 12/28 (LIMA/LAD, SVG/dx, SVG/dRCA).  -Reintubated for respiratory reasons POD1.  -Persistent tachycardia and hypertension despite ejection fraction remaining less than 30% on repeat echocardiograms.  -Aggressive treatment from intensivist team for positive sputum cultures and concern for ARDS    Interim Events  Requiring PRN meds for significant hypertension  Narcotics for sedation  Good robust diuresis    Review of Systems  Review of Systems   Unable to perform ROS: Intubated       Vital signs in last 24 hours  Temp:  [37.5 °C (99.5 °F)-37.9 °C (100.2 °F)] 37.5 °C (99.5 °F)  Pulse:  [] 97  Resp:  [0-24] 13    Physical Exam  Physical Exam   Constitutional: He appears well-developed and well-nourished. No distress.   Intubated and sedated, not orienting to commands.  Patient seen and examined again today changes noted   HENT:   Head: Normocephalic and atraumatic.   Eyes: Pupils are equal, round, and reactive to light. EOM are normal. No scleral icterus.   Neck: Neck supple. No JVD present. No thyromegaly present.   Cardiovascular: Regular rhythm and intact distal pulses.  Exam reveals no gallop and no friction rub.    Sinus tachycardia at 110   Pulmonary/Chest: Breath sounds normal. No respiratory distress.   woundvac intact   Abdominal: Soft. Bowel sounds are normal. He exhibits no distension. There is no tenderness.   Musculoskeletal: He exhibits no edema or tenderness.   Lymphadenopathy:     He has no cervical adenopathy.   Neurological: He exhibits normal muscle tone. Coordination normal.   Skin: Skin  is warm and dry. He is not diaphoretic.       Lab Review  Lab Results   Component Value Date/Time    WBC 9.6 01/02/2019 03:35 AM    RBC 3.45 (L) 01/02/2019 03:35 AM    HEMOGLOBIN 10.8 (L) 01/02/2019 03:35 AM    HEMATOCRIT 32.9 (L) 01/02/2019 03:35 AM    MCV 95.4 01/02/2019 03:35 AM    MCH 31.3 01/02/2019 03:35 AM    MCHC 32.8 (L) 01/02/2019 03:35 AM    MPV 10.5 01/02/2019 03:35 AM      Lab Results   Component Value Date/Time    SODIUM 139 01/02/2019 03:35 AM    POTASSIUM 4.0 01/02/2019 03:35 AM    CHLORIDE 97 01/02/2019 03:35 AM    CO2 34 (H) 01/02/2019 03:35 AM    GLUCOSE 199 (H) 01/02/2019 03:35 AM    BUN 20 01/02/2019 03:35 AM    CREATININE 0.52 01/02/2019 03:35 AM      Lab Results   Component Value Date/Time    ASTSGOT 122 (H) 12/31/2018 05:04 AM    ALTSGPT 158 (H) 12/31/2018 05:04 AM     Lab Results   Component Value Date/Time    CHOLSTRLTOT 79 (L) 12/27/2018 04:28 AM    LDL 42 12/27/2018 04:28 AM    HDL 22 (A) 12/27/2018 04:28 AM    TRIGLYCERIDE 78 12/31/2018 05:04 AM    TROPONINI 6.13 (H) 12/27/2018 08:02 AM             Cardiac Imaging and Procedures Review  EKG:  My personal interpretation of the EKG dated yesterday shows ectopic atrial tachycardia, unlikely atypical flutter    Echocardiogram: Repeated again January 1: EF ~25%.  No change at all from priors.  Akinesis of the anterior wall and significant right ventricular dilation dysfunction, pulmonary pressures not estimated    Cardiac Catheterization:  See HPI, as yesterday    Imaging  Chest X-Ray: Today January 2 unchanged    Assessment/Plan    -CAD/STEMI  -severe 3VD requiring urgent revasc with CABG  -respiratory failure with concerns not only for inflammatory response but also sepsis and pulmonary edema  -CHF, systolic  -No evidence of ongoing ischemia, tolerating short acting beta-blocker and ACE inhibitor    CAD  Status post aggressive revascularization in the setting of late presenting non-STEMI  Asa/plavix  Statin  Gentle beta-blockade, off  pressors more than 24 hours, continue short acting with vulnerable state    Tachycardia  EKG still reveals no evidence that would be conclusive for atypical flutter  Treat as if it is sinus, aggressive diuresis for not only pulmonary edema which is suspected but also ARDS, doubt PE as he has been on prophylaxis  Also managing for potential withdrawal, agitation/pain    CHF, systolic, now subacute  No change on repeat echo Monday  Hemodynamics concerning for volume overload as noted above, continue intravenous aggressive Lasix 80 mg to see if it improves hemodynamics  Discussed with CT surgery who agrees in the setting of ARDS, GFR still normal and will keep a close watch on it daily    Hypertension  Increasing afterload reduction in the form of enalapril and low-dose short acting metoprolol, very precarious - will discontinue if this appears to be acute heart failure     Shock  Resolving, likely multifactorial with right and left ventricular failure as well as ARDS  Gentle heart failure treatment    D/w RN at bedside, discussed also with Inna Linton of CT surgery   Unfortunately, prognosis still very guarded    Thank you for allowing me to participate in the care of this patient.  Please contact me with any questions.    Herminia Queen M.D.   Cardiologist, [facility]  [phone]

## 2019-01-02 NOTE — THERAPY
"Occupational Therapy Evaluation completed.   Functional Status: Pt is a 42 y/o male admitted with near syncope, with non sustained SVT, and elevated troponins, found to have NSTEMI. He had a cardiac cath, and then a CABG x3. He remains intubated but tolerating low levels of sedation to initiate OT eval. Pt waxing and waning with level of alertness due to sedation. He needed maxA x2 for bed mobility, attempting to initiate but then needing assistance to sequence. He appears to be quite strong but is limited by ET tube and sedation. BUE PROM is WDL, and pt assisting in lifting arms off pillows. He tolerated 5-8 min EOB, needing to return to supine due to DBP >110. RN present and aware. He is limited by fatigue, impaired balance, and impaired cog at the moment which impacts independence in ADLs and functional mobility. Will continue OHS education as patient progresses medically.  Plan of Care: Will benefit from Occupational Therapy 2 times per week  Discharge Recommendations:  Equipment: Will Continue to Assess for Equipment Needs. Recommend inpatient transitional care services for continued occupational therapy services.       See \"Rehab Therapy-Acute\" Patient Summary Report for complete documentation.    "

## 2019-01-02 NOTE — RESPIRATORY CARE
Vent Day: 6  Vent: APVCMV 14/470/+8/30%  ETT: 8.0@25    Pt rested overnight on vent without any remarkable respiratory events.

## 2019-01-02 NOTE — PROGRESS NOTES
Critical Care Progress Note    Date of admission  12/26/2018    Chief Complaint  43 y.o. male admitted 12/26/2018 with chest pain.    Hospital Course    This gentleman was admitted to the hospital with an acute non-STEMI.  He was found to have significant CAD.  He underwent CABG.      Interval Problem Update  Reviewed last 24 hour events:  S/P CABG day 5  Vemt isatu 5  Sputum staph culture MSSA sensitive  Follows simple commands  Received metop IV pushes last night  On enteral metop now  ARDS persists  Diresis  New echo: EF 20%, high right sided pressures    Prior 24 hours  ARDS  On vent  Propofol plus fent  Propofol d/c, on fent alone  Vent day 4  CAB 12/26  Casper infusion at 50 for shock      Reassessment:  Casper still at 100.  Fent 50.  Vent 3.  PEEP 8.  SBT.  50%.  Stop Lasix and K.  Give 25 grams of albumin    Reassessment:  Requires Casper at 100.  NG output has decreased.  Abdomen is soft.    Reassessment:  Urine output has improved.  Remains on Casper at 100.    Reassessment:  Agitated with decrease in propofol.  Will increase fentanyl drip.    Reassessment:  Sputum culture with significant Staphylococcus aureus.  Sensitivities pending.  Check pro-calcitonin.  Start empiric vancomycin.      Review of Systems  Review of Systems   Unable to perform ROS: Intubated        Vital Signs for last 24 hours   Pulse:  [107-140] 107  Resp:  [0-18] 12    Hemodynamic parameters for last 24 hours  CVP:  [4 MM HG-214 MM HG] 31 MM HG    Respiratory  [place] Vent Mode: APVCMV  Rate (breaths/min): 14  Vt Target (mL): 470  PEEP/CPAP: 8  FiO2: 30  P Support: 5  P MEAN: 10  Control VTE (exp VT): 428    Physical Exam   Physical Exam   Constitutional: He appears well-developed and well-nourished.   On ventilator   HENT:   Head: Normocephalic.   Right Ear: External ear normal.   Left Ear: External ear normal.   Mouth/Throat: No oropharyngeal exudate.   Eyes: Pupils are equal, round, and reactive to light. Right eye exhibits no discharge. Left  eye exhibits no discharge. No scleral icterus.   Neck: Normal range of motion. Neck supple. No JVD present. No tracheal deviation present.   Cardiovascular: Intact distal pulses.  Exam reveals no gallop.    Sinus tachycardia   Pulmonary/Chest: He has no wheezes. He has rales (Coarse and fine crackles bilaterally).   On vent   Abdominal: Soft. Bowel sounds are normal. He exhibits no distension. There is no tenderness. There is no guarding.   Gastric tube to suction   Musculoskeletal: He exhibits no tenderness or deformity.   No clubbing or cyanosis   Neurological: He is alert.   Sedated to Rass minus 2   Skin: Skin is warm and dry. No rash noted. He is not diaphoretic.   Nursing note and vitals reviewed.      Medications  Current Facility-Administered Medications   Medication Dose Route Frequency Provider Last Rate Last Dose   • enalaprilat (VASOTEC) injection 2.5 mg  2.5 mg Intravenous Q6HRS PRN Herminia Queen M.D.       • Metoprolol Tartrate (LOPRESSOR) injection 5 mg  5 mg Intravenous Q4HRS Herminia Queen M.D.   5 mg at 01/01/19 1731   • [START ON 1/2/2019] lisinopril (PRINIVIL) 10 MG tablet 10 mg  10 mg Feeding Tube Q DAY Herminia Queen M.D.       • fentaNYL (SUBLIMAZE) injection 200 mcg  200 mcg Intravenous Q HOUR PRN Rikki Cross M.D.       • fentaNYL (SUBLIMAZE) 50 mcg/mL in 50mL   Intravenous Continuous Rikki Cross M.D. 5 mL/hr at 01/01/19 1909 250 mcg/hr at 01/01/19 1909   • ceFAZolin in dextrose (ANCEF) IVPB premix 2 g  2 g Intravenous Q8HRS Rikki Cross M.D.   Stopped at 01/01/19 1659   • tramadol (ULTRAM) 50 MG tablet 50 mg  50 mg Feeding Tube Q4HRS PRN Tomas Barrios M.D.       • insulin lispro (HUMALOG) injection 1 Units  1 Units Subcutaneous PRN Tomas Barrios M.D.       • insulin NPH (HUMULIN,NOVOLIN) injection 3 Units  3 Units Subcutaneous Q8HRS Tomas Barrios M.D.   3 Units at 01/01/19 1403   • Respiratory Care per Protocol   Nebulization Continuous RT Hieu Tapia  Basilio Mathew M.D.       • famotidine (PEPCID) tablet 20 mg  20 mg Feeding Tube Q12HRS Hieu Vallejo M.D.   20 mg at 01/01/19 1800   • senna-docusate (PERICOLACE or SENOKOT S) 8.6-50 MG per tablet 2 Tab  2 Tab Feeding Tube BID Hieu Vallejo M.D.   2 Tab at 01/01/19 1733    And   • polyethylene glycol/lytes (MIRALAX) PACKET 1 Packet  1 Packet Feeding Tube QDAY PRN Hieu Vallejo M.D.   1 Packet at 01/01/19 1248    And   • magnesium hydroxide (MILK OF MAGNESIA) suspension 30 mL  30 mL Feeding Tube QDAY PRN Hieu Vallejo M.D.        And   • bisacodyl (DULCOLAX) suppository 10 mg  10 mg Rectal QDAY PRN Hieu Vallejo M.D.       • fentaNYL (SUBLIMAZE) injection 25 mcg  25 mcg Intravenous Q HOUR PRN Hieu Vallejo M.D.        Or   • fentaNYL (SUBLIMAZE) injection 50 mcg  50 mcg Intravenous Q HOUR PRN Hieu Vallejo M.D.   50 mcg at 12/31/18 0724    Or   • fentaNYL (SUBLIMAZE) injection 100 mcg  100 mcg Intravenous Q HOUR PRN Hieu Vallejo M.D.   100 mcg at 01/01/19 0738   • ipratropium-albuterol (DUONEB) nebulizer solution  3 mL Nebulization Q2HRS PRN (RT) Hieu Vallejo M.D.       • ipratropium-albuterol (DUONEB) nebulizer solution  3 mL Nebulization Q4HRS (RT) Hieu Vallejo M.D.   3 mL at 01/01/19 1842   • Pharmacy Consult: Enteral tube feeding - review meds/change route/product selection   Other PRN Hieu Vallejo M.D.       • acetaminophen (TYLENOL) tablet 650 mg  650 mg Feeding Tube Q4HRS PRN Hieu Vallejo M.D.        Or   • acetaminophen (TYLENOL) suppository 650 mg  650 mg Rectal Q4HRS PRN Hieu Vallejo M.D.       • ALPRAZolam (XANAX) tablet 0.5 mg  0.5 mg Feeding Tube 4X/DAY PRN Hieu Vallejo M.D.   0.5 mg at 01/01/19 1629   • aspirin (ASA) chewable tab 81 mg  81 mg Per NG Tube DAILY Hieu Vallejo M.D.   81 mg at 01/01/19 0549   • atorvastatin (LIPITOR) tablet 80 mg  80 mg Per NG Tube  DAILY Hieu Vallejo M.D.   80 mg at 01/01/19 0549   • baclofen (LIORESAL) tablet 10 mg  10 mg Per NG Tube TID Hieu Vallejo M.D.   10 mg at 01/01/19 1731   • clopidogrel (PLAVIX) tablet 75 mg  75 mg Feeding Tube DAILY Hieu Vallejo M.D.   75 mg at 01/01/19 0549   • diphenhydrAMINE (BENADRYL) tablet/capsule 25 mg  25 mg Feeding Tube HS PRN - MR X 1 Hieu Vallejo M.D.       • HYDROcodone-acetaminophen (NORCO) 5-325 MG per tablet 1-2 Tab  1-2 Tab Feeding Tube Q6HRS PRN Hieu Vallejo M.D.   2 Tab at 01/01/19 1923   • mag hydrox-al hydrox-simeth (MAALOX PLUS ES or MYLANTA DS) suspension 30 mL  30 mL Per NG Tube Q4HRS PRN Hieu Vallejo M.D.       • oxyCODONE immediate-release (ROXICODONE) tablet 5 mg  5 mg Feeding Tube Q3HRS PRN Hieu Vallejo M.D.   5 mg at 01/01/19 0417   • insulin lispro (HUMALOG) injection 0-10 Units  0-10 Units Subcutaneous Q6HRS DIRK MongeP.N.   4 Units at 01/01/19 1748   • phenylephrine (RAFIA-SYNEPHRINE) 40,000 mcg in  mL Infusion  0-100 mcg/min Intravenous Continuous Nelly Zazueta A.P.N.   Stopped at 12/31/18 1353   • NS infusion   Intravenous Continuous Tomas Barrios M.D.   Stopped at 12/29/18 1000   • Pharmacy Consult Request ...Pain Management Review 1 Each  1 Each Other PRN Tomas Barrios M.D.       • electrolyte-A (PLASMALYTE-A) infusion   Intravenous PRN Tomas Barrios M.D.   1,000 mL at 12/28/18 1345   • enoxaparin (LOVENOX) inj 40 mg  40 mg Subcutaneous QDAY Tomas Barrios M.D.   40 mg at 01/01/19 1800   • mupirocin (BACTROBAN) 2 % ointment 1 Application  1 Application Topical BID Tomas Barrios M.D.   1 Application at 01/01/19 1732   • sodium bicarbonate 8.4 % injection 50 mEq  50 mEq Intravenous Q HOUR PRN Tomas Barrios M.D.   50 mEq at 12/28/18 1327   • ondansetron (ZOFRAN) syringe/vial injection 4 mg  4 mg Intravenous Q6HRS PRN Tomas Barrios M.D.   4 mg at 12/29/18 1831    Or   • prochlorperazine  (COMPAZINE) injection 10 mg  10 mg Intravenous Q6HRS PRN Tomas Barrios M.D.   10 mg at 12/29/18 0004    Or   • promethazine (PHENERGAN) suppository 25 mg  25 mg Rectal Q6HRS PRN Tomas Barrios M.D.       • lidocaine (LIDODERM) 5 % 1 Patch  1 Patch Transdermal Q24HR DIRK AlejoPKJ   1 Patch at 12/31/18 2136       Fluids    Intake/Output Summary (Last 24 hours) at 01/01/19 2005  Last data filed at 01/01/19 1800   Gross per 24 hour   Intake             1597 ml   Output             3448 ml   Net            -1851 ml       Laboratory  Recent Labs      12/30/18 0418 12/31/18   0431  01/01/19   0446   ISTATAPH  7.377*  7.363*  7.371*   ISTATAPCO2  37.7*  42.9*  45.3*   ISTATAPO2  84  75  74   ISTATATCO2  23  26  28   NTLWUKP2GCL  96  94  94   ISTATARTHCO3  22.2  24.4  26.3*   ISTATARTBE  -3  -1  1   ISTATTEMP  38.1 C  37.2 C  98.4 F   ISTATFIO2  60  40  30   ISTATSPEC  Arterial  Arterial  Arterial   ISTATAPHTC  7.361*  7.360*  7.373*   OAOROPCI7BR  90*  76  74         Recent Labs      12/30/18 0417 12/31/18   0504  01/01/19   0600   SODIUM  130*  133*  137   POTASSIUM  4.7  4.1  3.8   CHLORIDE  100  103  99   CO2  23  24  29   BUN  19  16  15   CREATININE  0.71  0.52  0.48*   MAGNESIUM  2.0  1.8  1.6   PHOSPHORUS  3.6  2.6  2.8   CALCIUM  8.5  8.5  8.9     Recent Labs      12/30/18 0417 12/31/18   0504  01/01/19   0600   ALTSGPT   --   158*   --    ASTSGOT   --   122*   --    ALKPHOSPHAT   --   71   --    TBILIRUBIN   --   0.8   --    PREALBUMIN  8.0*   --    --    GLUCOSE  128*  121*  159*     Recent Labs      12/30/18 0417 12/31/18   0504  01/01/19   0600   WBC  15.7*  11.5*  10.7   ASTSGOT   --   122*   --    ALTSGPT   --   158*   --    ALKPHOSPHAT   --   71   --    TBILIRUBIN   --   0.8   --      Recent Labs      12/30/18   0417  12/31/18   0504  01/01/19   0600   RBC  3.37*  2.94*  3.28*   HEMOGLOBIN  10.8*  9.3*  10.4*   HEMATOCRIT  32.0*  28.1*  31.1*   PLATELETCT  151*  150*  187        Imaging  X-Ray:  I have personally reviewed the images and compared with prior images. and My impression is: Diffuse right and left lower lobe opacities    Assessment/Plan  * NSTEMI (non-ST elevated myocardial infarction) (HCC)- (present on admission)   Assessment & Plan    Continue aspirin, statin and Plavix     Acute hypoxemic respiratory failure (HCC)   Assessment & Plan    Reintubated on 12/29  Continue full vent support  All appropriate ventilator bundles are in place  ARDS net vent strategy     Ventilator dependent (HCC)   Assessment & Plan    ARDS, may take many days to several weeks. Daily SBTs. If does not progress in next 10 days consider trach. ARDS net vent strategy     S/P CABG x 3   Assessment & Plan    On 12/28 by Dr. Barrios  Continue aspirin, statin and Plavix  Complicated post-op course with ARDS and ongoing mech vent/ pneumonia     Acute systolic congestive heart failure (HCC)   Assessment & Plan    EF 25%  Intra-aortic balloon pump removed on 12/29     Pneumonia   Assessment & Plan    Query pneumonia  Check pro-calcitonin  Significant Staphylococcus aureus growing from sputum  MSSA- may stop vanco. On Ancef alone now (7 day course)     Type 2 diabetes mellitus without complication (HCC)- (present on admission)   Assessment & Plan    Poorly controlled prior to admission with glycohemoglobin of 12  Continue sliding scale insulin- goal sugar > 80< 180     SVT (supraventricular tachycardia) (HCC)- (present on admission)   Assessment & Plan    History of     Sepsis ([facility])- (present on admission)   Assessment & Plan    Severe sepsis secondary to staph/strep pneumonia that is bilateral and causing ARDS and acute hypoxic respiratory failure          VTE:  Lovenox  Ulcer: H2 Antagonist  Lines: Central Line  Ongoing indication addressed, Arterial Line  Ongoing indication addressed and Thomas Catheter  Ongoing indication addressed    I have performed a physical exam and reviewed and updated ROS and Plan  today (1/1/2019). In review of yesterday's note (12/31/2018), there are no changes except as documented above.     I have assessed and reassessed his respiratory status with ventilator adjustments, ventilator waveforms, airway mechanics, blood pressure, hemodynamics, cardiovascular status with titration of phenylephrine and neurologic status with titration of propofol.  He is at increased risk for worsening respiratory and cardiovascular system dysfunction.    Discussed patient condition and risk of morbidity and/or mortality with Family, RN, RT, Pharmacy, Charge nurse / hot rounds, QA team and [facility]     The patient remains critically ill.  Critical care time = 40  minutes in directly providing and coordinating critical care and extensive data review.  No time overlap and excludes procedures.

## 2019-01-03 ENCOUNTER — APPOINTMENT (OUTPATIENT)
Dept: RADIOLOGY | Facility: MEDICAL CENTER | Age: 44
DRG: 233 | End: 2019-01-03
Attending: INTERNAL MEDICINE
Payer: MEDICAID

## 2019-01-03 LAB
ANION GAP SERPL CALC-SCNC: 10 MMOL/L (ref 0–11.9)
BUN SERPL-MCNC: 24 MG/DL (ref 8–22)
CALCIUM SERPL-MCNC: 9.6 MG/DL (ref 8.5–10.5)
CHLORIDE SERPL-SCNC: 94 MMOL/L (ref 96–112)
CO2 SERPL-SCNC: 32 MMOL/L (ref 20–33)
CREAT SERPL-MCNC: 0.53 MG/DL (ref 0.5–1.4)
ERYTHROCYTE [DISTWIDTH] IN BLOOD BY AUTOMATED COUNT: 43.7 FL (ref 35.9–50)
GLUCOSE BLD-MCNC: 285 MG/DL (ref 65–99)
GLUCOSE BLD-MCNC: 290 MG/DL (ref 65–99)
GLUCOSE BLD-MCNC: 323 MG/DL (ref 65–99)
GLUCOSE BLD-MCNC: 325 MG/DL (ref 65–99)
GLUCOSE BLD-MCNC: 336 MG/DL (ref 65–99)
GLUCOSE SERPL-MCNC: 342 MG/DL (ref 65–99)
HCT VFR BLD AUTO: 40.1 % (ref 42–52)
HGB BLD-MCNC: 13.1 G/DL (ref 14–18)
MAGNESIUM SERPL-MCNC: 1.8 MG/DL (ref 1.5–2.5)
MCH RBC QN AUTO: 31 PG (ref 27–33)
MCHC RBC AUTO-ENTMCNC: 33.2 G/DL (ref 33.7–35.3)
MCV RBC AUTO: 93.4 FL (ref 81.4–97.8)
PHOSPHATE SERPL-MCNC: 3 MG/DL (ref 2.5–4.5)
PLATELET # BLD AUTO: 352 K/UL (ref 164–446)
PMV BLD AUTO: 10.5 FL (ref 9–12.9)
POTASSIUM SERPL-SCNC: 3.5 MMOL/L (ref 3.6–5.5)
RBC # BLD AUTO: 4.23 M/UL (ref 4.7–6.1)
SODIUM SERPL-SCNC: 136 MMOL/L (ref 135–145)
WBC # BLD AUTO: 14.2 K/UL (ref 4.8–10.8)

## 2019-01-03 PROCEDURE — 84100 ASSAY OF PHOSPHORUS: CPT

## 2019-01-03 PROCEDURE — 99233 SBSQ HOSP IP/OBS HIGH 50: CPT | Performed by: INTERNAL MEDICINE

## 2019-01-03 PROCEDURE — 71045 X-RAY EXAM CHEST 1 VIEW: CPT

## 2019-01-03 PROCEDURE — 700102 HCHG RX REV CODE 250 W/ 637 OVERRIDE(OP): Performed by: INTERNAL MEDICINE

## 2019-01-03 PROCEDURE — 80048 BASIC METABOLIC PNL TOTAL CA: CPT

## 2019-01-03 PROCEDURE — A9270 NON-COVERED ITEM OR SERVICE: HCPCS | Performed by: INTERNAL MEDICINE

## 2019-01-03 PROCEDURE — 770022 HCHG ROOM/CARE - ICU (200)

## 2019-01-03 PROCEDURE — 92610 EVALUATE SWALLOWING FUNCTION: CPT

## 2019-01-03 PROCEDURE — 700111 HCHG RX REV CODE 636 W/ 250 OVERRIDE (IP): Performed by: INTERNAL MEDICINE

## 2019-01-03 PROCEDURE — 85027 COMPLETE CBC AUTOMATED: CPT

## 2019-01-03 PROCEDURE — 83735 ASSAY OF MAGNESIUM: CPT

## 2019-01-03 PROCEDURE — 94667 MNPJ CHEST WALL 1ST: CPT

## 2019-01-03 PROCEDURE — 82962 GLUCOSE BLOOD TEST: CPT | Mod: 91

## 2019-01-03 PROCEDURE — 700101 HCHG RX REV CODE 250: Performed by: CLINICAL NURSE SPECIALIST

## 2019-01-03 PROCEDURE — 94668 MNPJ CHEST WALL SBSQ: CPT

## 2019-01-03 PROCEDURE — 700101 HCHG RX REV CODE 250: Performed by: INTERNAL MEDICINE

## 2019-01-03 PROCEDURE — 700111 HCHG RX REV CODE 636 W/ 250 OVERRIDE (IP): Performed by: THORACIC SURGERY (CARDIOTHORACIC VASCULAR SURGERY)

## 2019-01-03 RX ORDER — DIGOXIN 0.25 MG/ML
250 INJECTION INTRAMUSCULAR; INTRAVENOUS DAILY
Status: COMPLETED | OUTPATIENT
Start: 2019-01-03 | End: 2019-01-04

## 2019-01-03 RX ORDER — MAGNESIUM SULFATE HEPTAHYDRATE 40 MG/ML
2 INJECTION, SOLUTION INTRAVENOUS ONCE
Status: COMPLETED | OUTPATIENT
Start: 2019-01-03 | End: 2019-01-03

## 2019-01-03 RX ORDER — IPRATROPIUM BROMIDE AND ALBUTEROL SULFATE 2.5; .5 MG/3ML; MG/3ML
3 SOLUTION RESPIRATORY (INHALATION)
Status: DISCONTINUED | OUTPATIENT
Start: 2019-01-03 | End: 2019-01-10 | Stop reason: HOSPADM

## 2019-01-03 RX ORDER — POTASSIUM CHLORIDE 14.9 MG/ML
20 INJECTION INTRAVENOUS ONCE
Status: COMPLETED | OUTPATIENT
Start: 2019-01-03 | End: 2019-01-03

## 2019-01-03 RX ORDER — FUROSEMIDE 10 MG/ML
80 INJECTION INTRAMUSCULAR; INTRAVENOUS
Status: DISCONTINUED | OUTPATIENT
Start: 2019-01-04 | End: 2019-01-04

## 2019-01-03 RX ORDER — METOPROLOL TARTRATE 1 MG/ML
5 INJECTION, SOLUTION INTRAVENOUS EVERY 6 HOURS
Status: DISCONTINUED | OUTPATIENT
Start: 2019-01-03 | End: 2019-01-05

## 2019-01-03 RX ADMIN — INSULIN HUMAN 3 UNITS: 100 INJECTION, SUSPENSION SUBCUTANEOUS at 14:06

## 2019-01-03 RX ADMIN — METOPROLOL TARTRATE 5 MG: 5 INJECTION, SOLUTION INTRAVENOUS at 05:59

## 2019-01-03 RX ADMIN — ENALAPRILAT 1.25 MG: 2.5 INJECTION INTRAVENOUS at 10:11

## 2019-01-03 RX ADMIN — INSULIN HUMAN 3 UNITS: 100 INJECTION, SUSPENSION SUBCUTANEOUS at 06:00

## 2019-01-03 RX ADMIN — CEFAZOLIN SODIUM 2 G: 2 INJECTION, SOLUTION INTRAVENOUS at 06:02

## 2019-01-03 RX ADMIN — METOPROLOL TARTRATE 5 MG: 5 INJECTION, SOLUTION INTRAVENOUS at 14:09

## 2019-01-03 RX ADMIN — ENALAPRILAT 1.25 MG: 2.5 INJECTION INTRAVENOUS at 21:15

## 2019-01-03 RX ADMIN — METOPROLOL TARTRATE 5 MG: 5 INJECTION, SOLUTION INTRAVENOUS at 18:28

## 2019-01-03 RX ADMIN — ENOXAPARIN SODIUM 40 MG: 100 INJECTION SUBCUTANEOUS at 17:30

## 2019-01-03 RX ADMIN — CEFAZOLIN SODIUM 2 G: 2 INJECTION, SOLUTION INTRAVENOUS at 21:15

## 2019-01-03 RX ADMIN — STANDARDIZED SENNA CONCENTRATE AND DOCUSATE SODIUM 2 TABLET: 8.6; 5 TABLET, FILM COATED ORAL at 17:25

## 2019-01-03 RX ADMIN — METOPROLOL TARTRATE 5 MG: 5 INJECTION, SOLUTION INTRAVENOUS at 02:00

## 2019-01-03 RX ADMIN — LIDOCAINE 1 PATCH: 50 PATCH TOPICAL at 21:14

## 2019-01-03 RX ADMIN — CEFAZOLIN SODIUM 2 G: 2 INJECTION, SOLUTION INTRAVENOUS at 14:16

## 2019-01-03 RX ADMIN — HYDRALAZINE HYDROCHLORIDE 20 MG: 20 INJECTION INTRAMUSCULAR; INTRAVENOUS at 05:59

## 2019-01-03 RX ADMIN — POTASSIUM CHLORIDE 20 MEQ: 14.9 INJECTION, SOLUTION INTRAVENOUS at 10:13

## 2019-01-03 RX ADMIN — MAGNESIUM SULFATE IN WATER 2 G: 40 INJECTION, SOLUTION INTRAVENOUS at 10:19

## 2019-01-03 RX ADMIN — ENALAPRILAT 1.25 MG: 2.5 INJECTION INTRAVENOUS at 15:38

## 2019-01-03 RX ADMIN — METOPROLOL TARTRATE 5 MG: 5 INJECTION, SOLUTION INTRAVENOUS at 10:57

## 2019-01-03 RX ADMIN — FUROSEMIDE 80 MG: 10 INJECTION, SOLUTION INTRAMUSCULAR; INTRAVENOUS at 06:02

## 2019-01-03 RX ADMIN — DIGOXIN 250 MCG: 0.25 INJECTION INTRAMUSCULAR; INTRAVENOUS at 08:55

## 2019-01-03 RX ADMIN — METOPROLOL TARTRATE 25 MG: 25 TABLET, FILM COATED ORAL at 17:25

## 2019-01-03 ASSESSMENT — ENCOUNTER SYMPTOMS
NEUROLOGICAL NEGATIVE: 1
GASTROINTESTINAL NEGATIVE: 1
MUSCULOSKELETAL NEGATIVE: 1
EYES NEGATIVE: 1
CARDIOVASCULAR NEGATIVE: 1

## 2019-01-03 ASSESSMENT — PAIN SCALES - GENERAL
PAINLEVEL_OUTOF10: 0

## 2019-01-03 NOTE — PROGRESS NOTES
12hr chart check.  2 RN skin assessment completed.    Monitor Summary:  ST w/ frequent PVCs, occasional bigem/trigem  110-130s  .18/.08/.26

## 2019-01-03 NOTE — THERAPY
"Speech Language Therapy Clinical Swallow Evaluation completed.  Functional Status: Clinical swallow evaluation completed today. Pt initially A&Ox3 (not to time), slow process and respond, becoming more fatigued and lethargic throughout the evaluation. Oral mech exam limited d/t increasing lethargy and increased response time, though dentition is poor, decayed with many missing teeth and vocal quality is hoarse. Pt able to protrude tongue and move orofacial muscles appropriately. PO trials presented include ice chips and NTL via tsp. Oral prep time severely prolonged with ice chips with pt needing max multimodal cues to initiate A-P transit, though WFL for NTL (citrus flavored). Pharyngeal response time mod delayed with ice chips and mildly delayed with NTL. Hyolaryngeal excursion palpated as weak, incomplete. Pt demo'd reflexive swallow for 1/3 ice chips, despite max multimodal cues to initiate swallow. Pt became more alert and awake when presented with citrus flavored juice, stating \"mmm\" and \"yummy\" with eyes opening wider. Multiple swallows appreciated for tsp size sips of NTL, concerning for possible pharyngeal residue. No overt s/sx of penetration/aspiration noted with limited PO trials. Pt declined further PO at this point and fell asleep, unable to be roused for further participation. Pt is deemed at high risk of aspiration given lethargy, decreased responsiveness, overall weakness and compromised respiratory status.  Recommend NPO with Cortrak for alternative means of nutrition/hydration given aforementioned barriers to safe PO intake at this point. Recommend prefeeding trials with SLP, oral care 4x/day. Thank you for this consult.     Recommendations - Diet: NPO, Pre-Feeding Trials with SLP Only                          Strategies: Head of Bed at 90 Degrees                          Medication Administration: Medication Administration : Via Gastric Tube  Plan of Care: Will benefit from Speech Therapy 3 times " "per week  Post-Acute Therapy: Recommend inpatient transitional care services for continued speech therapy services.        See \"Rehab Therapy-Acute\" Patient Summary Report for complete documentation.   "

## 2019-01-03 NOTE — PROGRESS NOTES
Critical Care Progress Note    Date of admission  12/26/2018    Chief Complaint  43 y.o. male admitted 12/26/2018 with chest pain.    Hospital Course    This gentleman was admitted to the hospital with an acute non-STEMI.  He was found to have significant CAD.  He underwent CABG.      Interval Problem Update  Reviewed last 24 hour events:  Diureses with lasix (1-2 liters negative last 24)  Passed SBT well  Undergoing trial of extubaton now  tmx 37.8  BP 120s systolic  Awake, alert, appropriate  Fent infusion stopped  Swallow eval pending  OOB to chair/walking pending  Thomas and three lumen cath in place  CXR slow improvement    Prior 24 hours  S/P CABG day 5  Vemt isatu 5  Sputum staph culture MSSA sensitive  Follows simple commands  Received metop IV pushes last night  On enteral metop now  ARDS persists  Diresis  New echo: EF 20%, high right sided pressures                Review of Systems  Review of Systems   Unable to perform ROS: Intubated        Vital Signs for last 24 hours   Temp:  [37.5 °C (99.5 °F)-37.9 °C (100.2 °F)] 37.5 °C (99.5 °F)  Pulse:  [] 111  Resp:  [0-24] 6  BP: (160)/(112) 160/112    Hemodynamic parameters for last 24 hours  CVP:  [1 MM HG-96 MM HG] 6 MM HG    Respiratory  [place] Vent Mode: Spont  Rate (breaths/min): 14  Vt Target (mL): 470  PEEP/CPAP: 8  FiO2: 30  P Support: 5  P MEAN: 10  Length of Weaning Trial (Hours): 1  Control VTE (exp VT): 8    Physical Exam   Physical Exam   Constitutional: He appears well-developed and well-nourished.   On ventilator   HENT:   Head: Normocephalic.   Right Ear: External ear normal.   Left Ear: External ear normal.   Mouth/Throat: No oropharyngeal exudate.   Eyes: Pupils are equal, round, and reactive to light. Right eye exhibits no discharge. Left eye exhibits no discharge. No scleral icterus.   Neck: Normal range of motion. Neck supple. No JVD present. No tracheal deviation present.   Cardiovascular: Intact distal pulses.  Exam reveals no  gallop.    Sinus tachycardia   Pulmonary/Chest: He has no wheezes. He has rales (Coarse and fine crackles bilaterally).   On vent   Abdominal: Soft. Bowel sounds are normal. He exhibits no distension. There is no tenderness. There is no guarding.   Gastric tube to suction   Musculoskeletal: He exhibits no tenderness or deformity.   No clubbing or cyanosis   Neurological: He is alert.   Sedated to Rass minus 2   Skin: Skin is warm and dry. No rash noted. He is not diaphoretic.   Nursing note and vitals reviewed.      Medications  Current Facility-Administered Medications   Medication Dose Route Frequency Provider Last Rate Last Dose   • [START ON 1/3/2019] lisinopril (PRINIVIL) tablet 20 mg  20 mg Feeding Tube Q DAY DIRK AlejoPDeeptiN.       • furosemide (LASIX) injection 80 mg  80 mg Intravenous BID DIURETIC Herminia Queen M.D.   80 mg at 01/02/19 1047   • SODIUM CHLORIDE 0.9 % IV SOLN            • SODIUM CHLORIDE 0.9 % IV SOLN            • enalaprilat (VASOTEC) injection 1.25 mg  1.25 mg Intravenous Q6HRS Herminia Queen M.D.       • hydrALAZINE (APRESOLINE) injection 20 mg  20 mg Intravenous Q6HRS Herminia Queen M.D.       • enalaprilat (VASOTEC) injection 2.5 mg  2.5 mg Intravenous Q6HRS PRN Herminia Queen M.D.   2.5 mg at 01/02/19 1105   • Metoprolol Tartrate (LOPRESSOR) injection 5 mg  5 mg Intravenous Q4HRS Herminia Queen M.D.   5 mg at 01/02/19 1500   • ceFAZolin in dextrose (ANCEF) IVPB premix 2 g  2 g Intravenous Q8HRS Rikki Cross M.D.   Stopped at 01/02/19 1320   • tramadol (ULTRAM) 50 MG tablet 50 mg  50 mg Feeding Tube Q4HRS PRN Tomas Barrios M.D.   50 mg at 01/02/19 1053   • insulin lispro (HUMALOG) injection 1 Units  1 Units Subcutaneous PRN Tomas Barrios M.D.       • insulin NPH (HUMULIN,NOVOLIN) injection 3 Units  3 Units Subcutaneous Q8HRS Tomas Barrios M.D.   3 Units at 01/02/19 1240   • Respiratory Care per Protocol   Nebulization Continuous RT Hieu Mccullough  DUY Mathew       • famotidine (PEPCID) tablet 20 mg  20 mg Feeding Tube Q12HRS Hieu Vallejo M.D.   20 mg at 01/02/19 0545   • senna-docusate (PERICOLACE or SENOKOT S) 8.6-50 MG per tablet 2 Tab  2 Tab Feeding Tube BID Hieu Vallejo M.D.   2 Tab at 01/02/19 0545    And   • polyethylene glycol/lytes (MIRALAX) PACKET 1 Packet  1 Packet Feeding Tube QDAY PRN Hieu Vallejo M.D.   1 Packet at 01/02/19 1054    And   • magnesium hydroxide (MILK OF MAGNESIA) suspension 30 mL  30 mL Feeding Tube QDAY PRN Hieu Vallejo M.D.        And   • bisacodyl (DULCOLAX) suppository 10 mg  10 mg Rectal QDAY PRN Hieu Vallejo M.D.       • ipratropium-albuterol (DUONEB) nebulizer solution  3 mL Nebulization Q2HRS PRN (RT) Hieu Vallejo M.D.       • ipratropium-albuterol (DUONEB) nebulizer solution  3 mL Nebulization Q4HRS (RT) Hieu Vallejo M.D.   3 mL at 01/02/19 1104   • Pharmacy Consult: Enteral tube feeding - review meds/change route/product selection   Other PRN Hieu Vallejo M.D.       • acetaminophen (TYLENOL) tablet 650 mg  650 mg Feeding Tube Q4HRS PRN Hieu Vallejo M.D.        Or   • acetaminophen (TYLENOL) suppository 650 mg  650 mg Rectal Q4HRS PRN Hieu Vallejo M.D.       • ALPRAZolam (XANAX) tablet 0.5 mg  0.5 mg Feeding Tube 4X/DAY PRN Hieu Vallejo M.D.   0.5 mg at 01/02/19 0009   • aspirin (ASA) chewable tab 81 mg  81 mg Per NG Tube DAILY Hieu Vallejo M.D.   81 mg at 01/02/19 0545   • atorvastatin (LIPITOR) tablet 80 mg  80 mg Per NG Tube DAILY Hieu Vallejo M.D.   80 mg at 01/02/19 0545   • baclofen (LIORESAL) tablet 10 mg  10 mg Per NG Tube TID Hieu Vallejo M.D.   10 mg at 01/02/19 1047   • clopidogrel (PLAVIX) tablet 75 mg  75 mg Feeding Tube DAILY Hieu Vallejo M.D.   75 mg at 01/02/19 0545   • diphenhydrAMINE (BENADRYL) tablet/capsule 25 mg  25 mg Feeding Tube HS PRN - MR X 1 Hieu  KARLY Vallejo M.D.       • HYDROcodone-acetaminophen (NORCO) 5-325 MG per tablet 1-2 Tab  1-2 Tab Feeding Tube Q6HRS PRN Hieu Vallejo M.D.   2 Tab at 01/02/19 0930   • mag hydrox-al hydrox-simeth (MAALOX PLUS ES or MYLANTA DS) suspension 30 mL  30 mL Per NG Tube Q4HRS PRN Hieu Vallejo M.D.       • oxyCODONE immediate-release (ROXICODONE) tablet 5 mg  5 mg Feeding Tube Q3HRS PRN Hieu Vallejo M.D.   5 mg at 01/01/19 0417   • insulin lispro (HUMALOG) injection 0-10 Units  0-10 Units Subcutaneous Q6HRS DIRK MongeP.NDeepti   6 Units at 01/02/19 1241   • phenylephrine (RAIFA-SYNEPHRINE) 40,000 mcg in  mL Infusion  0-100 mcg/min Intravenous Continuous NAKUL Monge.P.N.   Stopped at 12/31/18 1353   • NS infusion   Intravenous Continuous Tomas Barrios M.D.   Stopped at 12/29/18 1000   • Pharmacy Consult Request ...Pain Management Review 1 Each  1 Each Other PRN Tomas Barrios M.D.       • electrolyte-A (PLASMALYTE-A) infusion   Intravenous PRN Tomas Barrios M.D.   1,000 mL at 12/28/18 1345   • enoxaparin (LOVENOX) inj 40 mg  40 mg Subcutaneous QDAY Tomas Barrios M.D.   40 mg at 01/01/19 1800   • sodium bicarbonate 8.4 % injection 50 mEq  50 mEq Intravenous Q HOUR PRN Tomas Barrios M.D.   50 mEq at 12/28/18 1327   • ondansetron (ZOFRAN) syringe/vial injection 4 mg  4 mg Intravenous Q6HRS PRN Tomas Barrios M.D.   4 mg at 12/29/18 1831    Or   • prochlorperazine (COMPAZINE) injection 10 mg  10 mg Intravenous Q6HRS PRN Tomas Barrios M.D.   10 mg at 12/29/18 0004    Or   • promethazine (PHENERGAN) suppository 25 mg  25 mg Rectal Q6HRS PRN Tomas Barrios M.D.       • lidocaine (LIDODERM) 5 % 1 Patch  1 Patch Transdermal Q24HR MARYJANE AlejoN.   1 Patch at 01/01/19 2007       Fluids    Intake/Output Summary (Last 24 hours) at 01/02/19 1715  Last data filed at 01/02/19 1400   Gross per 24 hour   Intake             1161 ml   Output             3710 ml   Net             -2549 ml       Laboratory  Recent Labs      12/31/18   0431  01/01/19   0446  01/02/19   0432   ISTATAPH  7.363*  7.371*  7.450   ISTATAPCO2  42.9*  45.3*  50.0*   ISTATAPO2  75  74  82   ISTATATCO2  26  28  36*   MUULFFX2MDK  94  94  96   ISTATARTHCO3  24.4  26.3*  34.7*   ISTATARTBE  -1  1  9*   ISTATTEMP  37.2 C  98.4 F  37.8 C   ISTATFIO2  40  30  30   ISTATSPEC  Arterial  Arterial  Arterial   ISTATAPHTC  7.360*  7.373*  7.438   SPGNOOEZ3GK  76  74  86         Recent Labs      12/31/18   0504  01/01/19   0600 01/02/19   0335   SODIUM  133*  137  139   POTASSIUM  4.1  3.8  4.0   CHLORIDE  103  99  97   CO2  24  29  34*   BUN  16  15  20   CREATININE  0.52  0.48*  0.52   MAGNESIUM  1.8  1.6  1.8   PHOSPHORUS  2.6  2.8  2.5   CALCIUM  8.5  8.9  8.8     Recent Labs      12/31/18   0504  01/01/19   0600 01/02/19   0335   ALTSGPT  158*   --    --    ASTSGOT  122*   --    --    ALKPHOSPHAT  71   --    --    TBILIRUBIN  0.8   --    --    GLUCOSE  121*  159*  199*     Recent Labs      12/31/18   0504  01/01/19   0600 01/02/19   0335   WBC  11.5*  10.7  9.6   ASTSGOT  122*   --    --    ALTSGPT  158*   --    --    ALKPHOSPHAT  71   --    --    TBILIRUBIN  0.8   --    --      Recent Labs      12/31/18   0504  01/01/19   0600 01/02/19   0335   RBC  2.94*  3.28*  3.45*   HEMOGLOBIN  9.3*  10.4*  10.8*   HEMATOCRIT  28.1*  31.1*  32.9*   PLATELETCT  150*  187  266       Imaging  X-Ray:  I have personally reviewed the images and compared with prior images. and My impression is: Diffuse right and left lower lobe opacities    Assessment/Plan  * NSTEMI (non-ST elevated myocardial infarction) (HCC)- (present on admission)   Assessment & Plan    Continue aspirin, statin and Plavix     Acute hypoxemic respiratory failure (HCC)   Assessment & Plan    Reintubated on 12/29  Continue full vent support  All appropriate ventilator bundles are in place  ARDS net vent strategy    Trial of extubation today     Ventilator dependent (HCC)    Assessment & Plan    ARDS, may take many days to several weeks. Daily SBTs. If does not progress in next 10 days consider trach. ARDS net vent strategy. Clinically doing better today. Will do trial of extubation. Meets criteria of such a trial. Pt/family advised that there is a notable risk that the patient may required reintobation     S/P CABG x 3   Assessment & Plan    On 12/28 by Dr. Barrios  Continue aspirin, statin and Plavix  Complicated post-op course with ARDS and ongoing mech vent/ pneumonia  Today pt is showing evidence of improvement     Acute systolic congestive heart failure (HCC)   Assessment & Plan    EF 25%  Intra-aortic balloon pump removed on 12/29     Pneumonia   Assessment & Plan    Staph/Strep bilateral pneumonia (MSSA)- 7 days of Ancef     Type 2 diabetes mellitus without complication (HCC)- (present on admission)   Assessment & Plan    Poorly controlled prior to admission with glycohemoglobin of 12  Continue sliding scale insulin- goal sugar > 80< 180     SVT (supraventricular tachycardia) (HCC)- (present on admission)   Assessment & Plan    Vent rate well controlled this week     Sepsis (HCC)- (present on admission)   Assessment & Plan    Severe sepsis secondary to staph/strep pneumonia that is bilateral and causing ARDS and acute hypoxic respiratory failure. Clnically improving. Continue antibiotics          VTE:  Lovenox  Ulcer: H2 Antagonist  Lines: Central Line  Ongoing indication addressed, Arterial Line  Ongoing indication addressed and Thomas Catheter  Ongoing indication addressed    I have performed a physical exam and reviewed and updated ROS and Plan today (1/2/2019). In review of yesterday's note (1/1/2019), there are no changes except as documented above.     I have assessed and reassessed his respiratory status with ventilator adjustments, ventilator waveforms, airway mechanics, blood pressure, hemodynamics, cardiovascular status with titration of phenylephrine and neurologic  status with titration of propofol.  He is at increased risk for worsening respiratory and cardiovascular system dysfunction.    Discussed patient condition and risk of morbidity and/or mortality with Family, RN, RT, Pharmacy, Charge nurse / hot rounds, QA team and [facility]     The patient remains critically ill.  Critical care time = 35  minutes in directly providing and coordinating critical care and extensive data review.  No time overlap and excludes procedures.

## 2019-01-03 NOTE — PROGRESS NOTES
Cortrak Placement    Tube Team verified patient name and medical record number prior to tube placement.  Cortrak tube (43 inches, 10 [place]uvian) placed at 90 cm in left nare.  Per Cortrak picture, tube appears to be in the small bowel.  Nursing Instructions: Awaiting KUB to confirm placement before use for medications or feeding. Once placement confirmed, flush tube with 30 ml of water, and then remove and save stylet, in patient medication drawer.

## 2019-01-03 NOTE — PROGRESS NOTES
Cardiology Follow Up Progress Note    Date of Service  1/3/2019    Attending Physician  Tomas Barrios M.D.    Chief Complaint   SVT transferred from [place] [facility] for troponin >4    HPI  Rikki Khalil is a 43 y.o. male w poorly controlled DMII who lives in [place] with his wife, admitted 12/26/2018 with NSTEMI/cp and SVT -- echo at admit showed EF ~25%, angio showed severe 3VD and he went for CABG x3 on 12/28 (LIMA/LAD, SVG/dx, SVG/dRCA).  -Reintubated for respiratory reasons POD1.  -Persistent tachycardia and hypertension despite ejection fraction remaining less than 30% on repeat echocardiograms.  -Aggressive treatment from intensivist team for positive sputum cultures and concern for ARDS  -Extubated January 2    Interim Events    Blood pressure much better controlled on high-dose medication, extubated  Still sinus tachycardia     Review of Systems  Review of Systems   Unable to perform ROS: Patient nonverbal       Vital signs in last 24 hours  Temp:  [36.2 °C (97.2 °F)-36.4 °C (97.6 °F)] 36.4 °C (97.6 °F)  Pulse:  [] 126  Resp:  [10-20] 20  BP: (118)/(79) 118/79    Physical Exam  Physical Exam   Constitutional: He appears well-developed and well-nourished. No distress.   Out of bed sitting in chair somnolent groggy not verbal   HENT:   Head: Normocephalic and atraumatic.   Eyes: Pupils are equal, round, and reactive to light. EOM are normal. No scleral icterus.   Neck: Neck supple. No JVD present. No thyromegaly present.   Cardiovascular: Regular rhythm and intact distal pulses.  Exam reveals no gallop and no friction rub.    Sinus tachycardia at 110   Pulmonary/Chest: Breath sounds normal. No respiratory distress.   woundvac intact   Abdominal: Soft. Bowel sounds are normal. He exhibits no distension. There is no tenderness.   Musculoskeletal: He exhibits no edema or tenderness.   Lymphadenopathy:     He has no cervical adenopathy.   Neurological: He exhibits normal muscle tone. Coordination normal.    Skin: Skin is warm and dry. He is not diaphoretic.       Lab Review  Lab Results   Component Value Date/Time    WBC 14.2 (H) 01/03/2019 04:15 AM    RBC 4.23 (L) 01/03/2019 04:15 AM    HEMOGLOBIN 13.1 (L) 01/03/2019 04:15 AM    HEMATOCRIT 40.1 (L) 01/03/2019 04:15 AM    MCV 93.4 01/03/2019 04:15 AM    MCH 31.0 01/03/2019 04:15 AM    MCHC 33.2 (L) 01/03/2019 04:15 AM    MPV 10.5 01/03/2019 04:15 AM      Lab Results   Component Value Date/Time    SODIUM 136 01/03/2019 04:15 AM    POTASSIUM 3.5 (L) 01/03/2019 04:15 AM    CHLORIDE 94 (L) 01/03/2019 04:15 AM    CO2 32 01/03/2019 04:15 AM    GLUCOSE 342 (H) 01/03/2019 04:15 AM    BUN 24 (H) 01/03/2019 04:15 AM    CREATININE 0.53 01/03/2019 04:15 AM      Lab Results   Component Value Date/Time    ASTSGOT 122 (H) 12/31/2018 05:04 AM    ALTSGPT 158 (H) 12/31/2018 05:04 AM     Lab Results   Component Value Date/Time    CHOLSTRLTOT 79 (L) 12/27/2018 04:28 AM    LDL 42 12/27/2018 04:28 AM    HDL 22 (A) 12/27/2018 04:28 AM    TRIGLYCERIDE 78 12/31/2018 05:04 AM    TROPONINI 6.13 (H) 12/27/2018 08:02 AM             Cardiac Imaging and Procedures Review  EKG:  My personal interpretation of the EKG dated yesterday shows ectopic atrial tachycardia, unlikely atypical flutter    Echocardiogram: Repeated again January 1: EF ~25%.  No change at all from priors.  Akinesis of the anterior wall and significant right ventricular dilation dysfunction, pulmonary pressures not estimated    Cardiac Catheterization:  See HPI, as yesterday    Imaging  Chest X-Ray: Today January 2 unchanged    Assessment/Plan    -CAD/STEMI  -severe 3VD requiring urgent revasc with CABG  -respiratory failure with concerns not only for inflammatory response but also sepsis and pulmonary edema  -CHF, systolic  -No evidence of ongoing ischemia, tolerating short acting beta-blocker and ACE inhibitor, converting to p.o.    CAD  Status post aggressive revascularization in the setting of late presenting  non-STEMI  Asa/plavix  Statin  Gentle beta-blockade, off pressors all week    Tachycardia  EKG still reveals no evidence that would be conclusive for atypical flutter  Treat as if it is sinus, aggressive diuresis for not only pulmonary edema which is suspected but also ARDS, doubt PE as he has been on prophylaxis  Also managing for potential withdrawal, agitation/pain    CHF, systolic, now subacute  No change on repeat echo Monday  Ejection fraction still less than 30%, severe systolic dysfunction  continue intravenous aggressive Lasix 80 mg, close eye on GFR which is currently normal   discussed again with CT surgery who agrees in the setting of ARDS, GFR still normal and will keep a close watch on it daily    Hypertension  Much better on current regimen, advancing p.o.    Shock  Resolving, likely multifactorial with right and left ventricular failure as well as ARDS  Gentle heart failure treatment    D/w RN at bedside, discussed also with Inna Linton of CT surgery   Unfortunately, prognosis still very guarded    Thank you for allowing me to participate in the care of this patient.  Please contact me with any questions.    Herminia Queen M.D.   Cardiologist, [facility]  [phone]

## 2019-01-03 NOTE — PROGRESS NOTES
Sitting up in recliner. Very lethargic. Opens eyes and follows commands, but is still very sluggish in response and does not maintain attention for long. Not appropriate for swallow eval at this time.

## 2019-01-03 NOTE — PROGRESS NOTES
Patient called out to be short of breath, patient had pulled off his oxygen, NG tube, wound vac and gown. Patient at that point was A&O3 and did not remember why he was in the hospital. Patient was reoriented. Per Dr Cross we helped the patient up into a chair. He did not stand well but did try to participate, he is currently sitting up with a chair alarm set. The patient does answerer questions but does have a distant look when not interacting, there patient seems to be responding in the same sluggish way he did when he was first extubated.     The patient has a strong cough with lots of secretions, RN will not replace NG at this time as it was a difficult placement. The patient does not seem to be alert enough for a swallow evaluation, but pain meds will be held for now and I believe he should be able to swallow tonight or tomorrow, Dr Cross was okay with a bedside swallow

## 2019-01-03 NOTE — CARE PLAN
Problem: Safety  Goal: Will remain free from falls  Outcome: PROGRESSING AS EXPECTED  Lethargic. Very weak. AOx3 (disoriented to event). CLIP. Pt educated and return demonstrated how to call for assist. Hourly rounding. Non-skid socks. Bed/chair alarm on. Fall precautions in place.          Problem: Post Op Day 4 CABG/Heart Valve Replacement  Goal: Optimal care of the Post Op CABG/Heart Valve replacement Post Op Day 4  Outcome: PROGRESSING AS EXPECTED    Intervention: Daily Weights  Will do in AM    Intervention: Shower daily and clean incisions twice daily with soap and water  Completed  Intervention: Up in chair for all meals  NPO pending swallow eval.  Up to chair.    Intervention: Ambulate, increasing the distance each time x 3 and before bed  Refused. Recently extubated and very lethargic/weak.  Intervention: IS q 1 hour while awake and record best IS volume  Goal of 3000mL.   Achieved 1000mL with assist tonight.  Intervention: Consider removal of solitario, chest tube and pacer wire if not already done  F/u in AM. All lines still in place.

## 2019-01-03 NOTE — PROGRESS NOTES
Cardiovascular Surgery Progress Note    Name: Rikki Khalil  MRN: [id number]  : 1975  Admit Date: 2018  9:54 PM  Procedure:  Procedure(s) and Anesthesia Type:     * MULTIPLE CORONARY ARTERY BYPASS x 3,  ENDO VEIN HARVEST LEFT LEG - General     * DONNA - General  6 Day Post-Op    Vitals:  Patient Vitals for the past 8 hrs:   Temp SpO2 O2 Delivery O2 (LPM) Pulse Resp BP NIBP Weight   19 0745 - 94 % - - (!) 128 - - 117/70 -   19 0730 - 95 % - - (!) 124 - - 105/66 -   19 0715 - 95 % - - (!) 126 - - 104/70 -   19 0700 - 95 % - - (!) 123 - - (!) 99/66 -   19 0645 - 95 % - - (!) 123 - - 110/69 -   19 0637 - 95 % - 4 (!) 123 12 - - -   19 0630 - 96 % - - (!) 122 - - 104/71 -   19 0615 - 95 % - - (!) 133 - - 148/96 -   19 0600 - 96 % Nasal Cannula 4 (!) 129 - - - 79.6 kg (175 lb 7.8 oz)   19 0545 - 95 % - - (!) 123 - - 127/82 -   19 0530 - 96 % - - (!) 121 - - 112/77 -   19 0515 - 96 % - - (!) 120 - - 122/78 -   19 0500 - 95 % - - (!) 124 - - 121/77 -   19 0445 - 94 % - - 74 - - 125/76 -   19 0430 - 94 % - - (!) 128 - - 134/80 -   19 0415 - 98 % - - (!) 124 - - 126/73 -   19 0400 36.3 °C (97.3 °F) 98 % Nasal Cannula 4 95 12 - 148/83 -   19 0345 - 96 % - - (!) 120 - - 127/82 -   19 0330 - 96 % - - (!) 120 - - 121/80 -   19 0315 - 95 % - - (!) 120 - - 124/81 -   19 0300 - 96 % - - (!) 118 - - 120/86 -   19 0245 - 95 % - - (!) 120 - - 113/80 -   19 0230 - 95 % - - (!) 119 - - 128/79 -   19 0215 - 96 % - - (!) 119 - - 102/87 -   19 0200 - 95 % Nasal Cannula 4 (!) 129 (!) 11 118/79 118/79 -   19 0145 - 94 % - - (!) 126 - - 113/72 -   19 0130 - 94 % - - (!) 133 - - 124/80 -   19 0115 - 93 % - - (!) 127 - - 107/74 -   19 0100 - 93 % - - (!) 129 - - 111/72 -   19 0045 - 93 % - 4 81 12 - 125/74 -   19 0030 - 94 % - - (!) 139 - - 152/84 -    19 0015 - 94 % - - 97 - - 143/80 -     Temp (24hrs), Av.3 °C (97.4 °F), Min:36.2 °C (97.2 °F), Max:36.4 °C (97.6 °F)      Respiratory:  [place] Vent Mode: Spont, Rate (breaths/min): 14, PEEP/CPAP: 8, P Peak (PIP): 14, P MEAN: 10 Respiration: 12, Pulse Oximetry: 94 %, O2 Daily Delivery Respiratory : Silicone Nasal Cannula     Chest Tube Drains:          Fluids:    Intake/Output Summary (Last 24 hours) at 19 0813  Last data filed at 19 0800   Gross per 24 hour   Intake              714 ml   Output             5567 ml   Net            -4853 ml     Admit weight: Weight: 95.3 kg (210 lb)  Current weight: Weight: 79.6 kg (175 lb 7.8 oz) (19 06)    Labs:  Recent Labs      19   0619   WBC  10.7  9.6  14.2*   RBC  3.28*  3.45*  4.23*   HEMOGLOBIN  10.4*  10.8*  13.1*   HEMATOCRIT  31.1*  32.9*  40.1*   MCV  94.8  95.4  93.4   MCH  31.7  31.3  31.0   MCHC  33.4*  32.8*  33.2*   RDW  45.1  44.9  43.7   PLATELETCT  187  266  352   MPV  11.1  10.5  10.5         Recent Labs      19   0619   0335  19   041   SODIUM  137  139  136   POTASSIUM  3.8  4.0  3.5*   CHLORIDE  99  97  94*   CO2  29  34*  32   GLUCOSE  159*  199*  342*   BUN  15  20  24*   CREATININE  0.48*  0.52  0.53   CALCIUM  8.9  8.8  9.6           Medications:  • digoxin  250 mcg     • [START ON 2019] furosemide  80 mg     • enalaprilat  1.25 mg     • Metoprolol Tartrate  5 mg     • ceFAZolin  2 g Stopped (19 0632)   • insulin NPH  3 Units     • senna-docusate  2 Tab     • aspirin  81 mg     • atorvastatin  80 mg     • clopidogrel  75 mg     • insulin lispro  0-10 Units     • enoxaparin  40 mg     • lidocaine  1 Patch          Ordered Medications:    ASA - Yes    Plavix - Yes    Post-operative Beta Blockers - Yes    Ace Inhibitor -yes      Statin - Yes         Exam:   Review of Systems   Unable to perform ROS: Intubated       Physical Exam    Quality Measures:    Quality-Core Measures   Reviewed items::  EKG reviewed, Labs reviewed, Medications reviewed and Radiology images reviewed  Solitario catheter::  Strict Intake and Output During Sepisis or Shock  Central line in place:  Need for access and Vasopressors  DVT prophylaxis pharmacological::  Contraindicated - High bleeding risk  DVT prophylaxis - mechanical:  SCDs  Ulcer Prophylaxis::  Yes  Assessed for rehabilitation services:  Patient returned to prior level of function, rehabilitation not indicated at this time      Assessment/Plan:  POD 1 HDS- no gtts- dc IABP, ST start metorolol, low EF add ace, Resp insuff- currently on bi-pap- diurese- pulm following pain- add ms contin, D/c Ct after OOB, keep solitario for strict I&O, enc IS  POD 2 HOTN - on devora gtt- give albumin this am, ST, re-intubated yesterday for resp distress, sedated, pain on fentanyl gtt, hyponatremia- hold diuresis today, solitario for strict I&O, CPM  POD 3 HOTN- devora gtt, vent- broch w/ copious amt of secretions yesterday, sedated - on propofol/fentanyl for pain, low UO- check solitario/gently diurese  POD 4 HTN- increase po meds, 's- start esmolol, vent- pulm following, sedated, given lasix this am, CPM  POD 5 HDS, intubated vented, respiratory cultures positive, hypertensive, neuro grossly intact, abd S/NT +BS.  Plan:  Increase lisinopril.  Since unit out of IV pushes of fentanyl will increase drip slightly for pain.  Wean vent as tolerated.  On abx for positive respiratory cultures.  CPM.   POD 6 extubated, HDS, ST rate 120's, very slow to answer questions, hypotensive today.  Lisinopril adjusted by cards.  Plan:  Will dc pacing wires and janee this afternoon.  DC more potent narcotics.      Patient seen, examined and plan reviewed with midlevel provider. I agree with the plan.      Active Hospital Problems    Diagnosis   • Acute hypoxemic respiratory failure (HCC) [J96.01]     Priority: High   • S/P CABG x 3 [Z95.1]     Priority: High   • Ventilator  dependent ([facility]) [Z99.11]     Priority: High   • NSTEMI (non-ST elevated myocardial infarction) ([facility]) [I21.4]     Priority: High   • Acute systolic congestive heart failure ([facility]) [I50.21]     Priority: High   • Pneumonia [J18.9]     Priority: Medium   • Atelectasis [J98.11]     Priority: Medium   • SVT (supraventricular tachycardia) ([facility]) [I47.1]     Priority: Medium   • Type 2 diabetes mellitus without complication ([facility]) [E11.9]     Priority: Medium   • Hypokalemia [E87.6]     Priority: Low   • Postural dizziness with presyncope [R42, R55]     Priority: Low   • Sepsis ([facility]) [A41.9]

## 2019-01-04 ENCOUNTER — APPOINTMENT (OUTPATIENT)
Dept: RADIOLOGY | Facility: MEDICAL CENTER | Age: 44
DRG: 233 | End: 2019-01-04
Attending: CLINICAL NURSE SPECIALIST
Payer: MEDICAID

## 2019-01-04 ENCOUNTER — APPOINTMENT (OUTPATIENT)
Dept: RADIOLOGY | Facility: MEDICAL CENTER | Age: 44
DRG: 233 | End: 2019-01-04
Attending: INTERNAL MEDICINE
Payer: MEDICAID

## 2019-01-04 LAB
ANION GAP SERPL CALC-SCNC: 13 MMOL/L (ref 0–11.9)
BUN SERPL-MCNC: 33 MG/DL (ref 8–22)
CALCIUM SERPL-MCNC: 9.2 MG/DL (ref 8.5–10.5)
CHLORIDE SERPL-SCNC: 97 MMOL/L (ref 96–112)
CO2 SERPL-SCNC: 30 MMOL/L (ref 20–33)
CREAT SERPL-MCNC: 0.51 MG/DL (ref 0.5–1.4)
ERYTHROCYTE [DISTWIDTH] IN BLOOD BY AUTOMATED COUNT: 44.8 FL (ref 35.9–50)
GLUCOSE BLD-MCNC: 257 MG/DL (ref 65–99)
GLUCOSE BLD-MCNC: 283 MG/DL (ref 65–99)
GLUCOSE BLD-MCNC: 295 MG/DL (ref 65–99)
GLUCOSE BLD-MCNC: 353 MG/DL (ref 65–99)
GLUCOSE BLD-MCNC: 376 MG/DL (ref 65–99)
GLUCOSE SERPL-MCNC: 312 MG/DL (ref 65–99)
HCT VFR BLD AUTO: 37 % (ref 42–52)
HGB BLD-MCNC: 12.1 G/DL (ref 14–18)
MAGNESIUM SERPL-MCNC: 1.8 MG/DL (ref 1.5–2.5)
MCH RBC QN AUTO: 30.7 PG (ref 27–33)
MCHC RBC AUTO-ENTMCNC: 32.7 G/DL (ref 33.7–35.3)
MCV RBC AUTO: 93.9 FL (ref 81.4–97.8)
PHOSPHATE SERPL-MCNC: 2.8 MG/DL (ref 2.5–4.5)
PLATELET # BLD AUTO: 374 K/UL (ref 164–446)
PMV BLD AUTO: 10.5 FL (ref 9–12.9)
POTASSIUM SERPL-SCNC: 3.3 MMOL/L (ref 3.6–5.5)
RBC # BLD AUTO: 3.94 M/UL (ref 4.7–6.1)
SODIUM SERPL-SCNC: 140 MMOL/L (ref 135–145)
WBC # BLD AUTO: 14.2 K/UL (ref 4.8–10.8)

## 2019-01-04 PROCEDURE — 700101 HCHG RX REV CODE 250: Performed by: CLINICAL NURSE SPECIALIST

## 2019-01-04 PROCEDURE — A9270 NON-COVERED ITEM OR SERVICE: HCPCS | Performed by: INTERNAL MEDICINE

## 2019-01-04 PROCEDURE — 700102 HCHG RX REV CODE 250 W/ 637 OVERRIDE(OP): Performed by: INTERNAL MEDICINE

## 2019-01-04 PROCEDURE — 99233 SBSQ HOSP IP/OBS HIGH 50: CPT | Performed by: INTERNAL MEDICINE

## 2019-01-04 PROCEDURE — 70450 CT HEAD/BRAIN W/O DYE: CPT

## 2019-01-04 PROCEDURE — 83735 ASSAY OF MAGNESIUM: CPT

## 2019-01-04 PROCEDURE — 84100 ASSAY OF PHOSPHORUS: CPT

## 2019-01-04 PROCEDURE — 700111 HCHG RX REV CODE 636 W/ 250 OVERRIDE (IP): Performed by: THORACIC SURGERY (CARDIOTHORACIC VASCULAR SURGERY)

## 2019-01-04 PROCEDURE — 770022 HCHG ROOM/CARE - ICU (200)

## 2019-01-04 PROCEDURE — 85027 COMPLETE CBC AUTOMATED: CPT

## 2019-01-04 PROCEDURE — 82962 GLUCOSE BLOOD TEST: CPT | Mod: 91

## 2019-01-04 PROCEDURE — 700101 HCHG RX REV CODE 250: Performed by: INTERNAL MEDICINE

## 2019-01-04 PROCEDURE — 80048 BASIC METABOLIC PNL TOTAL CA: CPT

## 2019-01-04 PROCEDURE — 700111 HCHG RX REV CODE 636 W/ 250 OVERRIDE (IP): Performed by: INTERNAL MEDICINE

## 2019-01-04 PROCEDURE — 94668 MNPJ CHEST WALL SBSQ: CPT

## 2019-01-04 RX ORDER — DEXTROSE MONOHYDRATE 25 G/50ML
25 INJECTION, SOLUTION INTRAVENOUS
Status: DISCONTINUED | OUTPATIENT
Start: 2019-01-04 | End: 2019-01-05

## 2019-01-04 RX ORDER — INSULIN GLARGINE 100 [IU]/ML
15 INJECTION, SOLUTION SUBCUTANEOUS
Status: DISCONTINUED | OUTPATIENT
Start: 2019-01-04 | End: 2019-01-05

## 2019-01-04 RX ORDER — MAGNESIUM SULFATE HEPTAHYDRATE 40 MG/ML
2 INJECTION, SOLUTION INTRAVENOUS ONCE
Status: COMPLETED | OUTPATIENT
Start: 2019-01-04 | End: 2019-01-04

## 2019-01-04 RX ADMIN — INSULIN LISPRO 4 UNITS: 100 INJECTION, SOLUTION INTRAVENOUS; SUBCUTANEOUS at 23:32

## 2019-01-04 RX ADMIN — METOPROLOL TARTRATE 25 MG: 25 TABLET, FILM COATED ORAL at 05:29

## 2019-01-04 RX ADMIN — INSULIN LISPRO 7 UNITS: 100 INJECTION, SOLUTION INTRAVENOUS; SUBCUTANEOUS at 17:45

## 2019-01-04 RX ADMIN — CEFAZOLIN SODIUM 2 G: 2 INJECTION, SOLUTION INTRAVENOUS at 13:39

## 2019-01-04 RX ADMIN — MAGNESIUM HYDROXIDE 30 ML: 400 SUSPENSION ORAL at 05:30

## 2019-01-04 RX ADMIN — FUROSEMIDE 80 MG: 10 INJECTION, SOLUTION INTRAMUSCULAR; INTRAVENOUS at 05:49

## 2019-01-04 RX ADMIN — POTASSIUM BICARBONATE 25 MEQ: 25 TABLET, EFFERVESCENT ORAL at 09:00

## 2019-01-04 RX ADMIN — INSULIN HUMAN 3 UNITS: 100 INJECTION, SUSPENSION SUBCUTANEOUS at 05:38

## 2019-01-04 RX ADMIN — ATORVASTATIN CALCIUM 80 MG: 80 TABLET, FILM COATED ORAL at 05:30

## 2019-01-04 RX ADMIN — CLOPIDOGREL 75 MG: 75 TABLET, FILM COATED ORAL at 05:29

## 2019-01-04 RX ADMIN — MAGNESIUM SULFATE IN WATER 2 G: 40 INJECTION, SOLUTION INTRAVENOUS at 09:40

## 2019-01-04 RX ADMIN — ASPIRIN 81 MG 81 MG: 81 TABLET ORAL at 05:29

## 2019-01-04 RX ADMIN — METOPROLOL TARTRATE 5 MG: 5 INJECTION, SOLUTION INTRAVENOUS at 05:46

## 2019-01-04 RX ADMIN — CEFAZOLIN SODIUM 2 G: 2 INJECTION, SOLUTION INTRAVENOUS at 06:04

## 2019-01-04 RX ADMIN — DIGOXIN 250 MCG: 0.25 INJECTION INTRAMUSCULAR; INTRAVENOUS at 05:40

## 2019-01-04 RX ADMIN — ENALAPRILAT 1.25 MG: 2.5 INJECTION INTRAVENOUS at 08:56

## 2019-01-04 RX ADMIN — METOPROLOL TARTRATE 5 MG: 5 INJECTION, SOLUTION INTRAVENOUS at 18:05

## 2019-01-04 RX ADMIN — METOPROLOL TARTRATE 5 MG: 5 INJECTION, SOLUTION INTRAVENOUS at 23:30

## 2019-01-04 RX ADMIN — ENOXAPARIN SODIUM 40 MG: 100 INJECTION SUBCUTANEOUS at 17:28

## 2019-01-04 RX ADMIN — CEFAZOLIN SODIUM 2 G: 2 INJECTION, SOLUTION INTRAVENOUS at 22:00

## 2019-01-04 RX ADMIN — METOPROLOL TARTRATE 25 MG: 25 TABLET, FILM COATED ORAL at 17:25

## 2019-01-04 RX ADMIN — ENALAPRILAT 1.25 MG: 2.5 INJECTION INTRAVENOUS at 03:23

## 2019-01-04 RX ADMIN — INSULIN HUMAN 3 UNITS: 100 INJECTION, SUSPENSION SUBCUTANEOUS at 00:13

## 2019-01-04 RX ADMIN — INSULIN LISPRO 12 UNITS: 100 INJECTION, SOLUTION INTRAVENOUS; SUBCUTANEOUS at 12:43

## 2019-01-04 RX ADMIN — POTASSIUM BICARBONATE 25 MEQ: 25 TABLET, EFFERVESCENT ORAL at 13:38

## 2019-01-04 RX ADMIN — ENALAPRILAT 1.25 MG: 2.5 INJECTION INTRAVENOUS at 22:00

## 2019-01-04 RX ADMIN — STANDARDIZED SENNA CONCENTRATE AND DOCUSATE SODIUM 2 TABLET: 8.6; 5 TABLET, FILM COATED ORAL at 17:29

## 2019-01-04 RX ADMIN — POTASSIUM BICARBONATE 25 MEQ: 25 TABLET, EFFERVESCENT ORAL at 17:29

## 2019-01-04 RX ADMIN — METOPROLOL TARTRATE 5 MG: 5 INJECTION, SOLUTION INTRAVENOUS at 00:22

## 2019-01-04 RX ADMIN — LIDOCAINE 1 PATCH: 50 PATCH TOPICAL at 22:01

## 2019-01-04 RX ADMIN — INSULIN GLARGINE 15 UNITS: 100 INJECTION, SOLUTION SUBCUTANEOUS at 12:43

## 2019-01-04 RX ADMIN — STANDARDIZED SENNA CONCENTRATE AND DOCUSATE SODIUM 2 TABLET: 8.6; 5 TABLET, FILM COATED ORAL at 05:29

## 2019-01-04 RX ADMIN — METOPROLOL TARTRATE 5 MG: 5 INJECTION, SOLUTION INTRAVENOUS at 11:43

## 2019-01-04 ASSESSMENT — ENCOUNTER SYMPTOMS
ACTIVITY CHANGE: 0
ARTHRALGIAS: 0
GASTROINTESTINAL NEGATIVE: 1
CONSTITUTIONAL NEGATIVE: 1
NERVOUS/ANXIOUS: 0
MYALGIAS: 0
COLOR CHANGE: 0
MUSCULOSKELETAL NEGATIVE: 1
EYE DISCHARGE: 0
CONFUSION: 0
BRUISES/BLEEDS EASILY: 0
NEUROLOGICAL NEGATIVE: 1
DIZZINESS: 0
ABDOMINAL PAIN: 0
ABDOMINAL DISTENTION: 0
WHEEZING: 0
APNEA: 0
DIAPHORESIS: 0
RESPIRATORY NEGATIVE: 1
FACIAL ASYMMETRY: 0
ADENOPATHY: 0
EYE ITCHING: 0
AGITATION: 0
CHEST TIGHTNESS: 0
SHORTNESS OF BREATH: 0
EYES NEGATIVE: 1
CHILLS: 0
PSYCHIATRIC NEGATIVE: 1
DYSPHORIC MOOD: 0
CARDIOVASCULAR NEGATIVE: 1

## 2019-01-04 ASSESSMENT — PAIN SCALES - GENERAL
PAINLEVEL_OUTOF10: 0
PAINLEVEL_OUTOF10: 0
PAINLEVEL_OUTOF10: 2
PAINLEVEL_OUTOF10: 0
PAINLEVEL_OUTOF10: 2
PAINLEVEL_OUTOF10: 0
PAINLEVEL_OUTOF10: 2
PAINLEVEL_OUTOF10: 0
PAINLEVEL_OUTOF10: 3
PAINLEVEL_OUTOF10: 0
PAINLEVEL_OUTOF10: 2

## 2019-01-04 NOTE — DISCHARGE PLANNING
Care Transition Team Discharge Planning    Anticipated Discharge Information  Anticipated discharge disposition: Acute rehab    1/4/19  Met with patient and wife regarding discharge planning. Referral placed for acute inpatient rehab. Patient and wife agreeable. Choice for [facility].            Discharge Plan:  Rehab

## 2019-01-04 NOTE — CARE PLAN
Problem: Venous Thromboembolism (VTW)/Deep Vein Thrombosis (DVT) Prevention:  Goal: Patient will participate in Venous Thrombosis (VTE)/Deep Vein Thrombosis (DVT)Prevention Measures  Outcome: PROGRESSING AS EXPECTED   01/03/19 2000 01/04/19 0000   Mechanical/VTE Prophylaxis   Mechanical Prophylaxis  --  SCDs, Sequential Compression Device   MANUEL Hose (Graduated Compression Stockings) --  On   SCDs, Sequential Compression Device --  Refused   OTHER   Risk Assessment Score 4 --    VTE RISK High --    Pharmacologic Prophylaxis Used LMWH: Enoxaparin(Lovenox) --        Problem: Post Op Day 4 CABG/Heart Valve Replacement  Goal: Optimal care of the Post Op CABG/Heart Valve replacement Post Op Day 4  Outcome: PROGRESSING AS EXPECTED    Intervention: Daily Weights  completed  Intervention: Shower daily and clean incisions twice daily with soap and water  Completed    Intervention: Up in chair for all meals  Completed    Intervention: Ambulate, increasing the distance each time x 3 and before bed  PT involved. Pt continues to be very weak, needs assistance of 2-3 people to stand. Goal is to take steps in room.   Intervention: IS q 1 hour while awake and record best IS volume  Needs assistance. Goal of 3000mL - managed to get 1250mL tonight  Intervention: Consider removal of solitario, chest tube and pacer wire if not already done  CT and pacer wires removed 1/3/19  Solitario still in place

## 2019-01-04 NOTE — DISCHARGE PLANNING
Care Transition Team Assessment    Information Source  Orientation : Oriented x 4  Information Given By: Spouse  Informant's Name: Norma Khalil  Who is responsible for making decisions for patient? : Patient    Elopement Risk  Legal Hold: No  Ambulatory or Self Mobile in Wheelchair: No-Not an Elopement Risk  Elopement Risk: Not at Risk for Elopement    Interdisciplinary Discharge Planning  Does Admitting Nurse Feel This Could be a Complex Discharge?: Yes  Lives with - Patient's Self Care Capacity: Spouse  Patient or legal guardian wants to designate a caregiver (see row info): No  Support Systems: Spouse / Significant Other  Housing / Facility: [address]  Do You Take your Prescribed Medications Regularly: Yes  Able to Return to Previous ADL's: Future Time w/Therapy  Mobility Issues: Yes  Prior Services: None  Assistance Needed: Yes    Discharge Preparedness  What is your plan after discharge?: Uncertain - pending medical team collaboration  What are your discharge supports?: Spouse  Prior Functional Level: Ambulatory, Drives Self, Independent with Activities of Daily Living, Independent with Medication Management  Difficulity with ADLs: None  Difficulity with IADLs: None    Functional Assesment  Prior Functional Level: Ambulatory, Drives Self, Independent with Activities of Daily Living, Independent with Medication Management    Finances  Financial Barriers to Discharge: No  Prescription Coverage: Yes    Vision / Hearing Impairment  Vision Impairment : Yes  Right Eye Vision: Wears Glasses, Impaired  Left Eye Vision: Wears Glasses, Impaired  Hearing Impairment : No    Values / Beliefs / Concerns  Values / Beliefs Concerns : No    Advance Directive  Advance Directive?: None    Domestic Abuse  Have you ever been the victim of abuse or violence?: No    Anticipated Discharge Information  Anticipated discharge disposition: Acute rehab

## 2019-01-04 NOTE — PROGRESS NOTES
Critical Care Progress Note    Date of admission  12/26/2018    Chief Complaint  43 y.o. male admitted 12/26/2018 with chest pain.    Hospital Course    This gentleman was admitted to the hospital with an acute non-STEMI.  He was found to have significant CAD.  He underwent CABG.      Interval Problem Update  Reviewed last 24 hour events:  Extubated  All sedatives off  O x 3  Up to chair with help  Sinus 120-130  Urine 1700 last night  Dig added by cards  4 liters oxygen      Prior 24 hours  Diureses with lasix (1-2 liters negative last 24)  Passed SBT well  Undergoing trial of extubaton now  tmx 37.8  BP 120s systolic  Awake, alert, appropriate  Fent infusion stopped  Swallow eval pending  OOB to chair/walking pending  Thomas and three lumen cath in place  CXR slow improvement                Review of Systems  Review of Systems   Unable to perform ROS: Intubated   HENT: Negative.    Eyes: Negative.    Cardiovascular: Negative.    Gastrointestinal: Negative.    Musculoskeletal: Negative.    Skin: Negative.    Neurological: Negative.         Vital Signs for last 24 hours   Temp:  [36.2 °C (97.2 °F)-36.4 °C (97.6 °F)] 36.4 °C (97.6 °F)  Pulse:  [] 107  Resp:  [8-21] 18  BP: (118)/(79) 118/79    Hemodynamic parameters for last 24 hours       Respiratory       Physical Exam   Physical Exam   Constitutional: He appears well-developed and well-nourished.   On ventilator   HENT:   Head: Normocephalic.   Right Ear: External ear normal.   Left Ear: External ear normal.   Mouth/Throat: No oropharyngeal exudate.   Eyes: Pupils are equal, round, and reactive to light. Right eye exhibits no discharge. Left eye exhibits no discharge. No scleral icterus.   Neck: Normal range of motion. Neck supple. No JVD present. No tracheal deviation present.   Cardiovascular: Intact distal pulses.  Exam reveals no gallop.    Sinus tachycardia   Pulmonary/Chest: He has no wheezes. He has rales (Coarse and fine crackles bilaterally).   On  vent   Abdominal: Soft. Bowel sounds are normal. He exhibits no distension. There is no tenderness. There is no guarding.   Gastric tube to suction   Musculoskeletal: He exhibits no tenderness or deformity.   No clubbing or cyanosis   Neurological: He is alert.   Sedated to Rass minus 2   Skin: Skin is warm and dry. No rash noted. He is not diaphoretic.   Nursing note and vitals reviewed.      Medications  Current Facility-Administered Medications   Medication Dose Route Frequency Provider Last Rate Last Dose   • ipratropium-albuterol (DUONEB) nebulizer solution  3 mL Nebulization Q4H PRN (RT) Hieu Vallejo M.D.       • digoxin (LANOXIN) injection 250 mcg  250 mcg Intravenous DAILY AT 1800 Herminia Queen M.D.   250 mcg at 01/03/19 0855   • [START ON 1/4/2019] furosemide (LASIX) injection 80 mg  80 mg Intravenous Q DAY Herminia Queen M.D.       • metoprolol (LOPRESSOR) tablet 25 mg  25 mg Feeding Tube TWICE DAILY Herminia Queen M.D.   25 mg at 01/03/19 1725   • Metoprolol Tartrate (LOPRESSOR) injection 5 mg  5 mg Intravenous Q6HRS Herminia Queen M.D.   5 mg at 01/03/19 1828   • enalaprilat (VASOTEC) injection 1.25 mg  1.25 mg Intravenous Q6HRS Herminia Queen M.D.   1.25 mg at 01/03/19 1538   • enalaprilat (VASOTEC) injection 2.5 mg  2.5 mg Intravenous Q6HRS PRN Herminia Queen M.D.   2.5 mg at 01/02/19 1105   • ceFAZolin in dextrose (ANCEF) IVPB premix 2 g  2 g Intravenous Q8HRS Rikki Cross M.D.   Stopped at 01/03/19 1446   • insulin lispro (HUMALOG) injection 1 Units  1 Units Subcutaneous PRN Tomas Barrios M.D.       • insulin NPH (HUMULIN,NOVOLIN) injection 3 Units  3 Units Subcutaneous Q8HRS Tomas Barrios M.D.   3 Units at 01/03/19 1406   • Respiratory Care per Protocol   Nebulization Continuous RT Hieu Vallejo M.D.       • senna-docusate (PERICOLACE or SENOKOT S) 8.6-50 MG per tablet 2 Tab  2 Tab Feeding Tube BID Hieu Vallejo M.D.   2 Tab at  01/03/19 1725    And   • polyethylene glycol/lytes (MIRALAX) PACKET 1 Packet  1 Packet Feeding Tube QDAY PRN Hieu Vallejo M.D.   1 Packet at 01/02/19 1054    And   • magnesium hydroxide (MILK OF MAGNESIA) suspension 30 mL  30 mL Feeding Tube QDAY PRN Hieu Vallejo M.D.        And   • bisacodyl (DULCOLAX) suppository 10 mg  10 mg Rectal QDAY PRN Hieu Vallejo M.D.       • ipratropium-albuterol (DUONEB) nebulizer solution  3 mL Nebulization Q2HRS PRN (RT) Hieu Vallejo M.D.       • Pharmacy Consult: Enteral tube feeding - review meds/change route/product selection   Other PRN Hieu Vallejo M.D.       • acetaminophen (TYLENOL) tablet 650 mg  650 mg Feeding Tube Q4HRS PRN Hieu Vallejo M.D.        Or   • acetaminophen (TYLENOL) suppository 650 mg  650 mg Rectal Q4HRS PRN Hieu Vallejo M.D.       • aspirin (ASA) chewable tab 81 mg  81 mg Per NG Tube DAILY Hieu Vallejo M.D.   Stopped at 01/03/19 0600   • atorvastatin (LIPITOR) tablet 80 mg  80 mg Per NG Tube DAILY Hieu Vallejo M.D.   Stopped at 01/03/19 0600   • clopidogrel (PLAVIX) tablet 75 mg  75 mg Feeding Tube DAILY Hieu Vallejo M.D.   Stopped at 01/03/19 0600   • diphenhydrAMINE (BENADRYL) tablet/capsule 25 mg  25 mg Feeding Tube HS PRN - MR X 1 Hieu Vallejo M.D.       • mag hydrox-al hydrox-simeth (MAALOX PLUS ES or MYLANTA DS) suspension 30 mL  30 mL Per NG Tube Q4HRS PRN Hieu Vallejo M.D.       • insulin lispro (HUMALOG) injection 0-10 Units  0-10 Units Subcutaneous Q6HRS MARYJANE MongeNDeepti   6 Units at 01/03/19 1737   • NS infusion   Intravenous Continuous Tomas Barrios M.D.   Stopped at 12/29/18 1000   • Pharmacy Consult Request ...Pain Management Review 1 Each  1 Each Other PRN Tomas Barrios M.D.       • electrolyte-A (PLASMALYTE-A) infusion   Intravenous PRN Tomas Barrios M.D.   1,000 mL at 12/28/18 1345   • enoxaparin (LOVENOX) inj 40  mg  40 mg Subcutaneous QDAY Tomas Barrios M.D.   40 mg at 01/03/19 1730   • ondansetron (ZOFRAN) syringe/vial injection 4 mg  4 mg Intravenous Q6HRS PRLUI aBrrios M.D.   4 mg at 12/29/18 1831    Or   • prochlorperazine (COMPAZINE) injection 10 mg  10 mg Intravenous Q6HRS PRN Tomas Barrios M.D.   10 mg at 12/29/18 0004    Or   • promethazine (PHENERGAN) suppository 25 mg  25 mg Rectal Q6HRS PRN Tomas Barrios M.D.       • lidocaine (LIDODERM) 5 % 1 Patch  1 Patch Transdermal Q24HR Inna Linton ADeeptiPDeeptiNDeepti   1 Patch at 01/02/19 2146       Fluids    Intake/Output Summary (Last 24 hours) at 01/03/19 1939  Last data filed at 01/03/19 1800   Gross per 24 hour   Intake           344.58 ml   Output             2417 ml   Net         -2072.42 ml       Laboratory  Recent Labs      01/01/19   0446  01/02/19   0432   ISTATAPH  7.371*  7.450   ISTATAPCO2  45.3*  50.0*   ISTATAPO2  74  82   ISTATATCO2  28  36*   [id number]  94  96   ISTATARTHCO3  26.3*  34.7*   ISTATARTBE  1  9*   ISTATTEMP  98.4 F  37.8 C   ISTATFIO2  30  30   ISTATSPEC  Arterial  Arterial   ISTATAPHTC  7.373*  7.438   [id number]  74  86         Recent Labs      01/01/19   0600 01/02/19   0335  01/03/19   0415   SODIUM  137  139  136   POTASSIUM  3.8  4.0  3.5*   CHLORIDE  99  97  94*   CO2  29  34*  32   BUN  15  20  24*   CREATININE  0.48*  0.52  0.53   MAGNESIUM  1.6  1.8  1.8   PHOSPHORUS  2.8  2.5  3.0   CALCIUM  8.9  8.8  9.6     Recent Labs      01/01/19   0600  01/02/19   0335  01/03/19   0415   GLUCOSE  159*  199*  342*     Recent Labs      01/01/19   0600  01/02/19   0335  01/03/19   0415   WBC  10.7  9.6  14.2*     Recent Labs      01/01/19   0600  01/02/19   0335  01/03/19   0415   RBC  3.28*  3.45*  4.23*   HEMOGLOBIN  10.4*  10.8*  13.1*   HEMATOCRIT  31.1*  32.9*  40.1*   PLATELETCT  187  266  352       Imaging  X-Ray:  I have personally reviewed the images and compared with prior images. and My impression is: Diffuse right and left  lower lobe opacities    Assessment/Plan  * NSTEMI (non-ST elevated myocardial infarction) (HCC)- (present on admission)   Assessment & Plan    Continue aspirin, statin and Plavix     Acute hypoxemic respiratory failure (HCC)   Assessment & Plan    Reintubated on 12/29  Continue full vent support  All appropriate ventilator bundles are in place  ARDS net vent strategy    Trial of extubation today     Ventilator dependent (HCC)   Assessment & Plan    ARDS, may take many days to several weeks. Daily SBTs. If does not progress in next 10 days consider trach. ARDS net vent strategy. Clinically doing better today. Will do trial of extubation. Meets criteria of such a trial. Pt/family advised that there is a notable risk that the patient may required reintobation    Extubated and so far doing OK  Mobility  Avoid sedation     S/P CABG x 3   Assessment & Plan    On 12/28 by Dr. Barrios  Continue aspirin, statin and Plavix  Complicated post-op course with ARDS and ongoing mech vent/ pneumonia  Today pt is showing evidence of improvement     Acute systolic congestive heart failure (HCC)   Assessment & Plan    EF 25%  Intra-aortic balloon pump removed on 12/29     Pneumonia   Assessment & Plan    Staph/Strep bilateral pneumonia (MSSA)- 7 days of Ancef  Doing well on antibiotic     Type 2 diabetes mellitus without complication ([facility])- (present on admission)   Assessment & Plan    Poorly controlled prior to admission with glycohemoglobin of 12  Continue sliding scale insulin- goal sugar > 80< 180     SVT (supraventricular tachycardia) ([facility])- (present on admission)   Assessment & Plan    Vent rate well controlled this week     Sepsis ([facility])- (present on admission)   Assessment & Plan    Severe sepsis secondary to staph/strep pneumonia that is bilateral and causing ARDS and acute hypoxic respiratory failure. Clnically improving. Continue antibiotics     Hypokalemia- (present on admission)   Assessment & Plan    Replete          VTE:   Lovenox  Ulcer: H2 Antagonist  Lines: Central Line  Ongoing indication addressed, Arterial Line  Ongoing indication addressed and Thomas Catheter  Ongoing indication addressed    I have performed a physical exam and reviewed and updated ROS and Plan today (1/3/2019). In review of yesterday's note (1/2/2019), there are no changes except as documented above.     I have assessed and reassessed his respiratory status with ventilator adjustments, ventilator waveforms, airway mechanics, blood pressure, hemodynamics, cardiovascular status with titration of phenylephrine and neurologic status with titration of propofol.  He is at increased risk for worsening respiratory and cardiovascular system dysfunction.    Discussed patient condition and risk of morbidity and/or mortality with Family, RN, RT, Pharmacy, Charge nurse / hot rounds, QA team and CVS     The patient remains critically ill.  Critical care time = 40  minutes in directly providing and coordinating critical care and extensive data review.  No time overlap and excludes procedures.

## 2019-01-04 NOTE — PROGRESS NOTES
Cardiology Follow Up Progress Note    Date of Service  1/4/2019    Attending Physician  Tomas Barrios M.D.    Chief Complaint   SVT transferred from [place] [facility] for troponin >4    HPI  Rikki Khalil is a 43 y.o. male w poorly controlled DMII who lives in [place] with his wife, admitted 12/26/2018 with NSTEMI/cp and SVT -- echo at admit showed EF ~25%, angio showed severe 3VD and he went for CABG x3 on 12/28 (LIMA/LAD, SVG/dx, SVG/dRCA).  -Reintubated for respiratory reasons POD1.  -Persistent tachycardia and hypertension despite ejection fraction remaining less than 30% on repeat echocardiograms.  -Aggressive treatment from intensivist team for positive sputum cultures and concern for ARDS  -Extubated January 2  -Very slow in getting oriented mental status wise    Interim Events    Persistent sinus tachycardia.  Aggressive diuresis  Walked to the scale    Review of Systems  Review of Systems   Constitutional: Negative for activity change, chills and diaphoresis.   HENT: Negative for congestion and drooling.    Eyes: Negative for discharge and itching.   Respiratory: Negative for apnea, chest tightness, shortness of breath and wheezing.    Cardiovascular: Negative for chest pain and leg swelling.   Gastrointestinal: Negative for abdominal distention and abdominal pain.   Endocrine: Negative for cold intolerance.   Genitourinary: Negative for difficulty urinating and dysuria.   Musculoskeletal: Negative for arthralgias and myalgias.   Skin: Negative for color change and pallor.   Neurological: Negative for dizziness and facial asymmetry.   Hematological: Negative for adenopathy. Does not bruise/bleed easily.   Psychiatric/Behavioral: Negative for agitation, confusion and dysphoric mood. The patient is not nervous/anxious.    All other systems reviewed and are negative.      Vital signs in last 24 hours  Temp:  [36.4 °C (97.6 °F)-36.8 °C (98.3 °F)] 36.8 °C (98.3 °F)  Pulse:  [] 110  Resp:  [8-24] 16  BP:  (140-173)/(82-97) 147/85    Physical Exam  Physical Exam   Constitutional: He appears well-developed and well-nourished. No distress.   Out of bed in chair, still has core track in his nose, more focused slow but appropriate speech.  Patient seen today again and examined changes noted.   HENT:   Head: Normocephalic and atraumatic.   Mouth/Throat: No oropharyngeal exudate.   Very poor dentition   Eyes: Pupils are equal, round, and reactive to light. Conjunctivae and EOM are normal.   Neck: Neck supple. No JVD present. No thyromegaly present.   Cardiovascular: Regular rhythm and intact distal pulses.  Exam reveals no gallop and no friction rub.    Sinus tachycardia at 115   Pulmonary/Chest: Breath sounds normal. No respiratory distress.   woundvac intact   Abdominal: Soft. Bowel sounds are normal. He exhibits no distension. There is no tenderness.   Musculoskeletal: He exhibits no edema or tenderness.   Neurological: He exhibits normal muscle tone. Coordination normal.   Skin: Skin is warm and dry. No rash noted. He is not diaphoretic.   Psychiatric: Thought content normal.       Lab Review  Lab Results   Component Value Date/Time    WBC 14.2 (H) 01/04/2019 05:25 AM    RBC 3.94 (L) 01/04/2019 05:25 AM    HEMOGLOBIN 12.1 (L) 01/04/2019 05:25 AM    HEMATOCRIT 37.0 (L) 01/04/2019 05:25 AM    MCV 93.9 01/04/2019 05:25 AM    MCH 30.7 01/04/2019 05:25 AM    MCHC 32.7 (L) 01/04/2019 05:25 AM    MPV 10.5 01/04/2019 05:25 AM      Lab Results   Component Value Date/Time    SODIUM 140 01/04/2019 05:25 AM    POTASSIUM 3.3 (L) 01/04/2019 05:25 AM    CHLORIDE 97 01/04/2019 05:25 AM    CO2 30 01/04/2019 05:25 AM    GLUCOSE 312 (H) 01/04/2019 05:25 AM    BUN 33 (H) 01/04/2019 05:25 AM    CREATININE 0.51 01/04/2019 05:25 AM      Lab Results   Component Value Date/Time    ASTSGOT 122 (H) 12/31/2018 05:04 AM    ALTSGPT 158 (H) 12/31/2018 05:04 AM     Lab Results   Component Value Date/Time    CHOLSTRLTOT 79 (L) 12/27/2018 04:28 AM     LDL 42 12/27/2018 04:28 AM    HDL 22 (A) 12/27/2018 04:28 AM    TRIGLYCERIDE 78 12/31/2018 05:04 AM    TROPONINI 6.13 (H) 12/27/2018 08:02 AM             Cardiac Imaging and Procedures Review  EKG:  My personal interpretation of the EKG dated yesterday shows ectopic atrial tachycardia, unlikely atypical flutter    Echocardiogram: Repeated again January 1: EF ~25%.  No change at all from priors.  Akinesis of the anterior wall and significant right ventricular dilation dysfunction, pulmonary pressures not estimated    Cardiac Catheterization:  See HPI    Imaging  Chest X-Ray: Yesterday January 3, worsening atelectasis    Assessment/Plan    -CAD/STEMI  -severe 3VD requiring urgent revasc with CABG  -respiratory failure with concerns not only for inflammatory response but also sepsis and pulmonary edema  -CHF, systolic  -No evidence of ongoing ischemia, tolerating short acting beta-blocker and ACE inhibitor, converting to p.o.    CAD  Status post aggressive revascularization in the setting of late presenting non-STEMI  Asa/plavix  Statin  Gentle beta-blockade, off pressors all week    Tachycardia  EKG still reveals no evidence that would be conclusive for atypical flutter  Treat as if it is sinus, aggressive diuresis for not only pulmonary edema which is suspected but also ARDS, doubt PE as he has been on prophylaxis  Also managing for potential withdrawal, agitation/pain, persistent ARDS/sepsis    CHF, systolic, now subacute  No change on repeat echo Monday  Ejection fraction still less than 30%, severe systolic dysfunction  continue intravenous aggressive Lasix 80 mg, close eye on GFR which is currently normal -likely switch to 40 daily tomorrow  discussed again with CT surgery and the intensivist who agree in the setting of ARDS, GFR still normal and will keep a close watch on it daily    Hypertension  Much better on current regimen, advancing p.o. again today    Shock  Resolved, likely multifactorial with right and  left ventricular failure as well as ARDS  Gentle heart failure treatment    Unfortunately, prognosis still very guarded this week.  Thank you for allowing me to participate in the care of this patient.  Please contact me with any questions.    Herminia Queen M.D.   Cardiologist, [facility]  [phone]

## 2019-01-04 NOTE — PROGRESS NOTES
12hr chart check.  2 RN skin assessment completed.    Monitor Summary:  SR/ST with frequent PVCs, and occasional bigem/trigem  90-120s  .18/.08/.36

## 2019-01-04 NOTE — CARE PLAN
Problem: Post Op Day 4 CABG/Heart Valve Replacement  Goal: Optimal care of the Post Op CABG/Heart Valve replacement Post Op Day 4    Intervention: Shower daily and clean incisions twice daily with soap and water  Pt unable to shower safe at this time.  Bed bath given, incision cleaned with soap and water.  Open to air.  Intervention: Up in chair for all meals  Completed  Intervention: Ambulate, increasing the distance each time x 3 and before bed  Pt able to stand at edge of chair.  Pt lethargic and withdrawn, unable to follow commands for safe ambulation.  MANUEL hose in place.  Intervention: IS q 1 hour while awake and record best IS volume  Complete  IS 1250  Intervention: Consider removal of solitario, chest tube and pacer wire if not already done  Solitario remains in place.  Chest tube removed, wires removed.

## 2019-01-04 NOTE — PROGRESS NOTES
Cardiovascular Surgery Progress Note    Name: Rikki Khalil  MRN: [id number]  : 1975  Admit Date: 2018  9:54 PM  Procedure:  Procedure(s) and Anesthesia Type:     * MULTIPLE CORONARY ARTERY BYPASS x 3,  ENDO VEIN HARVEST LEFT LEG - General     * DONNA - General  6 Day Post-Op    Vitals:  Patient Vitals for the past 8 hrs:   Temp SpO2 O2 Delivery O2 (LPM) Pulse Heart Rate (Monitored) Resp BP NIBP Weight FiO2%   19 0700 - 92 % - 0 (!) 109 (!) 108 (!) 11 - 121/74 - 21 %   19 0600 - 98 % - - (!) 105 (!) 105 17 - 157/84 - -   19 0545 - - - - (!) 113 - - 147/85 - - -   19 0529 - - - - (!) 113 - - 140/82 - - -   19 0400 36.8 °C (98.3 °F) 95 % Nasal Cannula 2 (!) 121 (!) 125 (!) 24 - 147/94 85.2 kg (187 lb 13.3 oz) -   19 0300 - 98 % - - (!) 104 (!) 102 12 - 130/80 - -   19 0200 - 100 % Nasal Cannula 3 (!) 104 (!) 105 15 - 147/84 - -   19 0100 - 97 % - - 98 98 (!) 11 - 142/82 - -     Temp (24hrs), Av.7 °C (98 °F), Min:36.4 °C (97.6 °F), Max:36.8 °C (98.3 °F)      Respiratory:    Respiration: (!) 11, Pulse Oximetry: 92 %, O2 Daily Delivery Respiratory : Room Air with O2 Available     Chest Tube Drains:          Fluids:    Intake/Output Summary (Last 24 hours) at 19 0832  Last data filed at 19 0600   Gross per 24 hour   Intake           574.58 ml   Output             1500 ml   Net          -925.42 ml     Admit weight: Weight: 95.3 kg (210 lb)  Current weight: Weight: 85.2 kg (187 lb 13.3 oz) (19 0400)    Labs:  Recent Labs      19   05   WBC  9.6  14.2*  14.2*   RBC  3.45*  4.23*  3.94*   HEMOGLOBIN  10.8*  13.1*  12.1*   HEMATOCRIT  32.9*  40.1*  37.0*   MCV  95.4  93.4  93.9   MCH  31.3  31.0  30.7   MCHC  32.8*  33.2*  32.7*   RDW  44.9  43.7  44.8   PLATELETCT  266  352  374   MPV  10.5  10.5  10.5         Recent Labs      19   05   SODIUM  139  136  140    POTASSIUM  4.0  3.5*  3.3*   CHLORIDE  97  94*  97   CO2  34*  32  30   GLUCOSE  199*  342*  312*   BUN  20  24*  33*   CREATININE  0.52  0.53  0.51   CALCIUM  8.8  9.6  9.2           Medications:  • potassium bicarbonate  25 mEq     • magnesium sulfate  2 g     • furosemide  80 mg     • metoprolol  25 mg     • Metoprolol Tartrate  5 mg     • enalaprilat  1.25 mg     • ceFAZolin  2 g Stopped (01/04/19 0634)   • insulin NPH  3 Units     • senna-docusate  2 Tab     • aspirin  81 mg     • atorvastatin  80 mg     • clopidogrel  75 mg     • insulin lispro  0-10 Units     • enoxaparin  40 mg     • lidocaine  1 Patch          Ordered Medications:    ASA - Yes    Plavix - Yes    Post-operative Beta Blockers - Yes    Ace Inhibitor -yes      Statin - Yes         Exam:   Review of Systems   Constitutional: Negative.    HENT: Negative.    Eyes: Negative.    Respiratory: Negative.    Cardiovascular: Negative.    Gastrointestinal: Negative.    Genitourinary: Negative.    Musculoskeletal: Negative.    Skin: Negative.    Neurological: Negative.    Endo/Heme/Allergies: Negative.    Psychiatric/Behavioral: Negative.        Physical Exam    Quality Measures:   Quality-Core Measures   Reviewed items::  EKG reviewed, Labs reviewed, Medications reviewed and Radiology images reviewed  Solitario catheter::  Strict Intake and Output During Sepisis or Shock  Central line in place:  Need for access  DVT prophylaxis pharmacological::  Contraindicated - High bleeding risk  DVT prophylaxis - mechanical:  SCDs  Ulcer Prophylaxis::  Yes  Assessed for rehabilitation services:  Patient returned to prior level of function, rehabilitation not indicated at this time      Assessment/Plan:  POD 1 HDS- no gtts- dc IABP, ST start metorolol, low EF add ace, Resp insuff- currently on bi-pap- diurese- pulm following pain- add ms contin, D/c Ct after OOB, keep solitario for strict I&O, enc IS  POD 2 HOTN - on devora gtt- give albumin this am, ST, re-intubated yesterday  for resp distress, sedated, pain on fentanyl gtt, hyponatremia- hold diuresis today, solitario for strict I&O, CPM  POD 3 HOTN- devora gtt, vent- broch w/ copious amt of secretions yesterday, sedated - on propofol/fentanyl for pain, low UO- check solitario/gently diurese  POD 4 HTN- increase po meds, 's- start esmolol, vent- pulm following, sedated, given lasix this am, CPM  POD 5 HDS, intubated vented, respiratory cultures positive, hypertensive, neuro grossly intact, abd S/NT +BS.  Plan:  Increase lisinopril.  Since unit out of IV pushes of fentanyl will increase drip slightly for pain.  Wean vent as tolerated.  On abx for positive respiratory cultures.  CPM.   POD 6 extubated, HDS, ST rate 120's, very slow to answer questions, hypotensive today.  Lisinopril adjusted by cards.  Plan:  Will dc pacing wires and janee this afternoon.  DC more potent narcotics.    POD 7 HDS, SR, neuro- diff with speech- CT scan today, dysphagia- cortrak, PT/OT rec rehab- will place order today would expect ready for transfer by Sunday/monday.    Patient seen, examined and plan reviewed with midlevel provider. I agree with the plan.      Active Hospital Problems    Diagnosis   • Acute hypoxemic respiratory failure (HCC) [J96.01]     Priority: High   • S/P CABG x 3 [Z95.1]     Priority: High   • Ventilator dependent (HCC) [Z99.11]     Priority: High   • NSTEMI (non-ST elevated myocardial infarction) ([facility]) [I21.4]     Priority: High   • Acute systolic congestive heart failure (HCC) [I50.21]     Priority: High   • Pneumonia [J18.9]     Priority: Medium   • Atelectasis [J98.11]     Priority: Medium   • SVT (supraventricular tachycardia) ([facility]) [I47.1]     Priority: Medium   • Type 2 diabetes mellitus without complication ([facility]) [E11.9]     Priority: Medium   • Hypokalemia [E87.6]     Priority: Low   • Postural dizziness with presyncope [R42, R55]     Priority: Low   • Sepsis ([facility]) [A41.9]

## 2019-01-05 ENCOUNTER — APPOINTMENT (OUTPATIENT)
Dept: RADIOLOGY | Facility: MEDICAL CENTER | Age: 44
DRG: 233 | End: 2019-01-05
Attending: INTERNAL MEDICINE
Payer: MEDICAID

## 2019-01-05 LAB
ANION GAP SERPL CALC-SCNC: 13 MMOL/L (ref 0–11.9)
BUN SERPL-MCNC: 39 MG/DL (ref 8–22)
CALCIUM SERPL-MCNC: 9.5 MG/DL (ref 8.5–10.5)
CHLORIDE SERPL-SCNC: 99 MMOL/L (ref 96–112)
CO2 SERPL-SCNC: 26 MMOL/L (ref 20–33)
CREAT SERPL-MCNC: 0.62 MG/DL (ref 0.5–1.4)
EKG IMPRESSION: NORMAL
ERYTHROCYTE [DISTWIDTH] IN BLOOD BY AUTOMATED COUNT: 43.8 FL (ref 35.9–50)
GLUCOSE BLD-MCNC: 215 MG/DL (ref 65–99)
GLUCOSE BLD-MCNC: 245 MG/DL (ref 65–99)
GLUCOSE BLD-MCNC: 255 MG/DL (ref 65–99)
GLUCOSE BLD-MCNC: 273 MG/DL (ref 65–99)
GLUCOSE BLD-MCNC: 295 MG/DL (ref 65–99)
GLUCOSE SERPL-MCNC: 307 MG/DL (ref 65–99)
HCT VFR BLD AUTO: 34.9 % (ref 42–52)
HGB BLD-MCNC: 11.7 G/DL (ref 14–18)
MAGNESIUM SERPL-MCNC: 1.9 MG/DL (ref 1.5–2.5)
MCH RBC QN AUTO: 31 PG (ref 27–33)
MCHC RBC AUTO-ENTMCNC: 33.5 G/DL (ref 33.7–35.3)
MCV RBC AUTO: 92.3 FL (ref 81.4–97.8)
PHOSPHATE SERPL-MCNC: 3.8 MG/DL (ref 2.5–4.5)
PLATELET # BLD AUTO: 455 K/UL (ref 164–446)
PMV BLD AUTO: 10.5 FL (ref 9–12.9)
POTASSIUM SERPL-SCNC: 3.7 MMOL/L (ref 3.6–5.5)
RBC # BLD AUTO: 3.78 M/UL (ref 4.7–6.1)
SODIUM SERPL-SCNC: 138 MMOL/L (ref 135–145)
WBC # BLD AUTO: 18.5 K/UL (ref 4.8–10.8)

## 2019-01-05 PROCEDURE — 85027 COMPLETE CBC AUTOMATED: CPT

## 2019-01-05 PROCEDURE — A9270 NON-COVERED ITEM OR SERVICE: HCPCS | Performed by: INTERNAL MEDICINE

## 2019-01-05 PROCEDURE — 700111 HCHG RX REV CODE 636 W/ 250 OVERRIDE (IP): Performed by: THORACIC SURGERY (CARDIOTHORACIC VASCULAR SURGERY)

## 2019-01-05 PROCEDURE — 700102 HCHG RX REV CODE 250 W/ 637 OVERRIDE(OP): Performed by: INTERNAL MEDICINE

## 2019-01-05 PROCEDURE — 700102 HCHG RX REV CODE 250 W/ 637 OVERRIDE(OP): Performed by: THORACIC SURGERY (CARDIOTHORACIC VASCULAR SURGERY)

## 2019-01-05 PROCEDURE — 51798 US URINE CAPACITY MEASURE: CPT

## 2019-01-05 PROCEDURE — 97530 THERAPEUTIC ACTIVITIES: CPT

## 2019-01-05 PROCEDURE — 770020 HCHG ROOM/CARE - TELE (206)

## 2019-01-05 PROCEDURE — 93005 ELECTROCARDIOGRAM TRACING: CPT | Performed by: INTERNAL MEDICINE

## 2019-01-05 PROCEDURE — 80048 BASIC METABOLIC PNL TOTAL CA: CPT

## 2019-01-05 PROCEDURE — 99233 SBSQ HOSP IP/OBS HIGH 50: CPT | Performed by: INTERNAL MEDICINE

## 2019-01-05 PROCEDURE — A9270 NON-COVERED ITEM OR SERVICE: HCPCS | Performed by: THORACIC SURGERY (CARDIOTHORACIC VASCULAR SURGERY)

## 2019-01-05 PROCEDURE — 82962 GLUCOSE BLOOD TEST: CPT

## 2019-01-05 PROCEDURE — 84100 ASSAY OF PHOSPHORUS: CPT

## 2019-01-05 PROCEDURE — 74018 RADEX ABDOMEN 1 VIEW: CPT

## 2019-01-05 PROCEDURE — 83735 ASSAY OF MAGNESIUM: CPT

## 2019-01-05 PROCEDURE — 700101 HCHG RX REV CODE 250: Performed by: CLINICAL NURSE SPECIALIST

## 2019-01-05 PROCEDURE — 700101 HCHG RX REV CODE 250: Performed by: INTERNAL MEDICINE

## 2019-01-05 PROCEDURE — 700111 HCHG RX REV CODE 636 W/ 250 OVERRIDE (IP): Performed by: INTERNAL MEDICINE

## 2019-01-05 PROCEDURE — 93010 ELECTROCARDIOGRAM REPORT: CPT | Performed by: INTERNAL MEDICINE

## 2019-01-05 RX ORDER — MAGNESIUM SULFATE HEPTAHYDRATE 40 MG/ML
2 INJECTION, SOLUTION INTRAVENOUS ONCE
Status: COMPLETED | OUTPATIENT
Start: 2019-01-05 | End: 2019-01-05

## 2019-01-05 RX ORDER — INSULIN GLARGINE 100 [IU]/ML
15 INJECTION, SOLUTION SUBCUTANEOUS ONCE
Status: COMPLETED | OUTPATIENT
Start: 2019-01-05 | End: 2019-01-05

## 2019-01-05 RX ORDER — METOPROLOL TARTRATE 50 MG/1
50 TABLET, FILM COATED ORAL EVERY 8 HOURS
Status: DISCONTINUED | OUTPATIENT
Start: 2019-01-05 | End: 2019-01-06

## 2019-01-05 RX ORDER — LISINOPRIL 10 MG/1
10 TABLET ORAL 2 TIMES DAILY
Status: DISCONTINUED | OUTPATIENT
Start: 2019-01-05 | End: 2019-01-06

## 2019-01-05 RX ORDER — DEXTROSE MONOHYDRATE 25 G/50ML
25 INJECTION, SOLUTION INTRAVENOUS
Status: DISCONTINUED | OUTPATIENT
Start: 2019-01-05 | End: 2019-01-06

## 2019-01-05 RX ORDER — INSULIN GLARGINE 100 [IU]/ML
30 INJECTION, SOLUTION SUBCUTANEOUS
Status: DISCONTINUED | OUTPATIENT
Start: 2019-01-06 | End: 2019-01-06

## 2019-01-05 RX ORDER — METOPROLOL TARTRATE 1 MG/ML
5 INJECTION, SOLUTION INTRAVENOUS EVERY 4 HOURS PRN
Status: DISCONTINUED | OUTPATIENT
Start: 2019-01-05 | End: 2019-01-10 | Stop reason: HOSPADM

## 2019-01-05 RX ADMIN — POTASSIUM BICARBONATE 25 MEQ: 25 TABLET, EFFERVESCENT ORAL at 10:03

## 2019-01-05 RX ADMIN — LISINOPRIL 10 MG: 10 TABLET ORAL at 17:19

## 2019-01-05 RX ADMIN — CEFAZOLIN SODIUM 2 G: 2 INJECTION, SOLUTION INTRAVENOUS at 14:54

## 2019-01-05 RX ADMIN — INSULIN LISPRO 7 UNITS: 100 INJECTION, SOLUTION INTRAVENOUS; SUBCUTANEOUS at 05:17

## 2019-01-05 RX ADMIN — METOPROLOL TARTRATE 25 MG: 25 TABLET, FILM COATED ORAL at 04:57

## 2019-01-05 RX ADMIN — ASPIRIN 81 MG 81 MG: 81 TABLET ORAL at 04:57

## 2019-01-05 RX ADMIN — LIDOCAINE 1 PATCH: 50 PATCH TOPICAL at 21:25

## 2019-01-05 RX ADMIN — ENOXAPARIN SODIUM 40 MG: 100 INJECTION SUBCUTANEOUS at 17:19

## 2019-01-05 RX ADMIN — STANDARDIZED SENNA CONCENTRATE AND DOCUSATE SODIUM 2 TABLET: 8.6; 5 TABLET, FILM COATED ORAL at 17:19

## 2019-01-05 RX ADMIN — LISINOPRIL 10 MG: 10 TABLET ORAL at 07:55

## 2019-01-05 RX ADMIN — INSULIN GLARGINE 15 UNITS: 100 INJECTION, SOLUTION SUBCUTANEOUS at 11:33

## 2019-01-05 RX ADMIN — MAGNESIUM SULFATE IN WATER 2 G: 40 INJECTION, SOLUTION INTRAVENOUS at 10:08

## 2019-01-05 RX ADMIN — CLOPIDOGREL 75 MG: 75 TABLET, FILM COATED ORAL at 04:57

## 2019-01-05 RX ADMIN — CEFAZOLIN SODIUM 2 G: 2 INJECTION, SOLUTION INTRAVENOUS at 04:57

## 2019-01-05 RX ADMIN — STANDARDIZED SENNA CONCENTRATE AND DOCUSATE SODIUM 2 TABLET: 8.6; 5 TABLET, FILM COATED ORAL at 04:57

## 2019-01-05 RX ADMIN — INSULIN GLARGINE 15 UNITS: 100 INJECTION, SOLUTION SUBCUTANEOUS at 04:58

## 2019-01-05 RX ADMIN — ATORVASTATIN CALCIUM 80 MG: 80 TABLET, FILM COATED ORAL at 04:57

## 2019-01-05 RX ADMIN — METOPROLOL TARTRATE 50 MG: 50 TABLET ORAL at 21:25

## 2019-01-05 RX ADMIN — CEFAZOLIN SODIUM 2 G: 2 INJECTION, SOLUTION INTRAVENOUS at 21:25

## 2019-01-05 RX ADMIN — METOPROLOL TARTRATE 50 MG: 50 TABLET ORAL at 12:54

## 2019-01-05 RX ADMIN — PROMETHAZINE HYDROCHLORIDE 25 MG: 25 SUPPOSITORY RECTAL at 10:03

## 2019-01-05 RX ADMIN — METOPROLOL TARTRATE 5 MG: 5 INJECTION, SOLUTION INTRAVENOUS at 04:57

## 2019-01-05 ASSESSMENT — COGNITIVE AND FUNCTIONAL STATUS - GENERAL
WALKING IN HOSPITAL ROOM: A LITTLE
MOVING TO AND FROM BED TO CHAIR: UNABLE
MOBILITY SCORE: 10
CLIMB 3 TO 5 STEPS WITH RAILING: TOTAL
TURNING FROM BACK TO SIDE WHILE IN FLAT BAD: UNABLE
MOVING FROM LYING ON BACK TO SITTING ON SIDE OF FLAT BED: UNABLE
STANDING UP FROM CHAIR USING ARMS: A LITTLE
SUGGESTED CMS G CODE MODIFIER MOBILITY: CL

## 2019-01-05 ASSESSMENT — ENCOUNTER SYMPTOMS
CHEST TIGHTNESS: 0
ACTIVITY CHANGE: 0
PALPITATIONS: 0
ABDOMINAL DISTENTION: 0
CONSTITUTIONAL NEGATIVE: 1
APPETITE CHANGE: 0
NEUROLOGICAL NEGATIVE: 1
PSYCHIATRIC NEGATIVE: 1
FACIAL ASYMMETRY: 0
CARDIOVASCULAR NEGATIVE: 1
ARTHRALGIAS: 0
CONFUSION: 0
EYES NEGATIVE: 1
NERVOUS/ANXIOUS: 0
COLOR CHANGE: 0
GASTROINTESTINAL NEGATIVE: 1
SHORTNESS OF BREATH: 0
AGITATION: 0
MUSCULOSKELETAL NEGATIVE: 1
ABDOMINAL PAIN: 0
RESPIRATORY NEGATIVE: 1
MYALGIAS: 0
APNEA: 0
DIZZINESS: 0
DYSPHORIC MOOD: 0
ADENOPATHY: 0
BRUISES/BLEEDS EASILY: 0

## 2019-01-05 ASSESSMENT — PAIN SCALES - GENERAL
PAINLEVEL_OUTOF10: 4
PAINLEVEL_OUTOF10: 3
PAINLEVEL_OUTOF10: 1
PAINLEVEL_OUTOF10: 4
PAINLEVEL_OUTOF10: 0
PAINLEVEL_OUTOF10: 4
PAINLEVEL_OUTOF10: 4
PAINLEVEL_OUTOF10: 2
PAINLEVEL_OUTOF10: 4

## 2019-01-05 ASSESSMENT — GAIT ASSESSMENTS
DISTANCE (FEET): 5
ASSISTIVE DEVICE: HAND HELD ASSIST
DEVIATION: BRADYKINETIC;SHUFFLED GAIT;DECREASED HEEL STRIKE;DECREASED TOE OFF;DECREASED BASE OF SUPPORT
GAIT LEVEL OF ASSIST: MINIMAL ASSIST

## 2019-01-05 NOTE — PROGRESS NOTES
Cardiovascular Surgery Progress Note    Name: Rikki Khalil  MRN: [id number]  : 1975  Admit Date: 2018  9:54 PM  Procedure:  Procedure(s) and Anesthesia Type:     * MULTIPLE CORONARY ARTERY BYPASS x 3,  ENDO VEIN HARVEST LEFT LEG - General     * DONNA - General  8 Day Post-Op    Vitals:  Patient Vitals for the past 8 hrs:   Temp Monitored Temp 2 SpO2 O2 Delivery O2 (LPM) Pulse Heart Rate (Monitored) Resp NIBP Weight FiO2%   19 0709 - - 95 % - 0 (!) 117 (!) 116 (!) 22 - - 21 %   19 0600 - - 95 % None (Room Air) - (!) 120 (!) 120 19 117/85 - -   19 0500 - - 96 % - - (!) 134 (!) 136 (!) 21 115/77 - -   19 0400 36.9 °C (98.4 °F) - 96 % None (Room Air) - (!) 125 (!) 125 15 117/81 83.1 kg (183 lb 3.2 oz) -   19 0300 - - 94 % None (Room Air) - (!) 120 (!) 120 19 124/88 - -   19 0200 - - 94 % None (Room Air) - (!) 116 (!) 116 19 116/79 - -   19 0100 - 35.9 °C (96.6 °F) 96 % None (Room Air) - (!) 121 (!) 120 (!) 21 117/80 - -   19 0000 36.4 °C (97.5 °F) 38 °C (100.4 °F) 95 % None (Room Air) - (!) 111 (!) 112 (!) 22 119/84 - -     Temp (24hrs), Av.8 °C (98.3 °F), Min:36.4 °C (97.5 °F), Max:37.2 °C (99 °F)      Respiratory:    Respiration: (!) 22, Pulse Oximetry: 95 %, O2 Daily Delivery Respiratory : Room Air with O2 Available     Chest Tube Drains:          Fluids:    Intake/Output Summary (Last 24 hours) at 19 0713  Last data filed at 19 0600   Gross per 24 hour   Intake              765 ml   Output             3726 ml   Net            -2961 ml     Admit weight: Weight: 95.3 kg (210 lb)  Current weight: Weight: 83.1 kg (183 lb 3.2 oz) (19 0400)    Labs:  Recent Labs      19   0415  19   0519   0515   WBC  14.2*  14.2*  18.5*   RBC  4.23*  3.94*  3.78*   HEMOGLOBIN  13.1*  12.1*  11.7*   HEMATOCRIT  40.1*  37.0*  34.9*   MCV  93.4  93.9  92.3   MCH  31.0  30.7  31.0   MCHC  33.2*  32.7*  33.5*   RDW  43.7  44.8  43.8    PLATELETCT  352  374  455*   MPV  10.5  10.5  10.5         Recent Labs      01/03/19   0415  01/04/19   0525  01/05/19   0515   SODIUM  136  140  138   POTASSIUM  3.5*  3.3*  3.7   CHLORIDE  94*  97  99   CO2  32  30  26   GLUCOSE  342*  312*  307*   BUN  24*  33*  39*   CREATININE  0.53  0.51  0.62   CALCIUM  9.6  9.2  9.5           Medications:  • insulin glargine  15 Units      And   • insulin lispro  3-14 Units     • metoprolol  25 mg     • Metoprolol Tartrate  5 mg     • enalaprilat  1.25 mg     • ceFAZolin  2 g Stopped (01/05/19 0527)   • senna-docusate  2 Tab     • aspirin  81 mg     • atorvastatin  80 mg     • clopidogrel  75 mg     • enoxaparin  40 mg     • lidocaine  1 Patch          Ordered Medications:    ASA - Yes    Plavix - Yes    Post-operative Beta Blockers - Yes    Ace Inhibitor -yes      Statin - Yes         Exam:   Review of Systems   Constitutional: Negative.    HENT: Negative.    Eyes: Negative.    Respiratory: Negative.    Cardiovascular: Negative.    Gastrointestinal: Negative.    Genitourinary: Negative.    Musculoskeletal: Negative.    Skin: Negative.    Neurological: Negative.    Endo/Heme/Allergies: Negative.    Psychiatric/Behavioral: Negative.        Physical Exam    Quality Measures:   Quality-Core Measures   Reviewed items::  EKG reviewed, Labs reviewed, Medications reviewed and Radiology images reviewed  Solitario catheter::  Strict Intake and Output During Sepisis or Shock  Central line in place:  Need for access  DVT prophylaxis pharmacological::  Contraindicated - High bleeding risk  DVT prophylaxis - mechanical:  SCDs  Ulcer Prophylaxis::  Yes  Assessed for rehabilitation services:  Patient returned to prior level of function, rehabilitation not indicated at this time      Assessment/Plan:  POD 1 HDS- no gtts- dc IABP, ST start metorolol, low EF add ace, Resp insuff- currently on bi-pap- diurese- pulm following pain- add ms contin, D/c Ct after OOB, keep solitario for strict I&O, enc  IS  POD 2 HOTN - on devora gtt- give albumin this am, ST, re-intubated yesterday for resp distress, sedated, pain on fentanyl gtt, hyponatremia- hold diuresis today, solitario for strict I&O, CPM  POD 3 HOTN- devora gtt, vent- broch w/ copious amt of secretions yesterday, sedated - on propofol/fentanyl for pain, low UO- check solitario/gently diurese  POD 4 HTN- increase po meds, 's- start esmolol, vent- pulm following, sedated, given lasix this am, CPM  POD 5 HDS, intubated vented, respiratory cultures positive, hypertensive, neuro grossly intact, abd S/NT +BS.  Plan:  Increase lisinopril.  Since unit out of IV pushes of fentanyl will increase drip slightly for pain.  Wean vent as tolerated.  On abx for positive respiratory cultures.  CPM.   POD 6 extubated, HDS, ST rate 120's, very slow to answer questions, hypotensive today.  Lisinopril adjusted by cards.  Plan:  Will dc pacing wires and janee this afternoon.  DC more potent narcotics.    POD 7 HDS, SR, neuro- diff with speech- CT scan today, dysphagia- cortrak, PT/OT rec rehab- will place order today would expect ready for transfer by Sunday/monday.  POD 8 HDS, ST vs aflutter- ekg- cards following, neuro status improved- Head CT neg, right groin hematoma - w/w, dysphagia- cortrak, PT/OT to re-eval for rehab, amb, enc IS    Patient seen, examined and plan reviewed with midlevel provider. I agree with the plan.      Active Hospital Problems    Diagnosis   • Acute hypoxemic respiratory failure (HCC) [J96.01]     Priority: High   • S/P CABG x 3 [Z95.1]     Priority: High   • Ventilator dependent (HCC) [Z99.11]     Priority: High   • NSTEMI (non-ST elevated myocardial infarction) (HCC) [I21.4]     Priority: High   • Acute systolic congestive heart failure (HCC) [I50.21]     Priority: High   • Pneumonia [J18.9]     Priority: Medium   • Atelectasis [J98.11]     Priority: Medium   • SVT (supraventricular tachycardia) (HCC) [I47.1]     Priority: Medium   • Type 2 diabetes  mellitus without complication (HCC) [E11.9]     Priority: Medium   • Hypokalemia [E87.6]     Priority: Low   • Postural dizziness with presyncope [R42, R55]     Priority: Low   • Sepsis (HCC) [A41.9]

## 2019-01-05 NOTE — PROGRESS NOTES
Critical Care Progress Note    Date of admission  12/26/2018    Chief Complaint  43 y.o. male admitted 12/26/2018 with chest pain.    Hospital Course    This gentleman was admitted to the hospital with an acute non-STEMI.  He was found to have significant CAD.  He underwent CABG.      Interval Problem Update  Reviewed last 24 hour events:  CT head neg acute  Remains somewhat lethargic  Persistent sinus tach 120-130  Stopped diuresis  ON metop oral  Mobility      Prior 24 hours  Extubated  All sedatives off  O x 3  Up to chair with help  Sinus 120-130  Urine 1700 last night  Dig added by cards  4 liters oxygen                  Review of Systems  Review of Systems   Unable to perform ROS: Intubated   Constitutional: Negative.    HENT: Negative.    Eyes: Negative.    Cardiovascular: Negative.    Gastrointestinal: Negative.    Musculoskeletal: Negative.    Skin: Negative.    Neurological: Negative.         Vital Signs for last 24 hours   Temp:  [36.6 °C (97.9 °F)-36.9 °C (98.4 °F)] 36.6 °C (97.9 °F)  Pulse:  [] 99  Resp:  [10-26] 17  BP: (140-173)/(82-97) 147/85    Hemodynamic parameters for last 24 hours       Respiratory       Physical Exam   Physical Exam   Constitutional: He appears well-developed and well-nourished.   On ventilator   HENT:   Head: Normocephalic.   Right Ear: External ear normal.   Left Ear: External ear normal.   Mouth/Throat: No oropharyngeal exudate.   Eyes: Pupils are equal, round, and reactive to light. Right eye exhibits no discharge. Left eye exhibits no discharge. No scleral icterus.   Neck: Normal range of motion. Neck supple. No JVD present. No tracheal deviation present.   Cardiovascular: Intact distal pulses.  Exam reveals no gallop.    Sinus tachycardia   Pulmonary/Chest: He has no wheezes. He has rales (Coarse and fine crackles bilaterally).   On vent   Abdominal: Soft. Bowel sounds are normal. He exhibits no distension. There is no tenderness. There is no guarding.   Gastric  tube to suction   Musculoskeletal: He exhibits no tenderness or deformity.   No clubbing or cyanosis   Neurological: He is alert.   Sedated to Rass minus 2   Skin: Skin is warm and dry. No rash noted. He is not diaphoretic.   Nursing note and vitals reviewed.      Medications  Current Facility-Administered Medications   Medication Dose Route Frequency Provider Last Rate Last Dose   • insulin glargine (LANTUS) injection 15 Units  15 Units Subcutaneous QAM INSULIN Rikki Cross M.D.   15 Units at 01/04/19 1243    And   • insulin lispro (HUMALOG) injection 3-14 Units  3-14 Units Subcutaneous Q6HRS Rikki Cross M.D.   7 Units at 01/04/19 1745    And   • glucose 4 g chewable tablet 16 g  16 g Oral Q15 MIN PRN Rikki Cross M.D.        And   • dextrose 50% (D50W) injection 25 mL  25 mL Intravenous Q15 MIN PRN Rikki Cross M.D.       • ipratropium-albuterol (DUONEB) nebulizer solution  3 mL Nebulization Q4H PRN (RT) Hieu Vallejo M.D.       • metoprolol (LOPRESSOR) tablet 25 mg  25 mg Feeding Tube TWICE DAILY Herminia Queen M.D.   25 mg at 01/04/19 1725   • Metoprolol Tartrate (LOPRESSOR) injection 5 mg  5 mg Intravenous Q6HRS Herminia Queen M.D.   5 mg at 01/04/19 1805   • enalaprilat (VASOTEC) injection 1.25 mg  1.25 mg Intravenous Q6HRS Herminia Queen M.D.   Stopped at 01/04/19 1500   • enalaprilat (VASOTEC) injection 2.5 mg  2.5 mg Intravenous Q6HRS PRN Herminia Queen M.D.   2.5 mg at 01/02/19 1105   • ceFAZolin in dextrose (ANCEF) IVPB premix 2 g  2 g Intravenous Q8HRS Rikki Cross M.D.   Stopped at 01/04/19 1409   • Respiratory Care per Protocol   Nebulization Continuous RT Hieu Vallejo M.D.       • senna-docusate (PERICOLACE or SENOKOT S) 8.6-50 MG per tablet 2 Tab  2 Tab Feeding Tube BID Hieu Vallejo M.D.   2 Tab at 01/04/19 1729    And   • polyethylene glycol/lytes (MIRALAX) PACKET 1 Packet  1 Packet Feeding Tube QDAY PRN Hieu Mccullough  DUY Mathew   1 Packet at 01/02/19 1054    And   • magnesium hydroxide (MILK OF MAGNESIA) suspension 30 mL  30 mL Feeding Tube QDAY PRN Hieu Vallejo M.D.   30 mL at 01/04/19 0530    And   • bisacodyl (DULCOLAX) suppository 10 mg  10 mg Rectal QDAY PRN Hieu Vallejo M.D.       • ipratropium-albuterol (DUONEB) nebulizer solution  3 mL Nebulization Q2HRS PRN (RT) Hieu Vallejo M.D.       • Pharmacy Consult: Enteral tube feeding - review meds/change route/product selection   Other PRN Hieu Vallejo M.D.       • acetaminophen (TYLENOL) tablet 650 mg  650 mg Feeding Tube Q4HRS PRN Hieu Vallejo M.D.        Or   • acetaminophen (TYLENOL) suppository 650 mg  650 mg Rectal Q4HRS PRN Hieu Vallejo M.D.       • aspirin (ASA) chewable tab 81 mg  81 mg Per NG Tube DAILY Hieu Vallejo M.D.   81 mg at 01/04/19 0529   • atorvastatin (LIPITOR) tablet 80 mg  80 mg Per NG Tube DAILY Hieu Vallejo M.D.   80 mg at 01/04/19 0530   • clopidogrel (PLAVIX) tablet 75 mg  75 mg Feeding Tube DAILY Hieu Vallejo M.D.   75 mg at 01/04/19 0529   • diphenhydrAMINE (BENADRYL) tablet/capsule 25 mg  25 mg Feeding Tube HS PRN - MR X 1 Hieu Vallejo M.D.       • mag hydrox-al hydrox-simeth (MAALOX PLUS ES or MYLANTA DS) suspension 30 mL  30 mL Per NG Tube Q4HRS PRN Hieu Vallejo M.D.       • NS infusion   Intravenous Continuous Tomas Barrios M.D.   Stopped at 12/29/18 1000   • Pharmacy Consult Request ...Pain Management Review 1 Each  1 Each Other PRN Tomas Barrios M.D.       • electrolyte-A (PLASMALYTE-A) infusion   Intravenous PRN Tomas Barrios M.D.   1,000 mL at 12/28/18 1345   • enoxaparin (LOVENOX) inj 40 mg  40 mg Subcutaneous QDAY Tomas Barrios M.D.   40 mg at 01/04/19 1728   • ondansetron (ZOFRAN) syringe/vial injection 4 mg  4 mg Intravenous Q6HRS PRN Tomas Barrios M.D.   4 mg at 12/29/18 1831    Or   • prochlorperazine (COMPAZINE)  injection 10 mg  10 mg Intravenous Q6HRS PRN Tomas Barrios M.D.   10 mg at 12/29/18 0004    Or   • promethazine (PHENERGAN) suppository 25 mg  25 mg Rectal Q6HRS PRN Tomas Barrios M.D.       • lidocaine (LIDODERM) 5 % 1 Patch  1 Patch Transdermal Q24HR SHYAM Alejo   1 Patch at 01/03/19 2114       Fluids    Intake/Output Summary (Last 24 hours) at 01/04/19 1910  Last data filed at 01/04/19 1800   Gross per 24 hour   Intake              995 ml   Output             3451 ml   Net            -2456 ml       Laboratory  Recent Labs      01/02/19   0432   ISTATAPH  7.450   ISTATAPCO2  50.0*   ISTATAPO2  82   ISTATATCO2  36*   [id number]  96   ISTATARTHCO3  34.7*   ISTATARTBE  9*   ISTATTEMP  37.8 C   ISTATFIO2  30   ISTATSPEC  Arterial   ISTATAPHTC  7.438   [id number]  86         Recent Labs      01/02/19   0335  01/03/19   0415  01/04/19   0525   SODIUM  139  136  140   POTASSIUM  4.0  3.5*  3.3*   CHLORIDE  97  94*  97   CO2  34*  32  30   BUN  20  24*  33*   CREATININE  0.52  0.53  0.51   MAGNESIUM  1.8  1.8  1.8   PHOSPHORUS  2.5  3.0  2.8   CALCIUM  8.8  9.6  9.2     Recent Labs      01/02/19   0335  01/03/19   0415  01/04/19   0525   GLUCOSE  199*  342*  312*     Recent Labs      01/02/19   0335  01/03/19   0415  01/04/19   0525   WBC  9.6  14.2*  14.2*     Recent Labs      01/02/19   0335  01/03/19   0415  01/04/19   0525   RBC  3.45*  4.23*  3.94*   HEMOGLOBIN  10.8*  13.1*  12.1*   HEMATOCRIT  32.9*  40.1*  37.0*   PLATELETCT  266  352  374       Imaging  X-Ray:  I have personally reviewed the images and compared with prior images. and My impression is: Diffuse right and left lower lobe opacities    Assessment/Plan  * NSTEMI (non-ST elevated myocardial infarction) (HCC)- (present on admission)   Assessment & Plan    Continue aspirin, statin and Plavix     Acute hypoxemic respiratory failure (HCC)   Assessment & Plan    Reintubated on 12/29  Continue full vent support  All appropriate ventilator  bundles are in place  ARDS net vent strategy    Trial of extubation today     Ventilator dependent (HCC)   Assessment & Plan    ARDS, may take many days to several weeks. Daily SBTs. If does not progress in next 10 days consider trach. ARDS net vent strategy. Clinically doing better today. Will do trial of extubation. Meets criteria of such a trial. Pt/family advised that there is a notable risk that the patient may required reintobation    Doing suprizingly well from resp standpoint  Mobility  Wean oxygen     S/P CABG x 3   Assessment & Plan    On 12/28 by Dr. Barrios  Continue aspirin, statin and Plavix  Complicated post-op course with ARDS and ongoing mech vent/ pneumonia  Today pt is showing evidence of improvement     Acute systolic congestive heart failure (HCC)   Assessment & Plan    EF 25%  Intra-aortic balloon pump removed on 12/29     Pneumonia   Assessment & Plan    Staph/Strep bilateral pneumonia (MSSA)- 7 days of Ancef  Doing well on antibiotic     Type 2 diabetes mellitus without complication (HCC)- (present on admission)   Assessment & Plan    Poorly controlled prior to admission with glycohemoglobin of 12  Continue sliding scale insulin- goal sugar > 80< 180    Change to outp regimen     SVT (supraventricular tachycardia) (HCC)- (present on admission)   Assessment & Plan    Vent rate well controlled this week but has sinus tach ? reasons     Sepsis (HCC)- (present on admission)   Assessment & Plan    Severe sepsis secondary to staph/strep pneumonia that is bilateral and causing ARDS and acute hypoxic respiratory failure. Clnically improving. Continue antibiotics     Hypokalemia- (present on admission)   Assessment & Plan    Replete          VTE:  Lovenox  Ulcer: H2 Antagonist  Lines: Central Line  Ongoing indication addressed, Arterial Line  Ongoing indication addressed and Thomas Catheter  Ongoing indication addressed    I have performed a physical exam and reviewed and updated ROS and Plan today  (1/4/2019). In review of yesterday's note (1/3/2019), there are no changes except as documented above.     I have assessed and reassessed his respiratory status with ventilator adjustments, ventilator waveforms, airway mechanics, blood pressure, hemodynamics, cardiovascular status with titration of phenylephrine and neurologic status with titration of propofol.  He is at increased risk for worsening respiratory and cardiovascular system dysfunction.    Discussed patient condition and risk of morbidity and/or mortality with Family, RN, RT, Pharmacy, Charge nurse / hot rounds, QA team and [facility]

## 2019-01-05 NOTE — PROGRESS NOTES
Pt c/o abdominal pain w/o nausea; abdomen soft and tender; no distention; bowel sounds hypoactive. Vitals stable. TF slowed down to 25 at initial complaint around 1500. Pt still experiencing pain at 1700. Updated Dr. Cross. Order to stop tube feeding until reassessment tomorrow + abdominal xray. Tube feeding stopped and order for xray placed.

## 2019-01-05 NOTE — PROGRESS NOTES
Pt complaining of new pain in right groin. When site inspected, large hematoma seen. Vitals stable, heat pack and sand bag applied. Cardiology notified, no new orders per Nelly PORTILLO

## 2019-01-05 NOTE — PROGRESS NOTES
Critical Care Progress Note    Date of admission  12/26/2018    Chief Complaint  43 y.o. male admitted 12/26/2018 with chest pain.    Hospital Course    This gentleman was admitted to the hospital with an acute non-STEMI.  He was found to have significant CAD.  He underwent CABG.      Interval Problem Update  Reviewed last 24 hour events:  Oriented, somewhat slow but fully oriented  Non-focal neuro  Minor slip when trying to walk (no injury)  Up to chair  Room air  Constipatated  Sinus tach persists 110-135  Urine 1000    Prior 24 hours  CT head neg acute  Remains somewhat lethargic  Persistent sinus tach 120-130  Stopped diuresis  ON metop oral  Mobility            Review of Systems  Review of Systems   Unable to perform ROS: Intubated   Constitutional: Negative.    HENT: Negative.    Eyes: Negative.    Cardiovascular: Negative.    Gastrointestinal: Negative.    Musculoskeletal: Negative.    Skin: Negative.    Neurological: Negative.    Psychiatric/Behavioral: Substance abuse:           Vital Signs for last 24 hours   Temp:  [36.4 °C (97.5 °F)-37.2 °C (99 °F)] 37.1 °C (98.8 °F)  Pulse:  [] 121  Resp:  [10-26] 22    Hemodynamic parameters for last 24 hours       Respiratory       Physical Exam   Physical Exam   Constitutional: He appears well-developed and well-nourished.   On ventilator   HENT:   Head: Normocephalic.   Right Ear: External ear normal.   Left Ear: External ear normal.   Mouth/Throat: No oropharyngeal exudate.   Eyes: Pupils are equal, round, and reactive to light. Right eye exhibits no discharge. Left eye exhibits no discharge. No scleral icterus.   Neck: Normal range of motion. Neck supple. No JVD present. No tracheal deviation present.   Cardiovascular: Intact distal pulses.  Exam reveals no gallop.    Sinus tachycardia   Pulmonary/Chest: He has no wheezes. He has rales (Coarse and fine crackles bilaterally).   On vent   Abdominal: Soft. Bowel sounds are normal. He exhibits no distension.  There is no tenderness. There is no guarding.   Gastric tube to suction   Musculoskeletal: He exhibits no tenderness or deformity.   No clubbing or cyanosis   Neurological: He is alert.   Sedated to Rass minus 2   Skin: Skin is warm and dry. No rash noted. He is not diaphoretic.   Nursing note and vitals reviewed.      Medications  Current Facility-Administered Medications   Medication Dose Route Frequency Provider Last Rate Last Dose   • lisinopril (PRINIVIL) 10 MG tablet 10 mg  10 mg Oral BID Herminia Queen M.D.   10 mg at 01/05/19 0755   • metoprolol (LOPRESSOR) tablet 50 mg  50 mg Feeding Tube Q8HRS Herminia Queen M.D.   50 mg at 01/05/19 1254   • Metoprolol Tartrate (LOPRESSOR) injection 5 mg  5 mg Intravenous Q4HRS PRN Herminia Queen M.D.       • [START ON 1/6/2019] insulin glargine (LANTUS) injection 30 Units  30 Units Subcutaneous QAM INSULIN Rikki Cross M.D.        And   • insulin lispro (HUMALOG) injection 3-14 Units  3-14 Units Subcutaneous Q6HRS Rikki Cross M.D.   7 Units at 01/05/19 1256    And   • glucose 4 g chewable tablet 16 g  16 g Oral Q15 MIN PRN Rikki Cross M.D.        And   • dextrose 50% (D50W) injection 25 mL  25 mL Intravenous Q15 MIN PRN Rikki Cross M.D.       • ipratropium-albuterol (DUONEB) nebulizer solution  3 mL Nebulization Q4H PRN (RT) Hieu Vallejo M.D.       • enalaprilat (VASOTEC) injection 2.5 mg  2.5 mg Intravenous Q6HRS PRN Herminia Queen M.D.   2.5 mg at 01/02/19 1105   • ceFAZolin in dextrose (ANCEF) IVPB premix 2 g  2 g Intravenous Q8HRS Rikki Cross M.D.   Stopped at 01/05/19 0546   • Respiratory Care per Protocol   Nebulization Continuous RT Hieu Vallejo M.D.       • senna-docusate (PERICOLACE or SENOKOT S) 8.6-50 MG per tablet 2 Tab  2 Tab Feeding Tube BID Hieu Vallejo M.D.   2 Tab at 01/05/19 0457    And   • polyethylene glycol/lytes (MIRALAX) PACKET 1 Packet  1 Packet Feeding Tube QDAY PRN  Hieu Vallejo M.D.   1 Packet at 01/02/19 1054    And   • magnesium hydroxide (MILK OF MAGNESIA) suspension 30 mL  30 mL Feeding Tube QDAY PRN Hieu Vallejo M.D.   30 mL at 01/04/19 0530    And   • bisacodyl (DULCOLAX) suppository 10 mg  10 mg Rectal QDAY PRN Hieu Vallejo M.D.       • ipratropium-albuterol (DUONEB) nebulizer solution  3 mL Nebulization Q2HRS PRN (RT) Hieu Vallejo M.D.       • Pharmacy Consult: Enteral tube feeding - review meds/change route/product selection   Other PRN Hieu Vallejo M.D.       • acetaminophen (TYLENOL) tablet 650 mg  650 mg Feeding Tube Q4HRS PRN Hieu Vallejo M.D.        Or   • acetaminophen (TYLENOL) suppository 650 mg  650 mg Rectal Q4HRS PRN Hieu Vallejo M.D.       • aspirin (ASA) chewable tab 81 mg  81 mg Per NG Tube DAILY Hieu Vallejo M.D.   81 mg at 01/05/19 0457   • atorvastatin (LIPITOR) tablet 80 mg  80 mg Per NG Tube DAILY Hieu Vallejo M.D.   80 mg at 01/05/19 0457   • clopidogrel (PLAVIX) tablet 75 mg  75 mg Feeding Tube DAILY Hieu Vallejo M.D.   75 mg at 01/05/19 0457   • mag hydrox-al hydrox-simeth (MAALOX PLUS ES or MYLANTA DS) suspension 30 mL  30 mL Per NG Tube Q4HRS PRN Hieu Vallejo M.D.       • NS infusion   Intravenous Continuous Tomas Barrios M.D.   Stopped at 12/29/18 1000   • Pharmacy Consult Request ...Pain Management Review 1 Each  1 Each Other PRN Tomas Barrios M.D.       • enoxaparin (LOVENOX) inj 40 mg  40 mg Subcutaneous QDAY Tomas Barrios M.D.   40 mg at 01/04/19 1728   • ondansetron (ZOFRAN) syringe/vial injection 4 mg  4 mg Intravenous Q6HRS PRN Tomas Barrios M.D.   4 mg at 12/29/18 1831    Or   • prochlorperazine (COMPAZINE) injection 10 mg  10 mg Intravenous Q6HRS PRN Tomas Barrios M.D.   10 mg at 12/29/18 0004    Or   • promethazine (PHENERGAN) suppository 25 mg  25 mg Rectal Q6HRS PRN Tomas Barrios M.D.   25 mg at 01/05/19 1003    • lidocaine (LIDODERM) 5 % 1 Patch  1 Patch Transdermal Q24HR MARYJANE AlejoNDeepti   1 Patch at 01/04/19 2201       Fluids    Intake/Output Summary (Last 24 hours) at 01/05/19 1440  Last data filed at 01/05/19 1208   Gross per 24 hour   Intake              505 ml   Output             1675 ml   Net            -1170 ml       Laboratory          Recent Labs      01/03/19   0415  01/04/19   0525  01/05/19   0515   SODIUM  136  140  138   POTASSIUM  3.5*  3.3*  3.7   CHLORIDE  94*  97  99   CO2  32  30  26   BUN  24*  33*  39*   CREATININE  0.53  0.51  0.62   MAGNESIUM  1.8  1.8  1.9   PHOSPHORUS  3.0  2.8  3.8   CALCIUM  9.6  9.2  9.5     Recent Labs      01/03/19   0415  01/04/19   0525  01/05/19   0515   GLUCOSE  342*  312*  307*     Recent Labs      01/03/19   0415  01/04/19   0525  01/05/19   0515   WBC  14.2*  14.2*  18.5*     Recent Labs      01/03/19   0415  01/04/19   0525  01/05/19   0515   RBC  4.23*  3.94*  3.78*   HEMOGLOBIN  13.1*  12.1*  11.7*   HEMATOCRIT  40.1*  37.0*  34.9*   PLATELETCT  352  374  455*       Imaging  X-Ray:  I have personally reviewed the images and compared with prior images. and My impression is: Diffuse right and left lower lobe opacities    Assessment/Plan  * NSTEMI (non-ST elevated myocardial infarction) (HCC)- (present on admission)   Assessment & Plan    Continue aspirin, statin and Plavix     Acute hypoxemic respiratory failure (HCC)   Assessment & Plan    Reintubated on 12/29  Continue full vent support  All appropriate ventilator bundles are in place  ARDS net vent strategy    Trial of extubation today     Ventilator dependent (HCC)   Assessment & Plan    Room air, good pulmonary toilet     S/P CABG x 3   Assessment & Plan    Doing much better- still slow but gaining strengh     Acute systolic congestive heart failure (HCC)   Assessment & Plan    EF 25%  Intra-aortic balloon pump removed on 12/29     Pneumonia   Assessment & Plan    Staph/Strep bilateral pneumonia (MSSA)-  7 days of Ancef  Doing well on antibiotic     Type 2 diabetes mellitus without complication (HCC)- (present on admission)   Assessment & Plan    Poorly controlled prior to admission with glycohemoglobin of 12  Continue sliding scale insulin- goal sugar > 80< 180    Change to outp regimen     SVT (supraventricular tachycardia) (HCC)- (present on admission)   Assessment & Plan    Vent rate well controlled this week but has sinus tach ? Reasons    Remains a concern  Cardiology using beta blockers     Sepsis (HCC)- (present on admission)   Assessment & Plan    Sepsis resolved     Hypokalemia- (present on admission)   Assessment & Plan    Replete          VTE:  Lovenox  Ulcer: H2 Antagonist  Lines: Central Line  Ongoing indication addressed, Arterial Line  Ongoing indication addressed and Thomas Catheter  Ongoing indication addressed    I have performed a physical exam and reviewed and updated ROS and Plan today (1/5/2019). In review of yesterday's note (1/4/2019), there are no changes except as documented above.     I have assessed and reassessed his respiratory status with ventilator adjustments, ventilator waveforms, airway mechanics, blood pressure, hemodynamics, cardiovascular status with titration of phenylephrine and neurologic status with titration of propofol.  He is at increased risk for worsening respiratory and cardiovascular system dysfunction.    Discussed patient condition and risk of morbidity and/or mortality with Family, RN, RT, Pharmacy, Charge nurse / hot rounds, QA team and [facility]

## 2019-01-05 NOTE — CARE PLAN
Problem: Safety  Goal: Will remain free from falls    Intervention: Assess risk factors for falls  Pt is up in chair with chair alarm in place, high fall interventions are in place.       Problem: Pain Management  Goal: Pain level will decrease to patient's comfort goal    Intervention: Educate and implement non-pharmacologic comfort measures. Examples: relaxation, distration, play therapy, activity therapy, massage, etc.  Pt has new abdominal pain, used non-pharmacological interventions with some relief reported.

## 2019-01-05 NOTE — DISCHARGE PLANNING
Aware of PMR referral from Nelly PORTILLO. CABG x3 on 12/28. Current documentation does not reflect tolerance/participation for IRF level therapy regime. Would appreciate updated PT/OT evals to reflect current functional/mobility status. No Physiatry consult ordered as yet per protocol. [facility] following for updated therapy recommendations. Thank you for the referral.

## 2019-01-05 NOTE — PROGRESS NOTES
Cardiology Follow Up Progress Note    Date of Service  1/5/2019    Attending Physician  Tomas Barrios M.D.    Chief Complaint   SVT transferred from [place] ER for troponin >4    HPI  Rikki Khalil is a 43 y.o. male w poorly controlled DMII who lives in [place] with his wife, admitted 12/26/2018 with NSTEMI/cp and SVT -- echo at admit showed EF ~25%, angio showed severe 3VD and he went for CABG x3 on 12/28 (LIMA/LAD, SVG/dx, SVG/dRCA).  -Reintubated for respiratory reasons POD1.  -Persistent tachycardia and hypertension despite ejection fraction remaining less than 30% on repeat echocardiograms.  -Aggressive treatment from intensivist team for positive sputum cultures and concern for ARDS  -Extubated January 2  -Very slow in getting oriented mental status wise    Interim Events    No changes, heart rate 100 while sleeping now better with metoprolol, up to 150 while getting into chair    Review of Systems  Review of Systems   Constitutional: Negative for activity change and appetite change.   HENT: Negative for congestion, drooling and mouth sores.    Eyes: Negative.    Respiratory: Negative for apnea, chest tightness and shortness of breath.    Cardiovascular: Negative for chest pain, palpitations and leg swelling.   Gastrointestinal: Negative for abdominal distention and abdominal pain.   Endocrine: Negative for cold intolerance.   Genitourinary: Negative for difficulty urinating and dysuria.   Musculoskeletal: Negative for arthralgias and myalgias.   Skin: Negative for color change and pallor.   Neurological: Negative for dizziness and facial asymmetry.   Hematological: Negative for adenopathy. Does not bruise/bleed easily.   Psychiatric/Behavioral: Negative for agitation, confusion and dysphoric mood. The patient is not nervous/anxious.    All other systems reviewed and are negative.      Vital signs in last 24 hours  Temp:  [36.4 °C (97.5 °F)-37.2 °C (99 °F)] 36.9 °C (98.4 °F)  Pulse:  []  117  Resp:  [12-26] 22    Physical Exam  Physical Exam   Constitutional: He appears well-developed and well-nourished.   Out of bed in chair, still has core track in his nose, patient seen again today and examined.  Changes noted.  More animated nonfocal   HENT:   Head: Normocephalic and atraumatic.   Mouth/Throat: No oropharyngeal exudate.   Very poor dentition   Eyes: Pupils are equal, round, and reactive to light. Conjunctivae and EOM are normal.   Neck: Neck supple. No JVD present. No thyromegaly present.   Cardiovascular: Regular rhythm and intact distal pulses.    , No changeSinus tachycardia at 115   Pulmonary/Chest: Breath sounds normal. No respiratory distress. He exhibits no tenderness.   woundvac intact   Abdominal: Soft. Bowel sounds are normal. He exhibits no distension. There is no tenderness.   Musculoskeletal: He exhibits no edema or tenderness.   Lymphadenopathy:     He has no cervical adenopathy.   Neurological: He exhibits normal muscle tone. Coordination normal.   Skin: Skin is warm and dry. No rash noted.   Psychiatric: Thought content normal.       Lab Review  Lab Results   Component Value Date/Time    WBC 18.5 (H) 01/05/2019 05:15 AM    RBC 3.78 (L) 01/05/2019 05:15 AM    HEMOGLOBIN 11.7 (L) 01/05/2019 05:15 AM    HEMATOCRIT 34.9 (L) 01/05/2019 05:15 AM    MCV 92.3 01/05/2019 05:15 AM    MCH 31.0 01/05/2019 05:15 AM    MCHC 33.5 (L) 01/05/2019 05:15 AM    MPV 10.5 01/05/2019 05:15 AM      Lab Results   Component Value Date/Time    SODIUM 138 01/05/2019 05:15 AM    POTASSIUM 3.7 01/05/2019 05:15 AM    CHLORIDE 99 01/05/2019 05:15 AM    CO2 26 01/05/2019 05:15 AM    GLUCOSE 307 (H) 01/05/2019 05:15 AM    BUN 39 (H) 01/05/2019 05:15 AM    CREATININE 0.62 01/05/2019 05:15 AM      Lab Results   Component Value Date/Time    ASTSGOT 122 (H) 12/31/2018 05:04 AM    ALTSGPT 158 (H) 12/31/2018 05:04 AM     Lab Results   Component Value Date/Time    CHOLSTRLTOT 79 (L) 12/27/2018 04:28 AM    LDL 42  12/27/2018 04:28 AM    HDL 22 (A) 12/27/2018 04:28 AM    TRIGLYCERIDE 78 12/31/2018 05:04 AM    TROPONINI 6.13 (H) 12/27/2018 08:02 AM             Cardiac Imaging and Procedures Review  EKG:  My personal interpretation of the EKG dated January 3 shows ectopic atrial tachycardia, unlikely atypical flutter    Echocardiogram: Repeated again January 1: EF ~25%.  No change at all from priors.  Akinesis of the anterior wall and significant right ventricular dilation dysfunction, pulmonary pressures not estimated    Cardiac Catheterization:  See HPI    Imaging  Chest X-Ray: Yesterday January 3, worsening atelectasis    Assessment/Plan    -CAD/NSTEMI  -severe 3VD requiring urgent revasc with CABG  -CHF, systolic  -Dual antiplatelet therapy, statin  -No evidence of ongoing ischemia, tolerating short acting beta-blocker and ACE inhibitor, converting to p.o.    CAD  -Status post aggressive revascularization in the setting of late presenting non-STEMI, as above  -Asa/plavix  -Statin  -Gentle beta-blockade, off pressors all week    Tachycardia  -EKG still reveals no evidence that would be conclusive for atypical flutter, repeat today  -Treat as if it is sinus, aggressive diuresis for not only pulmonary edema which is suspected but also ARDS, doubt PE as he has been on prophylaxis  -Also managing for potential withdrawal, agitation/pain, persistent ARDS/sepsis    CHF, systolic, now subacute  -No change on repeat echo Monday  -Ejection fraction still less than 30%, severe systolic dysfunction  continue intravenous aggressive Lasix 80 mg, close eye on GFR which is currently normal, likely switch to 40 daily tomorrow    Hypertension  Much better on current regimen, advancing p.o. again today  Increase PO meds    Shock  Resolved, likely multifactorial with right and left ventricular failure as well as ARDS  Gentle heart failure treatment    Unfortunately, prognosis still very guarded this week.  Thank you for allowing me to  participate in the care of this patient.  Please contact me with any questions.    Herminia Queen M.D.   Cardiologist, [facility]  [phone]

## 2019-01-06 LAB
ANION GAP SERPL CALC-SCNC: 9 MMOL/L (ref 0–11.9)
BUN SERPL-MCNC: 44 MG/DL (ref 8–22)
CALCIUM SERPL-MCNC: 8.6 MG/DL (ref 8.5–10.5)
CHLORIDE SERPL-SCNC: 96 MMOL/L (ref 96–112)
CO2 SERPL-SCNC: 27 MMOL/L (ref 20–33)
CREAT SERPL-MCNC: 0.63 MG/DL (ref 0.5–1.4)
EKG IMPRESSION: NORMAL
ERYTHROCYTE [DISTWIDTH] IN BLOOD BY AUTOMATED COUNT: 44.1 FL (ref 35.9–50)
GLUCOSE BLD-MCNC: 188 MG/DL (ref 65–99)
GLUCOSE BLD-MCNC: 200 MG/DL (ref 65–99)
GLUCOSE BLD-MCNC: 204 MG/DL (ref 65–99)
GLUCOSE BLD-MCNC: 245 MG/DL (ref 65–99)
GLUCOSE SERPL-MCNC: 227 MG/DL (ref 65–99)
HCT VFR BLD AUTO: 30.6 % (ref 42–52)
HGB BLD-MCNC: 10.1 G/DL (ref 14–18)
MAGNESIUM SERPL-MCNC: 1.9 MG/DL (ref 1.5–2.5)
MCH RBC QN AUTO: 30.5 PG (ref 27–33)
MCHC RBC AUTO-ENTMCNC: 33 G/DL (ref 33.7–35.3)
MCV RBC AUTO: 92.4 FL (ref 81.4–97.8)
PHOSPHATE SERPL-MCNC: 3.4 MG/DL (ref 2.5–4.5)
PLATELET # BLD AUTO: 417 K/UL (ref 164–446)
PMV BLD AUTO: 10.7 FL (ref 9–12.9)
POTASSIUM SERPL-SCNC: 3.1 MMOL/L (ref 3.6–5.5)
RBC # BLD AUTO: 3.31 M/UL (ref 4.7–6.1)
SODIUM SERPL-SCNC: 132 MMOL/L (ref 135–145)
WBC # BLD AUTO: 20.1 K/UL (ref 4.8–10.8)

## 2019-01-06 PROCEDURE — 770020 HCHG ROOM/CARE - TELE (206)

## 2019-01-06 PROCEDURE — 700102 HCHG RX REV CODE 250 W/ 637 OVERRIDE(OP): Performed by: INTERNAL MEDICINE

## 2019-01-06 PROCEDURE — 700101 HCHG RX REV CODE 250: Performed by: INTERNAL MEDICINE

## 2019-01-06 PROCEDURE — 700111 HCHG RX REV CODE 636 W/ 250 OVERRIDE (IP): Performed by: INTERNAL MEDICINE

## 2019-01-06 PROCEDURE — A9270 NON-COVERED ITEM OR SERVICE: HCPCS | Performed by: INTERNAL MEDICINE

## 2019-01-06 PROCEDURE — 84100 ASSAY OF PHOSPHORUS: CPT

## 2019-01-06 PROCEDURE — 94760 N-INVAS EAR/PLS OXIMETRY 1: CPT

## 2019-01-06 PROCEDURE — 99233 SBSQ HOSP IP/OBS HIGH 50: CPT | Performed by: INTERNAL MEDICINE

## 2019-01-06 PROCEDURE — 80048 BASIC METABOLIC PNL TOTAL CA: CPT

## 2019-01-06 PROCEDURE — 700102 HCHG RX REV CODE 250 W/ 637 OVERRIDE(OP): Performed by: NURSE PRACTITIONER

## 2019-01-06 PROCEDURE — 93010 ELECTROCARDIOGRAM REPORT: CPT | Performed by: INTERNAL MEDICINE

## 2019-01-06 PROCEDURE — 51798 US URINE CAPACITY MEASURE: CPT

## 2019-01-06 PROCEDURE — 700105 HCHG RX REV CODE 258

## 2019-01-06 PROCEDURE — 93005 ELECTROCARDIOGRAM TRACING: CPT | Performed by: INTERNAL MEDICINE

## 2019-01-06 PROCEDURE — 83735 ASSAY OF MAGNESIUM: CPT

## 2019-01-06 PROCEDURE — 85027 COMPLETE CBC AUTOMATED: CPT

## 2019-01-06 PROCEDURE — 82962 GLUCOSE BLOOD TEST: CPT | Mod: 91

## 2019-01-06 PROCEDURE — 700111 HCHG RX REV CODE 636 W/ 250 OVERRIDE (IP): Performed by: THORACIC SURGERY (CARDIOTHORACIC VASCULAR SURGERY)

## 2019-01-06 PROCEDURE — 700101 HCHG RX REV CODE 250: Performed by: CLINICAL NURSE SPECIALIST

## 2019-01-06 PROCEDURE — A9270 NON-COVERED ITEM OR SERVICE: HCPCS | Performed by: NURSE PRACTITIONER

## 2019-01-06 RX ORDER — INSULIN GLARGINE 100 [IU]/ML
10 INJECTION, SOLUTION SUBCUTANEOUS ONCE
Status: COMPLETED | OUTPATIENT
Start: 2019-01-06 | End: 2019-01-06

## 2019-01-06 RX ORDER — MAGNESIUM SULFATE HEPTAHYDRATE 40 MG/ML
2 INJECTION, SOLUTION INTRAVENOUS ONCE
Status: COMPLETED | OUTPATIENT
Start: 2019-01-06 | End: 2019-01-06

## 2019-01-06 RX ORDER — LISINOPRIL 5 MG/1
5 TABLET ORAL 2 TIMES DAILY
Status: DISCONTINUED | OUTPATIENT
Start: 2019-01-06 | End: 2019-01-10 | Stop reason: HOSPADM

## 2019-01-06 RX ORDER — METOPROLOL SUCCINATE 25 MG/1
50 TABLET, EXTENDED RELEASE ORAL
Status: DISCONTINUED | OUTPATIENT
Start: 2019-01-06 | End: 2019-01-07

## 2019-01-06 RX ORDER — DEXTROSE MONOHYDRATE 25 G/50ML
25 INJECTION, SOLUTION INTRAVENOUS
Status: DISCONTINUED | OUTPATIENT
Start: 2019-01-06 | End: 2019-01-07

## 2019-01-06 RX ORDER — TAMSULOSIN HYDROCHLORIDE 0.4 MG/1
0.4 CAPSULE ORAL
Status: DISCONTINUED | OUTPATIENT
Start: 2019-01-06 | End: 2019-01-07

## 2019-01-06 RX ORDER — INSULIN GLARGINE 100 [IU]/ML
40 INJECTION, SOLUTION SUBCUTANEOUS
Status: DISCONTINUED | OUTPATIENT
Start: 2019-01-07 | End: 2019-01-07

## 2019-01-06 RX ORDER — ENEMA 19; 7 G/133ML; G/133ML
1 ENEMA RECTAL ONCE
Status: COMPLETED | OUTPATIENT
Start: 2019-01-06 | End: 2019-01-06

## 2019-01-06 RX ORDER — SODIUM CHLORIDE 9 MG/ML
INJECTION, SOLUTION INTRAVENOUS
Status: COMPLETED
Start: 2019-01-06 | End: 2019-01-06

## 2019-01-06 RX ADMIN — ATORVASTATIN CALCIUM 80 MG: 80 TABLET, FILM COATED ORAL at 05:27

## 2019-01-06 RX ADMIN — INSULIN GLARGINE 30 UNITS: 100 INJECTION, SOLUTION SUBCUTANEOUS at 05:29

## 2019-01-06 RX ADMIN — LIDOCAINE 1 PATCH: 50 PATCH TOPICAL at 21:15

## 2019-01-06 RX ADMIN — ENOXAPARIN SODIUM 40 MG: 100 INJECTION SUBCUTANEOUS at 18:02

## 2019-01-06 RX ADMIN — MAGNESIUM SULFATE IN WATER 2 G: 40 INJECTION, SOLUTION INTRAVENOUS at 10:05

## 2019-01-06 RX ADMIN — CEFAZOLIN SODIUM 2 G: 2 INJECTION, SOLUTION INTRAVENOUS at 05:28

## 2019-01-06 RX ADMIN — POTASSIUM BICARBONATE 25 MEQ: 25 TABLET, EFFERVESCENT ORAL at 13:45

## 2019-01-06 RX ADMIN — POTASSIUM BICARBONATE 25 MEQ: 25 TABLET, EFFERVESCENT ORAL at 18:03

## 2019-01-06 RX ADMIN — METOPROLOL TARTRATE 50 MG: 50 TABLET ORAL at 05:34

## 2019-01-06 RX ADMIN — STANDARDIZED SENNA CONCENTRATE AND DOCUSATE SODIUM 2 TABLET: 8.6; 5 TABLET, FILM COATED ORAL at 05:27

## 2019-01-06 RX ADMIN — CLOPIDOGREL 75 MG: 75 TABLET, FILM COATED ORAL at 05:27

## 2019-01-06 RX ADMIN — TAMSULOSIN HYDROCHLORIDE 0.4 MG: 0.4 CAPSULE ORAL at 09:30

## 2019-01-06 RX ADMIN — POTASSIUM BICARBONATE 25 MEQ: 25 TABLET, EFFERVESCENT ORAL at 10:04

## 2019-01-06 RX ADMIN — SODIUM CHLORIDE: 9 INJECTION, SOLUTION INTRAVENOUS at 13:37

## 2019-01-06 RX ADMIN — CEFAZOLIN SODIUM 2 G: 2 INJECTION, SOLUTION INTRAVENOUS at 13:36

## 2019-01-06 RX ADMIN — INSULIN GLARGINE 10 UNITS: 100 INJECTION, SOLUTION SUBCUTANEOUS at 13:27

## 2019-01-06 RX ADMIN — CEFAZOLIN SODIUM 2 G: 2 INJECTION, SOLUTION INTRAVENOUS at 21:14

## 2019-01-06 RX ADMIN — ASPIRIN 81 MG 81 MG: 81 TABLET ORAL at 05:28

## 2019-01-06 RX ADMIN — STANDARDIZED SENNA CONCENTRATE AND DOCUSATE SODIUM 2 TABLET: 8.6; 5 TABLET, FILM COATED ORAL at 18:02

## 2019-01-06 RX ADMIN — SODIUM PHOSPHATE 133 ML: 7; 19 ENEMA RECTAL at 15:40

## 2019-01-06 ASSESSMENT — ENCOUNTER SYMPTOMS
COLOR CHANGE: 0
ARTHRALGIAS: 0
APNEA: 0
CONFUSION: 0
WEAKNESS: 0
DYSPHORIC MOOD: 0
PSYCHIATRIC NEGATIVE: 1
NEUROLOGICAL NEGATIVE: 1
ADENOPATHY: 0
PALPITATIONS: 0
MUSCULOSKELETAL NEGATIVE: 1
CHEST TIGHTNESS: 0
SLEEP DISTURBANCE: 0
ABDOMINAL DISTENTION: 0
CONSTITUTIONAL NEGATIVE: 1
FLANK PAIN: 0
EYES NEGATIVE: 1
CARDIOVASCULAR NEGATIVE: 1
ACTIVITY CHANGE: 0
BRUISES/BLEEDS EASILY: 0
RESPIRATORY NEGATIVE: 1
ABDOMINAL PAIN: 0
DIZZINESS: 0
MYALGIAS: 0
NERVOUS/ANXIOUS: 0
GASTROINTESTINAL NEGATIVE: 1
AGITATION: 0
APPETITE CHANGE: 0
SHORTNESS OF BREATH: 0

## 2019-01-06 ASSESSMENT — PAIN SCALES - GENERAL
PAINLEVEL_OUTOF10: 0
PAINLEVEL_OUTOF10: 1
PAINLEVEL_OUTOF10: 0
PAINLEVEL_OUTOF10: 1
PAINLEVEL_OUTOF10: 0

## 2019-01-06 NOTE — CARE PLAN
Problem: Post Op Day 4 CABG/Heart Valve Replacement  Goal: Optimal care of the Post Op CABG/Heart Valve replacement Post Op Day 4  Outcome: PROGRESSING SLOWER THAN EXPECTED    Intervention: Up in chair for all meals  Up in chair for breakfast  Intervention: IS q 1 hour while awake and record best IS volume  2000 ml

## 2019-01-06 NOTE — PROGRESS NOTES
Cardiology Follow Up Progress Note    Date of Service  1/6/2019    Attending Physician  Tomas Barrios M.D.    Chief Complaint   SVT transferred from [place] ER for troponin >4    HPI  Rikki Khalil is a 43 y.o. male w poorly controlled DMII who lives in [place] with his wife, admitted 12/26/2018 with NSTEMI/cp and SVT -- echo at admit showed EF ~25%, angio showed severe 3VD and he went for CABG x3 on 12/28 (LIMA/LAD, SVG/dx, SVG/dRCA).  -Reintubated for respiratory reasons POD1.  -Persistent tachycardia and hypertension despite ejection fraction remaining less than 30% on repeat echocardiograms.  -Aggressive treatment from intensivist team for positive sputum cultures and concern for ARDS  -Extubated January 2  -Very slow in getting oriented mental status wise    Interim Events    WCC up to 20, no fevers  OOB, more lucid again  lisin held with low BP    Review of Systems  Review of Systems   Constitutional: Negative for activity change and appetite change.   HENT: Negative for congestion, drooling and mouth sores.    Eyes: Negative.    Respiratory: Negative for apnea, chest tightness and shortness of breath.    Cardiovascular: Negative for chest pain, palpitations and leg swelling.   Gastrointestinal: Negative for abdominal distention and abdominal pain.   Endocrine: Negative for cold intolerance, heat intolerance and polyuria.   Genitourinary: Negative for difficulty urinating, dysuria and flank pain.   Musculoskeletal: Negative for arthralgias and myalgias.   Skin: Negative for color change and pallor.   Neurological: Negative for dizziness and weakness.   Hematological: Negative for adenopathy. Does not bruise/bleed easily.   Psychiatric/Behavioral: Negative for agitation, confusion, dysphoric mood and sleep disturbance. The patient is not nervous/anxious.    All other systems reviewed and are negative.      Vital signs in last 24 hours  Temp:  [36.8 °C (98.3 °F)-36.9 °C (98.4 °F)] 36.8 °C (98.3  °F)  Pulse:  [100-133] 100  Resp:  [10-26] 23  BP: (92)/(72) 92/72    Physical Exam  Physical Exam   Constitutional: He appears well-developed and well-nourished.   Out of bed in chair - getting into bed - , still has cortrack in his nose, pt seen again today & examined - changes noted   HENT:   Head: Normocephalic and atraumatic.   Mouth/Throat: No oropharyngeal exudate.   Very poor dentition   Eyes: Pupils are equal, round, and reactive to light. Conjunctivae and EOM are normal.   Neck: Neck supple. No JVD present. No thyromegaly present.   Cardiovascular: Regular rhythm and intact distal pulses.    Regular narrow tachy   Pulmonary/Chest: Breath sounds normal. No respiratory distress. He exhibits no tenderness.   woundvac intact   Abdominal: Soft. Bowel sounds are normal. He exhibits no distension. There is no tenderness.   Musculoskeletal: Normal range of motion. He exhibits no edema.   Lymphadenopathy:     He has no cervical adenopathy.   Neurological: He is alert. He exhibits normal muscle tone. Coordination normal.   Skin: Skin is warm and dry. No rash noted.   Psychiatric: Thought content normal.       Lab Review  Lab Results   Component Value Date/Time    WBC 20.1 (H) 01/06/2019 04:10 AM    RBC 3.31 (L) 01/06/2019 04:10 AM    HEMOGLOBIN 10.1 (L) 01/06/2019 04:10 AM    HEMATOCRIT 30.6 (L) 01/06/2019 04:10 AM    MCV 92.4 01/06/2019 04:10 AM    MCH 30.5 01/06/2019 04:10 AM    MCHC 33.0 (L) 01/06/2019 04:10 AM    MPV 10.7 01/06/2019 04:10 AM      Lab Results   Component Value Date/Time    SODIUM 132 (L) 01/06/2019 04:10 AM    POTASSIUM 3.1 (L) 01/06/2019 04:10 AM    CHLORIDE 96 01/06/2019 04:10 AM    CO2 27 01/06/2019 04:10 AM    GLUCOSE 227 (H) 01/06/2019 04:10 AM    BUN 44 (H) 01/06/2019 04:10 AM    CREATININE 0.63 01/06/2019 04:10 AM      Lab Results   Component Value Date/Time    ASTSGOT 122 (H) 12/31/2018 05:04 AM    ALTSGPT 158 (H) 12/31/2018 05:04 AM     Lab Results   Component Value Date/Time     CHOLSTRLTOT 79 (L) 12/27/2018 04:28 AM    LDL 42 12/27/2018 04:28 AM    HDL 22 (A) 12/27/2018 04:28 AM    TRIGLYCERIDE 78 12/31/2018 05:04 AM    TROPONINI 6.13 (H) 12/27/2018 08:02 AM             Cardiac Imaging and Procedures Review  EKG:  My personal interpretation of the EKG dated January 3 shows ectopic atrial tachycardia, unlikely atypical flutter    Echocardiogram: Repeated again January 1: EF ~25%.  No change at all from priors.  Akinesis of the anterior wall and significant right ventricular dilation dysfunction, pulmonary pressures not estimated    Cardiac Catheterization:  See HPI    Imaging  Chest X-Ray: January 3, worsening atelectasis    Assessment/Plan    -CAD/NSTEMI  -severe 3VD requiring urgent revasc with CABG  -CHF, systolic  -Dual antiplatelet therapy, statin  -No evidence of ongoing ischemia, tolerating short acting beta-blocker and ACE inhibitor, converting to p.o.    CAD  -Status post aggressive revascularization in the setting of late presenting non-STEMI, as above  -Asa/plavix  -Statin  -Gentle beta-blockade, titrated up with good response    Tachycardia  -EKG still reveals no evidence that would be conclusive for atypical flutter, repeat today  -Treat as if it is sinus, aggressive diuresis for not only pulmonary edema which is suspected but also ARDS, doubt PE as he has been on prophylaxis  -Also managing for potential withdrawal, agitation/pain, persistent ARDS/sepsis    CHF, systolic, now subacute  -No change on repeat echo Monday  -Ejection fraction still less than 30%, severe systolic dysfunction  - 12L down LOS, continue intravenous aggressive Lasix 80 mg, close eye on GFR which is still normal  -asa, toprol (switched to today), ACEI - hold spironlactone with high risk for AKD and hyperkalemia while in house    Hypertension  Much better on current regimen, advancing p.o. again today  Increased PO meds    Shock  Resolved, likely multifactorial with right and left ventricular failure as  well as ARDS  Gentle heart failure treatment as above    Unfortunately, prognosis still very guarded this week.   Will need outpt consideration for ICD (our office) if LVEF remains low  Will s/o - d/w RN too   Thank you for allowing me to participate in the care of this patient.  Please contact me with any questions.    Herminia Queen M.D.   Cardiologist, [facility]  [phone]

## 2019-01-06 NOTE — CARE PLAN
Problem: Urinary Elimination:  Goal: Ability to reestablish a normal urinary elimination pattern will improve  Outcome: PROGRESSING SLOWER THAN EXPECTED  Thomas was removed this am, at this time has still not urinated. Six hour bladder scan showed 200mL, second bladder scan has been ordered.   Intervention: Assist patient to sit on commode or toilet for voiding  Pt was assisted to the C this evening, but was unable to urinate, bladder scan ordered. Will reassess when bladder scan results.       Problem: Skin Integrity  Goal: Risk for impaired skin integrity will decrease  Outcome: PROGRESSING AS EXPECTED  Pt has Nahid scale of 16, skin protocol is in place, waffle cushion, float heels and reposition.

## 2019-01-06 NOTE — CARE PLAN
Problem: Post Op Day 4 CABG/Heart Valve Replacement  Goal: Optimal care of the Post Op CABG/Heart Valve replacement Post Op Day 4  Outcome: PROGRESSING AS EXPECTED    Intervention: Daily Weights  Completed.   Intervention: Shower daily and clean incisions twice daily with soap and water  Pt unable to shower. Pt is only ambulating several steps at this time. Pt gets from bed to chair.   Intervention: Up in chair for all meals  Pt sat up to chair for 8 hours.   Intervention: Ambulate, increasing the distance each time x 3 and before bed  Pt ambulated several steps with PT. Pt mobilizing to chair and commode.   Intervention: IS q 1 hour while awake and record best IS volume  IS 2250.   Intervention: Consider removal of solitario, chest tube and pacer wire if not already done  Solitario removed today, bladder scan obtained after obtained after 6 hours, not a candidate for straight cath at this time.   Intervention: Discharge Education  Rehab referral.

## 2019-01-06 NOTE — PROGRESS NOTES
I/O cath performed per protocol.  500 urine output noted.  Patient states he has had trouble with urine retention post catheter removal in the past.  Bladder scan in 6 hrs to re-evaluate.

## 2019-01-06 NOTE — PROGRESS NOTES
Cardiovascular Surgery Progress Note    Name: Rikki Khalil  MRN: [id number]  : 1975  Admit Date: 2018  9:54 PM  Procedure:  Procedure(s) and Anesthesia Type:     * MULTIPLE CORONARY ARTERY BYPASS x 3,  ENDO VEIN HARVEST LEFT LEG - General     * DONNA - General  9 Day Post-Op    Vitals:  Patient Vitals for the past 8 hrs:   Temp SpO2 O2 Delivery Pulse Heart Rate (Monitored) Resp NIBP   19 0400 36.8 °C (98.3 °F) - None (Room Air) - - - -   19 0000 36.9 °C (98.4 °F) 94 % None (Room Air) 100 (!) 101 (!) 23 (!) 98/66     Temp (24hrs), Av.9 °C (98.5 °F), Min:36.8 °C (98.3 °F), Max:37.1 °C (98.8 °F)      Respiratory:    Respiration: (!) 23, Pulse Oximetry: 94 %, O2 Daily Delivery Respiratory : Room Air with O2 Available     Chest Tube Drains:          Fluids:    Intake/Output Summary (Last 24 hours) at 19 0648  Last data filed at 19 0500   Gross per 24 hour   Intake              350 ml   Output             1375 ml   Net            -1025 ml     Admit weight: Weight: 95.3 kg (210 lb)  Current weight: Weight: 83.1 kg (183 lb 3.2 oz) (19 0400)    Labs:  Recent Labs      19   0525  19   0515  19   0410   WBC  14.2*  18.5*  20.1*   RBC  3.94*  3.78*  3.31*   HEMOGLOBIN  12.1*  11.7*  10.1*   HEMATOCRIT  37.0*  34.9*  30.6*   MCV  93.9  92.3  92.4   MCH  30.7  31.0  30.5   MCHC  32.7*  33.5*  33.0*   RDW  44.8  43.8  44.1   PLATELETCT  374  455*  417   MPV  10.5  10.5  10.7         Recent Labs      19   0525  19   0515  19   0410   SODIUM  140  138  132*   POTASSIUM  3.3*  3.7  3.1*   CHLORIDE  97  99  96   CO2  30  26  27   GLUCOSE  312*  307*  227*   BUN  33*  39*  44*   CREATININE  0.51  0.62  0.63   CALCIUM  9.2  9.5  8.6           Medications:  • lisinopril  10 mg     • metoprolol  50 mg     • insulin glargine  30 Units      And   • insulin lispro  3-14 Units     • ceFAZolin  2 g 2 g (19 0528)   • senna-docusate  2 Tab     • aspirin  81  mg     • atorvastatin  80 mg     • clopidogrel  75 mg     • enoxaparin  40 mg     • lidocaine  1 Patch          Ordered Medications:    ASA - Yes    Plavix - Yes    Post-operative Beta Blockers - Yes    Ace Inhibitor -yes      Statin - Yes         Exam:   Review of Systems   Constitutional: Negative.    HENT: Negative.    Eyes: Negative.    Respiratory: Negative.    Cardiovascular: Negative.    Gastrointestinal: Negative.    Genitourinary: Negative.    Musculoskeletal: Negative.    Skin: Negative.    Neurological: Negative.    Endo/Heme/Allergies: Negative.    Psychiatric/Behavioral: Negative.        Physical Exam    Quality Measures:   Quality-Core Measures   Reviewed items::  EKG reviewed, Labs reviewed, Medications reviewed and Radiology images reviewed  Solitario catheter::  Strict Intake and Output During Sepisis or Shock  Central line in place:  Need for access  DVT prophylaxis pharmacological::  Contraindicated - High bleeding risk  DVT prophylaxis - mechanical:  SCDs  Ulcer Prophylaxis::  Yes  Assessed for rehabilitation services:  Patient returned to prior level of function, rehabilitation not indicated at this time      Assessment/Plan:  POD 1 HDS- no gtts- dc IABP, ST start metorolol, low EF add ace, Resp insuff- currently on bi-pap- diurese- pulm following pain- add ms contin, D/c Ct after OOB, keep solitario for strict I&O, enc IS  POD 2 HOTN - on devora gtt- give albumin this am, ST, re-intubated yesterday for resp distress, sedated, pain on fentanyl gtt, hyponatremia- hold diuresis today, solitario for strict I&O, CPM  POD 3 HOTN- devora gtt, vent- broch w/ copious amt of secretions yesterday, sedated - on propofol/fentanyl for pain, low UO- check solitario/gently diurese  POD 4 HTN- increase po meds, 's- start esmolol, vent- pulm following, sedated, given lasix this am, CPM  POD 5 HDS, intubated vented, respiratory cultures positive, hypertensive, neuro grossly intact, abd S/NT +BS.  Plan:  Increase lisinopril.   Since unit out of IV pushes of fentanyl will increase drip slightly for pain.  Wean vent as tolerated.  On abx for positive respiratory cultures.  CPM.   POD 6 extubated, HDS, ST rate 120's, very slow to answer questions, hypotensive today.  Lisinopril adjusted by cards.  Plan:  Will dc pacing wires and janee this afternoon.  DC more potent narcotics.    POD 7 HDS, SR, neuro- diff with speech- CT scan today, dysphagia- cortrak, PT/OT rec rehab- will place order today would expect ready for transfer by Sunday/monday.  POD 8 HDS, ST vs aflutter- ekg- cards following, neuro status improved- Head CT neg, right groin hematoma - w/w, dysphagia- cortrak, PT/OT to re-eval for rehab, amb, enc IS  POD 9 HDS, SR/ST- cards following, Dysphagia- cortrak, pneumonia- on atbx, urinary retention- solitario replaced/start flomax, OT to see today, awaiting acceptance to rehab  Patient seen, examined and plan reviewed with midlevel provider. I agree with the plan.      Active Hospital Problems    Diagnosis   • Acute hypoxemic respiratory failure ([facility]) [J96.01]     Priority: High   • S/P CABG x 3 [Z95.1]     Priority: High   • Ventilator dependent ([facility]) [Z99.11]     Priority: High   • NSTEMI (non-ST elevated myocardial infarction) ([facility]) [I21.4]     Priority: High   • Acute systolic congestive heart failure ([facility]) [I50.21]     Priority: High   • Pneumonia [J18.9]     Priority: Medium   • Atelectasis [J98.11]     Priority: Medium   • SVT (supraventricular tachycardia) ([facility]) [I47.1]     Priority: Medium   • Type 2 diabetes mellitus without complication ([facility]) [E11.9]     Priority: Medium   • Hypokalemia [E87.6]     Priority: Low   • Postural dizziness with presyncope [R42, R55]     Priority: Low   • Sepsis ([facility]) [A41.9]

## 2019-01-06 NOTE — PROGRESS NOTES
Critical Care Progress Note    Date of admission  12/26/2018    Chief Complaint  43 y.o. male admitted 12/26/2018 with chest pain.    Hospital Course    This gentleman was admitted to the hospital with an acute non-STEMI.  He was found to have significant CAD.  He underwent CABG.      Interval Problem Update  Reviewed last 24 hour events:  Constipated  Walking better today  Slow but mentation picking up  O x 4  F/U speed eval pending  Receiving tube feeds      Prior 24 hours  Oriented, somewhat slow but fully oriented  Non-focal neuro  Minor slip when trying to walk (no injury)  Up to chair  Room air  Constipatated  Sinus tach persists 110-135  Urine 1000              Review of Systems  Review of Systems   Unable to perform ROS: Intubated   Constitutional: Negative.    HENT: Negative.    Eyes: Negative.    Cardiovascular: Negative.    Gastrointestinal: Negative.    Musculoskeletal: Negative.    Skin: Negative.    Neurological: Negative.    Psychiatric/Behavioral: Substance abuse:           Vital Signs for last 24 hours   Temp:  [36.4 °C (97.6 °F)-36.9 °C (98.4 °F)] 36.5 °C (97.7 °F)  Pulse:  [100-118] 113  Resp:  [18-39] 28    Hemodynamic parameters for last 24 hours       Respiratory       Physical Exam   Physical Exam   Constitutional: He is oriented to person, place, and time. He appears well-developed and well-nourished.   On ventilator   HENT:   Head: Normocephalic.   Right Ear: External ear normal.   Left Ear: External ear normal.   Mouth/Throat: No oropharyngeal exudate.   Eyes: Pupils are equal, round, and reactive to light. Right eye exhibits no discharge. Left eye exhibits no discharge. No scleral icterus.   Neck: Normal range of motion. Neck supple. No JVD present. No tracheal deviation present.   Cardiovascular: Intact distal pulses.  Exam reveals no gallop.    Sinus tachycardia   Pulmonary/Chest: He has no wheezes. He has rales (Coarse and fine crackles bilaterally).   On vent   Abdominal: Soft. Bowel  sounds are normal. He exhibits no distension. There is no tenderness. There is no guarding.   Gastric tube to suction   Musculoskeletal: He exhibits no tenderness or deformity.   No clubbing or cyanosis   Neurological: He is alert and oriented to person, place, and time.   Sedated to Rass minus 2   Skin: Skin is warm and dry. No rash noted. He is not diaphoretic.   Psychiatric: He has a normal mood and affect. His behavior is normal.   Nursing note and vitals reviewed.      Medications  Current Facility-Administered Medications   Medication Dose Route Frequency Provider Last Rate Last Dose   • tamsulosin (FLOMAX) capsule 0.4 mg  0.4 mg Oral AFTER BREAKFAST SHYAM Monge   0.4 mg at 01/06/19 0930   • lisinopril (PRINIVIL) tablet 5 mg  5 mg Oral BID Herminia Queen M.D.       • metoprolol SR (TOPROL XL) tablet 50 mg  50 mg Oral Q DAY Herminia Queen M.D.   Stopped at 01/06/19 1200   • potassium bicarbonate (KLYTE) effervescent tablet 25 mEq  25 mEq Feeding Tube Q4HRS Rikki Cross M.D.   25 mEq at 01/06/19 1345   • [START ON 1/7/2019] insulin glargine (LANTUS) injection 40 Units  40 Units Subcutaneous QAM INSULIN Rikki Cross M.D.        And   • insulin lispro (HUMALOG) injection 3-14 Units  3-14 Units Subcutaneous Q6HRS Rikki Cross M.D.        And   • glucose 4 g chewable tablet 16 g  16 g Oral Q15 MIN PRN Rikki Cross M.D.        And   • dextrose 50% (D50W) injection 25 mL  25 mL Intravenous Q15 MIN PRN Rikki Cross M.D.       • Metoprolol Tartrate (LOPRESSOR) injection 5 mg  5 mg Intravenous Q4HRS PRN Herminia Queen M.D.       • ipratropium-albuterol (DUONEB) nebulizer solution  3 mL Nebulization Q4H PRN (RT) Hieu Vallejo M.D.       • enalaprilat (VASOTEC) injection 2.5 mg  2.5 mg Intravenous Q6HRS PRN Herminia Queen M.D.   2.5 mg at 01/02/19 1105   • ceFAZolin in dextrose (ANCEF) IVPB premix 2 g  2 g Intravenous Q8HRS Rikki Cross M.D.   Stopped  at 01/06/19 1406   • Respiratory Care per Protocol   Nebulization Continuous RT Hieu Vallejo M.D.       • senna-docusate (PERICOLACE or SENOKOT S) 8.6-50 MG per tablet 2 Tab  2 Tab Feeding Tube BID Hieu Vallejo M.D.   2 Tab at 01/06/19 0527    And   • polyethylene glycol/lytes (MIRALAX) PACKET 1 Packet  1 Packet Feeding Tube QDAY PRN Hieu Vallejo M.D.   1 Packet at 01/02/19 1054    And   • magnesium hydroxide (MILK OF MAGNESIA) suspension 30 mL  30 mL Feeding Tube QDAY PRN Hieu Vallejo M.D.   30 mL at 01/04/19 0530    And   • bisacodyl (DULCOLAX) suppository 10 mg  10 mg Rectal QDAY PRN Hieu Vallejo M.D.       • ipratropium-albuterol (DUONEB) nebulizer solution  3 mL Nebulization Q2HRS PRN (RT) Hieu Vallejo M.D.       • Pharmacy Consult: Enteral tube feeding - review meds/change route/product selection   Other PRN Hieu Vallejo M.D.       • acetaminophen (TYLENOL) tablet 650 mg  650 mg Feeding Tube Q4HRS PRN Hieu Vallejo M.D.        Or   • acetaminophen (TYLENOL) suppository 650 mg  650 mg Rectal Q4HRS PRN Hieu Vallejo M.D.       • aspirin (ASA) chewable tab 81 mg  81 mg Per NG Tube DAILY Hieu Vallejo M.D.   81 mg at 01/06/19 0528   • atorvastatin (LIPITOR) tablet 80 mg  80 mg Per NG Tube DAILY Hieu Vallejo M.D.   80 mg at 01/06/19 0527   • clopidogrel (PLAVIX) tablet 75 mg  75 mg Feeding Tube DAILY Hieu Vallejo M.D.   75 mg at 01/06/19 0527   • mag hydrox-al hydrox-simeth (MAALOX PLUS ES or MYLANTA DS) suspension 30 mL  30 mL Per NG Tube Q4HRS PRN Hieu Vallejo M.D.       • NS infusion   Intravenous Continuous Tomas Barrios M.D. 10 mL/hr at 01/06/19 1337     • Pharmacy Consult Request ...Pain Management Review 1 Each  1 Each Other PRN Tomas Barrios M.D.       • enoxaparin (LOVENOX) inj 40 mg  40 mg Subcutaneous QDAY Tomas Barrios M.D.   40 mg at 01/05/19 1390   • ondansetron (ZOFRAN)  syringe/vial injection 4 mg  4 mg Intravenous Q6HRS PRN Tomas Barrios M.D.   4 mg at 12/29/18 1831    Or   • prochlorperazine (COMPAZINE) injection 10 mg  10 mg Intravenous Q6HRS PRN Tomas Barrios M.D.   10 mg at 12/29/18 0004    Or   • promethazine (PHENERGAN) suppository 25 mg  25 mg Rectal Q6HRS PRN Tomas Barrios M.D.   25 mg at 01/05/19 1003   • lidocaine (LIDODERM) 5 % 1 Patch  1 Patch Transdermal Q24HR Inna Linton A.PDeeptiNDeepti   1 Patch at 01/05/19 2125       Fluids    Intake/Output Summary (Last 24 hours) at 01/06/19 1716  Last data filed at 01/06/19 1600   Gross per 24 hour   Intake              270 ml   Output             1525 ml   Net            -1255 ml       Laboratory          Recent Labs      01/04/19   0525  01/05/19   0515  01/06/19   0410   SODIUM  140  138  132*   POTASSIUM  3.3*  3.7  3.1*   CHLORIDE  97  99  96   CO2  30  26  27   BUN  33*  39*  44*   CREATININE  0.51  0.62  0.63   MAGNESIUM  1.8  1.9  1.9   PHOSPHORUS  2.8  3.8  3.4   CALCIUM  9.2  9.5  8.6     Recent Labs      01/04/19   0525  01/05/19   0515  01/06/19   0410   GLUCOSE  312*  307*  227*     Recent Labs      01/04/19   0525  01/05/19   0515  01/06/19   0410   WBC  14.2*  18.5*  20.1*     Recent Labs      01/04/19   0525  01/05/19   0515  01/06/19   0410   RBC  3.94*  3.78*  3.31*   HEMOGLOBIN  12.1*  11.7*  10.1*   HEMATOCRIT  37.0*  34.9*  30.6*   PLATELETCT  374  455*  417       Imaging  X-Ray:  I have personally reviewed the images and compared with prior images. and My impression is: Diffuse right and left lower lobe opacities    Assessment/Plan  * NSTEMI (non-ST elevated myocardial infarction) (HCC)- (present on admission)   Assessment & Plan    Continue aspirin, statin and Plavix     Acute hypoxemic respiratory failure (HCC)   Assessment & Plan    Resolved     Ventilator dependent (HCC)   Assessment & Plan    Room air, good pulmonary toilet    Encourage mobility     S/P CABG x 3   Assessment & Plan    Doing much  better- still slow but gaining strengh  Good candidate for rehab acute post hospitalization     Acute systolic congestive heart failure (HCC)   Assessment & Plan    EF 25%  Intra-aortic balloon pump removed on 12/29     Pneumonia   Assessment & Plan    Staph/Strep bilateral pneumonia ([facility])- 7 days of Ancef  Finished antibiotic  No evidence active infection     Atelectasis   Assessment & Plan    Resolved although has low lung volumes     Type 2 diabetes mellitus without complication ([facility])- (present on admission)   Assessment & Plan    Poorly controlled prior to admission with glycohemoglobin of 12  Continue sliding scale insulin- goal sugar > 80< 180    Change to outp regimen     SVT (supraventricular tachycardia) ([facility])- (present on admission)   Assessment & Plan    Vent rate well controlled this week but has sinus tach ? Reasons    On beta blockers  Sinus tach is slowing down       Sepsis ([facility])- (present on admission)   Assessment & Plan    Sepsis resolved     Hypokalemia- (present on admission)   Assessment & Plan    Replete          VTE:  Lovenox  Ulcer: H2 Antagonist  Lines: Central Line  Ongoing indication addressed, Arterial Line  Ongoing indication addressed and Thomas Catheter  Ongoing indication addressed    I have performed a physical exam and reviewed and updated ROS and Plan today (1/6/2019). In review of yesterday's note (1/5/2019), there are no changes except as documented above.     I have assessed and reassessed his respiratory status with ventilator adjustments, ventilator waveforms, airway mechanics, blood pressure, hemodynamics, cardiovascular status with titration of phenylephrine and neurologic status with titration of propofol.  He is at increased risk for worsening respiratory and cardiovascular system dysfunction.    Discussed patient condition and risk of morbidity and/or mortality with Family, RN, RT, Pharmacy, Charge nurse / hot rounds, QA team and [facility]

## 2019-01-07 ENCOUNTER — APPOINTMENT (OUTPATIENT)
Dept: RADIOLOGY | Facility: MEDICAL CENTER | Age: 44
DRG: 233 | End: 2019-01-07
Attending: CLINICAL NURSE SPECIALIST
Payer: MEDICAID

## 2019-01-07 ENCOUNTER — HOSPITAL ENCOUNTER (INPATIENT)
Facility: REHABILITATION | Age: 44
End: 2019-01-07
Attending: PHYSICAL MEDICINE & REHABILITATION | Admitting: PHYSICAL MEDICINE & REHABILITATION
Payer: MEDICAID

## 2019-01-07 LAB
ALBUMIN SERPL BCP-MCNC: 2.5 G/DL (ref 3.2–4.9)
ALBUMIN/GLOB SERPL: 0.7 G/DL
ALP SERPL-CCNC: 56 U/L (ref 30–99)
ALT SERPL-CCNC: 14 U/L (ref 2–50)
ANION GAP SERPL CALC-SCNC: 7 MMOL/L (ref 0–11.9)
AST SERPL-CCNC: 13 U/L (ref 12–45)
BILIRUB SERPL-MCNC: 0.7 MG/DL (ref 0.1–1.5)
BUN SERPL-MCNC: 30 MG/DL (ref 8–22)
CALCIUM SERPL-MCNC: 8.2 MG/DL (ref 8.5–10.5)
CHLORIDE SERPL-SCNC: 95 MMOL/L (ref 96–112)
CO2 SERPL-SCNC: 31 MMOL/L (ref 20–33)
CREAT SERPL-MCNC: 0.58 MG/DL (ref 0.5–1.4)
ERYTHROCYTE [DISTWIDTH] IN BLOOD BY AUTOMATED COUNT: 43.2 FL (ref 35.9–50)
GLOBULIN SER CALC-MCNC: 3.7 G/DL (ref 1.9–3.5)
GLUCOSE BLD-MCNC: 229 MG/DL (ref 65–99)
GLUCOSE BLD-MCNC: 241 MG/DL (ref 65–99)
GLUCOSE BLD-MCNC: 258 MG/DL (ref 65–99)
GLUCOSE BLD-MCNC: 277 MG/DL (ref 65–99)
GLUCOSE BLD-MCNC: 289 MG/DL (ref 65–99)
GLUCOSE SERPL-MCNC: 243 MG/DL (ref 65–99)
HCT VFR BLD AUTO: 26.8 % (ref 42–52)
HGB BLD-MCNC: 8.9 G/DL (ref 14–18)
MAGNESIUM SERPL-MCNC: 1.9 MG/DL (ref 1.5–2.5)
MCH RBC QN AUTO: 30.8 PG (ref 27–33)
MCHC RBC AUTO-ENTMCNC: 33.2 G/DL (ref 33.7–35.3)
MCV RBC AUTO: 92.7 FL (ref 81.4–97.8)
PHOSPHATE SERPL-MCNC: 2.5 MG/DL (ref 2.5–4.5)
PLATELET # BLD AUTO: 350 K/UL (ref 164–446)
PMV BLD AUTO: 10.8 FL (ref 9–12.9)
POTASSIUM SERPL-SCNC: 3 MMOL/L (ref 3.6–5.5)
PROT SERPL-MCNC: 6.2 G/DL (ref 6–8.2)
RBC # BLD AUTO: 2.89 M/UL (ref 4.7–6.1)
SODIUM SERPL-SCNC: 133 MMOL/L (ref 135–145)
WBC # BLD AUTO: 17.6 K/UL (ref 4.8–10.8)

## 2019-01-07 PROCEDURE — 700102 HCHG RX REV CODE 250 W/ 637 OVERRIDE(OP): Performed by: INTERNAL MEDICINE

## 2019-01-07 PROCEDURE — 99233 SBSQ HOSP IP/OBS HIGH 50: CPT | Performed by: INTERNAL MEDICINE

## 2019-01-07 PROCEDURE — 80053 COMPREHEN METABOLIC PANEL: CPT

## 2019-01-07 PROCEDURE — 700101 HCHG RX REV CODE 250: Performed by: CLINICAL NURSE SPECIALIST

## 2019-01-07 PROCEDURE — 82962 GLUCOSE BLOOD TEST: CPT | Mod: 91

## 2019-01-07 PROCEDURE — 83735 ASSAY OF MAGNESIUM: CPT

## 2019-01-07 PROCEDURE — A9270 NON-COVERED ITEM OR SERVICE: HCPCS | Performed by: CLINICAL NURSE SPECIALIST

## 2019-01-07 PROCEDURE — 700117 HCHG RX CONTRAST REV CODE 255: Performed by: CLINICAL NURSE SPECIALIST

## 2019-01-07 PROCEDURE — 92526 ORAL FUNCTION THERAPY: CPT

## 2019-01-07 PROCEDURE — A9270 NON-COVERED ITEM OR SERVICE: HCPCS | Performed by: INTERNAL MEDICINE

## 2019-01-07 PROCEDURE — 71275 CT ANGIOGRAPHY CHEST: CPT

## 2019-01-07 PROCEDURE — 770020 HCHG ROOM/CARE - TELE (206)

## 2019-01-07 PROCEDURE — 700102 HCHG RX REV CODE 250 W/ 637 OVERRIDE(OP): Performed by: CLINICAL NURSE SPECIALIST

## 2019-01-07 PROCEDURE — 85027 COMPLETE CBC AUTOMATED: CPT

## 2019-01-07 PROCEDURE — 700111 HCHG RX REV CODE 636 W/ 250 OVERRIDE (IP): Performed by: INTERNAL MEDICINE

## 2019-01-07 PROCEDURE — 71045 X-RAY EXAM CHEST 1 VIEW: CPT

## 2019-01-07 PROCEDURE — 700111 HCHG RX REV CODE 636 W/ 250 OVERRIDE (IP): Performed by: THORACIC SURGERY (CARDIOTHORACIC VASCULAR SURGERY)

## 2019-01-07 PROCEDURE — A9270 NON-COVERED ITEM OR SERVICE: HCPCS | Performed by: NURSE PRACTITIONER

## 2019-01-07 PROCEDURE — 84100 ASSAY OF PHOSPHORUS: CPT

## 2019-01-07 PROCEDURE — 700105 HCHG RX REV CODE 258: Performed by: CLINICAL NURSE SPECIALIST

## 2019-01-07 PROCEDURE — 700102 HCHG RX REV CODE 250 W/ 637 OVERRIDE(OP): Performed by: NURSE PRACTITIONER

## 2019-01-07 PROCEDURE — 700105 HCHG RX REV CODE 258: Performed by: INTERNAL MEDICINE

## 2019-01-07 RX ORDER — DEXTROSE MONOHYDRATE 25 G/50ML
25 INJECTION, SOLUTION INTRAVENOUS
Status: DISCONTINUED | OUTPATIENT
Start: 2019-01-07 | End: 2019-01-09

## 2019-01-07 RX ORDER — SODIUM CHLORIDE 9 MG/ML
INJECTION, SOLUTION INTRAVENOUS CONTINUOUS
Status: DISCONTINUED | OUTPATIENT
Start: 2019-01-07 | End: 2019-01-08

## 2019-01-07 RX ORDER — INSULIN GLARGINE 100 [IU]/ML
10 INJECTION, SOLUTION SUBCUTANEOUS ONCE
Status: COMPLETED | OUTPATIENT
Start: 2019-01-07 | End: 2019-01-07

## 2019-01-07 RX ORDER — SODIUM CHLORIDE, SODIUM LACTATE, POTASSIUM CHLORIDE, CALCIUM CHLORIDE 600; 310; 30; 20 MG/100ML; MG/100ML; MG/100ML; MG/100ML
INJECTION, SOLUTION INTRAVENOUS CONTINUOUS
Status: DISCONTINUED | OUTPATIENT
Start: 2019-01-07 | End: 2019-01-07

## 2019-01-07 RX ORDER — GABAPENTIN 100 MG/1
100 CAPSULE ORAL 3 TIMES DAILY
Status: DISCONTINUED | OUTPATIENT
Start: 2019-01-07 | End: 2019-01-10 | Stop reason: HOSPADM

## 2019-01-07 RX ORDER — INSULIN GLARGINE 100 [IU]/ML
50 INJECTION, SOLUTION SUBCUTANEOUS
Status: DISCONTINUED | OUTPATIENT
Start: 2019-01-08 | End: 2019-01-09

## 2019-01-07 RX ORDER — MAGNESIUM SULFATE HEPTAHYDRATE 40 MG/ML
2 INJECTION, SOLUTION INTRAVENOUS ONCE
Status: COMPLETED | OUTPATIENT
Start: 2019-01-07 | End: 2019-01-07

## 2019-01-07 RX ORDER — BUSPIRONE HYDROCHLORIDE 10 MG/1
5 TABLET ORAL 3 TIMES DAILY
Status: DISCONTINUED | OUTPATIENT
Start: 2019-01-07 | End: 2019-01-10 | Stop reason: HOSPADM

## 2019-01-07 RX ADMIN — ENOXAPARIN SODIUM 40 MG: 100 INJECTION SUBCUTANEOUS at 16:36

## 2019-01-07 RX ADMIN — TAMSULOSIN HYDROCHLORIDE 0.4 MG: 0.4 CAPSULE ORAL at 08:30

## 2019-01-07 RX ADMIN — STANDARDIZED SENNA CONCENTRATE AND DOCUSATE SODIUM 2 TABLET: 8.6; 5 TABLET, FILM COATED ORAL at 16:37

## 2019-01-07 RX ADMIN — SODIUM CHLORIDE, POTASSIUM CHLORIDE, SODIUM LACTATE AND CALCIUM CHLORIDE: 600; 310; 30; 20 INJECTION, SOLUTION INTRAVENOUS at 07:58

## 2019-01-07 RX ADMIN — POTASSIUM BICARBONATE 50 MEQ: 25 TABLET, EFFERVESCENT ORAL at 16:36

## 2019-01-07 RX ADMIN — STANDARDIZED SENNA CONCENTRATE AND DOCUSATE SODIUM 2 TABLET: 8.6; 5 TABLET, FILM COATED ORAL at 05:23

## 2019-01-07 RX ADMIN — INSULIN GLARGINE 40 UNITS: 100 INJECTION, SOLUTION SUBCUTANEOUS at 05:27

## 2019-01-07 RX ADMIN — GABAPENTIN 100 MG: 100 CAPSULE ORAL at 13:08

## 2019-01-07 RX ADMIN — SODIUM CHLORIDE: 9 INJECTION, SOLUTION INTRAVENOUS at 10:06

## 2019-01-07 RX ADMIN — ATORVASTATIN CALCIUM 80 MG: 80 TABLET, FILM COATED ORAL at 05:23

## 2019-01-07 RX ADMIN — BUSPIRONE HYDROCHLORIDE 5 MG: 30 TABLET ORAL at 13:08

## 2019-01-07 RX ADMIN — CEFAZOLIN SODIUM 2 G: 2 INJECTION, SOLUTION INTRAVENOUS at 15:08

## 2019-01-07 RX ADMIN — LIDOCAINE 1 PATCH: 50 PATCH TOPICAL at 21:49

## 2019-01-07 RX ADMIN — BUSPIRONE HYDROCHLORIDE 5 MG: 30 TABLET ORAL at 16:38

## 2019-01-07 RX ADMIN — IOHEXOL 75 ML: 350 INJECTION, SOLUTION INTRAVENOUS at 14:53

## 2019-01-07 RX ADMIN — GABAPENTIN 100 MG: 100 CAPSULE ORAL at 16:38

## 2019-01-07 RX ADMIN — CEFAZOLIN SODIUM 2 G: 2 INJECTION, SOLUTION INTRAVENOUS at 05:23

## 2019-01-07 RX ADMIN — CEFAZOLIN SODIUM 2 G: 2 INJECTION, SOLUTION INTRAVENOUS at 21:50

## 2019-01-07 RX ADMIN — ASPIRIN 81 MG 81 MG: 81 TABLET ORAL at 05:23

## 2019-01-07 RX ADMIN — POTASSIUM BICARBONATE 50 MEQ: 25 TABLET, EFFERVESCENT ORAL at 10:07

## 2019-01-07 RX ADMIN — INSULIN GLARGINE 10 UNITS: 100 INJECTION, SOLUTION SUBCUTANEOUS at 10:44

## 2019-01-07 RX ADMIN — CLOPIDOGREL 75 MG: 75 TABLET, FILM COATED ORAL at 05:23

## 2019-01-07 RX ADMIN — MAGNESIUM SULFATE IN WATER 2 G: 40 INJECTION, SOLUTION INTRAVENOUS at 10:36

## 2019-01-07 ASSESSMENT — ENCOUNTER SYMPTOMS
NEUROLOGICAL NEGATIVE: 1
RESPIRATORY NEGATIVE: 1
PSYCHIATRIC NEGATIVE: 1
MUSCULOSKELETAL NEGATIVE: 1
CONSTITUTIONAL NEGATIVE: 1
EYES NEGATIVE: 1
CARDIOVASCULAR NEGATIVE: 1
GASTROINTESTINAL NEGATIVE: 1

## 2019-01-07 ASSESSMENT — PAIN SCALES - GENERAL
PAINLEVEL_OUTOF10: 0

## 2019-01-07 NOTE — THERAPY
"Speech Language Therapy dysphagia treatment completed.   Functional Status:  Pt seen for dysphagia tx this date. Per RN, pt more alert, interactive, requesting food, though is tachypneic, WBC elevated. NP to order chest CT to r/o PE. CXR 1/7/19 shows \"slight increased inflation prior exam with improvement of pulmonary edema, interval improvement of LEFT lung base atelectasis.\" Pt very eager for prefeeding trials today. Pt A&Ox3, able to follow directions and respond appropriately. Wife at bedside. Oral care completed prior to administration of PO. Pt given ice chips (10), NTL via tsp (8), thins via 1/2-full tsp (6), purees via 1/2 tsp (10). Pt with increasing SOB after swallowing. Throat clearing x2 noted with NTL, concerning for pen/asp. Swallow response is mildly delayed (~3-4s). Hyolaryngeal excursion palpated as reduced. Double swallows noted for all purees and occ with NTL and thins. No s/sx of aspiration noted with ice chips, thins or purees. Given pt's SOB/increased RR with PO trials, s/sx of aspiration with nectars, and general weakness/fatigue, recommend pt continue NPO/Cortrak for alternative means of nutrition/hydration. Pt may have 5-10 ice chips/hr with 1:1 nsg supervision for oral comfort and to facilitate more swallowing of secretions throughout the day. Pt/wife verbalize understanding of recommendations and are in agreement with POC. RN as well. SLP following.     Recommendations: NPO/Cortrak, prefeeding with SLP only. 5-10 ice chips/hr ok with 1:1 nsg supervision. Oral care 4x/day.     Plan of Care: Will benefit from Speech Therapy 3 times per week  Post-Acute Therapy: Recommend inpatient transitional care services for continued speech therapy services.        See \"Rehab Therapy-Acute\" Patient Summary Report for complete documentation.     "

## 2019-01-07 NOTE — PROGRESS NOTES
Cardiovascular Surgery Progress Note    Name: Rikki Khalil  MRN: [id number]  : 1975  Admit Date: 2018  9:54 PM  Procedure:  Procedure(s) and Anesthesia Type:     * MULTIPLE CORONARY ARTERY BYPASS x 3,  ENDO VEIN HARVEST LEFT LEG - General     * DONNA - General  10 Day Post-Op    Vitals:  Patient Vitals for the past 8 hrs:   Temp SpO2 O2 Delivery O2 (LPM) Pulse Heart Rate (Monitored) Resp NIBP Weight   19 0800 36.9 °C (98.5 °F) 97 % Silicone Nasal Cannula 2 - (!) 129 (!) 37 (!) 96/54 -   19 0600 - 94 % None (Room Air) - (!) 127 (!) 127 (!) 39 110/62 85.3 kg (188 lb 0.8 oz)   19 0500 - 92 % - - (!) 118 (!) 118 (!) 24 - -   19 0400 36.4 °C (97.5 °F) 95 % None (Room Air) - (!) 118 (!) 118 (!) 28 103/63 -   19 0300 - 93 % - - (!) 120 (!) 120 (!) 26 - -   19 0200 - 92 % - - (!) 121 (!) 121 (!) 36 104/54 -     Temp (24hrs), Av.6 °C (97.8 °F), Min:36.4 °C (97.5 °F), Max:36.9 °C (98.5 °F)      Respiratory:    Respiration: (!) 37, Pulse Oximetry: 97 %     Chest Tube Drains:          Fluids:    Intake/Output Summary (Last 24 hours) at 19 0900  Last data filed at 19 0800   Gross per 24 hour   Intake           1162.5 ml   Output             1585 ml   Net           -422.5 ml     Admit weight: Weight: 95.3 kg (210 lb)  Current weight: Weight: 85.3 kg (188 lb 0.8 oz) (19 0600)    Labs:  Recent Labs      19   0515  19   0410  19   0513   WBC  18.5*  20.1*  17.6*   RBC  3.78*  3.31*  2.89*   HEMOGLOBIN  11.7*  10.1*  8.9*   HEMATOCRIT  34.9*  30.6*  26.8*   MCV  92.3  92.4  92.7   MCH  31.0  30.5  30.8   MCHC  33.5*  33.0*  33.2*   RDW  43.8  44.1  43.2   PLATELETCT  455*  417  350   MPV  10.5  10.7  10.8         Recent Labs      19   0515  19   0410  19   0513   SODIUM  138  132*  133*   POTASSIUM  3.7  3.1*  3.0*   CHLORIDE  99  96  95*   CO2  26  27  31   GLUCOSE  307*  227*  243*   BUN  39*  44*  30*   CREATININE  0.62  0.63   0.58   CALCIUM  9.5  8.6  8.2*           Medications:  • busPIRone  5 mg     • gabapentin  100 mg     • tamsulosin  0.4 mg     • lisinopril  5 mg     • metoprolol SR  50 mg     • insulin glargine  40 Units      And   • insulin lispro  3-14 Units     • ceFAZolin  2 g Stopped (01/07/19 0553)   • senna-docusate  2 Tab     • aspirin  81 mg     • atorvastatin  80 mg     • clopidogrel  75 mg     • enoxaparin  40 mg     • lidocaine  1 Patch          Ordered Medications:    ASA - Yes    Plavix - Yes    Post-operative Beta Blockers - Yes    Ace Inhibitor -yes      Statin - Yes         Exam:   Review of Systems   Constitutional: Negative.    HENT: Negative.    Eyes: Negative.    Respiratory: Negative.    Cardiovascular: Negative.    Gastrointestinal: Negative.    Genitourinary: Negative.    Musculoskeletal: Negative.    Skin: Negative.    Neurological: Negative.    Endo/Heme/Allergies: Negative.    Psychiatric/Behavioral: Negative.        Physical Exam    Quality Measures:   Quality-Core Measures   Reviewed items::  EKG reviewed, Labs reviewed, Medications reviewed and Radiology images reviewed  Solitario catheter::  Strict Intake and Output During Sepisis or Shock  Central line in place:  Need for access  DVT prophylaxis pharmacological::  Contraindicated - High bleeding risk  DVT prophylaxis - mechanical:  SCDs  Ulcer Prophylaxis::  Yes  Assessed for rehabilitation services:  Patient returned to prior level of function, rehabilitation not indicated at this time      Assessment/Plan:  POD 1 HDS- no gtts- dc IABP, ST start metorolol, low EF add ace, Resp insuff- currently on bi-pap- diurese- pulm following pain- add ms contin, D/c Ct after OOB, keep solitario for strict I&O, enc IS  POD 2 HOTN - on devora gtt- give albumin this am, ST, re-intubated yesterday for resp distress, sedated, pain on fentanyl gtt, hyponatremia- hold diuresis today, solitario for strict I&O, CPM  POD 3 HOTN- devora gtt, vent- broch w/ copious amt of secretions  yesterday, sedated - on propofol/fentanyl for pain, low UO- check solitario/gently diurese  POD 4 HTN- increase po meds, 's- start esmolol, vent- pulm following, sedated, given lasix this am, CPM  POD 5 HDS, intubated vented, respiratory cultures positive, hypertensive, neuro grossly intact, abd S/NT +BS.  Plan:  Increase lisinopril.  Since unit out of IV pushes of fentanyl will increase drip slightly for pain.  Wean vent as tolerated.  On abx for positive respiratory cultures.  CPM.   POD 6 extubated, HDS, ST rate 120's, very slow to answer questions, hypotensive today.  Lisinopril adjusted by cards.  Plan:  Will dc pacing wires and janee this afternoon.  DC more potent narcotics.    POD 7 HDS, SR, neuro- diff with speech- CT scan today, dysphagia- cortrak, PT/OT rec rehab- will place order today would expect ready for transfer by Sunday/monday.  POD 8 HDS, ST vs aflutter- ekg- cards following, neuro status improved- Head CT neg, right groin hematoma - w/w, dysphagia- cortrak, PT/OT to re-eval for rehab, amb, enc IS  POD 9 HDS, SR/ST- cards following, Dysphagia- cortrak, pneumonia- on atbx, urinary retention- solitario replaced/start flomax, OT to see today, awaiting acceptance to rehab  POD 10 HDS, 's, tachypnec, became more tachypnec with swallow evaluation.  Awake and alert this AM.  Plan:  May consider CT scan R/O PE.    Patient seen, examined and plan reviewed with midlevel provider. I agree with the plan.      Active Hospital Problems    Diagnosis   • Acute hypoxemic respiratory failure (HCC) [J96.01]     Priority: High   • S/P CABG x 3 [Z95.1]     Priority: High   • Ventilator dependent (HCC) [Z99.11]     Priority: High   • NSTEMI (non-ST elevated myocardial infarction) (HCC) [I21.4]     Priority: High   • Acute systolic congestive heart failure (HCC) [I50.21]     Priority: High   • Pneumonia [J18.9]     Priority: Medium   • Atelectasis [J98.11]     Priority: Medium   • SVT (supraventricular tachycardia)  (HCC) [I47.1]     Priority: Medium   • Type 2 diabetes mellitus without complication (HCC) [E11.9]     Priority: Medium   • Hypokalemia [E87.6]     Priority: Low   • Postural dizziness with presyncope [R42, R55]     Priority: Low   • Sepsis (HCC) [A41.9]

## 2019-01-07 NOTE — PROGRESS NOTES
Paged PAWEL Linton and updated: pt tachypneic in 30s with end tidal CO2, mid 20s. Pt -12L, and drop in weight since admission. Pt not on fluids and on TF. Received orders for LR @75ml/hr and chest xray.

## 2019-01-07 NOTE — CONSULTS
DATE OF SERVICE:  01/07/2019    CHIEF COMPLAINT:  Positive cardiac enzymes.    HISTORY OF PRESENT ILLNESS:  Pleasant 43-year-old gentleman with history of   diverticulitis, ileostomy, status post takedown type 2 diabetes mellitus.    He has had an onset of left jaw pain radiating down the left shoulder,   tingling of left arm, after few minutes went away, and 24 hours later, he had   an almost a near syncopal spell and this repeated a few more times and was   taken to the emergency room.    He had at the emergency room, nonsustained SVT.  IV diltiazem was given.    Troponins were positive.  Transferred to [facility] for [facility].  Here in heart   catheterization shows that he has multivessel 3-vessel disease, he was worked   up by cardiology.  In addition, he has a very high hemoglobin A1c of 12.  He   has diabetes mellitus, hypertension, cigarette smoking the entire adult life,   myocardial infarction, inferior wall, angina, coronary artery disease.    SOCIAL HISTORY:  Noncontributory.  , good family support.  Smokes 1-2   packs of cigarettes a day.  Alcohol, none.    FAMILY HISTORY:  Noncontributory for premature coronary artery disease.    REVIEW OF SYSTEMS:  Negative except for that mentioned above.  Review with the   patient.    ALLERGIES:  None.    MEDICATIONS:  Per medication list.    PHYSICAL EXAMINATION:  GENERAL:  No acute distress, alert, appearance appropriate, oriented x3.    Memory x3.  VITAL SIGNS:  Heart rate 80, blood pressure 135/85, respirations 16.  HEENT:  Normocephalic, good airway.  NEUROLOGIC:  Cranial nerves intact.  Four extremities motor function equal.  CARDIAC:  S1, S2.  No S3, no S4, no murmur, heave or thrill.  PULMONARY:  Clear.  No rales or rhonchi.  Moves air well.  ABDOMEN:  Flat, scaphoid.  Incision is healed.  GENITOURINARY AND RECTAL:  Deferred.  EXTREMITIES:  Normal range of motion.  Arterial/vascular examination with   femorals, radials, carotids 1+.  Venous examination.   Saphenous vein good for   harvesting bilaterally.  SKIN:  Clear.  No lesions xanthomas or infections.  STERNUM:  No lesions or cancers or infection.  PSYCHIATRIC:  Affect appropriate.  ENDOCRINE:  No thyromegaly.    IMPRESSION:  1.  Angina.  2.  Coronary artery disease.  3.  Positive troponins.  4.  Cigarette smoking.  5.  Hypertension.  6.  Diabetes mellitus.  Hemoglobin A1c 12.    Patient needs coronary bypass surgery x3.  I have explained the risks of   surgery to the patient.  These risks include mortality of 2%, mostly related   to his low ejection fraction.  In addition, the risk of infection, bleeding,   heart attack, stroke, blood transfusion risk, liver, lung, kidney failure,   diaphragmatic paralysis, paresis, ulnar neuropathy, chest wall paresthesia,   leg paresthesia, reoperation, bleeding, reoperation for infection,   tracheostomy, ventilation, AIDS, hepatitis, missed counts of nonsurgical   items.  I have explained the alternatives, intent, and expectation of   procedure to the patient and family.  They understand and accept the above,   understand and accept risks and desires to proceed with surgery.  STS   mortality 2%, mortality and morbidity 10% because of his multiple problems of   cigarette smoking, high hemoglobin A1c and advanced diabetes and previous   gastrointestinal problems.  He is a New York Heart Classification IV, [place]   classification IV.  I have explained the STS ranking to patient and wife.    They agreed to all above and plan surgery as scheduled and they accept risks   and desire to proceed with surgery.       ____________________________________     MD OMI PIRES / IRISH    DD:  01/07/2019 13:55:49  DT:  01/07/2019 15:06:36    D#:  [id number]  Job#:  [id number]

## 2019-01-07 NOTE — THERAPY
Occupational Therapy Contact Note    Attempted OT treatment, RN requesting to hold this AM as patient has been tachypneic. Will attempt later as able and appropriate.    Janna Collins, OTR/L

## 2019-01-07 NOTE — PROGRESS NOTES
Critical Care Progress Note    Date of admission  12/26/2018    Chief Complaint  43 y.o. male admitted 12/26/2018 with chest pain.    Hospital Course    This gentleman was admitted to the hospital with an acute non-STEMI.  He was found to have significant CAD.  He underwent CABG.      Interval Problem Update  Reviewed last 24 hour events:  DAPT  Abx - MSSA and S.pneumo; Ancef  SLP eval; ice chips only  Inc WOB this am  2 lpm  Replace K and Mg  Lantus 40, SSI, inc to 50    Prior 24 hours  Constipated  Walking better today  Slow but mentation picking up  O x 4  F/U speed eval pending  Receiving tube feeds    Review of Systems  Review of Systems   Constitutional: Negative.    HENT: Negative.    Eyes: Negative.    Cardiovascular: Negative.    Gastrointestinal: Negative.    Musculoskeletal: Negative.    Skin: Negative.    Neurological: Negative.    Psychiatric/Behavioral: Substance abuse:           Vital Signs for last 24 hours   Temp:  [36.4 °C (97.5 °F)-36.9 °C (98.5 °F)] 36.9 °C (98.5 °F)  Pulse:  [103-127] 127  Resp:  [16-39] 16    Hemodynamic parameters for last 24 hours       Respiratory       Physical Exam   Physical Exam   Constitutional: He is oriented to person, place, and time. He appears well-developed.   HENT:   Head: Normocephalic and atraumatic.   Eyes: Pupils are equal, round, and reactive to light. Left eye exhibits no discharge.   Neck: Neck supple. Tracheal deviation present.   Cardiovascular: Normal rate.    No murmur heard.  Pulmonary/Chest: Effort normal.   Diminished, no wheezing   Abdominal: Soft. There is no tenderness.   Musculoskeletal: Normal range of motion. He exhibits no tenderness.   Neurological: He is alert and oriented to person, place, and time.   Skin: Skin is warm and dry.   Psychiatric: He has a normal mood and affect.       Medications  Current Facility-Administered Medications   Medication Dose Route Frequency Provider Last Rate Last Dose   • lactated ringers infusion    Intravenous Continuous Inna Linton A.P.N. 75 mL/hr at 01/07/19 0800     • busPIRone (BUSPAR) tablet 5 mg  5 mg Oral TID Inna Linton A.P.N.       • gabapentin (NEURONTIN) capsule 100 mg  100 mg Oral TID Inna Linton A.P.N.       • tamsulosin (FLOMAX) capsule 0.4 mg  0.4 mg Oral AFTER BREAKFAST DIRK MongeP.NDeepti   0.4 mg at 01/07/19 0830   • lisinopril (PRINIVIL) tablet 5 mg  5 mg Oral BID Herminia Queen M.D.   Stopped at 01/06/19 1800   • metoprolol SR (TOPROL XL) tablet 50 mg  50 mg Oral Q DAY Herminia Queen M.D.   Stopped at 01/06/19 1200   • insulin glargine (LANTUS) injection 40 Units  40 Units Subcutaneous QAM INSULIN Rikki Cross M.D.   40 Units at 01/07/19 0527    And   • insulin lispro (HUMALOG) injection 3-14 Units  3-14 Units Subcutaneous Q6HRS Rikki Cross M.D.   4 Units at 01/07/19 0524    And   • glucose 4 g chewable tablet 16 g  16 g Oral Q15 MIN PRN Rikki Cross M.D.        And   • dextrose 50% (D50W) injection 25 mL  25 mL Intravenous Q15 MIN PRN Rikki Cross M.D.       • Metoprolol Tartrate (LOPRESSOR) injection 5 mg  5 mg Intravenous Q4HRS PRN Herminia Queen M.D.       • ipratropium-albuterol (DUONEB) nebulizer solution  3 mL Nebulization Q4H PRN (RT) Hieu Vallejo M.D.       • enalaprilat (VASOTEC) injection 2.5 mg  2.5 mg Intravenous Q6HRS PRN Herminia Queen M.D.   2.5 mg at 01/02/19 1105   • ceFAZolin in dextrose (ANCEF) IVPB premix 2 g  2 g Intravenous Q8HRS Rikki Cross M.D.   Stopped at 01/07/19 0553   • Respiratory Care per Protocol   Nebulization Continuous RT Hieu Vallejo M.D.       • senna-docusate (PERICOLACE or SENOKOT S) 8.6-50 MG per tablet 2 Tab  2 Tab Feeding Tube BID Hieu Vallejo M.D.   2 Tab at 01/07/19 0523    And   • polyethylene glycol/lytes (MIRALAX) PACKET 1 Packet  1 Packet Feeding Tube QDAY PRN Hieu Vallejo M.D.   1 Packet at 01/02/19 1054    And   • magnesium hydroxide (MILK  OF MAGNESIA) suspension 30 mL  30 mL Feeding Tube QDAY PRN Hieu Vallejo M.D.   30 mL at 01/04/19 0530    And   • bisacodyl (DULCOLAX) suppository 10 mg  10 mg Rectal QDAY PRN Hieu Vallejo M.D.       • ipratropium-albuterol (DUONEB) nebulizer solution  3 mL Nebulization Q2HRS PRN (RT) Hieu Vallejo M.D.       • Pharmacy Consult: Enteral tube feeding - review meds/change route/product selection   Other PRN Hieu Vallejo M.D.       • acetaminophen (TYLENOL) tablet 650 mg  650 mg Feeding Tube Q4HRS PRN Hieu Vallejo M.D.        Or   • acetaminophen (TYLENOL) suppository 650 mg  650 mg Rectal Q4HRS PRN Hieu Vallejo M.D.       • aspirin (ASA) chewable tab 81 mg  81 mg Per NG Tube DAILY Hieu Vallejo M.D.   81 mg at 01/07/19 0523   • atorvastatin (LIPITOR) tablet 80 mg  80 mg Per NG Tube DAILY Hieu Vallejo M.D.   80 mg at 01/07/19 0523   • clopidogrel (PLAVIX) tablet 75 mg  75 mg Feeding Tube DAILY Hieu Vallejo M.D.   75 mg at 01/07/19 0523   • mag hydrox-al hydrox-simeth (MAALOX PLUS ES or MYLANTA DS) suspension 30 mL  30 mL Per NG Tube Q4HRS PRN Hieu Vallejo M.D.       • NS infusion   Intravenous Continuous Tomas Barrios M.D. 10 mL/hr at 01/06/19 1337     • Pharmacy Consult Request ...Pain Management Review 1 Each  1 Each Other PRN Tomas Barrios M.D.       • enoxaparin (LOVENOX) inj 40 mg  40 mg Subcutaneous QDAY Tomas Barrios M.D.   40 mg at 01/06/19 1802   • ondansetron (ZOFRAN) syringe/vial injection 4 mg  4 mg Intravenous Q6HRS PRN Tomas Barrios M.D.   4 mg at 12/29/18 1831    Or   • prochlorperazine (COMPAZINE) injection 10 mg  10 mg Intravenous Q6HRS PRN Tomas Barrios M.D.   10 mg at 12/29/18 0004    Or   • promethazine (PHENERGAN) suppository 25 mg  25 mg Rectal Q6HRS PRN Tomas Barrios M.D.   25 mg at 01/05/19 1003   • lidocaine (LIDODERM) 5 % 1 Patch  1 Patch Transdermal Q24HR Inna Linton ADeeptiPDeeptiN.   1  Patch at 01/06/19 2115       Fluids    Intake/Output Summary (Last 24 hours) at 01/07/19 0944  Last data filed at 01/07/19 0800   Gross per 24 hour   Intake           1162.5 ml   Output             1585 ml   Net           -422.5 ml       Laboratory          Recent Labs      01/05/19 0515 01/06/19 0410 01/07/19   0513   SODIUM  138  132*  133*   POTASSIUM  3.7  3.1*  3.0*   CHLORIDE  99  96  95*   CO2  26  27  31   BUN  39*  44*  30*   CREATININE  0.62  0.63  0.58   MAGNESIUM  1.9  1.9  1.9   PHOSPHORUS  3.8  3.4  2.5   CALCIUM  9.5  8.6  8.2*     Recent Labs      01/05/19 0515 01/06/19 0410 01/07/19   0513   ALTSGPT   --    --   14   ASTSGOT   --    --   13   ALKPHOSPHAT   --    --   56   TBILIRUBIN   --    --   0.7   GLUCOSE  307*  227*  243*     Recent Labs      01/05/19 0515 01/06/19   0410  01/07/19   0513   WBC  18.5*  20.1*  17.6*   ASTSGOT   --    --   13   ALTSGPT   --    --   14   ALKPHOSPHAT   --    --   56   TBILIRUBIN   --    --   0.7     Recent Labs      01/05/19 0515 01/06/19 0410 01/07/19   0513   RBC  3.78*  3.31*  2.89*   HEMOGLOBIN  11.7*  10.1*  8.9*   HEMATOCRIT  34.9*  30.6*  26.8*   PLATELETCT  455*  417  350       Imaging  X-Ray:  I have personally reviewed the images and compared with prior images. and My impression is: Diffuse right and left lower lobe opacities    Assessment/Plan  * NSTEMI (non-ST elevated myocardial infarction) (HCC)- (present on admission)   Assessment & Plan    Continue aspirin, statin and Plavix     Acute hypoxemic respiratory failure (HCC)   Assessment & Plan    Resolved     Ventilator dependent (HCC)   Assessment & Plan    Room air, good pulmonary toilet    Encourage mobility     S/P CABG x 3   Assessment & Plan    Doing much better- still slow but gaining strengh  Good candidate for rehab acute post hospitalization     Acute systolic congestive heart failure (HCC)   Assessment & Plan    EF 25%  Intra-aortic balloon pump removed on 12/29      Pneumonia   Assessment & Plan    Staph/Strep bilateral pneumonia (MSSA)- 7 days of Ancef  Finished antibiotic  No evidence active infection     Atelectasis   Assessment & Plan    Resolved although has low lung volumes     Type 2 diabetes mellitus without complication (HCC)- (present on admission)   Assessment & Plan    Poorly controlled prior to admission with glycohemoglobin of 12  Continue sliding scale insulin- goal sugar > 80< 180    Change to outp regimen     SVT (supraventricular tachycardia) ([facility])- (present on admission)   Assessment & Plan    Vent rate well controlled this week but has sinus tach ? Reasons    On beta blockers  Sinus tach is slowing down       Sepsis (HCC)- (present on admission)   Assessment & Plan    Sepsis resolved     Hypokalemia- (present on admission)   Assessment & Plan    Replete          VTE:  Lovenox  Ulcer: Not Indicated  Lines: Central Line  Ongoing indication addressed    I have performed a physical exam and reviewed and updated ROS and Plan today (1/7/2019). In review of yesterday's note (1/6/2019), there are no changes except as documented above.   Ongoing treatment as above.  Review antibiotics in further detail and glycemic control addressed.  Discussed with cardiothoracic surgery today.  Discussed patient condition and risk of morbidity and/or mortality with Family, RN, RT, Pharmacy, Charge nurse / hot rounds, QA team and [facility]

## 2019-01-07 NOTE — CARE PLAN
Problem: Post Op Day 4 CABG/Heart Valve Replacement  Goal: Optimal care of the Post Op CABG/Heart Valve replacement Post Op Day 4  Outcome: PROGRESSING SLOWER THAN EXPECTED    Intervention: Daily Weights  Weight entered in epic  Intervention: Up in chair for all meals  Up in chair

## 2019-01-07 NOTE — DIETARY
Nutrition support weekly update:  Day 11 of admit.  Current TF via gastric Cortrak: Impact Peptide 1.5, goal rate 55 ml/hr, providing 1980 kcals, 124 grams protein, 1016 mL free water.     -TF remains tolerated at goal rate; meeting 100% of est nutrient needs  -pt is s/p CABG x3 on 12/28  -pt failed SLP evaluation this morning - ice chips only by mouth allowed at this time  -pt discussed in IDT rounds: POC BS running 200-300's in spite of increased dosage of Lantus.  SSI has been added, per MAR.  TF providing appropriate amount of carbohydrate to aid in addressing elevated blood glucose, hx type 2 diabetes mellitus.   -CT of chest pending      Assessment:  Weight 85.3 kg per stand up scale  Current TF reflects pt's estimated nutrient needs at this time; no need for change.    Evaluation:   1. Labs: sodium 133, potassium 3, glucose 289, BUN 30, adjusted calcium 9.4  2. Meds: Lantus, SSI, magnesium sulfate, Klyte, senna-docusate, s/p fleet enema x1 yesterday  3. Fluids: free water from TF providing ~1 L + NS IVF @ 75 ml/hr  4. +BM 1/6  5. No pressure ulcers noted per chart review.     Recommendations/Plan:  1. Continue TF formula and rate; no changes at this time.  2. Fluids per MD.

## 2019-01-08 LAB
ABO GROUP BLD: NORMAL
ACTION RANGE TRIGGERED IACRT: YES
ANION GAP SERPL CALC-SCNC: 4 MMOL/L (ref 0–11.9)
BASE EXCESS BLDV CALC-SCNC: 5 MMOL/L (ref -4–3)
BLD GP AB SCN SERPL QL: NORMAL
BODY TEMPERATURE: ABNORMAL DEGREES
BUN SERPL-MCNC: 19 MG/DL (ref 8–22)
CA-I BLD ISE-SCNC: 1.18 MMOL/L (ref 1.1–1.3)
CALCIUM SERPL-MCNC: 8 MG/DL (ref 8.5–10.5)
CHLORIDE SERPL-SCNC: 100 MMOL/L (ref 96–112)
CO2 BLDV-SCNC: 29 MMOL/L (ref 20–33)
CO2 SERPL-SCNC: 27 MMOL/L (ref 20–33)
CREAT SERPL-MCNC: 0.49 MG/DL (ref 0.5–1.4)
CRP SERPL HS-MCNC: 6.62 MG/DL (ref 0–0.75)
ERYTHROCYTE [DISTWIDTH] IN BLOOD BY AUTOMATED COUNT: 43.5 FL (ref 35.9–50)
GLUCOSE BLD-MCNC: 203 MG/DL (ref 65–99)
GLUCOSE BLD-MCNC: 204 MG/DL (ref 65–99)
GLUCOSE BLD-MCNC: 218 MG/DL (ref 65–99)
GLUCOSE BLD-MCNC: 274 MG/DL (ref 65–99)
GLUCOSE SERPL-MCNC: 233 MG/DL (ref 65–99)
HCO3 BLDV-SCNC: 27.6 MMOL/L (ref 24–28)
HCT VFR BLD AUTO: 17.9 % (ref 42–52)
HCT VFR BLD AUTO: 26.5 % (ref 42–52)
HCT VFR BLD CALC: 28 % (ref 42–52)
HGB BLD-MCNC: 6 G/DL (ref 14–18)
HGB BLD-MCNC: 8.8 G/DL (ref 14–18)
HGB BLD-MCNC: 9.5 G/DL (ref 14–18)
HOROWITZ INDEX BLDV+IHG-RTO: 138 MM[HG]
INST. QUALIFIED PATIENT IIQPT: YES
MAGNESIUM SERPL-MCNC: 1.9 MG/DL (ref 1.5–2.5)
MCH RBC QN AUTO: 31.6 PG (ref 27–33)
MCHC RBC AUTO-ENTMCNC: 33.7 G/DL (ref 33.7–35.3)
MCV RBC AUTO: 93.7 FL (ref 81.4–97.8)
O2/TOTAL GAS SETTING VFR VENT: 21 %
PCO2 BLDV: 33.8 MMHG (ref 41–51)
PCO2 TEMP ADJ BLDV: 33.8 MMHG (ref 41–51)
PH BLDV: 7.52 [PH] (ref 7.31–7.45)
PH TEMP ADJ BLDV: 7.52 [PH] (ref 7.31–7.45)
PHOSPHATE SERPL-MCNC: 2.2 MG/DL (ref 2.5–4.5)
PLATELET # BLD AUTO: 383 K/UL (ref 164–446)
PMV BLD AUTO: 10.9 FL (ref 9–12.9)
PO2 BLDV: 29 MMHG (ref 25–40)
PO2 TEMP ADJ BLDV: 29 MMHG (ref 25–40)
POTASSIUM BLD-SCNC: 3.8 MMOL/L (ref 3.6–5.5)
POTASSIUM SERPL-SCNC: 3.6 MMOL/L (ref 3.6–5.5)
PREALB SERPL-MCNC: 11 MG/DL (ref 18–38)
RBC # BLD AUTO: 1.9 M/UL (ref 4.7–6.1)
RH BLD: NORMAL
SAO2 % BLDV: 63 %
SODIUM BLD-SCNC: 135 MMOL/L (ref 135–145)
SODIUM SERPL-SCNC: 131 MMOL/L (ref 135–145)
SPECIMEN DRAWN FROM PATIENT: ABNORMAL
WBC # BLD AUTO: 20.4 K/UL (ref 4.8–10.8)

## 2019-01-08 PROCEDURE — 700102 HCHG RX REV CODE 250 W/ 637 OVERRIDE(OP): Performed by: CLINICAL NURSE SPECIALIST

## 2019-01-08 PROCEDURE — 83735 ASSAY OF MAGNESIUM: CPT

## 2019-01-08 PROCEDURE — 700111 HCHG RX REV CODE 636 W/ 250 OVERRIDE (IP): Performed by: INTERNAL MEDICINE

## 2019-01-08 PROCEDURE — 84134 ASSAY OF PREALBUMIN: CPT

## 2019-01-08 PROCEDURE — 86900 BLOOD TYPING SEROLOGIC ABO: CPT

## 2019-01-08 PROCEDURE — 700111 HCHG RX REV CODE 636 W/ 250 OVERRIDE (IP): Performed by: THORACIC SURGERY (CARDIOTHORACIC VASCULAR SURGERY)

## 2019-01-08 PROCEDURE — 700102 HCHG RX REV CODE 250 W/ 637 OVERRIDE(OP): Performed by: INTERNAL MEDICINE

## 2019-01-08 PROCEDURE — 84100 ASSAY OF PHOSPHORUS: CPT

## 2019-01-08 PROCEDURE — 82962 GLUCOSE BLOOD TEST: CPT | Mod: 91

## 2019-01-08 PROCEDURE — 86901 BLOOD TYPING SEROLOGIC RH(D): CPT

## 2019-01-08 PROCEDURE — 700101 HCHG RX REV CODE 250: Performed by: INTERNAL MEDICINE

## 2019-01-08 PROCEDURE — 82803 BLOOD GASES ANY COMBINATION: CPT

## 2019-01-08 PROCEDURE — A9270 NON-COVERED ITEM OR SERVICE: HCPCS | Performed by: INTERNAL MEDICINE

## 2019-01-08 PROCEDURE — 700105 HCHG RX REV CODE 258: Performed by: THORACIC SURGERY (CARDIOTHORACIC VASCULAR SURGERY)

## 2019-01-08 PROCEDURE — 85027 COMPLETE CBC AUTOMATED: CPT

## 2019-01-08 PROCEDURE — A9270 NON-COVERED ITEM OR SERVICE: HCPCS | Performed by: CLINICAL NURSE SPECIALIST

## 2019-01-08 PROCEDURE — 84295 ASSAY OF SERUM SODIUM: CPT

## 2019-01-08 PROCEDURE — 84132 ASSAY OF SERUM POTASSIUM: CPT

## 2019-01-08 PROCEDURE — 86850 RBC ANTIBODY SCREEN: CPT

## 2019-01-08 PROCEDURE — 700101 HCHG RX REV CODE 250: Performed by: CLINICAL NURSE SPECIALIST

## 2019-01-08 PROCEDURE — 82330 ASSAY OF CALCIUM: CPT

## 2019-01-08 PROCEDURE — 80048 BASIC METABOLIC PNL TOTAL CA: CPT

## 2019-01-08 PROCEDURE — 99254 IP/OBS CNSLTJ NEW/EST MOD 60: CPT | Performed by: PHYSICAL MEDICINE & REHABILITATION

## 2019-01-08 PROCEDURE — 700105 HCHG RX REV CODE 258: Performed by: INTERNAL MEDICINE

## 2019-01-08 PROCEDURE — 85014 HEMATOCRIT: CPT

## 2019-01-08 PROCEDURE — 770020 HCHG ROOM/CARE - TELE (206)

## 2019-01-08 PROCEDURE — 30233N1 TRANSFUSION OF NONAUTOLOGOUS RED BLOOD CELLS INTO PERIPHERAL VEIN, PERCUTANEOUS APPROACH: ICD-10-PCS | Performed by: THORACIC SURGERY (CARDIOTHORACIC VASCULAR SURGERY)

## 2019-01-08 PROCEDURE — 99233 SBSQ HOSP IP/OBS HIGH 50: CPT | Performed by: INTERNAL MEDICINE

## 2019-01-08 PROCEDURE — 85018 HEMOGLOBIN: CPT

## 2019-01-08 PROCEDURE — 86140 C-REACTIVE PROTEIN: CPT

## 2019-01-08 RX ORDER — SODIUM CHLORIDE 9 MG/ML
INJECTION, SOLUTION INTRAVENOUS
Status: ACTIVE
Start: 2019-01-08 | End: 2019-01-08

## 2019-01-08 RX ORDER — MAGNESIUM SULFATE HEPTAHYDRATE 40 MG/ML
2 INJECTION, SOLUTION INTRAVENOUS ONCE
Status: COMPLETED | OUTPATIENT
Start: 2019-01-08 | End: 2019-01-08

## 2019-01-08 RX ADMIN — ENOXAPARIN SODIUM 40 MG: 100 INJECTION SUBCUTANEOUS at 17:27

## 2019-01-08 RX ADMIN — STANDARDIZED SENNA CONCENTRATE AND DOCUSATE SODIUM 2 TABLET: 8.6; 5 TABLET, FILM COATED ORAL at 17:26

## 2019-01-08 RX ADMIN — CLOPIDOGREL 75 MG: 75 TABLET, FILM COATED ORAL at 05:37

## 2019-01-08 RX ADMIN — POTASSIUM BICARBONATE 50 MEQ: 25 TABLET, EFFERVESCENT ORAL at 17:26

## 2019-01-08 RX ADMIN — SODIUM CHLORIDE: 9 INJECTION, SOLUTION INTRAVENOUS at 09:41

## 2019-01-08 RX ADMIN — BUSPIRONE HYDROCHLORIDE 5 MG: 30 TABLET ORAL at 05:37

## 2019-01-08 RX ADMIN — GABAPENTIN 100 MG: 100 CAPSULE ORAL at 05:37

## 2019-01-08 RX ADMIN — POTASSIUM BICARBONATE 50 MEQ: 25 TABLET, EFFERVESCENT ORAL at 05:34

## 2019-01-08 RX ADMIN — BUSPIRONE HYDROCHLORIDE 5 MG: 30 TABLET ORAL at 17:27

## 2019-01-08 RX ADMIN — SODIUM CHLORIDE: 9 INJECTION, SOLUTION INTRAVENOUS at 03:38

## 2019-01-08 RX ADMIN — POTASSIUM PHOSPHATE, MONOBASIC AND POTASSIUM PHOSPHATE, DIBASIC 30 MMOL: 224; 236 INJECTION, SOLUTION INTRAVENOUS at 09:41

## 2019-01-08 RX ADMIN — ATORVASTATIN CALCIUM 80 MG: 80 TABLET, FILM COATED ORAL at 06:00

## 2019-01-08 RX ADMIN — GABAPENTIN 100 MG: 100 CAPSULE ORAL at 17:26

## 2019-01-08 RX ADMIN — BUSPIRONE HYDROCHLORIDE 5 MG: 30 TABLET ORAL at 11:42

## 2019-01-08 RX ADMIN — LIDOCAINE 1 PATCH: 50 PATCH TOPICAL at 21:25

## 2019-01-08 RX ADMIN — MAGNESIUM SULFATE IN WATER 2 G: 40 INJECTION, SOLUTION INTRAVENOUS at 09:41

## 2019-01-08 RX ADMIN — ASPIRIN 81 MG 81 MG: 81 TABLET ORAL at 05:37

## 2019-01-08 RX ADMIN — INSULIN GLARGINE 50 UNITS: 100 INJECTION, SOLUTION SUBCUTANEOUS at 05:41

## 2019-01-08 RX ADMIN — GABAPENTIN 100 MG: 100 CAPSULE ORAL at 12:00

## 2019-01-08 ASSESSMENT — PAIN SCALES - GENERAL
PAINLEVEL_OUTOF10: 0

## 2019-01-08 ASSESSMENT — PATIENT HEALTH QUESTIONNAIRE - PHQ9
SUM OF ALL RESPONSES TO PHQ9 QUESTIONS 1 AND 2: 0
1. LITTLE INTEREST OR PLEASURE IN DOING THINGS: NOT AT ALL
1. LITTLE INTEREST OR PLEASURE IN DOING THINGS: NOT AT ALL
2. FEELING DOWN, DEPRESSED, IRRITABLE, OR HOPELESS: NOT AT ALL
SUM OF ALL RESPONSES TO PHQ9 QUESTIONS 1 AND 2: 0
2. FEELING DOWN, DEPRESSED, IRRITABLE, OR HOPELESS: NOT AT ALL

## 2019-01-08 ASSESSMENT — ENCOUNTER SYMPTOMS
GASTROINTESTINAL NEGATIVE: 1
MUSCULOSKELETAL NEGATIVE: 1
CARDIOVASCULAR NEGATIVE: 1
CONSTITUTIONAL NEGATIVE: 1
RESPIRATORY NEGATIVE: 1
EYES NEGATIVE: 1
NEUROLOGICAL NEGATIVE: 1
PSYCHIATRIC NEGATIVE: 1

## 2019-01-08 NOTE — CONSULTS
Physical Medicine and Rehabilitation Consultation         Initial Consult      Date of Consultation: 1/8/2019  Consulting provider: Froylan Dougherty D.O.  Reason for consultation: weakness, assess for acute inpatient rehab appropriateness  Consulting physician Nelly Zazueta     Chief complaint: weakness    HPI: The patient is a 43 y.o. male with a past medical history of hypertension, diabetes, hemoglobin A1c of 12, chronic back pain secondary to scoliosis, status post ileostomy and reversal for diverticulitis and fistula, cigarette smoking, who was transferred from outside hospital on 12/26/2018  9:54 PM with SVT, acute non-STEMI, was found to have significant coronary artery disease, status post CABG x3, 12/28 by Dr Barrios.     On 12/25 patient complained of left jaw pain with slight ache in left shoulder and left periorbital pain, which lasted 20-30 minutes.  He denied chest pain, nausea vomiting or dizziness.  On 1226 symptoms progressed and patient presented to [facility].     Patient's hospitalization was complicated by acute hypoxic respiratory failure.  Initially he was intubated and ventilated after surgery.  He was found to have pneumonia, bilateral MSSA, received 7 days of Ancef.  Extubated 1/2    TTE shows ejection fraction of 25%, intra-aortic balloon pump removed on 12/29  He complains of mild chest pain, described as 2 out of 10 achy sensation, progresses to sharp with movement, improved with rest, constant, no radiation down the arms, he complains of generalized weakness legs greater than arms, no nausea vomiting, no blurry vision, no shortness of breath.  No other associated symptoms symptoms started after surgery    Patient was found to have dysphasia by speech therapy, core track placed currently on tube feeds    ROS:  Gen: No fatigue, confusion, significant weight loss  Eyes: no blurry vision, double vision or pain in eyes  ENT: no changes in hearing, runny nose, nose bleeds,  sinus pain  Endo: no episodes of low blood sugar  CV: Improving CP, no palpitations  Lungs: no shortness of breath, changes in secretions, changes in cough, pain with coughing  Abd: No abd pain, nausea, vomiting, pain with eating  : no blood in urine, suprapubic pain  Ext/MSK: No swelling in legs, asymmetric atrophy  Neuro: No changes in sensation, complains of moderate weakness legs greater than arms as above  Skin: no new ulcers/skin breakdown appreciated by patient  Mood: No changes in mood today, increase in depression or anxiety  Heme: no bruising, or bleeding  Allergy: No recent allergies    PMH:  Past Medical History:   Diagnosis Date   • Arthritis     All joints   • Back pain    • Bowel obstruction ([facility])    • Glaucoma     monika eyes   • NSTEMI (non-ST elevated myocardial infarction) ([facility]) 12/27/2018   • Pneumonia 12/30/2018   • Scoliosis    • Systolic congestive heart failure ([facility]) 12/27/2018   • Type II or unspecified type diabetes mellitus without mention of complication, not stated as uncontrolled     insulin and oral meds       PSH:  Past Surgical History:   Procedure Laterality Date   • MULTIPLE CORONARY ARTERY BYPASS ENDO VEIN HARVEST  12/28/2018    Procedure: MULTIPLE CORONARY ARTERY BYPASS x 3,  ENDO VEIN HARVEST LEFT LEG;  Surgeon: Tomas Barrios M.D.;  Location: [facility];  Service: Cardiothoracic   • DONNA  12/28/2018    Procedure: DONNA;  Surgeon: Tomas Barrios M.D.;  Location: [facility];  Service: Cardiothoracic   • ILEOSTOMY  9/29/2015    Procedure: ILEOSTOMY TAKE DOWN;  Surgeon: Carter Kumar M.D.;  Location: [facility];  Service:    • LOW ANTERIOR RESECTION ROBOTIC XI  7/14/2015    Procedure: Robotic low anterior resection, takedown enterocutaneous fistula, omental flap, ileostomy creation;  Surgeon: Carter Kumar M.D.;  Location: [facility];  Service:    • COLOSTOMY CREATION LAPAROSCOPIC  7/14/2015    Procedure: COLOSTOMY CREATION  LAPAROSCOPIC FOR: LAP ILEOSTOMY;  Surgeon: Carter Kumar M.D.;  Location: SURGERY [facility];  Service:        FHX:  Family History   Problem Relation Age of Onset   • Hypertension Mother    • Heart Disease Mother    • Stroke Mother    • No Known Problems Father        Medications:  Current Facility-Administered Medications   Medication Dose   • SODIUM CHLORIDE 0.9 % IV SOLN     • busPIRone (BUSPAR) tablet 5 mg  5 mg   • gabapentin (NEURONTIN) capsule 100 mg  100 mg   • insulin glargine (LANTUS) injection 50 Units  50 Units    And   • insulin lispro (HUMALOG) injection 3-14 Units  3-14 Units    And   • glucose 4 g chewable tablet 16 g  16 g    And   • dextrose 50% (D50W) injection 25 mL  25 mL   • potassium bicarbonate (KLYTE) effervescent tablet 50 mEq  50 mEq   • metoprolol (LOPRESSOR) tablet 25 mg  25 mg   • NS infusion     • lisinopril (PRINIVIL) tablet 5 mg  5 mg   • Metoprolol Tartrate (LOPRESSOR) injection 5 mg  5 mg   • ipratropium-albuterol (DUONEB) nebulizer solution  3 mL   • enalaprilat (VASOTEC) injection 2.5 mg  2.5 mg   • Respiratory Care per Protocol     • senna-docusate (PERICOLACE or SENOKOT S) 8.6-50 MG per tablet 2 Tab  2 Tab    And   • polyethylene glycol/lytes (MIRALAX) PACKET 1 Packet  1 Packet    And   • magnesium hydroxide (MILK OF MAGNESIA) suspension 30 mL  30 mL    And   • bisacodyl (DULCOLAX) suppository 10 mg  10 mg   • ipratropium-albuterol (DUONEB) nebulizer solution  3 mL   • Pharmacy Consult: Enteral tube feeding - review meds/change route/product selection     • acetaminophen (TYLENOL) tablet 650 mg  650 mg    Or   • acetaminophen (TYLENOL) suppository 650 mg  650 mg   • aspirin (ASA) chewable tab 81 mg  81 mg   • atorvastatin (LIPITOR) tablet 80 mg  80 mg   • clopidogrel (PLAVIX) tablet 75 mg  75 mg   • mag hydrox-al hydrox-simeth (MAALOX PLUS ES or MYLANTA DS) suspension 30 mL  30 mL   • NS infusion     • Pharmacy Consult Request ...Pain Management Review 1 Each  1 Each  "  • enoxaparin (LOVENOX) inj 40 mg  40 mg   • ondansetron (ZOFRAN) syringe/vial injection 4 mg  4 mg    Or   • prochlorperazine (COMPAZINE) injection 10 mg  10 mg    Or   • promethazine (PHENERGAN) suppository 25 mg  25 mg   • lidocaine (LIDODERM) 5 % 1 Patch  1 Patch       Allergies:  Allergies   Allergen Reactions   • Penicillins Unspecified     \"Blue baby\"  RXN= as a child  Tolerated ceftriaxone 6/2015       Social Hx:  Pre-morbidly, this patient lived in a single level home with Two  steps to enter, alone and able to care for self.   Tobacco: 1-1/2 packs/day for 24 years  Alcohol: No  Drugs: Cannabinoids    Prior level of function:   Independent    Current level of function:  The patient was evaluated by acute care Physical Therapy, Occupational Therapy and Speech Language Pathology; currently requiring moderate assistance for mobility and maximal assistance for ADLs, also with ongoing cognitive and swallowing deficits.    Physical Therapy Treatment completed.   Bed Mobility:  Supine to Sit: Moderate Assist (allowed increased time; raised HOB)  Transfers: Sit to Stand: Moderate Assist (with B UE support on therapist )  Gait: Level Of Assist: Minimal Assist with hand hold assist x 5ft     Updated occupational therapy note pending    Speech Language Therapy dysphagia treatment completed.   Functional Status:  Pt seen for dysphagia tx this date. Per RN, pt more alert, interactive, requesting food, though is tachypneic, WBC elevated. NP to order chest CT to r/o PE. CXR 1/7/19 shows \"slight increased inflation prior exam with improvement of pulmonary edema, interval improvement of LEFT lung base atelectasis.\" Pt very eager for prefeeding trials today. Pt A&Ox3, able to follow directions and respond appropriately. Wife at bedside. Oral care completed prior to administration of PO. Pt given ice chips (10), NTL via tsp (8), thins via 1/2-full tsp (6), purees via 1/2 tsp (10). Pt with increasing SOB after swallowing. " Throat clearing x2 noted with NTL, concerning for pen/asp. Swallow response is mildly delayed (~3-4s). Hyolaryngeal excursion palpated as reduced. Double swallows noted for all purees and occ with NTL and thins. No s/sx of aspiration noted with ice chips, thins or purees. Given pt's SOB/increased RR with PO trials, s/sx of aspiration with nectars, and general weakness/fatigue, recommend pt continue NPO/Cortrak for alternative means of nutrition/hydration. Pt may have 5-10 ice chips/hr with 1:1 nsg supervision for oral comfort and to facilitate more swallowing of secretions throughout the day. Pt/wife verbalize understanding of recommendations and are in agreement with POC. RN as well. SLP following.      Recommendations: NPO/Cortrak, prefeeding with SLP only. 5-10 ice chips/hr ok with 1:1 nsg supervision. Oral care 4x/day.     Physical Exam:  Vitals: Blood pressure (!) 92/72, pulse (!) 122, temperature 37 °C (98.6 °F), temperature source Temporal, resp. rate (!) 35, height 1.829 m (6'), weight 85.3 kg (188 lb 0.8 oz), SpO2 97 %.  Gen: NAD, laying comfortably in bed  HEENT:  NC/AT, PERRLA, moist mucous membranes  Cardio: RRR, no mumurs, chest incision clean dry and intact, no erythema  Pulm: Coarse breath sounds bilaterally at bases, with normal respiratory effort  Abd: Soft NTND, active bowel sounds,   Ext: No peripheral edema. No calf tenderness. No clubbing/cyanosis. +dorsal pedalis pulses bilaterally.  No asymmetric atrophy of the lower extremities    Mental status:  A&Ox4 (person, place, date, situation) answers questions appropriately follows commands    Speech: fluent, no aphasia or dysarthria    CRANIAL NERVES:  2,3: visual acuity grossly intact, PERRL  3,4,6: EOMI bilaterally, no nystagmus or diplopia  5: sensation intact to light touch bilaterally and symmetric  7: no facial asymmetry  8: hearing grossly intact  9,10: symmetric palate elevation  11: SCM/Trapezius strength 5/5 bilaterally  12: tongue  protrudes midline    Motor:  Patient is moving all 4 extremities against gravity, 4 out of 5 in all major myotomes    Sensory:   intact to light touch through out      DTRs: 2+ in bilateral biceps, triceps, brachioradialis, 2+ in bilateral patellar and achilles tendons  No clonus at bilateral ankles  Negative babinski b/l  Negative Holm b/l         Labs:  Recent Labs      01/06/19 0410 01/07/19   0513  01/08/19   0345  01/08/19   0530   RBC  3.31*  2.89*  1.90*   --    HEMOGLOBIN  10.1*  8.9*  6.0*  8.8*   HEMATOCRIT  30.6*  26.8*  17.9*  26.5*   PLATELETCT  417  350  383   --      Recent Labs      01/06/19 0410 01/07/19 0513 01/08/19 0345   SODIUM  132*  133*  131*   POTASSIUM  3.1*  3.0*  3.6   CHLORIDE  96  95*  100   CO2  27  31  27   GLUCOSE  227*  243*  233*   BUN  44*  30*  19   CREATININE  0.63  0.58  0.49*   CALCIUM  8.6  8.2*  8.0*     Recent Results (from the past 24 hour(s))   ACCU-CHEK GLUCOSE    Collection Time: 01/07/19 10:42 AM   Result Value Ref Range    Glucose - Accu-Ck 289 (H) 65 - 99 mg/dL   ACCU-CHEK GLUCOSE    Collection Time: 01/07/19  1:05 PM   Result Value Ref Range    Glucose - Accu-Ck 229 (H) 65 - 99 mg/dL   ACCU-CHEK GLUCOSE    Collection Time: 01/07/19  6:25 PM   Result Value Ref Range    Glucose - Accu-Ck 258 (H) 65 - 99 mg/dL   ACCU-CHEK GLUCOSE    Collection Time: 01/08/19 12:52 AM   Result Value Ref Range    Glucose - Accu-Ck 218 (H) 65 - 99 mg/dL   CBC without Differential Critical Care 0130    Collection Time: 01/08/19  3:45 AM   Result Value Ref Range    WBC 20.4 (H) 4.8 - 10.8 K/uL    RBC 1.90 (L) 4.70 - 6.10 M/uL    Hemoglobin 6.0 (L) 14.0 - 18.0 g/dL    Hematocrit 17.9 (LL) 42.0 - 52.0 %    MCV 93.7 81.4 - 97.8 fL    MCH 31.6 27.0 - 33.0 pg    MCHC 33.7 33.7 - 35.3 g/dL    RDW 43.5 35.9 - 50.0 fL    Platelet Count 383 164 - 446 K/uL    MPV 10.9 9.0 - 12.9 fL   Basic Metabolic Panel (BMP) Critical Care 0130    Collection Time: 01/08/19  3:45 AM   Result Value Ref  Range    Sodium 131 (L) 135 - 145 mmol/L    Potassium 3.6 3.6 - 5.5 mmol/L    Chloride 100 96 - 112 mmol/L    Co2 27 20 - 33 mmol/L    Glucose 233 (H) 65 - 99 mg/dL    Bun 19 8 - 22 mg/dL    Creatinine 0.49 (L) 0.50 - 1.40 mg/dL    Calcium 8.0 (L) 8.5 - 10.5 mg/dL    Anion Gap 4.0 0.0 - 11.9   Magnesium    Collection Time: 01/08/19  3:45 AM   Result Value Ref Range    Magnesium 1.9 1.5 - 2.5 mg/dL   Phosphorus    Collection Time: 01/08/19  3:45 AM   Result Value Ref Range    Phosphorus 2.2 (L) 2.5 - 4.5 mg/dL   CRP QUANTITIVE (NON-CARDIAC)    Collection Time: 01/08/19  3:45 AM   Result Value Ref Range    Stat C-Reactive Protein 6.62 (H) 0.00 - 0.75 mg/dL   PREALBUMIN    Collection Time: 01/08/19  3:45 AM   Result Value Ref Range    Pre-Albumin 11.0 (L) 18.0 - 38.0 mg/dL   ESTIMATED GFR    Collection Time: 01/08/19  3:45 AM   Result Value Ref Range    GFR If African American >60 >60 mL/min/1.73 m 2    GFR If Non African American >60 >60 mL/min/1.73 m 2   COD - Adult (Type and Screen)    Collection Time: 01/08/19  3:45 AM   Result Value Ref Range    ABO Grouping Only O     Rh Grouping Only NEG     Antibody Screen-Cod NEG    ISTAT VENOUS BLOOD GAS    Collection Time: 01/08/19  5:22 AM   Result Value Ref Range    Ph 7.519 (H) 7.310 - 7.450    Pco2 33.8 (L) 41.0 - 51.0 mmHg    Po2 29 25 - 40 mmHg    Tco2 29 20 - 33 mmol/L    SO2 63 %    Hco3 27.6 24.0 - 28.0 mmol/L    BE 5 (H) -4 - 3 mmol/L    Body Temp 37.0 C degrees    O2 Therapy 21 %    iPF Ratio 138     Ph Temp Correc 7.519 (H) 7.310 - 7.450    Pco2 Temp Jessica 33.8 (L) 41.0 - 51.0 mmHg    Po2 Temp Corre 29 25 - 40 mmHg    Specimen Venous     Action Range Triggered YES     Inst. Qualified Patient YES    ISTAT SODIUM    Collection Time: 01/08/19  5:22 AM   Result Value Ref Range    Istat Sodium 135 135 - 145 mmol/L   ISTAT POTASSIUM    Collection Time: 01/08/19  5:22 AM   Result Value Ref Range    Istat Potassium 3.8 3.6 - 5.5 mmol/L   ISTAT IONIZED CA    Collection  Time: 01/08/19  5:22 AM   Result Value Ref Range    Istat Ionized Calcium 1.18 1.10 - 1.30 mmol/L   ISTAT HEMATOCRIT AND HEMOGLOBIN    Collection Time: 01/08/19  5:22 AM   Result Value Ref Range    Istat Hematocrit 28 (L) 42 - 52 %    Istat Hemoglobin 9.5 (L) 14.0 - 18.0 g/dL   HEMOGLOBIN AND HEMATOCRIT    Collection Time: 01/08/19  5:30 AM   Result Value Ref Range    Hemoglobin 8.8 (L) 14.0 - 18.0 g/dL    Hematocrit 26.5 (L) 42.0 - 52.0 %   ACCU-CHEK GLUCOSE    Collection Time: 01/08/19  5:33 AM   Result Value Ref Range    Glucose - Accu-Ck 204 (H) 65 - 99 mg/dL       ASSESSMENT:  Non-STEMI: Status post three-vessel CABG on 12/28, ejection fraction of 25%- 30%  -Aspirin 81 mg daily  -Plavix 75 mg daily  -Lipitor 80 mg nightly  -Metoprolol 25 mg twice daily    Anoxic brain injury: Concern for anoxic brain injury given dysphasia, complicated hospitalization with both respiratory failure and acute heart failure.  No imaging at this time, head CT on 1/4 was negative personally evaluated  -Consult speech therapy for cognitive evaluation  -Consider MRI of brain    Hyponatremia: Sodium 131  -Check BMP in a.m.  # SIADH: check urine osmoles , expect elevated >300mmol/kg  - fluid restriction to 1.5L     # Cerebral salt wasting (CSW): hypovolemia 2/2 sodium loss in the urine  - FLuid replacement  - check urine osmoles expect normal     Leukocytosis WBC 20, chest CT shows no pneumonia  -Increasing from a 17, consider checking differential    Dysphasia: Oral pharyngeal, n.p.o. core track in place  -Continue working with speech therapy on swallowing exercises  Constipation: Likely drug-induced  -Increase senna to 3 tablets q. Noon  -Give round of MOM, if no results consider magnesium citrate full bottle    Rehabilitation: Impaired ADLs and mobility  -Pending updated Occupational Therapy note, patient would benefit from speech therapy for cognitive evaluation  -We will need discharge support clarification    Discussed with pt,  summarized hospitalization and care, options for next step of care    Discussed with team about recommendations    Froylan Dougherty D.O.  Physical Medicine and Rehabilitation

## 2019-01-08 NOTE — THERAPY
Per RN, pt not appropriate for therapy this morning d/t increased RR. Will continue to follow and re-attempt as appropriate.

## 2019-01-08 NOTE — DISCHARGE PLANNING
Anticipated Discharge Disposition: d/c to Rehab    Action: Per AM rounds, pt white count trending upward, and not medically cleared for d/c at this time.    Barriers to Discharge: medical clearance, Rehab INS AUTH/acceptance/open bed, transportation     Plan: f/u w/ medical team, Rehab admissions

## 2019-01-08 NOTE — PROGRESS NOTES
Cardiovascular Surgery Progress Note    Name: Rikki Khalil  MRN: [id number]  : 1975  Admit Date: 2018  9:54 PM  Procedure:  Procedure(s) and Anesthesia Type:     * MULTIPLE CORONARY ARTERY BYPASS x 3,  ENDO VEIN HARVEST LEFT LEG - General     * DONNA - General  11 Day Post-Op    Vitals:  Patient Vitals for the past 8 hrs:   Temp SpO2 O2 Delivery Pulse Heart Rate (Monitored) Resp NIBP   19 0800 37 °C (98.6 °F) 97 % None (Room Air) (!) 122 (!) 123 (!) 35 106/62   19 0600 - 96 % - (!) 119 (!) 119 (!) 35 116/71   19 0522 - 96 % - - (!) 115 - -     Temp (24hrs), Av °C (98.6 °F), Min:36.9 °C (98.5 °F), Max:37 °C (98.6 °F)      Respiratory:    Respiration: (!) 35, Pulse Oximetry: 97 %     Chest Tube Drains:          Fluids:    Intake/Output Summary (Last 24 hours) at 19 1203  Last data filed at 19 1000   Gross per 24 hour   Intake             2755 ml   Output             2170 ml   Net              585 ml     Admit weight: Weight: 95.3 kg (210 lb)  Current weight: Weight: 85.3 kg (188 lb 0.8 oz) (19 0600)    Labs:  Recent Labs      19   0410  19   0513  19   0345  19   0530   WBC  20.1*  17.6*  20.4*   --    RBC  3.31*  2.89*  1.90*   --    HEMOGLOBIN  10.1*  8.9*  6.0*  8.8*   HEMATOCRIT  30.6*  26.8*  17.9*  26.5*   MCV  92.4  92.7  93.7   --    MCH  30.5  30.8  31.6   --    MCHC  33.0*  33.2*  33.7   --    RDW  44.1  43.2  43.5   --    PLATELETCT  417  350  383   --    MPV  10.7  10.8  10.9   --          Recent Labs      19   0410  19   0513  19   0345   SODIUM  132*  133*  131*   POTASSIUM  3.1*  3.0*  3.6   CHLORIDE  96  95*  100   CO2  27  31  27   GLUCOSE  227*  243*  233*   BUN  44*  30*  19   CREATININE  0.63  0.58  0.49*   CALCIUM  8.6  8.2*  8.0*           Medications:  • NS   Stopped (19 0500)   • MD Alert...Adult ICU Electrolyte Replacement per Pharmacy       • potassium phosphate ivpb  30 mmol 30 mmol (19  0941)   • busPIRone  5 mg     • gabapentin  100 mg     • insulin glargine  50 Units      And   • insulin lispro  3-14 Units     • potassium bicarbonate  50 mEq     • metoprolol  25 mg     • lisinopril  5 mg     • senna-docusate  2 Tab     • aspirin  81 mg     • atorvastatin  80 mg     • clopidogrel  75 mg     • enoxaparin  40 mg     • lidocaine  1 Patch          Ordered Medications:    ASA - Yes    Plavix - Yes    Post-operative Beta Blockers - Yes    Ace Inhibitor -yes      Statin - Yes         Exam:   Review of Systems   Constitutional: Negative.    HENT: Negative.    Eyes: Negative.    Respiratory: Negative.    Cardiovascular: Negative.    Gastrointestinal: Negative.    Genitourinary: Negative.    Musculoskeletal: Negative.    Skin: Negative.    Neurological: Negative.    Endo/Heme/Allergies: Negative.    Psychiatric/Behavioral: Negative.        Physical Exam    Quality Measures:   Quality-Core Measures   Reviewed items::  EKG reviewed, Labs reviewed, Medications reviewed and Radiology images reviewed  Solitario catheter::  Strict Intake and Output During Sepisis or Shock  Central line in place:  Need for access  DVT prophylaxis pharmacological::  Contraindicated - High bleeding risk  DVT prophylaxis - mechanical:  SCDs  Ulcer Prophylaxis::  Yes  Assessed for rehabilitation services:  Patient returned to prior level of function, rehabilitation not indicated at this time      Assessment/Plan:  POD 1 HDS- no gtts- dc IABP, ST start metorolol, low EF add ace, Resp insuff- currently on bi-pap- diurese- pulm following pain- add ms contin, D/c Ct after OOB, keep solitario for strict I&O, enc IS  POD 2 HOTN - on devora gtt- give albumin this am, ST, re-intubated yesterday for resp distress, sedated, pain on fentanyl gtt, hyponatremia- hold diuresis today, solitario for strict I&O, CPM  POD 3 HOTN- devora gtt, vent- broch w/ copious amt of secretions yesterday, sedated - on propofol/fentanyl for pain, low UO- check solitario/gently  diurese  POD 4 HTN- increase po meds, 's- start esmolol, vent- pulm following, sedated, given lasix this am, CPM  POD 5 HDS, intubated vented, respiratory cultures positive, hypertensive, neuro grossly intact, abd S/NT +BS.  Plan:  Increase lisinopril.  Since unit out of IV pushes of fentanyl will increase drip slightly for pain.  Wean vent as tolerated.  On abx for positive respiratory cultures.  CPM.   POD 6 extubated, HDS, ST rate 120's, very slow to answer questions, hypotensive today.  Lisinopril adjusted by cards.  Plan:  Will dc pacing wires and janee this afternoon.  DC more potent narcotics.    POD 7 HDS, SR, neuro- diff with speech- CT scan today, dysphagia- cortrak, PT/OT rec rehab- will place order today would expect ready for transfer by Sunday/monday.  POD 8 HDS, ST vs aflutter- ekg- cards following, neuro status improved- Head CT neg, right groin hematoma - w/w, dysphagia- cortrak, PT/OT to re-eval for rehab, amb, enc IS  POD 9 HDS, SR/ST- cards following, Dysphagia- cortrak, pneumonia- on atbx, urinary retention- solitario replaced/start flomax, OT to see today, awaiting acceptance to rehab  POD 10 HDS, 's, tachypnec, became more tachypnec with swallow evaluation.  Awake and alert this AM.  Plan:  May consider CT scan R/O PE.    POD 11 HDS, still 's.  Respiratory rate better.  Awake and alert today.  CT chest negative for PE.  HCT down this AM.  Plan: 1 UPRBC's, guiac all stools.  Awaiting rehab.   Patient seen, examined and plan reviewed with midlevel provider. I agree with the plan.      Active Hospital Problems    Diagnosis   • Acute hypoxemic respiratory failure (HCC) [J96.01]     Priority: High   • S/P CABG x 3 [Z95.1]     Priority: High   • Ventilator dependent (HCC) [Z99.11]     Priority: High   • NSTEMI (non-ST elevated myocardial infarction) (HCC) [I21.4]     Priority: High   • Acute systolic congestive heart failure (HCC) [I50.21]     Priority: High   • Pneumonia [J18.9]      Priority: Medium   • Atelectasis [J98.11]     Priority: Medium   • SVT (supraventricular tachycardia) (HCC) [I47.1]     Priority: Medium   • Type 2 diabetes mellitus without complication (HCC) [E11.9]     Priority: Medium   • Hypokalemia [E87.6]     Priority: Low   • Postural dizziness with presyncope [R42, R55]     Priority: Low   • Sepsis (HCC) [A41.9]

## 2019-01-08 NOTE — PROGRESS NOTES
[facility] Service, due to the patient having low HGB of 6, and PAWEL Graham returned the page promptly. Described the patient's current condition including that he is tachy and HOTN. Requested PRBCs; PAWEL Graham placed orders for PRBCs. All orders read back, and entered in EPIC.

## 2019-01-08 NOTE — PROGRESS NOTES
Critical Care Progress Note    Date of admission  12/26/2018    Chief Complaint  43 y.o. male admitted 12/26/2018 with chest pain.    Hospital Course    This gentleman was admitted to the hospital with an acute non-STEMI.  He was found to have significant CAD.  He underwent CABG.      Interval Problem Update  Reviewed last 24 hour events:  SR/ST  Afebrile  chas TF; SLP - failed yesterday  Good UOP  Thomas; urine retention;   IS 1500; room air  HH - repeat 8.8 (6.0 was error)  SSI, Lantus 50    ??? Why   S.pneumo; ? chage   Stop IVF   F/u CXR    Yesterday  DAPT  Abx - MSSA and S.pneumo; Ancef  SLP eval; ice chips only  Inc WOB this am  2 lpm  Replace K and Mg  Lantus 40, SSI, inc to 50        Review of Systems  Review of Systems   Constitutional: Negative.    HENT: Negative.    Eyes: Negative.    Cardiovascular: Negative.    Gastrointestinal: Negative.    Musculoskeletal: Negative.    Skin: Negative.    Neurological: Negative.    Psychiatric/Behavioral: Substance abuse:           Vital Signs for last 24 hours   Temp:  [36.9 °C (98.4 °F)-37 °C (98.6 °F)] 37 °C (98.6 °F)  Pulse:  [115-131] 122  Resp:  [24-41] 35    Hemodynamic parameters for last 24 hours       Respiratory       Physical Exam   Physical Exam   Constitutional: He is oriented to person, place, and time. He appears well-developed and well-nourished.   HENT:   Head: Normocephalic and atraumatic.   Eyes: Pupils are equal, round, and reactive to light. No scleral icterus.   Neck: Neck supple. No tracheal deviation present. No thyromegaly present.   Cardiovascular: Normal rate.    No murmur heard.  Pulmonary/Chest: Effort normal and breath sounds normal.   Abdominal: Soft. There is no tenderness.   Musculoskeletal: Normal range of motion. He exhibits no edema.   Neurological: He is alert and oriented to person, place, and time. No cranial nerve deficit.   Skin: Skin is warm and dry.   Psychiatric: He has a normal mood and affect.       Medications  Current  Facility-Administered Medications   Medication Dose Route Frequency Provider Last Rate Last Dose   • SODIUM CHLORIDE 0.9 % IV SOLN        Stopped at 01/08/19 0500   • MD Alert...ICU Electrolyte Replacement per Pharmacy   Other pharmacy to dose Kenneth Clark M.D.       • magnesium sulfate IVPB premix 2 g  2 g Intravenous Once Kenneth Clark M.D. 25 mL/hr at 01/08/19 0941 2 g at 01/08/19 0941   • potassium phosphates 30 mmol in  mL ivpb  30 mmol Intravenous Once Kenneth Clark M.D. 83.3 mL/hr at 01/08/19 0941 30 mmol at 01/08/19 0941   • busPIRone (BUSPAR) tablet 5 mg  5 mg Oral TID Inna Linton, A.P.N.   5 mg at 01/08/19 0537   • gabapentin (NEURONTIN) capsule 100 mg  100 mg Oral TID Inna Linton, A.P.N.   100 mg at 01/08/19 0537   • insulin glargine (LANTUS) injection 50 Units  50 Units Subcutaneous QAM INSULIN Kenneth Clark M.D.   50 Units at 01/08/19 0541    And   • insulin lispro (HUMALOG) injection 3-14 Units  3-14 Units Subcutaneous Q6HRS Kenneth Clark M.D.   4 Units at 01/08/19 0541    And   • glucose 4 g chewable tablet 16 g  16 g Oral Q15 MIN PRN Kenneth Clark M.D.        And   • dextrose 50% (D50W) injection 25 mL  25 mL Intravenous Q15 MIN PRN Kenneth Clark M.D.       • potassium bicarbonate (KLYTE) effervescent tablet 50 mEq  50 mEq Feeding Tube BID Kenneth Clark M.D.   50 mEq at 01/08/19 0534   • metoprolol (LOPRESSOR) tablet 25 mg  25 mg Feeding Tube TWICE DAILY Kenneth Clark M.D.   Stopped at 01/07/19 1015   • NS infusion   Intravenous Continuous Kenneth Clark M.D. 75 mL/hr at 01/08/19 0338     • lisinopril (PRINIVIL) tablet 5 mg  5 mg Oral BID Herminia Queen M.D.   Stopped at 01/06/19 1800   • Metoprolol Tartrate (LOPRESSOR) injection 5 mg  5 mg Intravenous Q4HRS PRN Herminia Queen M.D.       • ipratropium-albuterol (DUONEB) nebulizer solution  3 mL Nebulization Q4H PRN (RT) Hieu Vallejo M.D.       • enalaprilat (VASOTEC) injection 2.5 mg  2.5 mg  Intravenous Q6HRS PRN Herminia Quene M.D.   2.5 mg at 01/02/19 1105   • Respiratory Care per Protocol   Nebulization Continuous RT Hieu Vallejo M.D.       • senna-docusate (PERICOLACE or SENOKOT S) 8.6-50 MG per tablet 2 Tab  2 Tab Feeding Tube BID Hieu Vallejo M.D.   2 Tab at 01/07/19 1637    And   • polyethylene glycol/lytes (MIRALAX) PACKET 1 Packet  1 Packet Feeding Tube QDAY PRN Hieu Vallejo M.D.   1 Packet at 01/02/19 1054    And   • magnesium hydroxide (MILK OF MAGNESIA) suspension 30 mL  30 mL Feeding Tube QDAY PRN Hieu Vallejo M.D.   30 mL at 01/04/19 0530    And   • bisacodyl (DULCOLAX) suppository 10 mg  10 mg Rectal QDAY PRN Hieu Vallejo M.D.       • ipratropium-albuterol (DUONEB) nebulizer solution  3 mL Nebulization Q2HRS PRN (RT) Hieu Vallejo M.D.       • Pharmacy Consult: Enteral tube feeding - review meds/change route/product selection   Other PRN Hieu Vallejo M.D.       • acetaminophen (TYLENOL) tablet 650 mg  650 mg Feeding Tube Q4HRS PRN Hieu Vallejo M.D.        Or   • acetaminophen (TYLENOL) suppository 650 mg  650 mg Rectal Q4HRS PRN Hieu Vallejo M.D.       • aspirin (ASA) chewable tab 81 mg  81 mg Per NG Tube DAILY Hieu Vallejo M.D.   81 mg at 01/08/19 0537   • atorvastatin (LIPITOR) tablet 80 mg  80 mg Per NG Tube DAILY Hieu Vallejo M.D.   80 mg at 01/08/19 0600   • clopidogrel (PLAVIX) tablet 75 mg  75 mg Feeding Tube DAILY Hieu Vallejo M.D.   75 mg at 01/08/19 0537   • mag hydrox-al hydrox-simeth (MAALOX PLUS ES or MYLANTA DS) suspension 30 mL  30 mL Per NG Tube Q4HRS PRN Hieu Vallejo M.D.       • NS infusion   Intravenous Continuous Tomas Barrios M.D. 10 mL/hr at 01/08/19 0941     • Pharmacy Consult Request ...Pain Management Review 1 Each  1 Each Other PRN Tomas Barrios M.D.       • enoxaparin (LOVENOX) inj 40 mg  40 mg Subcutaneous QDAY Tomas MOTA  DUY Barrios   40 mg at 01/07/19 1636   • ondansetron (ZOFRAN) syringe/vial injection 4 mg  4 mg Intravenous Q6HRS PRLUI Barrios M.D.   4 mg at 12/29/18 1831    Or   • prochlorperazine (COMPAZINE) injection 10 mg  10 mg Intravenous Q6HRS PRN Tomas Barrios M.D.   10 mg at 12/29/18 0004    Or   • promethazine (PHENERGAN) suppository 25 mg  25 mg Rectal Q6HRS PRN Tomas Barrios M.D.   25 mg at 01/05/19 1003   • lidocaine (LIDODERM) 5 % 1 Patch  1 Patch Transdermal Q24HR SHYAM Alejo   1 Patch at 01/07/19 2149       Fluids    Intake/Output Summary (Last 24 hours) at 01/08/19 1015  Last data filed at 01/08/19 1000   Gross per 24 hour   Intake          3044.58 ml   Output             2295 ml   Net           749.58 ml       Laboratory  Recent Labs      01/08/19   0522   ISTATTEMP  37.0 C   ISTATFIO2  21   ISTATSPEC  Venous         Recent Labs      01/06/19 0410 01/07/19   0513 01/08/19   0345   SODIUM  132*  133*  131*   POTASSIUM  3.1*  3.0*  3.6   CHLORIDE  96  95*  100   CO2  27  31  27   BUN  44*  30*  19   CREATININE  0.63  0.58  0.49*   MAGNESIUM  1.9  1.9  1.9   PHOSPHORUS  3.4  2.5  2.2*   CALCIUM  8.6  8.2*  8.0*     Recent Labs      01/06/19 0410 01/07/19   0513  01/08/19   0345   ALTSGPT   --   14   --    ASTSGOT   --   13   --    ALKPHOSPHAT   --   56   --    TBILIRUBIN   --   0.7   --    PREALBUMIN   --    --   11.0*   GLUCOSE  227*  243*  233*     Recent Labs      01/06/19 0410 01/07/19   0513  01/08/19   0345   WBC  20.1*  17.6*  20.4*   ASTSGOT   --   13   --    ALTSGPT   --   14   --    ALKPHOSPHAT   --   56   --    TBILIRUBIN   --   0.7   --      Recent Labs      01/06/19   0410  01/07/19   0513  01/08/19   0345  01/08/19   0530   RBC  3.31*  2.89*  1.90*   --    HEMOGLOBIN  10.1*  8.9*  6.0*  8.8*   HEMATOCRIT  30.6*  26.8*  17.9*  26.5*   PLATELETCT  417  350  383   --        Imaging  X-Ray:  I have personally reviewed the images and compared with prior images. and My  impression is: Diffuse right and left lower lobe opacities    Assessment/Plan  * NSTEMI (non-ST elevated myocardial infarction) ([facility])- (present on admission)   Assessment & Plan    Continue aspirin, statin and Plavix     Acute hypoxemic respiratory failure ([facility])   Assessment & Plan    Resolved     Ventilator dependent ([facility])   Assessment & Plan    Room air, good pulmonary toilet    Encourage mobility     S/P CABG x 3   Assessment & Plan    Doing much better- still slow but gaining strengh  Good candidate for rehab acute post hospitalization     Acute systolic congestive heart failure ([facility])   Assessment & Plan    EF 25%  Intra-aortic balloon pump removed on 12/29     Pneumonia   Assessment & Plan    Staph/Strep bilateral pneumonia (MSSA)- 7 days of Ancef  Finished antibiotic  No evidence active infection     Atelectasis   Assessment & Plan    Resolved although has low lung volumes     Type 2 diabetes mellitus without complication ([facility])- (present on admission)   Assessment & Plan    Poorly controlled prior to admission with glycohemoglobin of 12  Continue sliding scale insulin- goal sugar > 80< 180    Change to outp regimen     SVT (supraventricular tachycardia) ([facility])- (present on admission)   Assessment & Plan    Vent rate well controlled this week but has sinus tach ? Reasons    On beta blockers  Sinus tach is slowing down       Sepsis ([facility])- (present on admission)   Assessment & Plan    Sepsis resolved     Hypokalemia- (present on admission)   Assessment & Plan    Replete          VTE:  Lovenox  Ulcer: Not Indicated  Lines: Central Line  Ongoing indication addressed, remove soon    I have performed a physical exam and reviewed and updated ROS and Plan today (1/8/2019). In review of yesterday's note (1/7/2019), there are no changes except as documented above.     The low hemoglobin of 6.0 was a lab error this morning.  Repeat was unchanged at 8.8.  Continue prophylactic Lovenox.  Follow-up complete CBC and white  blood cell count.  He is afebrile but may need to escalate antibiotic therapy.  Discussed patient condition and risk of morbidity and/or mortality with Family, RN, RT, Pharmacy, Charge nurse / hot rounds, QA team and [facility]

## 2019-01-08 NOTE — THERAPY
Occupational Therapy Contact Note    OT treatment attempted, RN requesting to wait a bit as patient is anxious, still with increased RR. Will attempt later as able.    Janna Collins, OTR/L

## 2019-01-08 NOTE — CARE PLAN
Problem: Post Op Day 4 CABG/Heart Valve Replacement  Goal: Optimal care of the Post Op CABG/Heart Valve replacement Post Op Day 4  Outcome: PROGRESSING AS EXPECTED    Intervention: Daily Weights  Entered in epic  Intervention: Up in chair for all meals  Up in chair  Intervention: IS q 1 hour while awake and record best IS volume  2000

## 2019-01-09 ENCOUNTER — APPOINTMENT (OUTPATIENT)
Dept: RADIOLOGY | Facility: MEDICAL CENTER | Age: 44
DRG: 233 | End: 2019-01-09
Attending: INTERNAL MEDICINE
Payer: MEDICAID

## 2019-01-09 LAB
ANION GAP SERPL CALC-SCNC: 6 MMOL/L (ref 0–11.9)
APPEARANCE UR: CLEAR
BACTERIA #/AREA URNS HPF: NEGATIVE /HPF
BILIRUB UR QL STRIP.AUTO: NEGATIVE
BUN SERPL-MCNC: 18 MG/DL (ref 8–22)
CALCIUM SERPL-MCNC: 8.2 MG/DL (ref 8.5–10.5)
CHLORIDE SERPL-SCNC: 101 MMOL/L (ref 96–112)
CO2 SERPL-SCNC: 23 MMOL/L (ref 20–33)
COLOR UR: YELLOW
CREAT SERPL-MCNC: 0.51 MG/DL (ref 0.5–1.4)
EPI CELLS #/AREA URNS HPF: ABNORMAL /HPF
ERYTHROCYTE [DISTWIDTH] IN BLOOD BY AUTOMATED COUNT: 45.8 FL (ref 35.9–50)
GLUCOSE BLD-MCNC: 108 MG/DL (ref 65–99)
GLUCOSE BLD-MCNC: 128 MG/DL (ref 65–99)
GLUCOSE BLD-MCNC: 159 MG/DL (ref 65–99)
GLUCOSE BLD-MCNC: 212 MG/DL (ref 65–99)
GLUCOSE BLD-MCNC: 224 MG/DL (ref 65–99)
GLUCOSE SERPL-MCNC: 213 MG/DL (ref 65–99)
GLUCOSE UR STRIP.AUTO-MCNC: NEGATIVE MG/DL
HCT VFR BLD AUTO: 26.9 % (ref 42–52)
HGB BLD-MCNC: 8.7 G/DL (ref 14–18)
HYALINE CASTS #/AREA URNS LPF: ABNORMAL /LPF
KETONES UR STRIP.AUTO-MCNC: NEGATIVE MG/DL
LEUKOCYTE ESTERASE UR QL STRIP.AUTO: ABNORMAL
MAGNESIUM SERPL-MCNC: 1.8 MG/DL (ref 1.5–2.5)
MCH RBC QN AUTO: 30.9 PG (ref 27–33)
MCHC RBC AUTO-ENTMCNC: 32.3 G/DL (ref 33.7–35.3)
MCV RBC AUTO: 95.4 FL (ref 81.4–97.8)
MICRO URNS: ABNORMAL
NITRITE UR QL STRIP.AUTO: NEGATIVE
PH UR STRIP.AUTO: 6.5 [PH]
PHOSPHATE SERPL-MCNC: 2.3 MG/DL (ref 2.5–4.5)
PLATELET # BLD AUTO: 386 K/UL (ref 164–446)
PMV BLD AUTO: 11 FL (ref 9–12.9)
POTASSIUM SERPL-SCNC: 4.2 MMOL/L (ref 3.6–5.5)
PROT UR QL STRIP: NEGATIVE MG/DL
RBC # BLD AUTO: 2.82 M/UL (ref 4.7–6.1)
RBC # URNS HPF: ABNORMAL /HPF
RBC UR QL AUTO: NEGATIVE
SODIUM SERPL-SCNC: 130 MMOL/L (ref 135–145)
SP GR UR STRIP.AUTO: 1.02
UROBILINOGEN UR STRIP.AUTO-MCNC: 1 MG/DL
WBC # BLD AUTO: 21.3 K/UL (ref 4.8–10.8)
WBC #/AREA URNS HPF: ABNORMAL /HPF

## 2019-01-09 PROCEDURE — 97535 SELF CARE MNGMENT TRAINING: CPT

## 2019-01-09 PROCEDURE — 85027 COMPLETE CBC AUTOMATED: CPT

## 2019-01-09 PROCEDURE — 80048 BASIC METABOLIC PNL TOTAL CA: CPT

## 2019-01-09 PROCEDURE — 71045 X-RAY EXAM CHEST 1 VIEW: CPT

## 2019-01-09 PROCEDURE — 700101 HCHG RX REV CODE 250: Performed by: CLINICAL NURSE SPECIALIST

## 2019-01-09 PROCEDURE — 99233 SBSQ HOSP IP/OBS HIGH 50: CPT | Performed by: INTERNAL MEDICINE

## 2019-01-09 PROCEDURE — 700102 HCHG RX REV CODE 250 W/ 637 OVERRIDE(OP): Performed by: INTERNAL MEDICINE

## 2019-01-09 PROCEDURE — 700111 HCHG RX REV CODE 636 W/ 250 OVERRIDE (IP): Performed by: THORACIC SURGERY (CARDIOTHORACIC VASCULAR SURGERY)

## 2019-01-09 PROCEDURE — 700101 HCHG RX REV CODE 250: Performed by: INTERNAL MEDICINE

## 2019-01-09 PROCEDURE — 83735 ASSAY OF MAGNESIUM: CPT

## 2019-01-09 PROCEDURE — A9270 NON-COVERED ITEM OR SERVICE: HCPCS | Performed by: CLINICAL NURSE SPECIALIST

## 2019-01-09 PROCEDURE — 92526 ORAL FUNCTION THERAPY: CPT

## 2019-01-09 PROCEDURE — 700105 HCHG RX REV CODE 258: Performed by: THORACIC SURGERY (CARDIOTHORACIC VASCULAR SURGERY)

## 2019-01-09 PROCEDURE — 81001 URINALYSIS AUTO W/SCOPE: CPT

## 2019-01-09 PROCEDURE — 770020 HCHG ROOM/CARE - TELE (206)

## 2019-01-09 PROCEDURE — 700111 HCHG RX REV CODE 636 W/ 250 OVERRIDE (IP): Performed by: INTERNAL MEDICINE

## 2019-01-09 PROCEDURE — 700102 HCHG RX REV CODE 250 W/ 637 OVERRIDE(OP): Performed by: CLINICAL NURSE SPECIALIST

## 2019-01-09 PROCEDURE — A9270 NON-COVERED ITEM OR SERVICE: HCPCS | Performed by: INTERNAL MEDICINE

## 2019-01-09 PROCEDURE — 700105 HCHG RX REV CODE 258: Performed by: INTERNAL MEDICINE

## 2019-01-09 PROCEDURE — 84100 ASSAY OF PHOSPHORUS: CPT

## 2019-01-09 PROCEDURE — 82962 GLUCOSE BLOOD TEST: CPT | Mod: 91

## 2019-01-09 RX ORDER — INSULIN GLARGINE 100 [IU]/ML
50 INJECTION, SOLUTION SUBCUTANEOUS
Status: DISCONTINUED | OUTPATIENT
Start: 2019-01-10 | End: 2019-01-10 | Stop reason: HOSPADM

## 2019-01-09 RX ORDER — CLOPIDOGREL BISULFATE 75 MG/1
75 TABLET ORAL DAILY
Status: DISCONTINUED | OUTPATIENT
Start: 2019-01-10 | End: 2019-01-10 | Stop reason: HOSPADM

## 2019-01-09 RX ORDER — ATORVASTATIN CALCIUM 80 MG/1
80 TABLET, FILM COATED ORAL DAILY
Status: DISCONTINUED | OUTPATIENT
Start: 2019-01-10 | End: 2019-01-10 | Stop reason: HOSPADM

## 2019-01-09 RX ORDER — BISACODYL 10 MG
10 SUPPOSITORY, RECTAL RECTAL
Status: DISCONTINUED | OUTPATIENT
Start: 2019-01-09 | End: 2019-01-10 | Stop reason: HOSPADM

## 2019-01-09 RX ORDER — MAGNESIUM SULFATE HEPTAHYDRATE 40 MG/ML
2 INJECTION, SOLUTION INTRAVENOUS ONCE
Status: COMPLETED | OUTPATIENT
Start: 2019-01-09 | End: 2019-01-09

## 2019-01-09 RX ORDER — AMOXICILLIN 250 MG
2 CAPSULE ORAL 2 TIMES DAILY
Status: DISCONTINUED | OUTPATIENT
Start: 2019-01-10 | End: 2019-01-10 | Stop reason: HOSPADM

## 2019-01-09 RX ORDER — POLYETHYLENE GLYCOL 3350 17 G/17G
1 POWDER, FOR SOLUTION ORAL
Status: DISCONTINUED | OUTPATIENT
Start: 2019-01-09 | End: 2019-01-10 | Stop reason: HOSPADM

## 2019-01-09 RX ORDER — DEXTROSE MONOHYDRATE 25 G/50ML
25 INJECTION, SOLUTION INTRAVENOUS
Status: DISCONTINUED | OUTPATIENT
Start: 2019-01-09 | End: 2019-01-10 | Stop reason: HOSPADM

## 2019-01-09 RX ADMIN — ACETAMINOPHEN 650 MG: 325 TABLET, FILM COATED ORAL at 00:42

## 2019-01-09 RX ADMIN — POTASSIUM BICARBONATE 50 MEQ: 25 TABLET, EFFERVESCENT ORAL at 17:45

## 2019-01-09 RX ADMIN — MAGNESIUM SULFATE IN WATER 2 G: 40 INJECTION, SOLUTION INTRAVENOUS at 10:28

## 2019-01-09 RX ADMIN — GABAPENTIN 100 MG: 100 CAPSULE ORAL at 17:46

## 2019-01-09 RX ADMIN — BUSPIRONE HYDROCHLORIDE 5 MG: 30 TABLET ORAL at 11:40

## 2019-01-09 RX ADMIN — GABAPENTIN 100 MG: 100 CAPSULE ORAL at 11:40

## 2019-01-09 RX ADMIN — BUSPIRONE HYDROCHLORIDE 5 MG: 30 TABLET ORAL at 17:46

## 2019-01-09 RX ADMIN — ENOXAPARIN SODIUM 40 MG: 100 INJECTION SUBCUTANEOUS at 17:46

## 2019-01-09 RX ADMIN — POTASSIUM PHOSPHATE, MONOBASIC AND POTASSIUM PHOSPHATE, DIBASIC 15 MMOL: 224; 236 INJECTION, SOLUTION INTRAVENOUS at 09:22

## 2019-01-09 RX ADMIN — LIDOCAINE 1 PATCH: 50 PATCH TOPICAL at 21:03

## 2019-01-09 RX ADMIN — SODIUM CHLORIDE: 9 INJECTION, SOLUTION INTRAVENOUS at 18:43

## 2019-01-09 RX ADMIN — INSULIN GLARGINE 50 UNITS: 100 INJECTION, SOLUTION SUBCUTANEOUS at 05:41

## 2019-01-09 RX ADMIN — ACETAMINOPHEN 650 MG: 325 TABLET, FILM COATED ORAL at 23:58

## 2019-01-09 ASSESSMENT — PAIN SCALES - GENERAL
PAINLEVEL_OUTOF10: 0
PAINLEVEL_OUTOF10: 4
PAINLEVEL_OUTOF10: 0
PAINLEVEL_OUTOF10: 9
PAINLEVEL_OUTOF10: 0
PAINLEVEL_OUTOF10: 7

## 2019-01-09 ASSESSMENT — COGNITIVE AND FUNCTIONAL STATUS - GENERAL
TOILETING: A LITTLE
SUGGESTED CMS G CODE MODIFIER MOBILITY: CK
MOVING TO AND FROM BED TO CHAIR: A LITTLE
DRESSING REGULAR LOWER BODY CLOTHING: A LITTLE
CLIMB 3 TO 5 STEPS WITH RAILING: A LITTLE
STANDING UP FROM CHAIR USING ARMS: A LITTLE
MOBILITY SCORE: 19
MOVING FROM LYING ON BACK TO SITTING ON SIDE OF FLAT BED: A LITTLE
WALKING IN HOSPITAL ROOM: A LITTLE
HELP NEEDED FOR BATHING: A LITTLE
SUGGESTED CMS G CODE MODIFIER DAILY ACTIVITY: CJ
DAILY ACTIVITIY SCORE: 21

## 2019-01-09 ASSESSMENT — PATIENT HEALTH QUESTIONNAIRE - PHQ9
2. FEELING DOWN, DEPRESSED, IRRITABLE, OR HOPELESS: NOT AT ALL
SUM OF ALL RESPONSES TO PHQ9 QUESTIONS 1 AND 2: 0
1. LITTLE INTEREST OR PLEASURE IN DOING THINGS: NOT AT ALL
2. FEELING DOWN, DEPRESSED, IRRITABLE, OR HOPELESS: NOT AT ALL
1. LITTLE INTEREST OR PLEASURE IN DOING THINGS: NOT AT ALL
SUM OF ALL RESPONSES TO PHQ9 QUESTIONS 1 AND 2: 0

## 2019-01-09 ASSESSMENT — ENCOUNTER SYMPTOMS
GASTROINTESTINAL NEGATIVE: 1
NEUROLOGICAL NEGATIVE: 1
PSYCHIATRIC NEGATIVE: 1
CONSTITUTIONAL NEGATIVE: 1
CARDIOVASCULAR NEGATIVE: 1
RESPIRATORY NEGATIVE: 1
MUSCULOSKELETAL NEGATIVE: 1
EYES NEGATIVE: 1

## 2019-01-09 ASSESSMENT — GAIT ASSESSMENTS
GAIT LEVEL OF ASSIST: STAND BY ASSIST
DEVIATION: BRADYKINETIC
DISTANCE (FEET): 250

## 2019-01-09 NOTE — HEART FAILURE PROGRAM
Cardiovascular Nurse Navigator () Advanced Heart Failure Program Inpatient Progress Note:    Today's notes indicating that pateint will go home and not to IR. I have asked hospital schedulers to bree up follow up.    Thank you and please call [facility] Link with questions M-F

## 2019-01-09 NOTE — THERAPY
"Speech Language Therapy dysphagia treatment completed.   Functional Status:  Patient seen for reassessment of swallowing function on this date with spouse at bedside.  Cortrak no longer in place and the patient now with no source of nutrition and asking for food.  He was AA&O x4, pleasant and agreeable.  Presentation of PO included ice chips, purees, mixed consistencies, dry solids, and thin liquids.  The patient presented with prolonged mastication but functional, given many missing teeth.  Initiation of swallow trigger was timely and laryngeal elevation was palpated as complete.  Patient required min cues to reduce bolus size and rate of presentation.  He demonstrated wet vocal quality x1 with a large bite of applesauce but cleared with a volitional throat clear.  No other overt s/sx of aspiration with any consistency consumed.  Order received for a cognitive-linguistic evaluation.  However, patient presented with intact cognition at bedside and answered functional problem solving questions adequately.  Spouse reported patient to be back to baseline and had no concerns.     Recommendations: At this time, recommend initiation of a regular/thin liquid diet with swallowing precautions (reduce rate and bolus size).  Spouse very proactive and provided cues throughout the session.  Cognitive evaluation not warranted at this time.  SLP to follow up x1 during a meal to assess diet tolerance.     Plan of Care: Will benefit from Speech Therapy 3 times per week  Post-Acute Therapy: Anticipate that the patient will have no further speech therapy needs after discharge from the hospital.      See \"Rehab Therapy-Acute\" Patient Summary Report for complete documentation.     "

## 2019-01-09 NOTE — PROGRESS NOTES
Monitor Summary:   ST   Rate: 100-120s  Ectopy: PVCs   Measurements: 0.14/0.10/0.36    2 RN skin check

## 2019-01-09 NOTE — DISCHARGE PLANNING
Follow up for Rehab. Would appreciate updated OT note to assist with insurance auth for IRF level care. Will submit case to Medicaid when OT update available. Please consider a SLP Cog eval per Physiatry Dr. Dougherty consult recommendations.  TCC following.  update to [facility] Ozuna [phone].

## 2019-01-09 NOTE — DISCHARGE PLANNING
Anticipated Discharge Disposition: d/c home    Action: In rounds, bedside Rn indicates pt is assessed by both SLP and OT. He is able to d/c home. Pt agrees and wants to go home today. Pt has no social needs.    Received VM from Rehab admissions, Isabel, pt told her he declines to be admitted to in pt rehab and will go home directly instead. If he changes his mind, Lsw can call [place] w/ Rehab admissions [phone].    Barriers to Discharge: none    Plan: pt to d/c home today w/o any needs

## 2019-01-09 NOTE — PROGRESS NOTES
Cardiovascular Surgery Progress Note    Name: Rikki Khalil  MRN: [id number]  : 1975  Admit Date: 2018  9:54 PM  Procedure:  Procedure(s) and Anesthesia Type:     * MULTIPLE CORONARY ARTERY BYPASS x 3,  ENDO VEIN HARVEST LEFT LEG - General     * DONNA - General  12 Day Post-Op    Vitals:  Patient Vitals for the past 8 hrs:   Temp SpO2 O2 Delivery Pulse Heart Rate (Monitored) Resp NIBP   19 1200 37 °C (98.6 °F) 98 % None (Room Air) (!) 118 (!) 120 (!) 25 119/74   19 0800 36.4 °C (97.6 °F) 96 % None (Room Air) (!) 127 (!) 127 16 132/78     Temp (24hrs), Av.6 °C (97.9 °F), Min:36.2 °C (97.2 °F), Max:37 °C (98.6 °F)      Respiratory:    Respiration: (!) 25, Pulse Oximetry: 98 %     Chest Tube Drains:          Fluids:    Intake/Output Summary (Last 24 hours) at 19 1255  Last data filed at 19 1000   Gross per 24 hour   Intake              880 ml   Output             2145 ml   Net            -1265 ml     Admit weight: Weight: 95.3 kg (210 lb)  Current weight: Weight: 86.3 kg (190 lb 4.1 oz) (19 0400)    Labs:  Recent Labs      19   0513  19   0345  19   0530  19   0445   WBC  17.6*  20.4*   --   21.3*   RBC  2.89*  1.90*   --   2.82*   HEMOGLOBIN  8.9*  6.0*  8.8*  8.7*   HEMATOCRIT  26.8*  17.9*  26.5*  26.9*   MCV  92.7  93.7   --   95.4   MCH  30.8  31.6   --   30.9   MCHC  33.2*  33.7   --   32.3*   RDW  43.2  43.5   --   45.8   PLATELETCT  350  383   --   386   MPV  10.8  10.9   --   11.0         Recent Labs      19   0513  19   0345  19   0445   SODIUM  133*  131*  130*   POTASSIUM  3.0*  3.6  4.2   CHLORIDE  95*  100  101   CO2  31  27  23   GLUCOSE  243*  233*  213*   BUN  30*  19  18   CREATININE  0.58  0.49*  0.51   CALCIUM  8.2*  8.0*  8.2*           Medications:  • [START ON 1/10/2019] insulin glargine  50 Units      And   • insulin lispro  3-14 Units     • MD Alert...Adult ICU Electrolyte Replacement per Pharmacy       •  busPIRone  5 mg     • gabapentin  100 mg     • potassium bicarbonate  50 mEq     • metoprolol  25 mg     • lisinopril  5 mg     • senna-docusate  2 Tab     • aspirin  81 mg     • atorvastatin  80 mg     • clopidogrel  75 mg     • enoxaparin  40 mg     • lidocaine  1 Patch          Ordered Medications:    ASA - Yes    Plavix - Yes    Post-operative Beta Blockers - Yes    Ace Inhibitor -yes      Statin - Yes         Exam:   Review of Systems   Constitutional: Negative.    HENT: Negative.    Eyes: Negative.    Respiratory: Negative.    Cardiovascular: Negative.    Gastrointestinal: Negative.    Genitourinary: Negative.    Musculoskeletal: Negative.    Skin: Negative.    Neurological: Negative.    Endo/Heme/Allergies: Negative.    Psychiatric/Behavioral: Negative.        Physical Exam    Quality Measures:   Quality-Core Measures   Reviewed items::  EKG reviewed, Labs reviewed, Medications reviewed and Radiology images reviewed  Solitario catheter::  Strict Intake and Output During Sepisis or Shock  Central line in place:  Need for access  DVT prophylaxis pharmacological::  Contraindicated - High bleeding risk  DVT prophylaxis - mechanical:  SCDs  Ulcer Prophylaxis::  Yes  Assessed for rehabilitation services:  Patient returned to prior level of function, rehabilitation not indicated at this time      Assessment/Plan:  POD 1 HDS- no gtts- dc IABP, ST start metorolol, low EF add ace, Resp insuff- currently on bi-pap- diurese- pulm following pain- add ms contin, D/c Ct after OOB, keep solitario for strict I&O, enc IS  POD 2 HOTN - on devora gtt- give albumin this am, ST, re-intubated yesterday for resp distress, sedated, pain on fentanyl gtt, hyponatremia- hold diuresis today, solitario for strict I&O, CPM  POD 3 HOTN- devora gtt, vent- broch w/ copious amt of secretions yesterday, sedated - on propofol/fentanyl for pain, low UO- check solitario/gently diurese  POD 4 HTN- increase po meds, 's- start esmolol, vent- pulm following, sedated,  given lasix this am, CPM  POD 5 HDS, intubated vented, respiratory cultures positive, hypertensive, neuro grossly intact, abd S/NT +BS.  Plan:  Increase lisinopril.  Since unit out of IV pushes of fentanyl will increase drip slightly for pain.  Wean vent as tolerated.  On abx for positive respiratory cultures.  CPM.   POD 6 extubated, HDS, ST rate 120's, very slow to answer questions, hypotensive today.  Lisinopril adjusted by cards.  Plan:  Will dc pacing wires and janee this afternoon.  DC more potent narcotics.    POD 7 HDS, SR, neuro- diff with speech- CT scan today, dysphagia- cortrak, PT/OT rec rehab- will place order today would expect ready for transfer by Sunday/monday.  POD 8 HDS, ST vs aflutter- ekg- cards following, neuro status improved- Head CT neg, right groin hematoma - w/w, dysphagia- cortrak, PT/OT to re-eval for rehab, amb, enc IS  POD 9 HDS, SR/ST- cards following, Dysphagia- cortrak, pneumonia- on atbx, urinary retention- solitario replaced/start flomax, OT to see today, awaiting acceptance to rehab  POD 10 HDS, 's, tachypnec, became more tachypnec with swallow evaluation.  Awake and alert this AM.  Plan:  May consider CT scan R/O PE.    POD 11 HDS, still 's.  Respiratory rate better.  Awake and alert today.  CT chest negative for PE.  HCT down this AM.  Plan: 1 UPRBC's, guiac all stools.  Awaiting rehab.   POD 12 HDS, still tachy, ambulating halls today.  Alert.  WBC up today.  Plan:  Will get UA.  Watch WBC.    Patient seen, examined and plan reviewed with midlevel provider. I agree with the plan.      Active Hospital Problems    Diagnosis   • Acute hypoxemic respiratory failure (HCC) [J96.01]     Priority: High   • S/P CABG x 3 [Z95.1]     Priority: High   • Ventilator dependent (HCC) [Z99.11]     Priority: High   • NSTEMI (non-ST elevated myocardial infarction) (HCC) [I21.4]     Priority: High   • Acute systolic congestive heart failure (HCC) [I50.21]     Priority: High   • Pneumonia  [J18.9]     Priority: Medium   • Atelectasis [J98.11]     Priority: Medium   • SVT (supraventricular tachycardia) (HCC) [I47.1]     Priority: Medium   • Type 2 diabetes mellitus without complication (HCC) [E11.9]     Priority: Medium   • Hypokalemia [E87.6]     Priority: Low   • Postural dizziness with presyncope [R42, R55]     Priority: Low   • Sepsis (HCC) [A41.9]

## 2019-01-09 NOTE — DISCHARGE PLANNING
Anticipated Discharge Disposition: dc/ to rehab    Action: EPIC note and VM from Rehab admissions requesting updated OT note and a SLP/speech Cognitive Eval.    Spoke to bedside RN and OT is scheduled to be here this AM and SLP this afternoon. RN will see if can add order for SLP Cog Eval.        Barriers to Discharge: updated assessments, INS AUTH, open bed, transport    Plan: f/u w/ medical team, Rehab Admissions

## 2019-01-09 NOTE — THERAPY
"Occupational Therapy Treatment completed with focus on ADLs, ADL transfers, patient education, caregiver training and upper extremity function.  Functional Status: Pt seen today for OT treatment. He was pleasant, cooperative, and receptive to education. He did seem a bit forgetful, wife providing prompts with timeline and reorienting as appropriate. He needed cues for sternal precautions during functional transfers. He was able to demo LB dressing with supervision. Wife present and actively participating in care by reminding patient to sit when needed. Pt amb to BR with supervision, and completed grooming seated at the sink due to increased HR. Reinforced talk test and importance of resting during ADLs, and sitting when possible/appropriate. Pt very receptive. Resting -130s, up to 150 with amb to the BR. RN and MD aware of this. Wife reports she is looking into buying patient a fit bit or pocket pulse ox so patient can keep an eye on his HR as well. Edu on home safety, and energy conservation techs as well as use of DME to conserve energy.  Plan of Care: Will benefit from Occupational Therapy 2 times per week  Discharge Recommendations:  Equipment Will Continue to Assess for Equipment Needs. He is most limited by HR and decreased endurance but is functionally capable of d/c home with 24/7 support from wife, as well as outpatient cardiac rehab.    See \"Rehab Therapy-Acute\" Patient Summary Report for complete documentation.   "

## 2019-01-09 NOTE — DISCHARGE PLANNING
F/U with client at bedside to discuss IRF referral. Explained specifics of inpatient rehab, answered questions/concerns. Rikki tells me he does not want acute inpatient rehab, he wants to d/c home to [facility]. Provided TCC contact information for any additional questions. TCC remains available to assist should pt change his mind. VM update to LAKISHA Ozuna [phone].

## 2019-01-09 NOTE — PROGRESS NOTES
"Patient very agiated and voicing he would like all lines/monitors removed and he would like to go home. Pt stating \"I've been here too long. Its taking a toll on my family and  I cant stand to watch it anymore. Being in here is messing with my psyche\"  This RN (Diane) consulted patient for over an hour discussing future plan of care. Pt request Dr. Disla. This nurse informed pt Dr. Disla is not currently here but will be returning at 0600. Pt stated he would be willing to wait to speak with physician when he arrives, however he wanted all lines removed. This nurse informed the patient of his right of care, therefor, if requested the solitario and coretrak may be removed, however if he fails swallow eval a new cortrak with need to be place and if he is retaining urine a Solitario catheter might be indicated again. Pt understood this and agreed to the circumstances. Therefor, solitario catheter and coretrak was removed. Charge RN [facility] consulted and presented bedside to address pt concerns. After this, pt was more clam and is currently resting in bed. Pt stated he is happy with the outcome of this encounter and looks forward to speaking with Dr. Disla.      "

## 2019-01-09 NOTE — CARE PLAN
Problem: Post Op Day 4 CABG/Heart Valve Replacement  Goal: Optimal care of the Post Op CABG/Heart Valve replacement Post Op Day 4    Intervention: Daily Weights  Done.  Intervention: Shower daily and clean incisions twice daily with soap and water  Incisions cleaned. Shower done during the day  Intervention: Up in chair for all meals  Pt refusing to sit in chair, however sitting tall in bed  Intervention: Ambulate, increasing the distance each time x 3 and before bed  Done. Pt tolerates well.  Intervention: IS q 1 hour while awake and record best IS volume  Done. Pt pulling 2500    Intervention: Consider removal of solitario, chest tube and pacer wire if not already done  Chest tubes and wires removed. Solitario to be remove 1/9

## 2019-01-09 NOTE — PROGRESS NOTES
Critical Care Progress Note    Date of admission  12/26/2018    Chief Complaint  43 y.o. male admitted 12/26/2018 with chest pain.    Hospital Course    This gentleman was admitted to the hospital with an acute non-STEMI.  He was found to have significant CAD.  He underwent CABG.      Interval Problem Update  Reviewed last 24 hour events:  SR/ST  Cortrak out; advancing diet  Thomas out today  Good UOP  A/o x 4  Room air  Remove cvc  Replacing Phos and K  Lantus 50,       Yesterday  SR/ST  Afebrile  chas TF; SLP - failed yesterday  Good UOP  Thomas; urine retention;   IS 1500; room air  HH - repeat 8.8 (6.0 was error)  SSI, Lantus 50    ??? Why   S.pneumo; ? chage   Stop IVF   F/u CXR      Review of Systems  Review of Systems   Constitutional: Negative.    HENT: Negative.    Eyes: Negative.    Cardiovascular: Negative.    Gastrointestinal: Negative.    Musculoskeletal: Negative.    Skin: Negative.    Neurological: Negative.    Psychiatric/Behavioral: Substance abuse:           Vital Signs for last 24 hours   Temp:  [36.2 °C (97.2 °F)-37.1 °C (98.8 °F)] 36.4 °C (97.6 °F)  Pulse:  [116-128] 127  Resp:  [16-49] 16    Hemodynamic parameters for last 24 hours       Respiratory       Physical Exam   Physical Exam   Constitutional: He is oriented to person, place, and time. He appears well-developed and well-nourished.   HENT:   Head: Normocephalic and atraumatic.   Eyes: Pupils are equal, round, and reactive to light. No scleral icterus.   Neck: Neck supple. No tracheal deviation present. No thyromegaly present.   Cardiovascular: Normal rate.    No murmur heard.  Pulmonary/Chest: Effort normal and breath sounds normal.   Abdominal: Soft. There is no tenderness.   Musculoskeletal: Normal range of motion. He exhibits no edema.   Neurological: He is alert and oriented to person, place, and time. No cranial nerve deficit.   Skin: Skin is warm and dry.   Psychiatric: He has a normal mood and affect.       Medications  Current  Facility-Administered Medications   Medication Dose Route Frequency Provider Last Rate Last Dose   • magnesium sulfate IVPB premix 2 g  2 g Intravenous Once Kenneth Clark M.D.       • potassium phosphates 15 mmol in  mL ivpb  15 mmol Intravenous Once Kenneth Clark M.D. 83.3 mL/hr at 01/09/19 0922 15 mmol at 01/09/19 0922   • MD Alert...ICU Electrolyte Replacement per Pharmacy   Other pharmacy to dose Kenneth Clark M.D.       • busPIRone (BUSPAR) tablet 5 mg  5 mg Oral TID Inna Linton, A.P.N.   Stopped at 01/09/19 0600   • gabapentin (NEURONTIN) capsule 100 mg  100 mg Oral TID Inna Linton, A.P.N.   Stopped at 01/09/19 0600   • insulin glargine (LANTUS) injection 50 Units  50 Units Subcutaneous QAM INSULIN Kenneth Clark M.D.   50 Units at 01/09/19 0541    And   • insulin lispro (HUMALOG) injection 3-14 Units  3-14 Units Subcutaneous Q6HRS Kenneth Clark M.D.   4 Units at 01/09/19 0529    And   • glucose 4 g chewable tablet 16 g  16 g Oral Q15 MIN PRN Kenneth Clark M.D.        And   • dextrose 50% (D50W) injection 25 mL  25 mL Intravenous Q15 MIN PRN Kenneth Clark M.D.       • potassium bicarbonate (KLYTE) effervescent tablet 50 mEq  50 mEq Feeding Tube BID Kenneth Clark M.D.   Stopped at 01/09/19 0600   • metoprolol (LOPRESSOR) tablet 25 mg  25 mg Feeding Tube TWICE DAILY Kenneth Clark M.D.   Stopped at 01/07/19 1015   • lisinopril (PRINIVIL) tablet 5 mg  5 mg Oral BID Herminia Queen M.D.   Stopped at 01/06/19 1800   • Metoprolol Tartrate (LOPRESSOR) injection 5 mg  5 mg Intravenous Q4HRS PRN Herminia Queen M.D.       • ipratropium-albuterol (DUONEB) nebulizer solution  3 mL Nebulization Q4H PRN (RT) Hieu Vallejo M.D.       • enalaprilat (VASOTEC) injection 2.5 mg  2.5 mg Intravenous Q6HRS PRN Herminia Queen M.D.   2.5 mg at 01/02/19 1105   • Respiratory Care per Protocol   Nebulization Continuous RT Hieu Vallejo M.D.       • senna-docusate (PERICOLACE or  SENOKOT S) 8.6-50 MG per tablet 2 Tab  2 Tab Feeding Tube BID Hieu Vallejo M.D.   Stopped at 01/09/19 0600    And   • polyethylene glycol/lytes (MIRALAX) PACKET 1 Packet  1 Packet Feeding Tube QDAY PRN Hieu Vallejo M.D.   1 Packet at 01/02/19 1054    And   • magnesium hydroxide (MILK OF MAGNESIA) suspension 30 mL  30 mL Feeding Tube QDAY PRN Hieu Vallejo M.D.   30 mL at 01/04/19 0530    And   • bisacodyl (DULCOLAX) suppository 10 mg  10 mg Rectal QDAY PRN Hieu Vallejo M.D.       • ipratropium-albuterol (DUONEB) nebulizer solution  3 mL Nebulization Q2HRS PRN (RT) Hieu Vallejo M.D.       • Pharmacy Consult: Enteral tube feeding - review meds/change route/product selection   Other PRN Hieu Vallejo M.D.       • acetaminophen (TYLENOL) tablet 650 mg  650 mg Feeding Tube Q4HRS PRN Hieu Vallejo M.D.   650 mg at 01/09/19 0042    Or   • acetaminophen (TYLENOL) suppository 650 mg  650 mg Rectal Q4HRS PRN Hieu Vallejo M.D.       • aspirin (ASA) chewable tab 81 mg  81 mg Per NG Tube DAILY Hieu Vallejo M.D.   Stopped at 01/09/19 0600   • atorvastatin (LIPITOR) tablet 80 mg  80 mg Per NG Tube DAILY Hieu Vallejo M.D.   Stopped at 01/09/19 0600   • clopidogrel (PLAVIX) tablet 75 mg  75 mg Feeding Tube DAILY Hieu Vallejo M.D.   Stopped at 01/09/19 0600   • mag hydrox-al hydrox-simeth (MAALOX PLUS ES or MYLANTA DS) suspension 30 mL  30 mL Per NG Tube Q4HRS PRN Hieu Vallejo M.D.       • NS infusion   Intravenous Continuous Tomas Barrios M.D. 10 mL/hr at 01/08/19 0941     • Pharmacy Consult Request ...Pain Management Review 1 Each  1 Each Other PRN Tomas Barrios M.D.       • enoxaparin (LOVENOX) inj 40 mg  40 mg Subcutaneous QDAY Tomas Barrios M.D.   40 mg at 01/08/19 1727   • ondansetron (ZOFRAN) syringe/vial injection 4 mg  4 mg Intravenous Q6HRS PRN Tomas Barrios M.D.   4 mg at 12/29/18 1831    Or   •  prochlorperazine (COMPAZINE) injection 10 mg  10 mg Intravenous Q6HRS PRN Tomas Barrios M.D.   10 mg at 12/29/18 0004    Or   • promethazine (PHENERGAN) suppository 25 mg  25 mg Rectal Q6HRS PRN Tomas Barrios M.D.   25 mg at 01/05/19 1003   • lidocaine (LIDODERM) 5 % 1 Patch  1 Patch Transdermal Q24HR SHYAM Alejo   1 Patch at 01/08/19 2125       Fluids    Intake/Output Summary (Last 24 hours) at 01/09/19 0957  Last data filed at 01/09/19 0800   Gross per 24 hour   Intake             1300 ml   Output             2395 ml   Net            -1095 ml       Laboratory  Recent Labs      01/08/19   0522   ISTATTEMP  37.0 C   ISTATFIO2  21   ISTATSPEC  Venous         Recent Labs      01/07/19 0513 01/08/19 0345 01/09/19   0445   SODIUM  133*  131*  130*   POTASSIUM  3.0*  3.6  4.2   CHLORIDE  95*  100  101   CO2  31  27  23   BUN  30*  19  18   CREATININE  0.58  0.49*  0.51   MAGNESIUM  1.9  1.9  1.8   PHOSPHORUS  2.5  2.2*  2.3*   CALCIUM  8.2*  8.0*  8.2*     Recent Labs      01/07/19 0513 01/08/19 0345 01/09/19 0445   ALTSGPT  14   --    --    ASTSGOT  13   --    --    ALKPHOSPHAT  56   --    --    TBILIRUBIN  0.7   --    --    PREALBUMIN   --   11.0*   --    GLUCOSE  243*  233*  213*     Recent Labs      01/07/19 0513 01/08/19 0345 01/09/19   0445   WBC  17.6*  20.4*  21.3*   ASTSGOT  13   --    --    ALTSGPT  14   --    --    ALKPHOSPHAT  56   --    --    TBILIRUBIN  0.7   --    --      Recent Labs      01/07/19 0513 01/08/19 0345 01/08/19   0530  01/09/19 0445   RBC  2.89*  1.90*   --   2.82*   HEMOGLOBIN  8.9*  6.0*  8.8*  8.7*   HEMATOCRIT  26.8*  17.9*  26.5*  26.9*   PLATELETCT  350  383   --   386       Imaging  X-Ray:  I have personally reviewed the images and compared with prior images. and My impression is: Diffuse right and left lower lobe opacities    Assessment/Plan  * NSTEMI (non-ST elevated myocardial infarction) (HCC)- (present on admission)   Assessment & Plan     Continue aspirin, statin and Plavix     Acute hypoxemic respiratory failure (HCC)   Assessment & Plan    Resolved     Ventilator dependent (HCC)   Assessment & Plan    Room air, good pulmonary toilet    Encourage mobility     S/P CABG x 3   Assessment & Plan    Doing much better- still slow but gaining strengh  Good candidate for rehab acute post hospitalization     Acute systolic congestive heart failure (HCC)   Assessment & Plan    EF 25%  Intra-aortic balloon pump removed on 12/29     Pneumonia   Assessment & Plan    Staph/Strep bilateral pneumonia ([facility])- 7 days of Ancef  Finished antibiotic  No evidence active infection     Atelectasis   Assessment & Plan    Resolved although has low lung volumes     Type 2 diabetes mellitus without complication ([facility])- (present on admission)   Assessment & Plan    Poorly controlled prior to admission with glycohemoglobin of 12  Continue sliding scale insulin- goal sugar > 80< 180    Change to outp regimen     SVT (supraventricular tachycardia) ([facility])- (present on admission)   Assessment & Plan    Vent rate well controlled this week but has sinus tach ? Reasons    On beta blockers  Sinus tach is slowing down       Sepsis ([facility])- (present on admission)   Assessment & Plan    Sepsis resolved     Hypokalemia- (present on admission)   Assessment & Plan    Replete          VTE:  Lovenox  Ulcer: Not Indicated  Lines: Central Line  Ongoing indication addressed, remove soon    I have performed a physical exam and reviewed and updated ROS and Plan today (1/9/2019). In review of yesterday's note (1/8/2019), there are no changes except as documented above.     Discussed patient condition and risk of morbidity and/or mortality with Family, RN, RT, Pharmacy, Charge nurse / hot rounds, QA team and [facility]

## 2019-01-09 NOTE — CARE PLAN
Problem: Post Op Day 4 CABG/Heart Valve Replacement  Goal: Optimal care of the Post Op CABG/Heart Valve replacement Post Op Day 4    Intervention: Daily Weights  Completed on night shift  Intervention: Shower daily and clean incisions twice daily with soap and water  Patient refusing shower, incision cleaned   Intervention: Up in chair for all meals  Patient refusing chair, sitting up on side of bed frequently throughout shift   Intervention: Ambulate, increasing the distance each time x 3 and before bed  Ambulated 3x, patient to ambulate once before bed   Intervention: IS q 1 hour while awake and record best IS volume  Completed   Intervention: Consider removal of solitario, chest tube and pacer wire if not already done  Solitario in place due to urinary retention, CTs/pacer wires removed   Intervention: Discharge Education  To be completed   Intervention: Add special instructions for cardiac surgery discharge instructions - [facility] to after visit summary and review with patient  To be completed

## 2019-01-09 NOTE — CARE PLAN
Problem: Post Op Day 4 CABG/Heart Valve Replacement  Goal: Optimal care of the Post Op CABG/Heart Valve replacement Post Op Day 4    Intervention: Daily Weights  Completed  Intervention: Shower daily and clean incisions twice daily with soap and water  Incision care provided  Intervention: Up in chair for all meals  Completed  Intervention: Ambulate, increasing the distance each time x 3 and before bed  Completed, patient walking without walker in hallway with RN and PT   Intervention: IS q 1 hour while awake and record best IS volume  Completed  Intervention: Consider removal of solitario, chest tube and pacer wire if not already done  N/A  Intervention: Discharge Education  To be completed  Intervention: Add special instructions for cardiac surgery discharge instructions - [facility] to after visit summary and review with patient  To be completed

## 2019-01-09 NOTE — DIETARY
Nutrition Services: Transition to PO diet    Admit day 13.  Pt on TF 12/30-1/9.  Pt cleared by SLP this morning for PO diet; pt currently consuming % of cardiac/diabetic diet per ADLs.  Plan D/c home when medically cleared.     Nutrition Representative will see pt daily for menu options.  RD will see for diet education as able prior to D/c.

## 2019-01-09 NOTE — DISCHARGE INSTRUCTIONS
Discharge Instructions    Discharged to home by car with relative. Discharged via wheelchair, hospital escort: Yes.  Special equipment needed: Not Applicable    Be sure to schedule a follow-up appointment with your primary care doctor or any specialists as instructed.     Discharge Plan:   Smoking Cessation Offered: Patient Refused  Influenza Vaccine Indication: Patient Refuses    I understand that a diet low in cholesterol, fat, and sodium is recommended for good health. Unless I have been given specific instructions below for another diet, I accept this instruction as my diet prescription.   Other diet: Cardiac    Special Instructions:     [facility] Discharge Instructions    Activity:  1. NO driving for 4 weeks after surgery. You may ride as a passenger.  2. NO lifting more than 10 pounds for 6 weeks.  3. For the next 6 weeks, keep your elbows close to your body and move within a pain-free motion when lifting, pushing or pulling.  Do not stretch both arms backwards at the same time.    4. Walk at least 4 times per day, there is no maximum.  5. Other physical activities (sex, housework, gardening, etc.) are okay after 4 weeks.  6. Continue using incentive spirometer for 2 weeks.  If you are going home on oxygen and you were not on oxygen prior to surgery, keep using until you are oxygen free.  7. Weigh yourself daily.  Call your Cardiologist for a weight gain of 2 or more pounds within 48 hours.    Incision Care:  1. SHOWER EVERYDAY-no baths. Make sure to clean your incision(s) twice daily with plain, perfume and dye-free soap.  Then pat incision(s) dry with clean towel. No creams or lotions on your incision(s).    2. If you are discharging with a Prevena bandage on your chest, you or your home health nurse may remove the bandage 7 days after surgery, or sooner if the battery runs out or the device alarms. When the battery runs out, the bandage will lose suction and it will puff up.  To remove, slowly  roll down the edges of the bandage. Throw away the entire bandage and the battery pack.  Keep the incision open to air and clean twice daily with soap and water.  3. If there is any increased redness or swelling, separation of the incision line, or thick drainage from any of your incisions, call [facility].    4. Continue to wear your MANUEL Stockings for 4 weeks. You may take them off when you are in bed or when your legs are elevated.    General Instructions:  1. You have been referred to Cardiac Rehab.  You can start Cardiac Rehab 30 days after surgery.  If you do not have an appointment at the time of discharge call [phone] to schedule an appointment.  2. Your Primary Care Doctor typically handles home oxygen. Oxygen may be stopped when your oxygen level is consistently greater than 90.  Check with your Primary Care Doctor if you are unsure.  3. Take all of your medications (including pain medications) as prescribed.  Taking medications other than prescribed can result in serious injury.    For Patients Discharged with Narcotic Pain Medication:   1. If a refill is needed, understand that only 1 refill will be provided and you must come to the [facility]’ office for an appointment (72 hours’ notice is required to schedule and there are no weekend appointments).  2. If the pain medications you are discharged on are not working, you will need to bring your remaining prescription into the office in order to receive a new prescription.  3. If you were taking narcotics prior to your heart surgery, [facility] will provide you with one prescription and additional medications will need to be provided by your pain management doctor.  4. Do not drink alcohol while taking narcotics.  5. Lost or stolen medications will not be refilled.  If medications are stolen, report to law enforcement.    Contact [facility] at [phone] if you have any questions.    Diagnosis:  Acute  Coronary Syndrome (ACS) is a diagnosis that encompasses cardiac-related chest pain and heart attack. ACS occurs when the blood flow to the heart muscle is severely reduced or cut off completely due to a slow process called atherosclerosis.  Atherosclerosis is a disease in which the coronary arteries become narrow from a buildup of fat, cholesterol, and other substances that combine to form plaque. If the plaque breaks, a blood clot will form and block the blood flow to the heart muscle. This lack of blood flow can cause damage or death to the heart muscle which is called a heart attack or Myocardial Infarction (MI). There are two different types of MIs:  ST Elevation Myocardial Infarction or STEMI (the most severe type of heart attack) and Non-ST Elevation Myocardial Infarction or NSTEMI.    Treatment Plan:  · Cardiac Diet  - Low fat, low salt, low cholesterol   · Cardiac Rehab  - Your doctor has ordered you a referral to [facility].  Call [phone] to schedule an appointment.  · Attend my follow-up appointment with my Cardiologist.  · Take my medications as prescribed by my doctor  · Exercise daily  · Lower my bad cholesterol and raise my good cholesterol, lower my blood pressure and Reduce stress    Medications:  Certain medications are used to treat ACS.  Remember to always take medications as prescribed and never stop talking medications unless told by your doctor.    You have been prescribed the following medicatons:    Aspirin - Aspirin is used as a blood thinning medication and you will require this medication indefinitely.  Anti-platelet/blood thinner - Your Anti-platelet/Blood thinning medication is called Clopidogrel, and is used in combination with aspirin to prevent clots from forming in your heart and/or around your stent.  Your doctor will determine how long you need to be on this medicine.  Beta-Blocker - Beta-Blocker Metoprolol is used to lower blood pressure and heart rate, and/or helps your heart  heal after a heart attack.  Statin - Statin Atorvastatin is used to lower cholesterol.  Angiotensin Converting Enzyme (ACE) Inhibitor - Angiotensin Converting Enzyme Inhibitor Lisinopril is used to lower blood pressure and treat heart failure.    · Is patient discharged on Warfarin / Coumadin?   No     Depression / Suicide Risk    As you are discharged from this [facility] facility, it is important to learn how to keep safe from harming yourself.    Recognize the warning signs:  · Abrupt changes in personality, positive or negative- including increase in energy   · Giving away possessions  · Change in eating patterns- significant weight changes-  positive or negative  · Change in sleeping patterns- unable to sleep or sleeping all the time   · Unwillingness or inability to communicate  · Depression  · Unusual sadness, discouragement and loneliness  · Talk of wanting to die  · Neglect of personal appearance   · Rebelliousness- reckless behavior  · Withdrawal from people/activities they love  · Confusion- inability to concentrate     If you or a loved one observes any of these behaviors or has concerns about self-harm, here's what you can do:  · Talk about it- your feelings and reasons for harming yourself  · Remove any means that you might use to hurt yourself (examples: pills, rope, extension cords, firearm)  · Get professional help from the community (Mental Health, Substance Abuse, psychological counseling)  · Do not be alone:Call your Safe Contact- someone whom you trust who will be there for you.  · Call your local CRISIS HOTLINE 552-6453 or 961-813-1704  · Call your local Children's Mobile Crisis Response Team [place] (140) 161-3935 or www.[employer]  · Call the toll free National Suicide Prevention Hotlines   · National Suicide Prevention Lifeline 538-667-UPPJ (2463)  · National Hope Line Network 800-SUICIDE (799-5668)

## 2019-01-10 VITALS
DIASTOLIC BLOOD PRESSURE: 67 MMHG | SYSTOLIC BLOOD PRESSURE: 104 MMHG | WEIGHT: 190.26 LBS | OXYGEN SATURATION: 96 % | TEMPERATURE: 98 F | BODY MASS INDEX: 25.77 KG/M2 | RESPIRATION RATE: 27 BRPM | HEIGHT: 72 IN | HEART RATE: 109 BPM

## 2019-01-10 PROBLEM — Z99.11 VENTILATOR DEPENDENT (HCC): Status: RESOLVED | Noted: 2018-12-28 | Resolved: 2019-01-10

## 2019-01-10 PROBLEM — I21.4 NSTEMI (NON-ST ELEVATED MYOCARDIAL INFARCTION) (HCC): Status: RESOLVED | Noted: 2018-12-27 | Resolved: 2019-01-10

## 2019-01-10 PROBLEM — I47.10 SVT (SUPRAVENTRICULAR TACHYCARDIA) (HCC): Status: RESOLVED | Noted: 2018-12-27 | Resolved: 2019-01-10

## 2019-01-10 PROBLEM — J96.01 ACUTE HYPOXEMIC RESPIRATORY FAILURE (HCC): Status: RESOLVED | Noted: 2018-12-29 | Resolved: 2019-01-10

## 2019-01-10 PROBLEM — E87.6 HYPOKALEMIA: Status: RESOLVED | Noted: 2018-12-27 | Resolved: 2019-01-10

## 2019-01-10 PROBLEM — R42 POSTURAL DIZZINESS WITH PRESYNCOPE: Status: RESOLVED | Noted: 2018-12-27 | Resolved: 2019-01-10

## 2019-01-10 PROBLEM — R55 POSTURAL DIZZINESS WITH PRESYNCOPE: Status: RESOLVED | Noted: 2018-12-27 | Resolved: 2019-01-10

## 2019-01-10 LAB
ANION GAP SERPL CALC-SCNC: 9 MMOL/L (ref 0–11.9)
BUN SERPL-MCNC: 13 MG/DL (ref 8–22)
CALCIUM SERPL-MCNC: 8.5 MG/DL (ref 8.5–10.5)
CHLORIDE SERPL-SCNC: 100 MMOL/L (ref 96–112)
CO2 SERPL-SCNC: 21 MMOL/L (ref 20–33)
CREAT SERPL-MCNC: 0.62 MG/DL (ref 0.5–1.4)
ERYTHROCYTE [DISTWIDTH] IN BLOOD BY AUTOMATED COUNT: 46.3 FL (ref 35.9–50)
GLUCOSE BLD-MCNC: 134 MG/DL (ref 65–99)
GLUCOSE BLD-MCNC: 162 MG/DL (ref 65–99)
GLUCOSE SERPL-MCNC: 157 MG/DL (ref 65–99)
HCT VFR BLD AUTO: 27.9 % (ref 42–52)
HGB BLD-MCNC: 9.4 G/DL (ref 14–18)
MAGNESIUM SERPL-MCNC: 1.9 MG/DL (ref 1.5–2.5)
MCH RBC QN AUTO: 32 PG (ref 27–33)
MCHC RBC AUTO-ENTMCNC: 33.7 G/DL (ref 33.7–35.3)
MCV RBC AUTO: 94.9 FL (ref 81.4–97.8)
PHOSPHATE SERPL-MCNC: 3.4 MG/DL (ref 2.5–4.5)
PLATELET # BLD AUTO: 490 K/UL (ref 164–446)
PMV BLD AUTO: 10.9 FL (ref 9–12.9)
POTASSIUM SERPL-SCNC: 3.9 MMOL/L (ref 3.6–5.5)
RBC # BLD AUTO: 2.94 M/UL (ref 4.7–6.1)
SODIUM SERPL-SCNC: 130 MMOL/L (ref 135–145)
WBC # BLD AUTO: 20.3 K/UL (ref 4.8–10.8)

## 2019-01-10 PROCEDURE — 700102 HCHG RX REV CODE 250 W/ 637 OVERRIDE(OP): Performed by: INTERNAL MEDICINE

## 2019-01-10 PROCEDURE — 82962 GLUCOSE BLOOD TEST: CPT | Mod: 91

## 2019-01-10 PROCEDURE — A9270 NON-COVERED ITEM OR SERVICE: HCPCS | Performed by: THORACIC SURGERY (CARDIOTHORACIC VASCULAR SURGERY)

## 2019-01-10 PROCEDURE — 83735 ASSAY OF MAGNESIUM: CPT

## 2019-01-10 PROCEDURE — 84100 ASSAY OF PHOSPHORUS: CPT

## 2019-01-10 PROCEDURE — A9270 NON-COVERED ITEM OR SERVICE: HCPCS | Performed by: INTERNAL MEDICINE

## 2019-01-10 PROCEDURE — 80048 BASIC METABOLIC PNL TOTAL CA: CPT

## 2019-01-10 PROCEDURE — 700102 HCHG RX REV CODE 250 W/ 637 OVERRIDE(OP): Performed by: THORACIC SURGERY (CARDIOTHORACIC VASCULAR SURGERY)

## 2019-01-10 PROCEDURE — A9270 NON-COVERED ITEM OR SERVICE: HCPCS | Performed by: CLINICAL NURSE SPECIALIST

## 2019-01-10 PROCEDURE — 85027 COMPLETE CBC AUTOMATED: CPT

## 2019-01-10 PROCEDURE — 92526 ORAL FUNCTION THERAPY: CPT

## 2019-01-10 PROCEDURE — 700111 HCHG RX REV CODE 636 W/ 250 OVERRIDE (IP): Performed by: THORACIC SURGERY (CARDIOTHORACIC VASCULAR SURGERY)

## 2019-01-10 PROCEDURE — 700102 HCHG RX REV CODE 250 W/ 637 OVERRIDE(OP): Performed by: CLINICAL NURSE SPECIALIST

## 2019-01-10 RX ORDER — SULFAMETHOXAZOLE AND TRIMETHOPRIM 800; 160 MG/1; MG/1
1 TABLET ORAL 2 TIMES DAILY
Qty: 20 TAB | Refills: 0 | Status: SHIPPED | OUTPATIENT
Start: 2019-01-10 | End: 2019-01-30

## 2019-01-10 RX ORDER — ASPIRIN 81 MG/1
81 TABLET ORAL DAILY
Qty: 30 TAB | COMMUNITY

## 2019-01-10 RX ORDER — BUSPIRONE HYDROCHLORIDE 5 MG/1
5 TABLET ORAL 3 TIMES DAILY
Qty: 90 TAB | Refills: 1 | Status: SHIPPED | OUTPATIENT
Start: 2019-01-10 | End: 2019-01-30

## 2019-01-10 RX ORDER — CLOPIDOGREL BISULFATE 75 MG/1
75 TABLET ORAL DAILY
Qty: 30 TAB | Refills: 2 | Status: SHIPPED | OUTPATIENT
Start: 2019-01-11 | End: 2019-05-16

## 2019-01-10 RX ORDER — ATORVASTATIN CALCIUM 80 MG/1
80 TABLET, FILM COATED ORAL DAILY
Qty: 30 TAB | Refills: 2 | Status: SHIPPED | OUTPATIENT
Start: 2019-01-11 | End: 2019-05-16 | Stop reason: SDUPTHER

## 2019-01-10 RX ORDER — GABAPENTIN 100 MG/1
100 CAPSULE ORAL 3 TIMES DAILY
Qty: 90 CAP | Refills: 1 | Status: SHIPPED
Start: 2019-01-10 | End: 2020-01-02

## 2019-01-10 RX ORDER — LISINOPRIL 5 MG/1
5 TABLET ORAL 2 TIMES DAILY
Qty: 30 TAB | Refills: 2 | Status: SHIPPED | OUTPATIENT
Start: 2019-01-10 | End: 2019-01-30

## 2019-01-10 RX ORDER — MAGNESIUM SULFATE HEPTAHYDRATE 40 MG/ML
2 INJECTION, SOLUTION INTRAVENOUS ONCE
Status: COMPLETED | OUTPATIENT
Start: 2019-01-10 | End: 2019-01-10

## 2019-01-10 RX ORDER — INSULIN GLARGINE 100 [IU]/ML
INJECTION, SOLUTION SUBCUTANEOUS
Qty: 1 VIAL | Refills: 2 | Status: SHIPPED | OUTPATIENT
Start: 2019-01-10 | End: 2019-02-16

## 2019-01-10 RX ADMIN — BUSPIRONE HYDROCHLORIDE 5 MG: 30 TABLET ORAL at 12:58

## 2019-01-10 RX ADMIN — BUSPIRONE HYDROCHLORIDE 5 MG: 30 TABLET ORAL at 05:35

## 2019-01-10 RX ADMIN — METOPROLOL TARTRATE 25 MG: 25 TABLET, FILM COATED ORAL at 05:31

## 2019-01-10 RX ADMIN — POTASSIUM BICARBONATE 50 MEQ: 25 TABLET, EFFERVESCENT ORAL at 05:32

## 2019-01-10 RX ADMIN — MAGNESIUM SULFATE IN WATER 2 G: 40 INJECTION, SOLUTION INTRAVENOUS at 08:55

## 2019-01-10 RX ADMIN — GABAPENTIN 100 MG: 100 CAPSULE ORAL at 05:32

## 2019-01-10 RX ADMIN — LISINOPRIL 5 MG: 5 TABLET ORAL at 05:33

## 2019-01-10 RX ADMIN — CLOPIDOGREL 75 MG: 75 TABLET, FILM COATED ORAL at 05:34

## 2019-01-10 RX ADMIN — ATORVASTATIN CALCIUM 80 MG: 80 TABLET, FILM COATED ORAL at 05:35

## 2019-01-10 RX ADMIN — ASPIRIN 81 MG: 81 TABLET, COATED ORAL at 05:33

## 2019-01-10 RX ADMIN — INSULIN GLARGINE 50 UNITS: 100 INJECTION, SOLUTION SUBCUTANEOUS at 06:04

## 2019-01-10 RX ADMIN — GABAPENTIN 100 MG: 100 CAPSULE ORAL at 12:58

## 2019-01-10 RX ADMIN — ACETAMINOPHEN 650 MG: 325 TABLET, FILM COATED ORAL at 08:54

## 2019-01-10 ASSESSMENT — PAIN SCALES - GENERAL
PAINLEVEL_OUTOF10: 2
PAINLEVEL_OUTOF10: 9
PAINLEVEL_OUTOF10: 1
PAINLEVEL_OUTOF10: 2
PAINLEVEL_OUTOF10: 0

## 2019-01-10 ASSESSMENT — ENCOUNTER SYMPTOMS
EYES NEGATIVE: 1
MUSCULOSKELETAL NEGATIVE: 1
CARDIOVASCULAR NEGATIVE: 1
PSYCHIATRIC NEGATIVE: 1
GASTROINTESTINAL NEGATIVE: 1
CONSTITUTIONAL NEGATIVE: 1
NEUROLOGICAL NEGATIVE: 1
RESPIRATORY NEGATIVE: 1

## 2019-01-10 NOTE — DISCHARGE PLANNING
Received Choice form at [phone]  Agency/Facility Name: [facility]  Referral sent per Choice form @ [phone]

## 2019-01-10 NOTE — PROGRESS NOTES
Cardiovascular Surgery Progress Note    Name: Rikki Khalil  MRN: [id number]  : 1975  Admit Date: 2018  9:54 PM  Procedure:  Procedure(s) and Anesthesia Type:     * MULTIPLE CORONARY ARTERY BYPASS x 3,  ENDO VEIN HARVEST LEFT LEG - General     * DONNA - General  13 Day Post-Op    Vitals:  Patient Vitals for the past 8 hrs:   Temp SpO2 O2 Delivery O2 (LPM) Pulse Heart Rate (Monitored) Resp NIBP   01/10/19 0600 - - - - - (!) 109 (!) 23 114/70   01/10/19 0500 - - - - - (!) 112 (!) 34 -   01/10/19 0400 36.8 °C (98.2 °F) 94 % None (Room Air) 0 - (!) 123 (!) 31 118/69   01/10/19 0300 - - - - (!) 109 (!) 109 (!) 28 -   01/10/19 0200 - - - - (!) 105 (!) 105 (!) 27 -   01/10/19 0100 - 96 % - - (!) 114 (!) 105 (!) 25 -   01/10/19 0000 37.2 °C (98.9 °F) 93 % None (Room Air) 0 (!) 130 (!) 130 (!) 22 123/72   19 2358 - - None (Room Air) 0 - (!) 126 18 -     Temp (24hrs), Av.8 °C (98.3 °F), Min:36.4 °C (97.6 °F), Max:37.2 °C (98.9 °F)      Respiratory:    Respiration: (!) 23, Pulse Oximetry: 94 %     Chest Tube Drains:          Fluids:    Intake/Output Summary (Last 24 hours) at 01/10/19 0716  Last data filed at 19 1400   Gross per 24 hour   Intake            299.9 ml   Output              400 ml   Net           -100.1 ml     Admit weight: Weight: 95.3 kg (210 lb)  Current weight: Weight: 86.3 kg (190 lb 4.1 oz) (19 0400)    Labs:  Recent Labs      19   0345  19   0530  19   0445  01/10/19   0450   WBC  20.4*   --   21.3*  20.3*   RBC  1.90*   --   2.82*  2.94*   HEMOGLOBIN  6.0*  8.8*  8.7*  9.4*   HEMATOCRIT  17.9*  26.5*  26.9*  27.9*   MCV  93.7   --   95.4  94.9   MCH  31.6   --   30.9  32.0   MCHC  33.7   --   32.3*  33.7   RDW  43.5   --   45.8  46.3   PLATELETCT  383   --   386  490*   MPV  10.9   --   11.0  10.9         Recent Labs      19   0345  19   0445  01/10/19   0450   SODIUM  131*  130*  130*   POTASSIUM  3.6  4.2  3.9   CHLORIDE  100  101  100   CO2   27  23  21   GLUCOSE  233*  213*  157*   BUN  19  18  13   CREATININE  0.49*  0.51  0.62   CALCIUM  8.0*  8.2*  8.5           Medications:  • insulin glargine  50 Units      And   • insulin lispro  3-14 Units     • aspirin EC  81 mg     • atorvastatin  80 mg     • clopidogrel  75 mg     • senna-docusate  2 Tab     • MD Alert...Adult ICU Electrolyte Replacement per Pharmacy       • busPIRone  5 mg     • gabapentin  100 mg     • potassium bicarbonate  50 mEq     • metoprolol  25 mg     • lisinopril  5 mg     • enoxaparin  40 mg     • lidocaine  1 Patch          Ordered Medications:    ASA - Yes    Plavix - Yes    Post-operative Beta Blockers - Yes    Ace Inhibitor -yes      Statin - Yes         Exam:   Review of Systems   Constitutional: Negative.    HENT: Negative.    Eyes: Negative.    Respiratory: Negative.    Cardiovascular: Negative.    Gastrointestinal: Negative.    Genitourinary: Negative.    Musculoskeletal: Negative.    Skin: Negative.    Neurological: Negative.    Endo/Heme/Allergies: Negative.    Psychiatric/Behavioral: Negative.        Physical Exam    Quality Measures:   Quality-Core Measures   Reviewed items::  EKG reviewed, Labs reviewed, Medications reviewed and Radiology images reviewed  Solitario catheter::  Strict Intake and Output During Sepisis or Shock  Central line in place:  Need for access  DVT prophylaxis pharmacological::  Contraindicated - High bleeding risk  DVT prophylaxis - mechanical:  SCDs  Ulcer Prophylaxis::  Yes  Assessed for rehabilitation services:  Patient returned to prior level of function, rehabilitation not indicated at this time      Assessment/Plan:  POD 1 HDS- no gtts- dc IABP, ST start metorolol, low EF add ace, Resp insuff- currently on bi-pap- diurese- pulm following pain- add ms contin, D/c Ct after OOB, keep solitario for strict I&O, enc IS  POD 2 HOTN - on devora gtt- give albumin this am, ST, re-intubated yesterday for resp distress, sedated, pain on fentanyl gtt,  hyponatremia- hold diuresis today, solitario for strict I&O, CPM  POD 3 HOTN- devora gtt, vent- broch w/ copious amt of secretions yesterday, sedated - on propofol/fentanyl for pain, low UO- check solitario/gently diurese  POD 4 HTN- increase po meds, 's- start esmolol, vent- pulm following, sedated, given lasix this am, CPM  POD 5 HDS, intubated vented, respiratory cultures positive, hypertensive, neuro grossly intact, abd S/NT +BS.  Plan:  Increase lisinopril.  Since unit out of IV pushes of fentanyl will increase drip slightly for pain.  Wean vent as tolerated.  On abx for positive respiratory cultures.  CPM.   POD 6 extubated, HDS, ST rate 120's, very slow to answer questions, hypotensive today.  Lisinopril adjusted by cards.  Plan:  Will dc pacing wires and janee this afternoon.  DC more potent narcotics.    POD 7 HDS, SR, neuro- diff with speech- CT scan today, dysphagia- cortrak, PT/OT rec rehab- will place order today would expect ready for transfer by Sunday/monday.  POD 8 HDS, ST vs aflutter- ekg- cards following, neuro status improved- Head CT neg, right groin hematoma - w/w, dysphagia- cortrak, PT/OT to re-eval for rehab, amb, enc IS  POD 9 HDS, SR/ST- cards following, Dysphagia- cortrak, pneumonia- on atbx, urinary retention- solitario replaced/start flomax, OT to see today, awaiting acceptance to rehab  POD 10 HDS, 's, tachypnec, became more tachypnec with swallow evaluation.  Awake and alert this AM.  Plan:  May consider CT scan R/O PE.    POD 11 HDS, still 's.  Respiratory rate better.  Awake and alert today.  CT chest negative for PE.  HCT down this AM.  Plan: 1 UPRBC's, guiac all stools.  Awaiting rehab.   POD 12 HDS, still tachy, ambulating halls today.  Alert.  WBC up today.  Plan:  Will get UA.  Watch WBC.    POD 13 HDs, , wounds CDI, neuro intact, abd S/NT and + BM.  WBC slightly improved today.  UA with positive leukocyte esterase.  Plan:  DC home today with home health.  No oxygen on  RA.  Will dc with bactrim.  Check WBC in one week.   Patient seen, examined and plan reviewed with midlevel provider. I agree with the plan.      Active Hospital Problems    Diagnosis   • Acute hypoxemic respiratory failure (HCC) [J96.01]     Priority: High   • S/P CABG x 3 [Z95.1]     Priority: High   • Ventilator dependent (HCC) [Z99.11]     Priority: High   • NSTEMI (non-ST elevated myocardial infarction) ([facility]) [I21.4]     Priority: High   • Acute systolic congestive heart failure ([facility]) [I50.21]     Priority: High   • Pneumonia [J18.9]     Priority: Medium   • Atelectasis [J98.11]     Priority: Medium   • SVT (supraventricular tachycardia) ([facility]) [I47.1]     Priority: Medium   • Type 2 diabetes mellitus without complication ([facility]) [E11.9]     Priority: Medium   • Hypokalemia [E87.6]     Priority: Low   • Postural dizziness with presyncope [R42, R55]     Priority: Low   • Sepsis ([facility]) [A41.9]

## 2019-01-10 NOTE — PROGRESS NOTES
Monitor Summary:   Sinus tachy   Rate: 100-120s (150 with ambulation)   Ectopy: occasional PVCs   Measurements: 0.16/0.08/0.28    2 RN skin check

## 2019-01-10 NOTE — THERAPY
"Speech Language Therapy dysphagia treatment completed.   Functional Status:  Patient seen for dysphagia therapy on this date.  He declined PO offered by this clinician but was agreeable to mixed consistency Cheese and Broccoli soup his spouse brought from [employer] and sips of thin liquids via straw.  The patient presented with functional mastication and bolus manipulation, timely initiation of swallow trigger and complete laryngeal elevation upon palpation.  He maintained clear vocal quality throughout the session and had no overt s/sx of aspiration with any consistency consumed.    Recommendations: At this time, recommend the patient continue regular/thins diet as tolerated.  No further acute SLP needs at this time.    Plan of Care: Patient with no further skilled SLP needs in the acute care setting at this time  Post-Acute Therapy:Anticipate that the patient will have no further speech therapy needs after discharge from the hospital.      See \"Rehab Therapy-Acute\" Patient Summary Report for complete documentation.     "

## 2019-01-10 NOTE — FACE TO FACE
Face to Face Supporting Documentation - Home Health    The encounter with this patient was in whole or in part the primary reason for home health admission.    Date of encounter:   Patient:                    MRN:                       YOB: 2021  Rikki Khalil  [id number]  1975     Home health to see patient for:  Skilled Nursing care for assessment, interventions & education    Skilled need for:  Surgical Aftercare vital signs, medication monitoring, wound care, disease education, lab draws.     Skilled nursing interventions to include:  Wound Care    Homebound status evidenced by:  Have a condition such that leaving his or her home is medically contraindicated. Leaving home requires a considerable and taxing effort. There is a normal inability to leave the home.    Community Physician to provide follow up care: Pcp Not In Computer     Optional Interventions? No      I certify the face to face encounter for this home health care referral meets the CMS requirements and the encounter/clinical assessment with the patient was, in whole, or in part, for the medical condition(s) listed above, which is the primary reason for home health care. Based on my clinical findings: the service(s) are medically necessary, support the need for home health care, and the homebound criteria are met.  I certify that this patient has had a face to face encounter by myself.  CARLEY Alejo. - NPI: [id number]

## 2019-01-10 NOTE — PROGRESS NOTES
Per verbal order from NP, Inna Linton, niecy removed from the pt's midline incision. Incision maintained approximation. Steri-strips placed over incision. Education and demonstration on surgical site cleaning provided by RN. Pt verbalized understanding. Incision is pink and blanching.

## 2019-01-10 NOTE — DISCHARGE SUMMARY
Discharge Summary    CHIEF COMPLAINT ON ADMISSION  Chief Complaint   Patient presents with   • Chest Pain     transfer s/p SVT; in ST now       Reason for Admission  Transfer     Admission Date  12/26/2018    CODE STATUS  Full Code     PREOPERATIVE DIAGNOSES:  Hypertension, diabetes mellitus, hemoglobin A1c 12,   cigarette smoking, myocardial infarction inferior wall, coronary artery   disease, angina.     POSTOPERATIVE DIAGNOSES:  Hypertension, diabetes mellitus, hemoglobin A1c 12,   cigarette smoking, myocardial infarction inferior wall, coronary artery   disease, angina.     OPERATIONS:  Coronary artery bypass grafting x3, left SANDRA to distal LAD,   reverse saphenous vein graft to distal diagonal, reverse saphenous vein graft   to the distal right coronary artery, intraaortic balloon pump, endoscopic vein   harvest, right leg and thigh.    HPI Pleasant 43-year-old gentleman with history of   diverticulitis, ileostomy, status post takedown type 2 diabetes mellitus.     He has had an onset of left jaw pain radiating down the left shoulder,   tingling of left arm, after few minutes went away, and 24 hours later, he had   an almost a near syncopal spell and this repeated a few more times and was   taken to the emergency room.     He had at the emergency room, nonsustained SVT.  IV diltiazem was given.    Troponins were positive.  Transferred to [facility] for evaluation.  Here in heart   catheterization shows that he has multivessel 3-vessel disease, he was worked   up by cardiology.  In addition, he has a very high hemoglobin A1c of 12.  He   has diabetes mellitus, hypertension, cigarette smoking the entire adult life,   myocardial infarction, inferior wall, angina, coronary artery disease.    HOSPITAL COURSE  POD 1 HDS- no gtts- dc IABP, ST start metorolol, low EF add ace, Resp insuff- currently on bi-pap- diurese- pulm following pain- add ms contin, D/c Ct after OOB, keep solitario for strict I&O, enc IS  POD 2 HOTN - on  devora gtt- give albumin this am, ST, re-intubated yesterday for resp distress, sedated, pain on fentanyl gtt, hyponatremia- hold diuresis today, solitario for strict I&O, CPM  POD 3 HOTN- devora gtt, vent- broch w/ copious amt of secretions yesterday, sedated - on propofol/fentanyl for pain, low UO- check solitario/gently diurese  POD 4 HTN- increase po meds, 's- start esmolol, vent- pulm following, sedated, given lasix this am, CPM  POD 5 HDS, intubated vented, respiratory cultures positive, hypertensive, neuro grossly intact, abd S/NT +BS.  Plan:  Increase lisinopril.  Since unit out of IV pushes of fentanyl will increase drip slightly for pain.  Wean vent as tolerated.  On abx for positive respiratory cultures.  CPM.   POD 6 extubated, HDS, ST rate 120's, very slow to answer questions, hypotensive today.  Lisinopril adjusted by cards.  Plan:  Will dc pacing wires and janee this afternoon.  DC more potent narcotics.    POD 7 HDS, SR, neuro- diff with speech- CT scan today, dysphagia- cortrak, PT/OT rec rehab- will place order today would expect ready for transfer by Sunday/monday.  POD 8 HDS, ST vs aflutter- ekg- cards following, neuro status improved- Head CT neg, right groin hematoma - w/w, dysphagia- cortrak, PT/OT to re-eval for rehab, amb, enc IS  POD 9 HDS, SR/ST- cards following, Dysphagia- cortrak, pneumonia- on atbx, urinary retention- solitario replaced/start flomax, OT to see today, awaiting acceptance to rehab  POD 10 HDS, 's, tachypnec, became more tachypnec with swallow evaluation.  Awake and alert this AM.  Plan:  May consider CT scan R/O PE.    POD 11 HDS, still 's.  Respiratory rate better.  Awake and alert today.  CT chest negative for PE.  HCT down this AM.  Plan: 1 UPRBC's, guiac all stools.  Awaiting rehab.   POD 12 HDS, still tachy, ambulating halls today.  Alert.  WBC up today.  Plan:  Will get UA.  Watch WBC.    POD 13 HDs, , wounds CDI, neuro intact, abd S/NT and + BM.  WBC slightly  improved today.  UA with positive leukocyte esterase.  Plan:  DC home today with No home health where he lives.  No oxygen on RA.  Will dc with bactrim.  Check WBC in one week.     Patient seen, examined and plan reviewed with midlevel provider. I agree with the plan.     Therefore, he is discharged in good and stable condition to home with close outpatient follow-up.    The patient met 2-midnight criteria for an inpatient stay at the time of discharge.    Discharge Date  1/10/2019    DISCHARGE DIAGNOSES  Principal Problem (Resolved):    NSTEMI (non-ST elevated myocardial infarction) ([facility]) POA: Yes  Active Problems:    Acute systolic congestive heart failure (HCC) POA: Yes    S/P CABG x 3 POA: No    Type 2 diabetes mellitus without complication ([facility]) POA: Yes    Atelectasis POA: Clinically Undetermined    Pneumonia POA: Clinically Undetermined  Resolved Problems:    Ventilator dependent (HCC) POA: No    Acute hypoxemic respiratory failure (HCC) POA: Clinically Undetermined    SVT (supraventricular tachycardia) ([facility]) POA: Yes    Hypokalemia POA: Yes    Postural dizziness with presyncope POA: Yes    Sepsis ([facility]) POA: Yes      FOLLOW UP  Future Appointments  Date Time Provider Department Center   1/16/2019 2:20 PM SHYAM Varela [facility] None     Tomas Barrios M.D.  [address] [place] [address] [phone]  [id number]  [facility] [address]  [phone]    Go on 1/23/2019  Please follow up with your Cardiac Surgeon Dr. Barrios on 1/23 at 1:15pm    [facility]  [address] [address]  [phone]          MEDICATIONS ON DISCHARGE     Medication List      START taking these medications      Instructions   aspirin 81 MG EC tablet  Start taking on:  1/11/2019   Take 1 Tab by mouth every day.  Dose:  81 mg     atorvastatin 80 MG tablet  Start taking on:  1/11/2019  Commonly known as:  LIPITOR   Take 1 Tab by mouth every day.  Dose:  80 mg     busPIRone 5 MG tablet  Commonly known as:  BUSPAR   Take 1 Tab by mouth 3 times a  "day.  Dose:  5 mg     clopidogrel 75 MG Tabs  Start taking on:  1/11/2019  Commonly known as:  PLAVIX   Take 1 Tab by mouth every day.  Dose:  75 mg     gabapentin 100 MG Caps  Commonly known as:  NEURONTIN   Take 1 Cap by mouth 3 times a day.  Dose:  100 mg     insulin glargine 100 UNIT/ML Soln  Commonly known as:  LANTUS   U 100 insulin syringes.  One touch monitor with test strips.     lisinopril 5 MG Tabs  Commonly known as:  PRINIVIL   Take 1 Tab by mouth 2 Times a Day.  Dose:  5 mg     metoprolol 25 MG Tabs  Commonly known as:  LOPRESSOR   Take 1 Tab by mouth 2 Times a Day.  Dose:  25 mg     sulfamethoxazole-trimethoprim 800-160 MG tablet  Commonly known as:  BACTRIM DS   Take 1 Tab by mouth 2 times a day.  Dose:  1 Tab            Allergies  Allergies   Allergen Reactions   • Penicillins Unspecified     \"Blue baby\"  RXN= as a child  Tolerated ceftriaxone 6/2015   • Fentanyl      Hallucinations       DIET  Orders Placed This Encounter   Procedures   • Diet Order Diabetic, Cardiac     Standing Status:   Standing     Number of Occurrences:   1     Order Specific Question:   Diet:     Answer:   Diabetic [3]     Order Specific Question:   Diet:     Answer:   Cardiac [6]       ACTIVITY  As tolerated.  Shower only.  No driving 1 month.  No heavy lifting heavier than 10 lbs.  No raising arms above head.    LABORATORY  Lab Results   Component Value Date    SODIUM 130 (L) 01/10/2019    POTASSIUM 3.9 01/10/2019    CHLORIDE 100 01/10/2019    CO2 21 01/10/2019    GLUCOSE 157 (H) 01/10/2019    BUN 13 01/10/2019    CREATININE 0.62 01/10/2019        Lab Results   Component Value Date    WBC 20.3 (H) 01/10/2019    HEMOGLOBIN 9.4 (L) 01/10/2019    HEMATOCRIT 27.9 (L) 01/10/2019    PLATELETCT 490 (H) 01/10/2019        Total time of the discharge process exceeds 30 minutes.  "

## 2019-01-10 NOTE — PROGRESS NOTES
At 2350 pt called this nurse into room. Pt appeared to be having an anxiety attack. Pt crying stating he is having 9/10 pain where is hematoma is located. Pt offered tylenol, heat/ice, ambulating. Dr. Vallejo updated and offering Oxycodone, however pt refusing due to past hallucinations with Fentanyl. Education provided on oxycodone. Ativan offered, pt refused.   Lars and Briseyda charge RNs notified and presented bedside and settled patient into bed with heatpacks/ice packs and tylenol given.  Pt currently sleeping and appears calm.

## 2019-01-10 NOTE — DISCHARGE PLANNING
Agency/Facility Name: [facility]  Spoke To: Ignacia  Outcome: Attempted to get status on referral, however, they are still reviewing.

## 2019-01-10 NOTE — PROGRESS NOTES
Education provided on new medications including Lantus. Pt is a diabetic and was able to verbalize understanding of how to administer Lantus as well as its action and side effects associated with the medication.     After Visit Summary reviewed with pt and pt's spouse. Follow-up appointments confirmed. All questions answered. IV removed. Charge aware. Pt transported downstairs by RN via wheelchair. Pt's spouse pulled car around, in front of [employer]. Pt assisted into vehicle.

## 2019-01-10 NOTE — THERAPY
"Physical Therapy Treatment completed.   Bed Mobility:  Supine to Sit: Supervised  Transfers: Sit to Stand: Supervised  Gait: Level Of Assist: Stand by Assist with No Equipment Needed       Plan of Care: Patient with no further skilled PT needs in the acute care setting at this time  Discharge Recommendations: Equipment: No Equipment Needed  Unless ADL equipment identified by OT     Pt with much improved mentation this session; appears to internalize education well; greatest d/c home concerns are medical in nature, pt reaching HR of 150 during short distance ambulation ,RPE of 11 though maintaining BP with position changes and increasing workload. Discussed workload with reduced EF and return to work activities. Pt has met all acute PT goals at this time, please continue to reinforce phase I to phase II cardiac rehab mobility.     See \"Rehab Therapy-Acute\" Patient Summary Report for complete documentation.       "

## 2019-01-10 NOTE — PROGRESS NOTES
Rossy Charge RN at bedside due to patient stating he wants to leave AMA. Rossy provided education on not having the resources line up for him yet to be successful After discharge. After pt spoke to Rossy he has decided to stay tonight however would like to be re-evaluated for discharge in the morning.

## 2019-01-14 NOTE — DOCUMENTATION QUERY
DOCUMENTATION QUERY    PROVIDERS: Please select “Cosign w/ note” to reply to query.    To better represent the severity of illness of your patient, please review the following information and exercise your independent professional judgment in responding to this query.    Sepsis is first documented on a progress note dated 1/1/19.  Subsequent documentation states Severe sepsis due to MSSA pneumonia with acute respiratory failure with hypoxia.      Sepsis is noted as being POA on the DC Summary.  However, both MSSA Pneumonia and Acute hypoxemic respiratory failure are documented with POA status of clinically undetermined.     Based upon the clinical findings, risk factors, and treatment, please specify if this condition was present on admission or developed after admission      • Sepsis present on admission   • Sepsis developed after admission   • Other explanation of clinical findings (please document)  • Unable to determine        The medical record reflects the following:   Clinical Findings  Pulse 110   Temp 97.9    Resp 20    WBC 14    /89    MSSA Pneumonia   ARDS   Acute respiratory failure with hypoxia   Tachycardia   Treatment  IV Vancomycin   Mech ventilation   Risk Factors  MI   CAD with Angina   s/p CABG   Smoker   Location within medical record  Progress Notes   DC Summary     Thank you,   Elizabeth Sawallisch

## 2019-01-14 NOTE — DOCUMENTATION QUERY
"DOCUMENTATION QUERY    PROVIDERS: Please select “Cosign w/ note” to reply to query.    To better represent the severity of illness of your patient, please review the following information and exercise your independent professional judgment in responding to this query.     NSTEMI is documented in the History and Physical, Progress Notes, and DC Summary. \"Myocardial infarction inferior wall\" is documented in the OP Report, Progress notes, and DC Summary as well.     Based upon the clinical findings, risk factors, and treatment, Please clarify the Myocardial infarction diagnosis.     Please select all that apply:  • STEMI - Inferior Wall  • NSTEMI   • Other explanation of clinical findings (Please document)  • Unable to determine        The medical record reflects the following:   Clinical Findings  Premature CAD with unspecified angina   SVT   Troponins were positive    HTN   Treatment  Left Heart Cath   CABG x 3   Risk Factors  DM 2 with Hyperglycemia   Current Smoker - Cigarettes      Location within medical record  History and Physical    DC Summary     Thank you,   Elizabeth Sawallisch        "

## 2019-01-16 ENCOUNTER — HOSPITAL ENCOUNTER (OUTPATIENT)
Dept: LAB | Facility: MEDICAL CENTER | Age: 44
End: 2019-01-16
Attending: NURSE PRACTITIONER
Payer: MEDICAID

## 2019-01-16 ENCOUNTER — OFFICE VISIT (OUTPATIENT)
Dept: CARDIOLOGY | Facility: PHYSICIAN GROUP | Age: 44
End: 2019-01-16
Attending: INTERNAL MEDICINE
Payer: MEDICAID

## 2019-01-16 VITALS
HEART RATE: 110 BPM | BODY MASS INDEX: 26.55 KG/M2 | DIASTOLIC BLOOD PRESSURE: 44 MMHG | OXYGEN SATURATION: 98 % | WEIGHT: 196 LBS | SYSTOLIC BLOOD PRESSURE: 84 MMHG | HEIGHT: 72 IN

## 2019-01-16 DIAGNOSIS — E78.2 MIXED HYPERLIPIDEMIA: ICD-10-CM

## 2019-01-16 DIAGNOSIS — E11.9 TYPE 2 DIABETES MELLITUS WITHOUT COMPLICATION, WITHOUT LONG-TERM CURRENT USE OF INSULIN (HCC): ICD-10-CM

## 2019-01-16 DIAGNOSIS — D64.9 ANEMIA, UNSPECIFIED TYPE: ICD-10-CM

## 2019-01-16 DIAGNOSIS — I47.10 SVT (SUPRAVENTRICULAR TACHYCARDIA): ICD-10-CM

## 2019-01-16 DIAGNOSIS — I50.21 ACUTE SYSTOLIC CONGESTIVE HEART FAILURE (HCC): ICD-10-CM

## 2019-01-16 DIAGNOSIS — I25.5 ISCHEMIC CARDIOMYOPATHY: ICD-10-CM

## 2019-01-16 DIAGNOSIS — I25.10 CORONARY ARTERY DISEASE INVOLVING NATIVE CORONARY ARTERY OF NATIVE HEART WITHOUT ANGINA PECTORIS: ICD-10-CM

## 2019-01-16 DIAGNOSIS — Z95.1 S/P CABG X 3: ICD-10-CM

## 2019-01-16 PROBLEM — K57.90 DIVERTICULOSIS: Status: ACTIVE | Noted: 2019-01-16

## 2019-01-16 PROBLEM — G89.29 CHRONIC PAIN: Status: ACTIVE | Noted: 2019-01-16

## 2019-01-16 PROBLEM — E78.5 HYPERLIPIDEMIA: Status: ACTIVE | Noted: 2019-01-16

## 2019-01-16 PROBLEM — F17.210 CIGARETTE SMOKER: Status: ACTIVE | Noted: 2019-01-16

## 2019-01-16 PROBLEM — F32.89 OTHER DEPRESSIVE DISORDER: Status: ACTIVE | Noted: 2019-01-16

## 2019-01-16 PROBLEM — E11.40 DIABETIC NEUROPATHY (HCC): Status: ACTIVE | Noted: 2019-01-16

## 2019-01-16 LAB
ANION GAP SERPL CALC-SCNC: 7 MMOL/L (ref 0–11.9)
BASOPHILS # BLD AUTO: 0.7 % (ref 0–1.8)
BASOPHILS # BLD: 0.11 K/UL (ref 0–0.12)
BUN SERPL-MCNC: 18 MG/DL (ref 8–22)
CALCIUM SERPL-MCNC: 9.6 MG/DL (ref 8.5–10.5)
CHLORIDE SERPL-SCNC: 101 MMOL/L (ref 96–112)
CO2 SERPL-SCNC: 24 MMOL/L (ref 20–33)
CREAT SERPL-MCNC: 0.74 MG/DL (ref 0.5–1.4)
EOSINOPHIL # BLD AUTO: 0.46 K/UL (ref 0–0.51)
EOSINOPHIL NFR BLD: 3 % (ref 0–6.9)
ERYTHROCYTE [DISTWIDTH] IN BLOOD BY AUTOMATED COUNT: 54.3 FL (ref 35.9–50)
GLUCOSE SERPL-MCNC: 259 MG/DL (ref 65–99)
HCT VFR BLD AUTO: 30.2 % (ref 42–52)
HGB BLD-MCNC: 9.6 G/DL (ref 14–18)
IMM GRANULOCYTES # BLD AUTO: 0.03 K/UL (ref 0–0.11)
IMM GRANULOCYTES NFR BLD AUTO: 0.2 % (ref 0–0.9)
LYMPHOCYTES # BLD AUTO: 2.74 K/UL (ref 1–4.8)
LYMPHOCYTES NFR BLD: 17.8 % (ref 22–41)
MCH RBC QN AUTO: 30.9 PG (ref 27–33)
MCHC RBC AUTO-ENTMCNC: 31.8 G/DL (ref 33.7–35.3)
MCV RBC AUTO: 97.1 FL (ref 81.4–97.8)
MONOCYTES # BLD AUTO: 0.97 K/UL (ref 0–0.85)
MONOCYTES NFR BLD AUTO: 6.3 % (ref 0–13.4)
NEUTROPHILS # BLD AUTO: 11.09 K/UL (ref 1.82–7.42)
NEUTROPHILS NFR BLD: 72 % (ref 44–72)
NRBC # BLD AUTO: 0 K/UL
NRBC BLD-RTO: 0 /100 WBC
PLATELET # BLD AUTO: 621 K/UL (ref 164–446)
PMV BLD AUTO: 9.9 FL (ref 9–12.9)
POTASSIUM SERPL-SCNC: 4.6 MMOL/L (ref 3.6–5.5)
RBC # BLD AUTO: 3.11 M/UL (ref 4.7–6.1)
SODIUM SERPL-SCNC: 132 MMOL/L (ref 135–145)
WBC # BLD AUTO: 15.4 K/UL (ref 4.8–10.8)

## 2019-01-16 PROCEDURE — 85025 COMPLETE CBC W/AUTO DIFF WBC: CPT

## 2019-01-16 PROCEDURE — 99214 OFFICE O/P EST MOD 30 MIN: CPT | Performed by: NURSE PRACTITIONER

## 2019-01-16 PROCEDURE — 80048 BASIC METABOLIC PNL TOTAL CA: CPT

## 2019-01-16 PROCEDURE — 36415 COLL VENOUS BLD VENIPUNCTURE: CPT

## 2019-01-16 RX ORDER — INSULIN LISPRO 100 [IU]/ML
10-15 INJECTION, SOLUTION INTRAVENOUS; SUBCUTANEOUS
COMMUNITY
Start: 2019-01-15 | End: 2023-11-05

## 2019-01-16 ASSESSMENT — ENCOUNTER SYMPTOMS
INSOMNIA: 0
BRUISES/BLEEDS EASILY: 0
FEVER: 0
HEADACHES: 0
COUGH: 0
WEAKNESS: 1
ORTHOPNEA: 0
PND: 0
MYALGIAS: 0
CHILLS: 0
PALPITATIONS: 0
LOSS OF CONSCIOUSNESS: 0
DIZZINESS: 0
ABDOMINAL PAIN: 0
SHORTNESS OF BREATH: 0
NAUSEA: 0

## 2019-01-16 NOTE — PROGRESS NOTES
Chief Complaint   Patient presents with   • Hospital Follow-up   • Coronary Artery Disease   • Cardiomyopathy (Ischemic)   • Coronary Artery Bypass Graft (CABG)   • CHF (Acute)   • Diabetes (Controlled)       Subjective:   Rikki Khalil is a 43 y.o. male who presents today for hospital follow-up of CABG x 3.    Rikki is a 43 year old male with history of SVT and diabetes. On 12/26/2018, he presented to [facility] with left sided chest pain, with radiation to the jaw and arm, as well as weakness in his lefts. He was initially seen the ER at [facility], and EKG showed nonsustained SVT.  At [facility], he did have elevated troponins, and coronary angiogram showed triple vessel disease, and he was referred for surgical revascularization. CABG x 3 was done on 12/28/2018. Echocardiogram showed LVEF 25-30%. Medical therapy was adjusted to ASA, Plavix, BB, ACE and statin.    He is here today for follow-up.  He is quite tired and feeling very weak. No overt chest pain, pressure or discomfort; no palpitations; no shortness of breath, orthopnea or PND; no dizziness, but some mild lightheadedness. No syncope. No LE edema. He has stopped smoking. He was recently given some insulin, for better management of his diabetes.    Past Medical History:   Diagnosis Date   • Arthritis     All joints   • Back pain    • Bowel obstruction ([facility])    • CAD (coronary artery disease) 12/2018    CABG x 3 (LIMA to distal LAD, rSVG to distal diagonal, rSVG to distal RCA) by Dr. Barrios.   • Glaucoma     monika eyes   • Hyperlipidemia    • Ischemic cardiomyopathy 12/2018    Echocardiogram with LVEF 25-30%. Global hypokinesis. Moderately dilated RV. Mild TR.   • NSTEMI (non-ST elevated myocardial infarction) ([facility]) 12/27/2018   • Pneumonia 12/30/2018   • Scoliosis    • SVT (supraventricular tachycardia) ([facility])    • Systolic congestive heart failure ([facility])    • Type II or unspecified type diabetes mellitus without mention of complication, not stated  "as uncontrolled     insulin and oral meds     Past Surgical History:   Procedure Laterality Date   • MULTIPLE CORONARY ARTERY BYPASS ENDO VEIN HARVEST  12/28/2018    Procedure: MULTIPLE CORONARY ARTERY BYPASS x 3,  ENDO VEIN HARVEST LEFT LEG;  Surgeon: Tomas Barrios M.D.;  Location: [facility];  Service: Cardiothoracic   • DONNA  12/28/2018    Procedure: DONNA;  Surgeon: Tomas Barrios M.D.;  Location: SURGERY [facility];  Service: Cardiothoracic   • ILEOSTOMY  9/29/2015    Procedure: ILEOSTOMY TAKE DOWN;  Surgeon: Carter Kumar M.D.;  Location: SURGERY [facility];  Service:    • LOW ANTERIOR RESECTION ROBOTIC XI  7/14/2015    Procedure: Robotic low anterior resection, takedown enterocutaneous fistula, omental flap, ileostomy creation;  Surgeon: Carter Kumar M.D.;  Location: [facility];  Service:    • COLOSTOMY CREATION LAPAROSCOPIC  7/14/2015    Procedure: COLOSTOMY CREATION LAPAROSCOPIC FOR: LAP ILEOSTOMY;  Surgeon: Carter Kumar M.D.;  Location: SURGERY [facility];  Service:      Family History   Problem Relation Age of Onset   • Hypertension Mother    • Heart Disease Mother    • Stroke Mother    • No Known Problems Father      Social History     Social History   • Marital status:      Spouse name: N/A   • Number of children: N/A   • Years of education: N/A     Occupational History   • Not on file.     Social History Main Topics   • Smoking status: Current Every Day Smoker     Packs/day: 1.50     Years: 24.00     Types: Cigarettes   • Smokeless tobacco: Never Used   • Alcohol use No   • Drug use: Yes     Types: Inhaled      Comment: marijuana daily - LAST USED 9/28/2015 0900   • Sexual activity: Not on file     Other Topics Concern   • Not on file     Social History Narrative   • No narrative on file     Allergies   Allergen Reactions   • Penicillins Unspecified     \"Blue baby\"  RXN= as a child  Tolerated ceftriaxone 6/2015   • Fentanyl      " Hallucinations     Outpatient Encounter Prescriptions as of 1/16/2019   Medication Sig Dispense Refill   • metFORMIN (GLUCOPHAGE) 500 MG Tab      • HUMALOG KWIKPEN 100 UNIT/ML Solution Pen-injector injection      • aspirin EC 81 MG EC tablet Take 1 Tab by mouth every day. 30 Tab    • busPIRone (BUSPAR) 5 MG tablet Take 1 Tab by mouth 3 times a day. 90 Tab 1   • gabapentin (NEURONTIN) 100 MG Cap Take 1 Cap by mouth 3 times a day. 90 Cap 1   • insulin glargine (LANTUS) 100 UNIT/ML Solution U 100 insulin syringes.  One touch monitor with test strips. 1 Vial 2   • atorvastatin (LIPITOR) 80 MG tablet Take 1 Tab by mouth every day. 30 Tab 2   • lisinopril (PRINIVIL) 5 MG Tab Take 1 Tab by mouth 2 Times a Day. 30 Tab 2   • metoprolol (LOPRESSOR) 25 MG Tab Take 1 Tab by mouth 2 Times a Day. 60 Tab 2   • clopidogrel (PLAVIX) 75 MG Tab Take 1 Tab by mouth every day. 30 Tab 2   • sulfamethoxazole-trimethoprim (BACTRIM DS) 800-160 MG tablet Take 1 Tab by mouth 2 times a day. 20 Tab 0     No facility-administered encounter medications on file as of 1/16/2019.      Review of Systems   Constitutional: Positive for malaise/fatigue. Negative for chills and fever.   HENT: Negative for congestion.    Respiratory: Negative for cough and shortness of breath.    Cardiovascular: Negative for chest pain, palpitations, orthopnea, leg swelling and PND.   Gastrointestinal: Negative for abdominal pain and nausea.   Musculoskeletal: Negative for myalgias.   Skin: Negative for rash.   Neurological: Positive for weakness. Negative for dizziness, loss of consciousness and headaches.   Endo/Heme/Allergies: Does not bruise/bleed easily.   Psychiatric/Behavioral: The patient does not have insomnia.         Objective:   BP (!) 84/44 (BP Location: Left arm, Patient Position: Sitting, BP Cuff Size: Adult)   Pulse (!) 110   Ht 1.829 m (6')   Wt 88.9 kg (196 lb)   SpO2 98%   BMI 26.58 kg/m²     Physical Exam   Constitutional: He is oriented to  person, place, and time. He appears well-developed and well-nourished.   Ambulating with a cane.   HENT:   Head: Normocephalic.   Eyes: EOM are normal.   Neck: Normal range of motion. Neck supple. No JVD present.   Cardiovascular: Normal rate, regular rhythm and normal heart sounds.    Pulmonary/Chest: Effort normal and breath sounds normal. No respiratory distress. He has no wheezes. He has no rales.   Sternotomy incision is healing well. No redness, swelling or drainage noted.   Abdominal: Soft. Bowel sounds are normal. He exhibits no distension. There is no tenderness.   Musculoskeletal: Normal range of motion. He exhibits no edema.   Neurological: He is alert and oriented to person, place, and time.   Skin: Skin is warm and dry. No rash noted. There is pallor.   Psychiatric: He has a normal mood and affect.     EKG ordered and interpreted by me shows sinus tachycardia at 109bpm.    RESULTS OF CABG X 3 OF 12/28/2018:  PREOPERATIVE DIAGNOSES:  Hypertension, diabetes mellitus, hemoglobin A1c 12,   cigarette smoking, myocardial infarction inferior wall, coronary artery   disease, angina.  POSTOPERATIVE DIAGNOSES:  Hypertension, diabetes mellitus, hemoglobin A1c 12,   cigarette smoking, myocardial infarction inferior wall, coronary artery   disease, angina.  OPERATIONS:  Coronary artery bypass grafting x3, left SANDRA to distal LAD,   reverse saphenous vein graft to distal diagonal, reverse saphenous vein graft   to the distal right coronary artery, intraaortic balloon pump, endoscopic vein   harvest, right leg and thigh.    CONCLUSIONS OF ECHOCARDIOGRAM OF 12/31/2018:  Prior Echo - 12/27/18 - no change in left ventricular function.  Left ventricular ejection fraction is visually estimated to be 25-30%.    Global hypokinesis with anterior akinesis. No ventricular thrombus noted.  Severely reduced right ventricular systolic function.  Moderately dilated right ventricle.  Unable to estimate pulmonary artery pressure due  to an inadequate tricuspid regurgitant jet.    Lab Results   Component Value Date/Time    WBC 20.3 (H) 01/10/2019 04:50 AM    RBC 2.94 (L) 01/10/2019 04:50 AM    HEMOGLOBIN 9.4 (L) 01/10/2019 04:50 AM    HEMATOCRIT 27.9 (L) 01/10/2019 04:50 AM    MCV 94.9 01/10/2019 04:50 AM    MCH 32.0 01/10/2019 04:50 AM    MCHC 33.7 01/10/2019 04:50 AM    MPV 10.9 01/10/2019 04:50 AM      Lab Results   Component Value Date/Time    SODIUM 130 (L) 01/10/2019 04:50 AM    POTASSIUM 3.9 01/10/2019 04:50 AM    CHLORIDE 100 01/10/2019 04:50 AM    CO2 21 01/10/2019 04:50 AM    GLUCOSE 157 (H) 01/10/2019 04:50 AM    BUN 13 01/10/2019 04:50 AM    CREATININE 0.62 01/10/2019 04:50 AM     Lab Results   Component Value Date/Time    CHOLSTRLTOT 79 (L) 12/27/2018 04:28 AM    LDL 42 12/27/2018 04:28 AM    HDL 22 (A) 12/27/2018 04:28 AM    TRIGLYCERIDE 78 12/31/2018 05:04 AM         Assessment:     1. SVT (supraventricular tachycardia) (HCC)  EKG   2. Coronary artery disease involving native coronary artery of native heart without angina pectoris  BASIC METABOLIC PANEL   3. Ischemic cardiomyopathy  BASIC METABOLIC PANEL   4. S/P CABG x 3     5. Acute systolic congestive heart failure (HCC)     6. Mixed hyperlipidemia     7. Type 2 diabetes mellitus without complication, without long-term current use of insulin (HCC)     8. Anemia, unspecified type  CBC WITH DIFFERENTIAL       Medical Decision Making:  Today's Assessment / Status / Plan:     1. History of SVT, with sinus tachycardia on EKG today, possibly due to underlying anemia. Continue Metoprolol 25mg BID.    2. CAD/ischemic cardiomyopathy with LVEF 25-30%. On BB, ACEI, ASA and statin. To HOLD ACEI for now, given low BP.    3. Hyperlipidemia, treated with statin.    4. Diabetes mellitus, treated and followed by PCP.    5. Anemia, to repeat CBC and BMP today.    He does see Dr. Barrios next week; I will see him back in 2 weeks, to assess BP and HR; and whether we will resume ACEI or not. Will  also review labs, and repeat echo in 3 months. FU sooner if clinical condition changes. Will also discuss cardiac rehab and lifestyle modifications after he is feeling better.    Collaborating MD: ALEKSANDR Queen

## 2019-01-17 ENCOUNTER — TELEPHONE (OUTPATIENT)
Dept: CARDIOLOGY | Facility: MEDICAL CENTER | Age: 44
End: 2019-01-17

## 2019-01-17 NOTE — TELEPHONE ENCOUNTER
"He calls back.  He is having very mild nosebleeds.  He is able to control them but will call back if not able or use ER.  He only stopped the Lisinopril yesterday, \"I think I feel better.\"  No vitals.  "

## 2019-01-17 NOTE — TELEPHONE ENCOUNTER
----- Message from SHYAM Varela sent at 1/17/2019  1:14 PM PST -----  Regarding: CBC and BMP results    I saw this patient in [facility] yesterday and did a stat CBC and BMP on him.    CBC shows his anemia is VERY slowly improving. He can add some OTC Iron (ferrous sulfate) 325mg once daily to help with this.    Glucose is VERY high - needs to see PCP to manage this.    Rest of labs are fine.    I had him HOLD his Lisinopril to help with low BP - is he feeling any better?

## 2019-01-23 LAB
FUNGUS SPEC CULT: NORMAL
SIGNIFICANT IND 70042: NORMAL
SITE SITE: NORMAL
SOURCE SOURCE: NORMAL

## 2019-01-30 ENCOUNTER — OFFICE VISIT (OUTPATIENT)
Dept: CARDIOLOGY | Facility: PHYSICIAN GROUP | Age: 44
End: 2019-01-30
Payer: MEDICAID

## 2019-01-30 VITALS
DIASTOLIC BLOOD PRESSURE: 70 MMHG | HEART RATE: 104 BPM | HEIGHT: 72 IN | BODY MASS INDEX: 26.55 KG/M2 | WEIGHT: 196 LBS | SYSTOLIC BLOOD PRESSURE: 122 MMHG | OXYGEN SATURATION: 96 %

## 2019-01-30 DIAGNOSIS — E11.9 TYPE 2 DIABETES MELLITUS WITHOUT COMPLICATION, WITHOUT LONG-TERM CURRENT USE OF INSULIN (HCC): ICD-10-CM

## 2019-01-30 DIAGNOSIS — D64.9 ANEMIA, UNSPECIFIED TYPE: ICD-10-CM

## 2019-01-30 DIAGNOSIS — I25.5 ISCHEMIC CARDIOMYOPATHY: ICD-10-CM

## 2019-01-30 DIAGNOSIS — E78.2 MIXED HYPERLIPIDEMIA: ICD-10-CM

## 2019-01-30 DIAGNOSIS — Z95.1 S/P CABG X 3: ICD-10-CM

## 2019-01-30 DIAGNOSIS — I47.10 SVT (SUPRAVENTRICULAR TACHYCARDIA): ICD-10-CM

## 2019-01-30 DIAGNOSIS — I25.10 CORONARY ARTERY DISEASE INVOLVING NATIVE CORONARY ARTERY OF NATIVE HEART WITHOUT ANGINA PECTORIS: ICD-10-CM

## 2019-01-30 PROCEDURE — 99214 OFFICE O/P EST MOD 30 MIN: CPT | Performed by: NURSE PRACTITIONER

## 2019-01-30 RX ORDER — CEPHALEXIN 500 MG/1
500 CAPSULE ORAL 2 TIMES DAILY
Qty: 14 CAP | Refills: 0 | Status: SHIPPED | OUTPATIENT
Start: 2019-01-30 | End: 2019-02-16

## 2019-01-30 RX ORDER — BLOOD SUGAR DIAGNOSTIC
STRIP MISCELLANEOUS
COMMUNITY
Start: 2019-01-28 | End: 2019-02-07

## 2019-01-30 RX ORDER — METOPROLOL TARTRATE 50 MG/1
50 TABLET, FILM COATED ORAL 2 TIMES DAILY
Qty: 60 TAB | Refills: 3 | Status: SHIPPED | OUTPATIENT
Start: 2019-01-30 | End: 2019-05-16 | Stop reason: SDUPTHER

## 2019-01-30 RX ORDER — INSULIN GLARGINE 100 [IU]/ML
INJECTION, SOLUTION SUBCUTANEOUS
Refills: 2 | COMMUNITY
Start: 2019-01-11 | End: 2019-01-30

## 2019-01-30 RX ORDER — SYRINGE AND NEEDLE,INSULIN,1ML 31 GX5/16"
SYRINGE, EMPTY DISPOSABLE MISCELLANEOUS
Refills: 2 | COMMUNITY
Start: 2019-01-10 | End: 2019-02-07

## 2019-01-30 RX ORDER — PEN NEEDLE, DIABETIC 32GX 5/32"
NEEDLE, DISPOSABLE MISCELLANEOUS
Refills: 1 | COMMUNITY
Start: 2019-01-15 | End: 2019-02-07

## 2019-01-30 RX ORDER — LIDOCAINE 50 MG/G
OINTMENT TOPICAL
Refills: 3 | COMMUNITY
Start: 2019-01-28 | End: 2019-02-16

## 2019-01-30 RX ORDER — BLOOD-GLUCOSE METER
KIT MISCELLANEOUS
Refills: 0 | COMMUNITY
Start: 2019-01-10 | End: 2019-02-07

## 2019-01-30 RX ORDER — LANCETS 33 GAUGE
EACH MISCELLANEOUS
COMMUNITY
Start: 2019-01-28 | End: 2019-02-07

## 2019-01-30 ASSESSMENT — ENCOUNTER SYMPTOMS
BRUISES/BLEEDS EASILY: 0
HEADACHES: 0
WEAKNESS: 0
ORTHOPNEA: 0
INSOMNIA: 0
PND: 0
COUGH: 0
LOSS OF CONSCIOUSNESS: 0
ABDOMINAL PAIN: 0
CHILLS: 0
MYALGIAS: 0
FEVER: 0
PALPITATIONS: 0
SHORTNESS OF BREATH: 0
NAUSEA: 0
DIZZINESS: 0

## 2019-01-30 NOTE — LETTER
[facility]   [address] [place] [id number]  Phone: [phone]  Fax: [phone]              Rikki Khalil  1975    Encounter Date: 1/30/2019    SHYAM Varela          PROGRESS NOTE:  Chief Complaint   Patient presents with   • Follow-Up   • Wound Check   • Coronary Artery Bypass Graft (CABG)       Subjective:   Rikki Khalil is a 43 y.o. male who presents today for two week follow-up of recent CABG.    Rikki is a 43 year old male with history of SVT, hypertension, hyperlipidemia and diabetes. In December 2018, he had CABG x 3. Echocardiogram showed LVEF 25-30%.  At follow-up with me, BP was quite low, and I had hm hold his Lisinopril.     He is here today for follow-up.   He is feeling much better, but noticed that the inferior portion of his sternotomy incision is weeping clear fluid. No overt chest pain, pressure or discomfort; no palpitations; no shortness of breath, orthopnea or PND; no dizziness, but some mild lightheadedness. No syncope. No LE edema. Glucose is gradually improving. No fever or chills. He did see Dr. Barrios and was cleared.     Past Medical History:   Diagnosis Date   • Arthritis     All joints   • Back pain    • Bowel obstruction (HCC)    • CAD (coronary artery disease) 12/2018    CABG x 3 (LIMA to distal LAD, rSVG to distal diagonal, rSVG to distal RCA) by Dr. Barrios.   • Glaucoma     monika eyes   • Hyperlipidemia    • Ischemic cardiomyopathy 12/2018    Echocardiogram with LVEF 25-30%. Global hypokinesis. Moderately dilated RV. Mild TR.   • NSTEMI (non-ST elevated myocardial infarction) ([facility]) 12/27/2018   • Other depressive disorder 1/16/2019   • Pneumonia 12/30/2018   • Scoliosis    • SVT (supraventricular tachycardia) ([facility])    • Systolic congestive heart failure ([facility])    • Type II or unspecified type diabetes mellitus without mention of complication, not stated as uncontrolled     insulin and oral  "meds     Past Surgical History:   Procedure Laterality Date   • MULTIPLE CORONARY ARTERY BYPASS ENDO VEIN HARVEST  12/28/2018    Procedure: MULTIPLE CORONARY ARTERY BYPASS x 3,  ENDO VEIN HARVEST LEFT LEG;  Surgeon: Tomas Barrios M.D.;  Location: SURGERY [facility];  Service: Cardiothoracic   • DONNA  12/28/2018    Procedure: DONNA;  Surgeon: Tomas Barrios M.D.;  Location: SURGERY [facility];  Service: Cardiothoracic   • ILEOSTOMY  9/29/2015    Procedure: ILEOSTOMY TAKE DOWN;  Surgeon: Carter Kumar M.D.;  Location: SURGERY [facility];  Service:    • LOW ANTERIOR RESECTION ROBOTIC XI  7/14/2015    Procedure: Robotic low anterior resection, takedown enterocutaneous fistula, omental flap, ileostomy creation;  Surgeon: Carter Kumar M.D.;  Location: SURGERY [facility];  Service:    • COLOSTOMY CREATION LAPAROSCOPIC  7/14/2015    Procedure: COLOSTOMY CREATION LAPAROSCOPIC FOR: LAP ILEOSTOMY;  Surgeon: Carter Kumar M.D.;  Location: SURGERY [facility];  Service:      Family History   Problem Relation Age of Onset   • Hypertension Mother    • Heart Disease Mother    • Stroke Mother    • No Known Problems Father      Social History     Social History   • Marital status:      Spouse name: N/A   • Number of children: N/A   • Years of education: N/A     Occupational History   • Not on file.     Social History Main Topics   • Smoking status: Current Every Day Smoker     Packs/day: 1.50     Years: 24.00     Types: Cigarettes   • Smokeless tobacco: Never Used   • Alcohol use No   • Drug use: Yes     Types: Inhaled      Comment: marijuana daily - LAST USED 9/28/2015 0900   • Sexual activity: Not on file     Other Topics Concern   • Not on file     Social History Narrative   • No narrative on file     Allergies   Allergen Reactions   • Penicillins Unspecified     \"Blue baby\"  RXN= as a child  Tolerated ceftriaxone 6/2015   • Fentanyl      Hallucinations     Outpatient Encounter " "Prescriptions as of 1/30/2019   Medication Sig Dispense Refill   • Blood Glucose Monitoring Suppl (ONE TOUCH ULTRA MINI) w/Device Kit   0   • ONETOUCH VERIO strip      • GLOBAL EASE INJECT PEN NEEDLES   1   • GLOBAL INJECT EASE INSULIN SYR 31G X 5/16\" 1 ML Misc   2   • ONETOUCH DELICA LANCETS 33G Misc      • lidocaine (XYLOCAINE) 5 % Ointment   3   • cephALEXin (KEFLEX) 500 MG Cap Take 1 Cap by mouth 2 times a day. 14 Cap 0   • metoprolol (LOPRESSOR) 50 MG Tab Take 1 Tab by mouth 2 times a day. 60 Tab 3   • metFORMIN (GLUCOPHAGE) 500 MG Tab      • HUMALOG KWIKPEN 100 UNIT/ML Solution Pen-injector injection      • aspirin EC 81 MG EC tablet Take 1 Tab by mouth every day. 30 Tab    • gabapentin (NEURONTIN) 100 MG Cap Take 1 Cap by mouth 3 times a day. 90 Cap 1   • insulin glargine (LANTUS) 100 UNIT/ML Solution U 100 insulin syringes.  One touch monitor with test strips. 1 Vial 2   • atorvastatin (LIPITOR) 80 MG tablet Take 1 Tab by mouth every day. 30 Tab 2   • clopidogrel (PLAVIX) 75 MG Tab Take 1 Tab by mouth every day. 30 Tab 2   • [DISCONTINUED] LANTUS SOLOSTAR 100 UNIT/ML Solution Pen-injector injection   2   • [DISCONTINUED] busPIRone (BUSPAR) 5 MG tablet Take 1 Tab by mouth 3 times a day. 90 Tab 1   • [DISCONTINUED] lisinopril (PRINIVIL) 5 MG Tab Take 1 Tab by mouth 2 Times a Day. (Patient not taking: Reported on 1/30/2019) 30 Tab 2   • [DISCONTINUED] metoprolol (LOPRESSOR) 25 MG Tab Take 1 Tab by mouth 2 Times a Day. 60 Tab 2   • [DISCONTINUED] sulfamethoxazole-trimethoprim (BACTRIM DS) 800-160 MG tablet Take 1 Tab by mouth 2 times a day. 20 Tab 0     No facility-administered encounter medications on file as of 1/30/2019.      Review of Systems   Constitutional: Positive for malaise/fatigue. Negative for chills and fever.        Energy level is improving.   HENT: Negative for congestion.    Respiratory: Negative for cough and shortness of breath.    Cardiovascular: Negative for chest pain, palpitations, " orthopnea, leg swelling and PND.   Gastrointestinal: Negative for abdominal pain and nausea.   Musculoskeletal: Negative for myalgias.   Skin: Negative for rash.        Sternotomy incision is weeping clear fluid.   Neurological: Negative for dizziness, loss of consciousness, weakness and headaches.   Endo/Heme/Allergies: Does not bruise/bleed easily.   Psychiatric/Behavioral: The patient does not have insomnia.         Objective:   /70 (BP Location: Left arm, Patient Position: Sitting)   Pulse (!) 104   Ht 1.829 m (6')   Wt 88.9 kg (196 lb)   SpO2 96%   BMI 26.58 kg/m²      Physical Exam   Constitutional: He is oriented to person, place, and time. He appears well-developed and well-nourished.   Ambulating with a cane.   HENT:   Head: Normocephalic.   Eyes: EOM are normal.   Neck: Normal range of motion. Neck supple. No JVD present.   Cardiovascular: Normal rate, regular rhythm and normal heart sounds.    HR slightly tachycardic   Pulmonary/Chest: Effort normal and breath sounds normal. No respiratory distress. He has no wheezes. He has no rales.   Sternotomy incision is slightly open on inferior portion, with clear serous fluid. No surrounding erythema.   Abdominal: Soft. Bowel sounds are normal. He exhibits no distension. There is no tenderness.   Musculoskeletal: Normal range of motion. He exhibits no edema.   Neurological: He is alert and oriented to person, place, and time.   Skin: Skin is warm and dry. No rash noted. There is pallor.   Psychiatric: He has a normal mood and affect.       Assessment:     1. Coronary artery disease involving native coronary artery of native heart without angina pectoris     2. Ischemic cardiomyopathy     3. S/P CABG x 3  cephALEXin (KEFLEX) 500 MG Cap    metoprolol (LOPRESSOR) 50 MG Tab   4. SVT (supraventricular tachycardia) (HCC)     5. Type 2 diabetes mellitus without complication, without long-term current use of insulin (HCC)     6. Anemia, unspecified type     7.  Mixed hyperlipidemia         Medical Decision Making:  Today's Assessment / Status / Plan:     1. CAD/ischemic cardiomyopathy with LVEF 25-30%, status post CABG x 3. His incision is slightly open on inferior portion; to start Keflex 500mg BID x 7 days. Recheck with me in 1 week. Keep covered initially, also keep dry.    2. History of SVT, to increase Metoprolol to 50mg BID.    3. Diabetes mellitus, treated. Glucose is gradually improving.    4. Anemia, slowly improving, on Iron.    5. Hyperlipidemia, treated.    Plan as above. FU with me in 1 week in [place].    Collaborating MD: ALEKSANDR Queen      No Recipients

## 2019-01-30 NOTE — PROGRESS NOTES
Chief Complaint   Patient presents with   • Follow-Up   • Wound Check   • Coronary Artery Bypass Graft (CABG)       Subjective:   Rikki Khalil is a 43 y.o. male who presents today for two week follow-up of recent CABG.    Rikki is a 43 year old male with history of SVT, hypertension, hyperlipidemia and diabetes. In December 2018, he had CABG x 3. Echocardiogram showed LVEF 25-30%.  At follow-up with me, BP was quite low, and I had  hold his Lisinopril.     He is here today for follow-up.  He is feeling much better, but noticed that the inferior portion of his sternotomy incision is weeping clear fluid. No overt chest pain, pressure or discomfort; no palpitations; no shortness of breath, orthopnea or PND; no dizziness, but some mild lightheadedness. No syncope. No LE edema. Glucose is gradually improving. No fever or chills. He did see Dr. Barrios and was cleared.     Past Medical History:   Diagnosis Date   • Arthritis     All joints   • Back pain    • Bowel obstruction ([facility])    • CAD (coronary artery disease) 12/2018    CABG x 3 (LIMA to distal LAD, rSVG to distal diagonal, rSVG to distal RCA) by Dr. Barrios.   • Glaucoma     monika eyes   • Hyperlipidemia    • Ischemic cardiomyopathy 12/2018    Echocardiogram with LVEF 25-30%. Global hypokinesis. Moderately dilated RV. Mild TR.   • NSTEMI (non-ST elevated myocardial infarction) ([facility]) 12/27/2018   • Other depressive disorder 1/16/2019   • Pneumonia 12/30/2018   • Scoliosis    • SVT (supraventricular tachycardia) ([facility])    • Systolic congestive heart failure ([facility])    • Type II or unspecified type diabetes mellitus without mention of complication, not stated as uncontrolled     insulin and oral meds     Past Surgical History:   Procedure Laterality Date   • MULTIPLE CORONARY ARTERY BYPASS ENDO VEIN HARVEST  12/28/2018    Procedure: MULTIPLE CORONARY ARTERY BYPASS x 3,  ENDO VEIN HARVEST LEFT LEG;  Surgeon: Tomas Barrios M.D.;  Location: SURGERY [facility];   "Service: Cardiothoracic   • DONNA  12/28/2018    Procedure: DONNA;  Surgeon: Tomas Barrios M.D.;  Location: SURGERY [facility];  Service: Cardiothoracic   • ILEOSTOMY  9/29/2015    Procedure: ILEOSTOMY TAKE DOWN;  Surgeon: Carter Kumar M.D.;  Location: SURGERY [facility];  Service:    • LOW ANTERIOR RESECTION ROBOTIC XI  7/14/2015    Procedure: Robotic low anterior resection, takedown enterocutaneous fistula, omental flap, ileostomy creation;  Surgeon: Carter Kumar M.D.;  Location: SURGERY [facility];  Service:    • COLOSTOMY CREATION LAPAROSCOPIC  7/14/2015    Procedure: COLOSTOMY CREATION LAPAROSCOPIC FOR: LAP ILEOSTOMY;  Surgeon: Carter Kumar M.D.;  Location: SURGERY [facility];  Service:      Family History   Problem Relation Age of Onset   • Hypertension Mother    • Heart Disease Mother    • Stroke Mother    • No Known Problems Father      Social History     Social History   • Marital status:      Spouse name: N/A   • Number of children: N/A   • Years of education: N/A     Occupational History   • Not on file.     Social History Main Topics   • Smoking status: Current Every Day Smoker     Packs/day: 1.50     Years: 24.00     Types: Cigarettes   • Smokeless tobacco: Never Used   • Alcohol use No   • Drug use: Yes     Types: Inhaled      Comment: marijuana daily - LAST USED 9/28/2015 0900   • Sexual activity: Not on file     Other Topics Concern   • Not on file     Social History Narrative   • No narrative on file     Allergies   Allergen Reactions   • Penicillins Unspecified     \"Blue baby\"  RXN= as a child  Tolerated ceftriaxone 6/2015   • Fentanyl      Hallucinations     Outpatient Encounter Prescriptions as of 1/30/2019   Medication Sig Dispense Refill   • Blood Glucose Monitoring Suppl (ONE TOUCH ULTRA MINI) w/Device Kit   0   • ONETOUCH VERIO strip      • GLOBAL EASE INJECT PEN NEEDLES   1   • GLOBAL INJECT EASE INSULIN SYR 31G X 5/16\" 1 ML Misc   2   • ONETOUCH " DELICA LANCETS 33G Misc      • lidocaine (XYLOCAINE) 5 % Ointment   3   • cephALEXin (KEFLEX) 500 MG Cap Take 1 Cap by mouth 2 times a day. 14 Cap 0   • metoprolol (LOPRESSOR) 50 MG Tab Take 1 Tab by mouth 2 times a day. 60 Tab 3   • metFORMIN (GLUCOPHAGE) 500 MG Tab      • HUMALOG KWIKPEN 100 UNIT/ML Solution Pen-injector injection      • aspirin EC 81 MG EC tablet Take 1 Tab by mouth every day. 30 Tab    • gabapentin (NEURONTIN) 100 MG Cap Take 1 Cap by mouth 3 times a day. 90 Cap 1   • insulin glargine (LANTUS) 100 UNIT/ML Solution U 100 insulin syringes.  One touch monitor with test strips. 1 Vial 2   • atorvastatin (LIPITOR) 80 MG tablet Take 1 Tab by mouth every day. 30 Tab 2   • clopidogrel (PLAVIX) 75 MG Tab Take 1 Tab by mouth every day. 30 Tab 2   • [DISCONTINUED] LANTUS SOLOSTAR 100 UNIT/ML Solution Pen-injector injection   2   • [DISCONTINUED] busPIRone (BUSPAR) 5 MG tablet Take 1 Tab by mouth 3 times a day. 90 Tab 1   • [DISCONTINUED] lisinopril (PRINIVIL) 5 MG Tab Take 1 Tab by mouth 2 Times a Day. (Patient not taking: Reported on 1/30/2019) 30 Tab 2   • [DISCONTINUED] metoprolol (LOPRESSOR) 25 MG Tab Take 1 Tab by mouth 2 Times a Day. 60 Tab 2   • [DISCONTINUED] sulfamethoxazole-trimethoprim (BACTRIM DS) 800-160 MG tablet Take 1 Tab by mouth 2 times a day. 20 Tab 0     No facility-administered encounter medications on file as of 1/30/2019.      Review of Systems   Constitutional: Positive for malaise/fatigue. Negative for chills and fever.        Energy level is improving.   HENT: Negative for congestion.    Respiratory: Negative for cough and shortness of breath.    Cardiovascular: Negative for chest pain, palpitations, orthopnea, leg swelling and PND.   Gastrointestinal: Negative for abdominal pain and nausea.   Musculoskeletal: Negative for myalgias.   Skin: Negative for rash.        Sternotomy incision is weeping clear fluid.   Neurological: Negative for dizziness, loss of consciousness, weakness  and headaches.   Endo/Heme/Allergies: Does not bruise/bleed easily.   Psychiatric/Behavioral: The patient does not have insomnia.         Objective:   /70 (BP Location: Left arm, Patient Position: Sitting)   Pulse (!) 104   Ht 1.829 m (6')   Wt 88.9 kg (196 lb)   SpO2 96%   BMI 26.58 kg/m²     Physical Exam   Constitutional: He is oriented to person, place, and time. He appears well-developed and well-nourished.   Ambulating with a cane.   HENT:   Head: Normocephalic.   Eyes: EOM are normal.   Neck: Normal range of motion. Neck supple. No JVD present.   Cardiovascular: Normal rate, regular rhythm and normal heart sounds.    HR slightly tachycardic   Pulmonary/Chest: Effort normal and breath sounds normal. No respiratory distress. He has no wheezes. He has no rales.   Sternotomy incision is slightly open on inferior portion, with clear serous fluid. No surrounding erythema.   Abdominal: Soft. Bowel sounds are normal. He exhibits no distension. There is no tenderness.   Musculoskeletal: Normal range of motion. He exhibits no edema.   Neurological: He is alert and oriented to person, place, and time.   Skin: Skin is warm and dry. No rash noted. There is pallor.   Psychiatric: He has a normal mood and affect.       Assessment:     1. Coronary artery disease involving native coronary artery of native heart without angina pectoris     2. Ischemic cardiomyopathy     3. S/P CABG x 3  cephALEXin (KEFLEX) 500 MG Cap    metoprolol (LOPRESSOR) 50 MG Tab   4. SVT (supraventricular tachycardia) (HCC)     5. Type 2 diabetes mellitus without complication, without long-term current use of insulin (HCC)     6. Anemia, unspecified type     7. Mixed hyperlipidemia         Medical Decision Making:  Today's Assessment / Status / Plan:     1. CAD/ischemic cardiomyopathy with LVEF 25-30%, status post CABG x 3. His incision is slightly open on inferior portion; to start Keflex 500mg BID x 7 days. Recheck with me in 1 week. Keep  covered initially, also keep dry.    2. History of SVT, to increase Metoprolol to 50mg BID.    3. Diabetes mellitus, treated. Glucose is gradually improving.    4. Anemia, slowly improving, on Iron.    5. Hyperlipidemia, treated.    Plan as above. FU with me in 1 week in [place].    Collaborating MD: ALEKSANDR Queen

## 2019-02-07 ENCOUNTER — OFFICE VISIT (OUTPATIENT)
Dept: CARDIOLOGY | Facility: PHYSICIAN GROUP | Age: 44
End: 2019-02-07
Payer: MEDICAID

## 2019-02-07 VITALS
WEIGHT: 206 LBS | OXYGEN SATURATION: 98 % | HEIGHT: 72 IN | BODY MASS INDEX: 27.9 KG/M2 | HEART RATE: 94 BPM | DIASTOLIC BLOOD PRESSURE: 88 MMHG | SYSTOLIC BLOOD PRESSURE: 138 MMHG

## 2019-02-07 DIAGNOSIS — I47.10 PAROXYSMAL SUPRAVENTRICULAR TACHYCARDIA: ICD-10-CM

## 2019-02-07 DIAGNOSIS — Z95.1 S/P CABG X 3: ICD-10-CM

## 2019-02-07 DIAGNOSIS — E11.9 TYPE 2 DIABETES MELLITUS WITHOUT COMPLICATION, WITHOUT LONG-TERM CURRENT USE OF INSULIN (HCC): ICD-10-CM

## 2019-02-07 DIAGNOSIS — I25.10 CORONARY ARTERY DISEASE INVOLVING NATIVE CORONARY ARTERY OF NATIVE HEART WITHOUT ANGINA PECTORIS: ICD-10-CM

## 2019-02-07 DIAGNOSIS — I47.10 SVT (SUPRAVENTRICULAR TACHYCARDIA): ICD-10-CM

## 2019-02-07 DIAGNOSIS — E78.2 MIXED HYPERLIPIDEMIA: ICD-10-CM

## 2019-02-07 DIAGNOSIS — I25.5 ISCHEMIC CARDIOMYOPATHY: ICD-10-CM

## 2019-02-07 PROCEDURE — 99214 OFFICE O/P EST MOD 30 MIN: CPT | Performed by: NURSE PRACTITIONER

## 2019-02-07 RX ORDER — LISINOPRIL 5 MG/1
2.5 TABLET ORAL DAILY
Refills: 2 | COMMUNITY
Start: 2019-02-04 | End: 2019-02-16

## 2019-02-07 RX ORDER — INSULIN GLARGINE 100 [IU]/ML
INJECTION, SOLUTION SUBCUTANEOUS
COMMUNITY
Start: 2019-02-06 | End: 2019-02-07

## 2019-02-07 ASSESSMENT — ENCOUNTER SYMPTOMS
LOSS OF CONSCIOUSNESS: 0
HEADACHES: 0
BRUISES/BLEEDS EASILY: 0
DIZZINESS: 0
NAUSEA: 0
MYALGIAS: 0
PALPITATIONS: 0
PND: 0
INSOMNIA: 0
ORTHOPNEA: 0
WEAKNESS: 0
FEVER: 0
ABDOMINAL PAIN: 0
SHORTNESS OF BREATH: 0
CHILLS: 0
COUGH: 0

## 2019-02-07 NOTE — PROGRESS NOTES
Chief Complaint   Patient presents with   • Follow-Up   • Coronary Artery Disease   • Coronary Artery Bypass Graft (CABG)   • Cardiomyopathy (Ischemic)   • Supraventricular Tachycardia (SVT)   • Hyperlipidemia   • Diabetes (Controlled)       Subjective:   Rikki Khalil is a 43 y.o. male who presents today for two week follow-up of recent CABG x 3 in December 2018.    Rikki is a 43 year old male with history of SVT and diabetes. In late December 2018, he had CABG x 3 done. Echocardiogram showed LVEF 25-30%. Medical therapy was adjusted to ASA, Plavix, BB, ACE and statin.    His recovery had been quite slow, due to anemia, SVT and low BP. He has been holding his ACEI, and we have also been trying to get his glucose under better control.     He is here today for follow-up.  He is doing well, and gradually getting more energy. No overt chest pain, pressure or discomfort; no palpitations; no shortness of breath, orthopnea or PND; no dizziness, but still has some mild lightheadedness. No syncope. No LE edema. He has stopped smoking. Glucose is slowly improving. He is now walking, instead of in a wheelchair. His incision on his chest is still weeping a small amount of clear serous fluid, but less and less each day.    Past Medical History:   Diagnosis Date   • Arthritis     All joints   • Back pain    • Bowel obstruction ([facility])    • CAD (coronary artery disease) 12/2018    CABG x 3 (LIMA to distal LAD, rSVG to distal diagonal, rSVG to distal RCA) by Dr. Barrios.   • Glaucoma     monika eyes   • Hyperlipidemia    • Ischemic cardiomyopathy 12/2018    Echocardiogram with LVEF 25-30%. Global hypokinesis. Moderately dilated RV. Mild TR.   • NSTEMI (non-ST elevated myocardial infarction) ([facility]) 12/27/2018   • Other depressive disorder 1/16/2019   • Pneumonia 12/30/2018   • Scoliosis    • SVT (supraventricular tachycardia) ([facility])    • Systolic congestive heart failure ([facility])    • Type II or unspecified type diabetes mellitus without  "mention of complication, not stated as uncontrolled     insulin and oral meds     Past Surgical History:   Procedure Laterality Date   • MULTIPLE CORONARY ARTERY BYPASS ENDO VEIN HARVEST  12/28/2018    Procedure: MULTIPLE CORONARY ARTERY BYPASS x 3,  ENDO VEIN HARVEST LEFT LEG;  Surgeon: Tomas Barrios M.D.;  Location: SURGERY [facility];  Service: Cardiothoracic   • DONNA  12/28/2018    Procedure: DONNA;  Surgeon: Tomas Barrios M.D.;  Location: SURGERY [facility];  Service: Cardiothoracic   • ILEOSTOMY  9/29/2015    Procedure: ILEOSTOMY TAKE DOWN;  Surgeon: Carter Kumar M.D.;  Location: SURGERY [facility];  Service:    • LOW ANTERIOR RESECTION ROBOTIC XI  7/14/2015    Procedure: Robotic low anterior resection, takedown enterocutaneous fistula, omental flap, ileostomy creation;  Surgeon: Carter Kumar M.D.;  Location: SURGERY [facility];  Service:    • COLOSTOMY CREATION LAPAROSCOPIC  7/14/2015    Procedure: COLOSTOMY CREATION LAPAROSCOPIC FOR: LAP ILEOSTOMY;  Surgeon: Carter Kumar M.D.;  Location: SURGERY [facility];  Service:      Family History   Problem Relation Age of Onset   • Hypertension Mother    • Heart Disease Mother    • Stroke Mother    • No Known Problems Father      Social History     Social History   • Marital status:      Spouse name: N/A   • Number of children: N/A   • Years of education: N/A     Occupational History   • Not on file.     Social History Main Topics   • Smoking status: Former Smoker     Packs/day: 1.50     Years: 24.00     Types: Cigarettes     Quit date: 2018   • Smokeless tobacco: Never Used      Comment: HAS RECENTLY QUIT   • Alcohol use No   • Drug use: Yes     Types: Inhaled      Comment: marijuana daily - LAST USED 9/28/2015 0900   • Sexual activity: Not on file     Other Topics Concern   • Not on file     Social History Narrative   • No narrative on file     Allergies   Allergen Reactions   • Penicillins Unspecified     \"Blue " "baby\"  RXN= as a child  Tolerated ceftriaxone 6/2015   • Fentanyl      Hallucinations     Outpatient Encounter Prescriptions as of 2/7/2019   Medication Sig Dispense Refill   • lisinopril (PRINIVIL) 5 MG Tab   2   • metoprolol (LOPRESSOR) 25 MG Tab   2   • lidocaine (XYLOCAINE) 5 % Ointment   3   • cephALEXin (KEFLEX) 500 MG Cap Take 1 Cap by mouth 2 times a day. 14 Cap 0   • metoprolol (LOPRESSOR) 50 MG Tab Take 1 Tab by mouth 2 times a day. 60 Tab 3   • metFORMIN (GLUCOPHAGE) 500 MG Tab      • HUMALOG KWIKPEN 100 UNIT/ML Solution Pen-injector injection      • aspirin EC 81 MG EC tablet Take 1 Tab by mouth every day. 30 Tab    • gabapentin (NEURONTIN) 100 MG Cap Take 1 Cap by mouth 3 times a day. 90 Cap 1   • insulin glargine (LANTUS) 100 UNIT/ML Solution U 100 insulin syringes.  One touch monitor with test strips. 1 Vial 2   • atorvastatin (LIPITOR) 80 MG tablet Take 1 Tab by mouth every day. 30 Tab 2   • clopidogrel (PLAVIX) 75 MG Tab Take 1 Tab by mouth every day. 30 Tab 2   • LANTUS SOLOSTAR 100 UNIT/ML Solution Pen-injector injection      • Blood Glucose Monitoring Suppl (ONE TOUCH ULTRA MINI) w/Device Kit   0   • ONETOUCH VERIO strip      • GLOBAL EASE INJECT PEN NEEDLES   1   • GLOBAL INJECT EASE INSULIN SYR 31G X 5/16\" 1 ML Misc   2   • ONETOUCH DELICA LANCETS 33G Misc        No facility-administered encounter medications on file as of 2/7/2019.      Review of Systems   Constitutional: Positive for malaise/fatigue. Negative for chills and fever.        Energy level is improving.   HENT: Negative for congestion.    Respiratory: Negative for cough and shortness of breath.    Cardiovascular: Negative for chest pain, palpitations, orthopnea, leg swelling and PND.   Gastrointestinal: Negative for abdominal pain and nausea.   Musculoskeletal: Negative for myalgias.   Skin: Negative for rash.        Sternotomy incision is weeping clear fluid.   Neurological: Negative for dizziness, loss of consciousness, weakness " and headaches.   Endo/Heme/Allergies: Does not bruise/bleed easily.   Psychiatric/Behavioral: The patient does not have insomnia.         Objective:   /88 (BP Location: Left arm, Patient Position: Sitting, BP Cuff Size: Adult)   Pulse 94   Ht 1.829 m (6')   Wt 93.4 kg (206 lb)   SpO2 98%   BMI 27.94 kg/m²     Physical Exam   Constitutional: He is oriented to person, place, and time. He appears well-developed and well-nourished.   Ambulating with a cane.   HENT:   Head: Normocephalic.   Eyes: EOM are normal.   Neck: Normal range of motion. Neck supple. No JVD present.   Cardiovascular: Normal rate, regular rhythm and normal heart sounds.    HR is better, running 90bpm.   Pulmonary/Chest: Effort normal and breath sounds normal. No respiratory distress. He has no wheezes. He has no rales.   Sternotomy incision is slowly healing; small amount of serous fluid still present, but improved from 2 weeks ago.   Abdominal: Soft. Bowel sounds are normal. He exhibits no distension. There is no tenderness.   Musculoskeletal: Normal range of motion. He exhibits no edema.   Neurological: He is alert and oriented to person, place, and time.   Skin: Skin is warm and dry. No rash noted. No pallor.   Psychiatric: He has a normal mood and affect.     EKG ordered and interpreted by me today reveals sinus rhythm at 90bpm.    Lab Results   Component Value Date/Time    SODIUM 132 (L) 01/16/2019 02:57 PM    POTASSIUM 4.6 01/16/2019 02:57 PM    CHLORIDE 101 01/16/2019 02:57 PM    CO2 24 01/16/2019 02:57 PM    GLUCOSE 259 (H) 01/16/2019 02:57 PM    BUN 18 01/16/2019 02:57 PM    CREATININE 0.74 01/16/2019 02:57 PM     Lab Results   Component Value Date/Time    WBC 15.4 (H) 01/16/2019 02:57 PM    RBC 3.11 (L) 01/16/2019 02:57 PM    HEMOGLOBIN 9.6 (L) 01/16/2019 02:57 PM    HEMATOCRIT 30.2 (L) 01/16/2019 02:57 PM    MCV 97.1 01/16/2019 02:57 PM    MCH 30.9 01/16/2019 02:57 PM    MCHC 31.8 (L) 01/16/2019 02:57 PM    MPV 9.9 01/16/2019  02:57 PM        Assessment:     1. Paroxysmal supraventricular tachycardia (HCC)  EKG   2. Coronary artery disease involving native coronary artery of native heart without angina pectoris     3. Ischemic cardiomyopathy     4. S/P CABG x 3     5. SVT (supraventricular tachycardia) (HCC)     6. Type 2 diabetes mellitus without complication, without long-term current use of insulin (HCC)     7. Mixed hyperlipidemia         Medical Decision Making:  Today's Assessment / Status / Plan:     1. History of SVT, in sinus rhythm on Metoprolol.    2. CAD/ischemic cardiomyopathy with LVEF 25-30%, status post CABG x 3, with prolonged recovery. To readd Lisinopril 2.5mg once daily. Continue ASA, Plavix, BB and statin.    3. Diabetes mellitus, treated and slowly improving.    4. Hyperlipidemia, treated with statin.    He is making slow progress. To repeat BMP and CBC. FU with me in 4 weeks; sooner if clinical condition changes.    Collaborating MD: TK Queen

## 2019-02-07 NOTE — LETTER
[facility] [address]  Phone: [phone]  Fax: [phone]              Rikki Khalil  1975    Encounter Date: 2/7/2019    SHYAM Varela          PROGRESS NOTE:  Chief Complaint   Patient presents with   • Follow-Up   • Coronary Artery Disease   • Coronary Artery Bypass Graft (CABG)   • Cardiomyopathy (Ischemic)   • Supraventricular Tachycardia (SVT)   • Hyperlipidemia   • Diabetes (Controlled)       Subjective:   Rikki Khalil is a 43 y.o. male who presents today for two week follow-up of recent CABG x 3 in December 2018.    Rikki is a 43 year old male with history of SVT and diabetes. In late December 2018, he had CABG x 3 done. Echocardiogram showed LVEF 25-30%. Medical therapy was adjusted to ASA, Plavix, BB, ACE and statin.    His recovery had been quite slow, due to anemia, SVT and low BP. He has been holding his ACEI, and we have also been trying to get his glucose under better control.     He is here today for follow-up.  He is doing well, and gradually getting more energy. No overt chest pain, pressure or discomfort; no palpitations; no shortness of breath, orthopnea or PND; no dizziness, but still has some mild lightheadedness. No syncope. No LE edema. He has stopped smoking. Glucose is slowly improving. He is now walking, instead of in a wheelchair. His incision on his chest is still weeping a small amount of clear serous fluid, but less and less each day.    Past Medical History:   Diagnosis Date   • Arthritis     All joints   • Back pain    • Bowel obstruction (HCC)    • CAD (coronary artery disease) 12/2018    CABG x 3 (LIMA to distal LAD, rSVG to distal diagonal, rSVG to distal RCA) by Dr. Barrios.   • Glaucoma     monika eyes   • Hyperlipidemia    • Ischemic cardiomyopathy 12/2018    Echocardiogram with LVEF 25-30%. Global hypokinesis. Moderately dilated RV. Mild TR.   • NSTEMI (non-ST elevated  myocardial infarction) ([facility]) 12/27/2018   • Other depressive disorder 1/16/2019   • Pneumonia 12/30/2018   • Scoliosis    • SVT (supraventricular tachycardia) ([facility])    • Systolic congestive heart failure ([facility])    • Type II or unspecified type diabetes mellitus without mention of complication, not stated as uncontrolled     insulin and oral meds     Past Surgical History:   Procedure Laterality Date   • MULTIPLE CORONARY ARTERY BYPASS ENDO VEIN HARVEST  12/28/2018    Procedure: MULTIPLE CORONARY ARTERY BYPASS x 3,  ENDO VEIN HARVEST LEFT LEG;  Surgeon: Tomas Barrios M.D.;  Location: SURGERY [facility];  Service: Cardiothoracic   • DONNA  12/28/2018    Procedure: DONNA;  Surgeon: Tomas Barrios M.D.;  Location: SURGERY [facility];  Service: Cardiothoracic   • ILEOSTOMY  9/29/2015    Procedure: ILEOSTOMY TAKE DOWN;  Surgeon: Carter Kumar M.D.;  Location: [facility];  Service:    • LOW ANTERIOR RESECTION ROBOTIC XI  7/14/2015    Procedure: Robotic low anterior resection, takedown enterocutaneous fistula, omental flap, ileostomy creation;  Surgeon: Carter Kumar M.D.;  Location: SURGERY [facility];  Service:    • COLOSTOMY CREATION LAPAROSCOPIC  7/14/2015    Procedure: COLOSTOMY CREATION LAPAROSCOPIC FOR: LAP ILEOSTOMY;  Surgeon: Carter Kumar M.D.;  Location: [facility];  Service:      Family History   Problem Relation Age of Onset   • Hypertension Mother    • Heart Disease Mother    • Stroke Mother    • No Known Problems Father      Social History     Social History   • Marital status:      Spouse name: N/A   • Number of children: N/A   • Years of education: N/A     Occupational History   • Not on file.     Social History Main Topics   • Smoking status: Former Smoker     Packs/day: 1.50     Years: 24.00     Types: Cigarettes     Quit date: 2018   • Smokeless tobacco: Never Used      Comment: HAS RECENTLY QUIT   • Alcohol use No   • Drug use: Yes     Types:  "Inhaled      Comment: marijuana daily - LAST USED 9/28/2015 0900   • Sexual activity: Not on file     Other Topics Concern   • Not on file     Social History Narrative   • No narrative on file     Allergies   Allergen Reactions   • Penicillins Unspecified     \"Blue baby\"  RXN= as a child  Tolerated ceftriaxone 6/2015   • Fentanyl      Hallucinations     Outpatient Encounter Prescriptions as of 2/7/2019   Medication Sig Dispense Refill   • lisinopril (PRINIVIL) 5 MG Tab   2   • metoprolol (LOPRESSOR) 25 MG Tab   2   • lidocaine (XYLOCAINE) 5 % Ointment   3   • cephALEXin (KEFLEX) 500 MG Cap Take 1 Cap by mouth 2 times a day. 14 Cap 0   • metoprolol (LOPRESSOR) 50 MG Tab Take 1 Tab by mouth 2 times a day. 60 Tab 3   • metFORMIN (GLUCOPHAGE) 500 MG Tab      • HUMALOG KWIKPEN 100 UNIT/ML Solution Pen-injector injection      • aspirin EC 81 MG EC tablet Take 1 Tab by mouth every day. 30 Tab    • gabapentin (NEURONTIN) 100 MG Cap Take 1 Cap by mouth 3 times a day. 90 Cap 1   • insulin glargine (LANTUS) 100 UNIT/ML Solution U 100 insulin syringes.  One touch monitor with test strips. 1 Vial 2   • atorvastatin (LIPITOR) 80 MG tablet Take 1 Tab by mouth every day. 30 Tab 2   • clopidogrel (PLAVIX) 75 MG Tab Take 1 Tab by mouth every day. 30 Tab 2   • LANTUS SOLOSTAR 100 UNIT/ML Solution Pen-injector injection      • Blood Glucose Monitoring Suppl (ONE TOUCH ULTRA MINI) w/Device Kit   0   • ONETOUCH VERIO strip      • GLOBAL EASE INJECT PEN NEEDLES   1   • GLOBAL INJECT EASE INSULIN SYR 31G X 5/16\" 1 ML Misc   2   • ONETOUCH DELICA LANCETS 33G Misc        No facility-administered encounter medications on file as of 2/7/2019.      Review of Systems   Constitutional: Positive for malaise/fatigue. Negative for chills and fever.        Energy level is improving.   HENT: Negative for congestion.    Respiratory: Negative for cough and shortness of breath.    Cardiovascular: Negative for chest pain, palpitations, orthopnea, leg " swelling and PND.   Gastrointestinal: Negative for abdominal pain and nausea.   Musculoskeletal: Negative for myalgias.   Skin: Negative for rash.        Sternotomy incision is weeping clear fluid.   Neurological: Negative for dizziness, loss of consciousness, weakness and headaches.   Endo/Heme/Allergies: Does not bruise/bleed easily.   Psychiatric/Behavioral: The patient does not have insomnia.         Objective:   /88 (BP Location: Left arm, Patient Position: Sitting, BP Cuff Size: Adult)   Pulse 94   Ht 1.829 m (6')   Wt 93.4 kg (206 lb)   SpO2 98%   BMI 27.94 kg/m²      Physical Exam   Constitutional: He is oriented to person, place, and time. He appears well-developed and well-nourished.   Ambulating with a cane.   HENT:   Head: Normocephalic.   Eyes: EOM are normal.   Neck: Normal range of motion. Neck supple. No JVD present.   Cardiovascular: Normal rate, regular rhythm and normal heart sounds.    HR is better, running 90bpm.   Pulmonary/Chest: Effort normal and breath sounds normal. No respiratory distress. He has no wheezes. He has no rales.   Sternotomy incision is slowly healing; small amount of serous fluid still present, but improved from 2 weeks ago.   Abdominal: Soft. Bowel sounds are normal. He exhibits no distension. There is no tenderness.   Musculoskeletal: Normal range of motion. He exhibits no edema.   Neurological: He is alert and oriented to person, place, and time.   Skin: Skin is warm and dry. No rash noted. No pallor.   Psychiatric: He has a normal mood and affect.     EKG ordered and interpreted by me today reveals sinus rhythm at 90bpm.    Lab Results   Component Value Date/Time    SODIUM 132 (L) 01/16/2019 02:57 PM    POTASSIUM 4.6 01/16/2019 02:57 PM    CHLORIDE 101 01/16/2019 02:57 PM    CO2 24 01/16/2019 02:57 PM    GLUCOSE 259 (H) 01/16/2019 02:57 PM    BUN 18 01/16/2019 02:57 PM    CREATININE 0.74 01/16/2019 02:57 PM     Lab Results   Component Value Date/Time    WBC  15.4 (H) 01/16/2019 02:57 PM    RBC 3.11 (L) 01/16/2019 02:57 PM    HEMOGLOBIN 9.6 (L) 01/16/2019 02:57 PM    HEMATOCRIT 30.2 (L) 01/16/2019 02:57 PM    MCV 97.1 01/16/2019 02:57 PM    MCH 30.9 01/16/2019 02:57 PM    MCHC 31.8 (L) 01/16/2019 02:57 PM    MPV 9.9 01/16/2019 02:57 PM        Assessment:     1. Paroxysmal supraventricular tachycardia (HCC)  EKG   2. Coronary artery disease involving native coronary artery of native heart without angina pectoris     3. Ischemic cardiomyopathy     4. S/P CABG x 3     5. SVT (supraventricular tachycardia) (HCC)     6. Type 2 diabetes mellitus without complication, without long-term current use of insulin (HCC)     7. Mixed hyperlipidemia         Medical Decision Making:  Today's Assessment / Status / Plan:     1. History of SVT, in sinus rhythm on Metoprolol.    2. CAD/ischemic cardiomyopathy with LVEF 25-30%, status post CABG x 3, with prolonged recovery. To readd Lisinopril 2.5mg once daily. Continue ASA, Plavix, BB and statin.    3. Diabetes mellitus, treated and slowly improving.    4. Hyperlipidemia, treated with statin.    He is making slow progress. To repeat BMP and CBC. FU with me in 4 weeks; sooner if clinical condition changes.    Collaborating MD: TK Queen      No Recipients

## 2019-02-16 ENCOUNTER — APPOINTMENT (OUTPATIENT)
Dept: RADIOLOGY | Facility: MEDICAL CENTER | Age: 44
End: 2019-02-16
Attending: HOSPITALIST
Payer: MEDICAID

## 2019-02-16 ENCOUNTER — HOSPITAL ENCOUNTER (OUTPATIENT)
Facility: MEDICAL CENTER | Age: 44
End: 2019-02-17
Attending: EMERGENCY MEDICINE | Admitting: HOSPITALIST
Payer: MEDICAID

## 2019-02-16 DIAGNOSIS — R42 LIGHTHEADEDNESS: ICD-10-CM

## 2019-02-16 DIAGNOSIS — R79.89 ELEVATED TROPONIN: ICD-10-CM

## 2019-02-16 PROBLEM — R55 NEAR SYNCOPE: Status: ACTIVE | Noted: 2019-02-16

## 2019-02-16 LAB
APTT PPP: 29.6 SEC (ref 24.7–36)
APTT PPP: 31.9 SEC (ref 24.7–36)
EKG IMPRESSION: NORMAL
GLUCOSE BLD-MCNC: 188 MG/DL (ref 65–99)
GLUCOSE BLD-MCNC: 208 MG/DL (ref 65–99)
GLUCOSE BLD-MCNC: 222 MG/DL (ref 65–99)
INR PPP: 1 (ref 0.87–1.13)
PLATELET # BLD AUTO: 379 K/UL (ref 164–446)
PROTHROMBIN TIME: 13.3 SEC (ref 12–14.6)
TROPONIN I SERPL-MCNC: 0.1 NG/ML (ref 0–0.04)
TROPONIN I SERPL-MCNC: 0.1 NG/ML (ref 0–0.04)

## 2019-02-16 PROCEDURE — 85027 COMPLETE CBC AUTOMATED: CPT

## 2019-02-16 PROCEDURE — 700101 HCHG RX REV CODE 250: Performed by: HOSPITALIST

## 2019-02-16 PROCEDURE — 700111 HCHG RX REV CODE 636 W/ 250 OVERRIDE (IP): Performed by: HOSPITALIST

## 2019-02-16 PROCEDURE — G0378 HOSPITAL OBSERVATION PER HR: HCPCS

## 2019-02-16 PROCEDURE — 85730 THROMBOPLASTIN TIME PARTIAL: CPT | Mod: 91

## 2019-02-16 PROCEDURE — 96366 THER/PROPH/DIAG IV INF ADDON: CPT

## 2019-02-16 PROCEDURE — 96375 TX/PRO/DX INJ NEW DRUG ADDON: CPT

## 2019-02-16 PROCEDURE — 99285 EMERGENCY DEPT VISIT HI MDM: CPT

## 2019-02-16 PROCEDURE — 80048 BASIC METABOLIC PNL TOTAL CA: CPT

## 2019-02-16 PROCEDURE — 96372 THER/PROPH/DIAG INJ SC/IM: CPT | Mod: XU

## 2019-02-16 PROCEDURE — 84484 ASSAY OF TROPONIN QUANT: CPT | Mod: 91

## 2019-02-16 PROCEDURE — 85049 AUTOMATED PLATELET COUNT: CPT | Mod: XU

## 2019-02-16 PROCEDURE — 82962 GLUCOSE BLOOD TEST: CPT | Mod: 91

## 2019-02-16 PROCEDURE — 700102 HCHG RX REV CODE 250 W/ 637 OVERRIDE(OP): Performed by: HOSPITALIST

## 2019-02-16 PROCEDURE — 99220 PR INITIAL OBSERVATION CARE,LEVL III: CPT | Performed by: HOSPITALIST

## 2019-02-16 PROCEDURE — 85610 PROTHROMBIN TIME: CPT

## 2019-02-16 PROCEDURE — 93880 EXTRACRANIAL BILAT STUDY: CPT

## 2019-02-16 PROCEDURE — 93005 ELECTROCARDIOGRAM TRACING: CPT

## 2019-02-16 PROCEDURE — 93005 ELECTROCARDIOGRAM TRACING: CPT | Performed by: EMERGENCY MEDICINE

## 2019-02-16 PROCEDURE — 96365 THER/PROPH/DIAG IV INF INIT: CPT

## 2019-02-16 PROCEDURE — 36415 COLL VENOUS BLD VENIPUNCTURE: CPT

## 2019-02-16 PROCEDURE — A9270 NON-COVERED ITEM OR SERVICE: HCPCS | Performed by: HOSPITALIST

## 2019-02-16 PROCEDURE — 99244 OFF/OP CNSLTJ NEW/EST MOD 40: CPT | Performed by: INTERNAL MEDICINE

## 2019-02-16 PROCEDURE — 83735 ASSAY OF MAGNESIUM: CPT

## 2019-02-16 RX ORDER — POLYETHYLENE GLYCOL 3350 17 G/17G
1 POWDER, FOR SOLUTION ORAL
Status: DISCONTINUED | OUTPATIENT
Start: 2019-02-16 | End: 2019-02-17 | Stop reason: HOSPADM

## 2019-02-16 RX ORDER — NITROGLYCERIN 0.4 MG/1
0.4 TABLET SUBLINGUAL
Status: DISCONTINUED | OUTPATIENT
Start: 2019-02-16 | End: 2019-02-17 | Stop reason: HOSPADM

## 2019-02-16 RX ORDER — METOPROLOL TARTRATE 50 MG/1
50 TABLET, FILM COATED ORAL 2 TIMES DAILY
Status: DISCONTINUED | OUTPATIENT
Start: 2019-02-16 | End: 2019-02-17 | Stop reason: HOSPADM

## 2019-02-16 RX ORDER — HEPARIN SODIUM 1000 [USP'U]/ML
3200 INJECTION, SOLUTION INTRAVENOUS; SUBCUTANEOUS PRN
Status: DISCONTINUED | OUTPATIENT
Start: 2019-02-16 | End: 2019-02-17

## 2019-02-16 RX ORDER — METOPROLOL TARTRATE 1 MG/ML
5 INJECTION, SOLUTION INTRAVENOUS
Status: DISCONTINUED | OUTPATIENT
Start: 2019-02-16 | End: 2019-02-16

## 2019-02-16 RX ORDER — BISACODYL 10 MG
10 SUPPOSITORY, RECTAL RECTAL
Status: DISCONTINUED | OUTPATIENT
Start: 2019-02-16 | End: 2019-02-17 | Stop reason: HOSPADM

## 2019-02-16 RX ORDER — LIDOCAINE 50 MG/G
1 PATCH TOPICAL EVERY 24 HOURS
Status: DISCONTINUED | OUTPATIENT
Start: 2019-02-16 | End: 2019-02-17 | Stop reason: HOSPADM

## 2019-02-16 RX ORDER — ATORVASTATIN CALCIUM 80 MG/1
80 TABLET, FILM COATED ORAL DAILY
Status: DISCONTINUED | OUTPATIENT
Start: 2019-02-16 | End: 2019-02-17 | Stop reason: HOSPADM

## 2019-02-16 RX ORDER — ATORVASTATIN CALCIUM 20 MG/1
80 TABLET, FILM COATED ORAL EVERY EVENING
Status: DISCONTINUED | OUTPATIENT
Start: 2019-02-17 | End: 2019-02-16

## 2019-02-16 RX ORDER — INSULIN GLARGINE 100 [IU]/ML
50 INJECTION, SOLUTION SUBCUTANEOUS NIGHTLY
COMMUNITY
End: 2019-06-20

## 2019-02-16 RX ORDER — AMOXICILLIN 250 MG
2 CAPSULE ORAL 2 TIMES DAILY
Status: DISCONTINUED | OUTPATIENT
Start: 2019-02-16 | End: 2019-02-17 | Stop reason: HOSPADM

## 2019-02-16 RX ORDER — METFORMIN HYDROCHLORIDE 500 MG/1
1000 TABLET, EXTENDED RELEASE ORAL DAILY
COMMUNITY
End: 2019-05-16 | Stop reason: SDUPTHER

## 2019-02-16 RX ORDER — LISINOPRIL 2.5 MG/1
2.5 TABLET ORAL DAILY
COMMUNITY
End: 2019-05-16 | Stop reason: SDUPTHER

## 2019-02-16 RX ORDER — LISINOPRIL 5 MG/1
2.5 TABLET ORAL DAILY
Status: DISCONTINUED | OUTPATIENT
Start: 2019-02-16 | End: 2019-02-17 | Stop reason: HOSPADM

## 2019-02-16 RX ORDER — INSULIN GLARGINE 100 [IU]/ML
20 INJECTION, SOLUTION SUBCUTANEOUS EVERY EVENING
Status: DISCONTINUED | OUTPATIENT
Start: 2019-02-16 | End: 2019-02-17 | Stop reason: HOSPADM

## 2019-02-16 RX ORDER — GABAPENTIN 100 MG/1
100 CAPSULE ORAL 3 TIMES DAILY
Status: DISCONTINUED | OUTPATIENT
Start: 2019-02-16 | End: 2019-02-17 | Stop reason: HOSPADM

## 2019-02-16 RX ORDER — HEPARIN SODIUM 1000 [USP'U]/ML
3200 INJECTION, SOLUTION INTRAVENOUS; SUBCUTANEOUS PRN
Status: DISCONTINUED | OUTPATIENT
Start: 2019-02-16 | End: 2019-02-16

## 2019-02-16 RX ORDER — DEXTROSE MONOHYDRATE 25 G/50ML
25 INJECTION, SOLUTION INTRAVENOUS
Status: DISCONTINUED | OUTPATIENT
Start: 2019-02-16 | End: 2019-02-17 | Stop reason: HOSPADM

## 2019-02-16 RX ORDER — CLOPIDOGREL BISULFATE 75 MG/1
75 TABLET ORAL DAILY
Status: DISCONTINUED | OUTPATIENT
Start: 2019-02-16 | End: 2019-02-17 | Stop reason: HOSPADM

## 2019-02-16 RX ORDER — MORPHINE SULFATE 4 MG/ML
2-4 INJECTION, SOLUTION INTRAMUSCULAR; INTRAVENOUS
Status: DISCONTINUED | OUTPATIENT
Start: 2019-02-16 | End: 2019-02-17 | Stop reason: HOSPADM

## 2019-02-16 RX ADMIN — HEPARIN SODIUM 3200 UNITS: 1000 INJECTION, SOLUTION INTRAVENOUS; SUBCUTANEOUS at 09:55

## 2019-02-16 RX ADMIN — MORPHINE SULFATE 4 MG: 4 INJECTION INTRAVENOUS at 21:03

## 2019-02-16 RX ADMIN — LISINOPRIL 2.5 MG: 5 TABLET ORAL at 14:55

## 2019-02-16 RX ADMIN — CLOPIDOGREL BISULFATE 75 MG: 75 TABLET ORAL at 14:55

## 2019-02-16 RX ADMIN — LIDOCAINE 1 PATCH: 50 PATCH TOPICAL at 19:55

## 2019-02-16 RX ADMIN — ASPIRIN 81 MG: 81 TABLET, COATED ORAL at 14:54

## 2019-02-16 RX ADMIN — ATORVASTATIN CALCIUM 80 MG: 80 TABLET, FILM COATED ORAL at 14:54

## 2019-02-16 RX ADMIN — INSULIN GLARGINE 20 UNITS: 100 INJECTION, SOLUTION SUBCUTANEOUS at 17:59

## 2019-02-16 RX ADMIN — METOPROLOL TARTRATE 50 MG: 50 TABLET ORAL at 14:54

## 2019-02-16 RX ADMIN — GABAPENTIN 100 MG: 100 CAPSULE ORAL at 19:54

## 2019-02-16 RX ADMIN — GABAPENTIN 100 MG: 100 CAPSULE ORAL at 14:56

## 2019-02-16 RX ADMIN — INSULIN LISPRO 6 UNITS: 100 INJECTION, SOLUTION INTRAVENOUS; SUBCUTANEOUS at 18:03

## 2019-02-16 RX ADMIN — INSULIN LISPRO 2 UNITS: 100 INJECTION, SOLUTION INTRAVENOUS; SUBCUTANEOUS at 17:59

## 2019-02-16 RX ADMIN — HEPARIN SODIUM 1200 UNITS/HR: 5000 INJECTION, SOLUTION INTRAVENOUS at 09:57

## 2019-02-16 ASSESSMENT — ENCOUNTER SYMPTOMS
FLANK PAIN: 0
SPEECH CHANGE: 0
NERVOUS/ANXIOUS: 0
HALLUCINATIONS: 0
FOCAL WEAKNESS: 0
EYE REDNESS: 0
HEMOPTYSIS: 0
CHILLS: 0
VOMITING: 0
EYE PAIN: 0
FEVER: 0
SHORTNESS OF BREATH: 0
SEIZURES: 0
SORE THROAT: 0
PALPITATIONS: 0
EYE DISCHARGE: 0
STRIDOR: 0
DIZZINESS: 1
LOSS OF CONSCIOUSNESS: 0
COUGH: 0
DIARRHEA: 0
ABDOMINAL PAIN: 0
SPUTUM PRODUCTION: 0
BLOOD IN STOOL: 0
MYALGIAS: 0
BRUISES/BLEEDS EASILY: 0

## 2019-02-16 ASSESSMENT — COGNITIVE AND FUNCTIONAL STATUS - GENERAL
SUGGESTED CMS G CODE MODIFIER DAILY ACTIVITY: CH
MOBILITY SCORE: 24
DAILY ACTIVITIY SCORE: 24
SUGGESTED CMS G CODE MODIFIER MOBILITY: CH

## 2019-02-16 ASSESSMENT — PATIENT HEALTH QUESTIONNAIRE - PHQ9
SUM OF ALL RESPONSES TO PHQ9 QUESTIONS 1 AND 2: 0
2. FEELING DOWN, DEPRESSED, IRRITABLE, OR HOPELESS: NOT AT ALL
1. LITTLE INTEREST OR PLEASURE IN DOING THINGS: NOT AT ALL

## 2019-02-16 ASSESSMENT — COPD QUESTIONNAIRES
COPD SCREENING SCORE: 2
DO YOU EVER COUGH UP ANY MUCUS OR PHLEGM?: NO/ONLY WITH OCCASIONAL COLDS OR INFECTIONS
HAVE YOU SMOKED AT LEAST 100 CIGARETTES IN YOUR ENTIRE LIFE: YES
DURING THE PAST 4 WEEKS HOW MUCH DID YOU FEEL SHORT OF BREATH: NONE/LITTLE OF THE TIME

## 2019-02-16 ASSESSMENT — LIFESTYLE VARIABLES
EVER_SMOKED: YES
ALCOHOL_USE: NO
EVER_SMOKED: YES

## 2019-02-16 NOTE — ASSESSMENT & PLAN NOTE
Last echocardiography showed a reduced ejection fraction of 25%  Not currently in acute exacerbation   On beta-blockers and ACE inhibitors

## 2019-02-16 NOTE — ED PROVIDER NOTES
"ED Provider Note      ER PROVIDER NOTE        CHIEF COMPLAINT  Chief Complaint   Patient presents with   • Sent by MD Rodney transferred from [place] ER for NSTEMI.    • MI (Non ST Segment Elevation MI)       HPI  Rikki Khalil is a 43 y.o. male who presents to the emergency department as a transfer from [place] for N STEMI.  Patient reports that yesterday evening he was feeling lightheaded like \"his blood sugar was low\", went to the emergency department in [place].  Was found to have elevated troponin and transferred here.  Patient denies any chest pain at any time at all or currently.  No shortness of breath diaphoresis nausea vomiting or abdominal pain.  No recent fevers or chills.  No leg pain or swelling    Prior history of CABG in December   REVIEW OF SYSTEMS  Pertinent positives include lightheadedness. Pertinent negatives include no chest pain. See HPI for details. All other systems reviewed and are negative.    PAST MEDICAL HISTORY   has a past medical history of Arthritis; Back pain; Bowel obstruction ([facility]); CAD (coronary artery disease) (12/2018); Glaucoma; Hyperlipidemia; Ischemic cardiomyopathy (12/2018); NSTEMI (non-ST elevated myocardial infarction) (HCC) (12/27/2018); Other depressive disorder (1/16/2019); Pneumonia (12/30/2018); Scoliosis; SVT (supraventricular tachycardia) ([facility]); Systolic congestive heart failure ([facility]); and Type II or unspecified type diabetes mellitus without mention of complication, not stated as uncontrolled.    SURGICAL HISTORY   has a past surgical history that includes low anterior resection robotic xi (7/14/2015); colostomy creation laparoscopic (7/14/2015); ileostomy (9/29/2015); multiple coronary artery bypass endo vein harvest (12/28/2018); and allegra (12/28/2018).    FAMILY HISTORY  Family History   Problem Relation Age of Onset   • Hypertension Mother    • Heart Disease Mother    • Stroke Mother    • No Known Problems Father        SOCIAL HISTORY  Social " "History     Social History   • Marital status:      Spouse name: N/A   • Number of children: N/A   • Years of education: N/A     Social History Main Topics   • Smoking status: Former Smoker     Packs/day: 1.50     Years: 24.00     Types: Cigarettes     Quit date: 2018   • Smokeless tobacco: Never Used      Comment: HAS RECENTLY QUIT   • Alcohol use No   • Drug use: Yes     Types: Inhaled      Comment: marijuana daily   • Sexual activity: Not on file     Other Topics Concern   • Not on file     Social History Narrative   • No narrative on file      History   Drug Use   • Types: Inhaled     Comment: marijuana daily       CURRENT MEDICATIONS  Home Medications     Reviewed by Ryan Boswell R.N. (Registered Nurse) on 02/16/19 at 0640  Med List Status: Partial   Medication Last Dose Status   aspirin EC 81 MG EC tablet  Active   atorvastatin (LIPITOR) 80 MG tablet  Active   cephALEXin (KEFLEX) 500 MG Cap  Active   clopidogrel (PLAVIX) 75 MG Tab  Active   gabapentin (NEURONTIN) 100 MG Cap  Active   HUMALOG KWIKPEN 100 UNIT/ML Solution Pen-injector injection  Active   insulin glargine (LANTUS) 100 UNIT/ML Solution  Active   lidocaine (XYLOCAINE) 5 % Ointment  Active   lisinopril (PRINIVIL) 5 MG Tab  Active   metFORMIN (GLUCOPHAGE) 500 MG Tab  Active   metoprolol (LOPRESSOR) 25 MG Tab  Active   metoprolol (LOPRESSOR) 50 MG Tab  Active                ALLERGIES  Allergies   Allergen Reactions   • Penicillins Unspecified     \"Blue baby\"  RXN= as a child  Tolerated ceftriaxone 6/2015   • Fentanyl      Hallucinations       PHYSICAL EXAM  VITAL SIGNS: Pulse 100   Temp 36.2 °C (97.2 °F) (Temporal)   Resp 12   Ht 1.829 m (6')   Wt 91.7 kg (202 lb 2.6 oz)   SpO2 98%   BMI 27.42 kg/m²    Pulse ox interpretation: I interpret this pulse ox as normal.    Constitutional: Alert in no apparent distress.  HENT: No signs of trauma, Bilateral external ears normal, Nose normal.   Eyes: Pupils are equal and reactive, Conjunctiva " normal, Non-icteric.   Neck: Normal range of motion, No tenderness, Supple, No stridor.   Lymphatic: No lymphadenopathy noted.   Cardiovascular: Regular rate and rhythm, no murmurs.   Thorax & Lungs: Normal breath sounds, No respiratory distress, No wheezing, No chest tenderness.  Healing sternotomy scar  Abdomen: Bowel sounds normal, Soft, No tenderness, No masses, No pulsatile masses. No peritoneal signs.  Skin: Warm, Dry, No erythema, No rash.   Back: No bony tenderness, No CVA tenderness.   Extremities: Intact distal pulses, No edema, No tenderness, No cyanosis, Negative Cam's sign.  Musculoskeletal: Good range of motion in all major joints. No tenderness to palpation or major deformities noted.   Neurologic: Alert , Normal motor function, Normal sensory function, No focal deficits noted.   Psychiatric: Affect normal, Judgment normal, Mood normal.     DIAGNOSTIC STUDIES / PROCEDURES    Results for orders placed or performed during the hospital encounter of 19   TROPONIN   Result Value Ref Range    Troponin I 0.10 (H) 0.00 - 0.04 ng/mL   PTT   Result Value Ref Range    APTT 29.6 24.7 - 36.0 sec   EKG   Result Value Ref Range    Report       [facility] Emergency Dept.    Test Date:  2019  Pt Name:    SY LEDESMA               Department: ER  MRN:        [id number]                      Room:        20  Gender:     Male                         Technician: [id number]  :        1975                   Requested By:ER TRIAGE PROTOCOL  Order #:    [id number]                    Reading MD: YOKASTA HERNANDEZ MD    Measurements  Intervals                                Axis  Rate:       91                           P:          52  SC:         152                          QRS:        -27  QRSD:       100                          T:          78  QT:         376  QTc:        463    Interpretive Statements  SINUS RHYTHM  VENTRICULAR PREMATURE COMPLEX  PROBABLE LEFT ATRIAL  ABNORMALITY  INFERIOR INFARCT, AGE INDETERMINATE  CONSIDER ANTERIOR INFARCT  BASELINE WANDER IN LEAD(S) I,III,aVL  Compared to ECG 01/06/2019 09:17:05  Ventricular premature complex(es) now present  Sinus tachycardia no longer present  T-w ave abnormality no longer present  Possible ischemia no longer present  Myocardial infarct finding still present    Electronically Signed On 2- 7:10:30 PST by YOKASTA HERNANDEZ MD           RADIOLOGY  No orders to display     The radiologist's interpretation of all radiological studies have been reviewed by me.    COURSE & MEDICAL DECISION MAKING  Nursing notes, VS, PMSFHx reviewed in chart.    6:58 AM Patient seen and examined at bedside.  Ordered for ECG, trop to evaluate his symptoms.     Reviewed patient's results from outside facility, creatinine 0.8, glucose 147, CT head negative chest x-ray no acute, unchanged ECG, troponin .141    7:47 AM  Page cardiology and hospitalist for admission    Discussed case with Dr. Alcazar.  We will continue heparin drip, he will evaluate patient    Discussed case with hospitalist for admission        Decision Making:  This is a 43 y.o. male presenting with episode of lightheadedness found to have slightly elevated troponin.  Patient does have significant cardiac history and prolonged SVT in the past so arrhythmia is possible for the etiology of his symptoms.  He has had no chest pain or pressure so seems unusual to represent new acute ischemic process, his elevated troponin may be more demand.  However he is certainly high risk with his known coronary artery disease.  There is no other evidence of I or metabolic disturbance on his outside labs,    Patient is admitted in stable condition      FINAL IMPRESSION  1. Elevated troponin    2. Lightheadedness          The note accurately reflects work and decisions made by me.  Yokasta Hernandez  2/16/2019  8:11 AM

## 2019-02-16 NOTE — CARE PLAN
Problem: Safety  Goal: Will remain free from injury  Fall risk assessed every shift and fall precautions in place.  Pt educated to not get up without assistance.  Verbalized understanding.       Problem: Knowledge Deficit  Goal: Knowledge of disease process/condition, treatment plan, diagnostic tests, and medications will improve  Outcome: PROGRESSING AS EXPECTED  Pt educated regarding activity, diet, meds and plan of care. Verbalized understanding.

## 2019-02-16 NOTE — ASSESSMENT & PLAN NOTE
Without chest pain however positive dizziness and lightheadedness  Cardiology consulted by the emergency department physician  Was recommended to start the patient on heparin drip and follow his troponin every 4 hours

## 2019-02-16 NOTE — ASSESSMENT & PLAN NOTE
Appears to be chronic  Continue to monitor  Transfuse for hemoglobin below 7  Patient is counseled to get age-appropriate cancer screening

## 2019-02-16 NOTE — ASSESSMENT & PLAN NOTE
With hyperglycemia, poorly controlled  Glycated hemoglobin 11.6  Long & short acting insulin  Accu-Checks, hypoglycemia protocol

## 2019-02-16 NOTE — ED TRIAGE NOTES
"Rikki Khalil  43 y.o. male  Chief Complaint   Patient presents with   • Sent by MD Rodney transferred from [place] ER for NSTEMI.    • MI (Non ST Segment Elevation MI)       Pt BIB [facility] for above CC.   Pt states he felt flushed this evening, \"like my BS was low.\" Pt found to have an elevated trop at .141 and lactic acid at 1.8. Pt started on heparin gtt at 1000u/hr, given loading bolus of 5000units and 325mg ASA. Pt denies CP.   Pt is alert and oriented, speaking in full sentences, follows commands and responds appropriately to questions. NAD. Resp are even and unlabored.     Hx: triple bypass (December '18)    NIBP: 142/95, NIBP MAP (Calculated): 107, Heart Rate (Monitored): 95, Pulse: 96, Respiration: 20, Temperature: 36.2 °C (97.2 °F), Height: 182.9 cm (6'), Weight: 91.7 kg (202 lb 2.6 oz), BMI (Calculated): 27.42, BSA (Calculated): 2.2, Pulse Oximetry: 100 %, O2 (LPM): 0, O2 Delivery: None (Room Air)  "

## 2019-02-16 NOTE — DISCHARGE PLANNING
Information Source  Orientation : Oriented x 4  Information Given By: Spouse  Informant's Name: Norma Khalil  Who is responsible for making decisions for patient? : Patient     Elopement Risk  Legal Hold: No  Ambulatory or Self Mobile in Wheelchair: No-Not an Elopement Risk  Elopement Risk: Not at Risk for Elopement     Interdisciplinary Discharge Planning  Does Admitting Nurse Feel This Could be a Complex Discharge?: Yes  Lives with - Patient's Self Care Capacity: Spouse  Patient or legal guardian wants to designate a caregiver (see row info): No  Support Systems: Spouse / Significant Other  Housing / Facility: [address]  Do You Take your Prescribed Medications Regularly: Yes  Able to Return to Previous ADL's: Future Time w/Therapy  Mobility Issues: Yes  Prior Services: None  Assistance Needed: Yes     Discharge Preparedness  What is your plan after discharge?: Uncertain - pending medical team collaboration  What are your discharge supports?: Spouse  Prior Functional Level: Ambulatory, Drives Self, Independent with Activities of Daily Living, Independent with Medication Management  Difficulity with ADLs: None  Difficulity with IADLs: None     Functional Assesment  Prior Functional Level: Ambulatory, Drives Self, Independent with Activities of Daily Living, Independent with Medication Management     Finances  Financial Barriers to Discharge: No  Prescription Coverage: Yes     Vision / Hearing Impairment  Vision Impairment : Yes  Right Eye Vision: Wears Glasses, Impaired  Left Eye Vision: Wears Glasses, Impaired  Hearing Impairment : No     Values / Beliefs / Concerns  Values / Beliefs Concerns : No     Advance Directive  Advance Directive?: None     Domestic Abuse  Have you ever been the victim of abuse or violence?: No     Anticipated Discharge Information  Anticipated discharge disposition: Acute rehab

## 2019-02-16 NOTE — ED NOTES
Med rec complete per pt at bedside   Allergies reviewed  Pt finished a course of Keflex about a week ago

## 2019-02-16 NOTE — ASSESSMENT & PLAN NOTE
EKG shows sinus rhythm with a rate of 91, no ST elevation, no significant ST-depression, QTc 463    This is most likely orthostatic hypotension due to neuropathy from uncontrolled diabetes, in addition to the recent addition of low-dose lisinopril.  Orthostatic vital  Telemetry monitor  Echocardiography   Carotid ultrasounds, positive carotid bruit on the right carotid artery

## 2019-02-16 NOTE — CONSULTS
Reason for Consult:  Asked by Dr Tyron Queen M.D. to see this patient with elevated troponin  Patient's PCP: CASIE Almaraz    CC:   Chief Complaint   Patient presents with   • Sent by MD Rodney transferred from [place] [facility] for NSTEMI.    • MI (Non ST Segment Elevation MI)       HPI: 43-year-old gentleman with history of diabetes recently had CABG for multivessel disease who is been following up as recommended he does have sternal wound that he is been treating who apparently had episode of near syncope, lightheadedness and diaphoresis which she felt was consistent with prior episodes of hypoglycemia and presented to the local emergency room there is found to have positive troponin but normal EKG transferred here for further evaluation given his recent surgery and cardiac history he was also started on heparin drip given his risk factors here as testing has been stable from that testing and he feels back to his normal self    Medications / Drug list prior to admission:  No current facility-administered medications on file prior to encounter.      Current Outpatient Prescriptions on File Prior to Encounter   Medication Sig Dispense Refill   • metoprolol (LOPRESSOR) 50 MG Tab Take 1 Tab by mouth 2 times a day. 60 Tab 3   • HUMALOG KWIKPEN 100 UNIT/ML Solution Pen-injector injection Inject 10-15 Units as instructed 3 times a day before meals.     • aspirin EC 81 MG EC tablet Take 1 Tab by mouth every day. 30 Tab    • gabapentin (NEURONTIN) 100 MG Cap Take 1 Cap by mouth 3 times a day. 90 Cap 1   • atorvastatin (LIPITOR) 80 MG tablet Take 1 Tab by mouth every day. 30 Tab 2   • clopidogrel (PLAVIX) 75 MG Tab Take 1 Tab by mouth every day. 30 Tab 2       Current list of administered Medications:    Current Facility-Administered Medications:   •  senna-docusate (PERICOLACE or SENOKOT S) 8.6-50 MG per tablet 2 Tab, 2 Tab, Oral, BID **AND** polyethylene glycol/lytes (MIRALAX) PACKET 1 Packet, 1 Packet, Oral,  QDAY PRN **AND** magnesium hydroxide (MILK OF MAGNESIA) suspension 30 mL, 30 mL, Oral, QDAY PRN **AND** bisacodyl (DULCOLAX) suppository 10 mg, 10 mg, Rectal, QDAY PRN, Connor Gutierrez M.D.  •  Respiratory Care per Protocol, , Nebulization, Continuous RT, Connor Gutierrez M.D.  •  nitroglycerin (NITROSTAT) tablet 0.4 mg, 0.4 mg, Sublingual, Q5 MIN PRN, Connor Gutierrez M.D.  •  morphine (pf) 4 mg/ml injection 2-4 mg, 2-4 mg, Intravenous, Q5 MIN PRN, Connor Gutierrez M.D.  •  aspirin EC (ECOTRIN) tablet 81 mg, 81 mg, Oral, DAILY, Connor Gutierrez M.D.  •  atorvastatin (LIPITOR) tablet 80 mg, 80 mg, Oral, DAILY, Connor Gutierrez M.D.  •  clopidogrel (PLAVIX) tablet 75 mg, 75 mg, Oral, DAILY, Connor Gutierrez M.D.  •  gabapentin (NEURONTIN) capsule 100 mg, 100 mg, Oral, TID, Connor Gutierrez M.D.  •  lisinopril (PRINIVIL) tablet 2.5 mg, 2.5 mg, Oral, DAILY, Connor Gutierrez M.D.  •  metoprolol (LOPRESSOR) tablet 50 mg, 50 mg, Oral, BID, Connor Gutierrez M.D.  •  insulin glargine (LANTUS) injection 20 Units, 20 Units, Subcutaneous, Q EVENING **AND** insulin lispro (HUMALOG) injection 6 Units, 0.2 Units/kg/day, Subcutaneous, TID AC **AND** insulin lispro (HUMALOG) injection 1-6 Units, 1-6 Units, Subcutaneous, 4X/DAY ACHS **AND** Accu-Chek ACHS, , , Q AC AND BEDTIME(S) **AND** NOTIFY MD and PharmD, , , Once **AND** glucose 4 g chewable tablet 16 g, 16 g, Oral, Q15 MIN PRN **AND** dextrose 50% (D50W) injection 25 mL, 25 mL, Intravenous, Q15 MIN PRN, Connor Gutierrez M.D.  •  heparin injection 3,200 Units, 3,200 Units, Intravenous, PRN, 3,200 Units at 02/16/19 0955 **AND** heparin infusion 25,000 units in 500 ml 0.45% nacl, , Intravenous, Continuous, Last Rate: 24 mL/hr at 02/16/19 0957, 1,200 Units/hr at 02/16/19 0957 **AND** Protocol 440 Heparin Weight Based DO NOT GIVE ANY HEPARIN BOLUS TO STROKE PATIENT, , , CONTINUOUS **AND** Protocol 440 Heparin Weight Based Discontinue Enoxaparin (Lovenox), Dabigatran (Pradaxa),  Rivaroxaban (Xarelto), Apixaban (Eliquis), Edoxaban (Savaysa, Lixiana), Fondaparinux (Arixtra) and Argatroban prior to heparin administration, , , Once **AND** Protocol 440 Heparin Weight Based Draw baseline aPTT, PT, and platelet count if not already done, , , CONTINUOUS **AND** Protocol 440 Heparin Weight Based Draw aPTT 6 hours after beginning infusion. , , , CONTINUOUS **AND** Protocol 440 Heparin Weight Based Record Patient Data, , , CONTINUOUS **AND** Protocol 440 Heparin Weight Based INSTRUCTIONS, , , CONTINUOUS **AND** Protocol 440 Heparin Weight Based Review aPTT results 6 hours after infusion is begun as detailed, , , CONTINUOUS **AND** Protocol 440 Heparin Weight Based Draw Platelet count every three days. Contact MD if platelet is 50% lower than baseline count., , , CONTINUOUS **AND** Protocol 440 Heparin Weight Based Adjust heparin to maintain aPTT between 55-96 sec, , , CONTINUOUS **AND** Protocol 440 Heparin Weight Based Order aPTT 6 hours after any rate change or hold until aPTT is therapeutic (55-96 seconds), , , CONTINUOUS **AND** Protocol 440 Heparin Weight Based Documentation and verification, , , CONTINUOUS, Connor Gutierrez M.D.    Current Outpatient Prescriptions:   •  insulin glargine (LANTUS) 100 UNIT/ML Solution, Inject 50 Units as instructed every evening., Disp: , Rfl:   •  lisinopril (PRINIVIL) 2.5 MG Tab, Take 2.5 mg by mouth every day., Disp: , Rfl:   •  metFORMIN ER (GLUCOPHAGE XR) 500 MG TABLET SR 24 HR, Take 1,000 mg by mouth every day., Disp: , Rfl:   •  metoprolol (LOPRESSOR) 50 MG Tab, Take 1 Tab by mouth 2 times a day., Disp: 60 Tab, Rfl: 3  •  HUMALOG KWIKPEN 100 UNIT/ML Solution Pen-injector injection, Inject 10-15 Units as instructed 3 times a day before meals., Disp: , Rfl:   •  aspirin EC 81 MG EC tablet, Take 1 Tab by mouth every day., Disp: 30 Tab, Rfl:   •  gabapentin (NEURONTIN) 100 MG Cap, Take 1 Cap by mouth 3 times a day., Disp: 90 Cap, Rfl: 1  •  atorvastatin  (LIPITOR) 80 MG tablet, Take 1 Tab by mouth every day., Disp: 30 Tab, Rfl: 2  •  clopidogrel (PLAVIX) 75 MG Tab, Take 1 Tab by mouth every day., Disp: 30 Tab, Rfl: 2    Past Medical History:   Diagnosis Date   • Arthritis     All joints   • Back pain    • Bowel obstruction (HCC)    • CAD (coronary artery disease) 12/2018    CABG x 3 (LIMA to distal LAD, rSVG to distal diagonal, rSVG to distal RCA) by Dr. Barrios.   • Glaucoma     monika eyes   • Hyperlipidemia    • Ischemic cardiomyopathy 12/2018    Echocardiogram with LVEF 25-30%. Global hypokinesis. Moderately dilated RV. Mild TR.   • NSTEMI (non-ST elevated myocardial infarction) ([facility]) 12/27/2018   • Other depressive disorder 1/16/2019   • Pneumonia 12/30/2018   • Scoliosis    • SVT (supraventricular tachycardia) ([facility])    • Systolic congestive heart failure ([facility])    • Type II or unspecified type diabetes mellitus without mention of complication, not stated as uncontrolled     insulin and oral meds       Past Surgical History:   Procedure Laterality Date   • MULTIPLE CORONARY ARTERY BYPASS ENDO VEIN HARVEST  12/28/2018    Procedure: MULTIPLE CORONARY ARTERY BYPASS x 3,  ENDO VEIN HARVEST LEFT LEG;  Surgeon: Tomas Barrios M.D.;  Location: [facility];  Service: Cardiothoracic   • DONNA  12/28/2018    Procedure: DONNA;  Surgeon: Tomas Barrios M.D.;  Location: [facility];  Service: Cardiothoracic   • ILEOSTOMY  9/29/2015    Procedure: ILEOSTOMY TAKE DOWN;  Surgeon: Carter Kumar M.D.;  Location: [facility];  Service:    • LOW ANTERIOR RESECTION ROBOTIC XI  7/14/2015    Procedure: Robotic low anterior resection, takedown enterocutaneous fistula, omental flap, ileostomy creation;  Surgeon: Carter Kumar M.D.;  Location: [facility];  Service:    • COLOSTOMY CREATION LAPAROSCOPIC  7/14/2015    Procedure: COLOSTOMY CREATION LAPAROSCOPIC FOR: LAP ILEOSTOMY;  Surgeon: Carter Kumar M.D.;  Location: [facility]  "[facility]S;  Service:        Family History   Problem Relation Age of Onset   • Hypertension Mother    • Heart Disease Mother    • Stroke Mother    • No Known Problems Father      Patient family history was personally reviewed, no pertinent family history to current presentation    Social History     Social History   • Marital status:      Spouse name: N/A   • Number of children: N/A   • Years of education: N/A     Occupational History   • Not on file.     Social History Main Topics   • Smoking status: Former Smoker     Packs/day: 1.50     Years: 24.00     Types: Cigarettes     Quit date: 2018   • Smokeless tobacco: Never Used      Comment: HAS RECENTLY QUIT   • Alcohol use No   • Drug use: Yes     Types: Inhaled      Comment: marijuana daily   • Sexual activity: Not on file     Other Topics Concern   • Not on file     Social History Narrative   • No narrative on file       ALLERGIES:  Allergies   Allergen Reactions   • Penicillins Unspecified     \"Blue baby\"  RXN= as a child  Tolerated ceftriaxone 6/2015   • Fentanyl      Hallucinations       Review of systems:  A complete review of symptoms was reviewed with patient . This is reviewed in H&P and PMH. ALL OTHERS reviewed and negative    Physical exam:  Patient Vitals for the past 24 hrs:   Temp Temp src Pulse Resp SpO2 Height Weight   02/16/19 1330 - - 95 17 98 % - -   02/16/19 1300 - - 91 20 95 % - -   02/16/19 1230 - - 91 (!) 23 95 % - -   02/16/19 1200 - - 97 (!) 35 98 % - -   02/16/19 1130 - - 94 (!) 23 99 % - -   02/16/19 1100 - - 87 (!) 8 98 % - -   02/16/19 1030 - - 93 14 98 % - -   02/16/19 1000 - - 86 13 97 % - -   02/16/19 0930 - - 100 16 98 % - -   02/16/19 0900 - - 94 (!) 26 99 % - -   02/16/19 0830 - - 88 (!) 9 97 % - -   02/16/19 0700 - - 100 12 98 % - -   02/16/19 0637 36.2 °C (97.2 °F) Temporal - - - - -   02/16/19 0633 - - - - - 1.829 m (6') 91.7 kg (202 lb 2.6 oz)   02/16/19 0630 - - 96 20 100 % - -       General: No acute distress.   EYES: " no jaundice  HEENT: OP clear   Neck: No bruits No JVD.   CVS:   RRR. S1 + S2. No M/R/G he has a bandage on his sternal wound  Resp: CTAB. No wheezing or crackles/rhonchi.  Abdomen: Soft, NT, ND,  Skin: Grossly nothing acute no obvious rashes  Neurological: Alert, Moves all extremities, no cranial nerve defects on limited exam  Extremities: Pulse 2+ in b/l LE. noedema. No cyanosis.       Data:  Laboratory studies personally reviewed by me:  Recent Results (from the past 24 hour(s))   EKG    Collection Time: 19  6:27 AM   Result Value Ref Range    Report       [facility]    Test Date:  2019  Pt Name:    SY LEDESMA               Department: ER  MRN:        [id number]                      Room:       [facility]  Gender:     Male                         Technician: [id number]  :        1975                   Requested By:ER TRIAGE PROTOCOL  Order #:    [id number]                    Reading MD: YOKASTA HERNANDEZ MD    Measurements  Intervals                                Axis  Rate:       91                           P:          52  ND:         152                          QRS:        -27  QRSD:       100                          T:          78  QT:         376  QTc:        463    Interpretive Statements  SINUS RHYTHM  VENTRICULAR PREMATURE COMPLEX  PROBABLE LEFT ATRIAL ABNORMALITY  INFERIOR INFARCT, AGE INDETERMINATE  CONSIDER ANTERIOR INFARCT  BASELINE WANDER IN LEAD(S) I,III,aVL  Compared to ECG 2019 09:17:05  Ventricular premature complex(es) now present  Sinus tachycardia no longer present  T-w ave abnormality no longer present  Possible ischemia no longer present  Myocardial infarct finding still present    Electronically Signed On 2019 7:10:30 PST by YOKASTA HERNANDEZ MD     TROPONIN    Collection Time: 19  6:35 AM   Result Value Ref Range    Troponin I 0.10 (H) 0.00 - 0.04 ng/mL   PTT    Collection Time: 19  6:35 AM   Result Value Ref Range     APTT 29.6 24.7 - 36.0 sec   PT/INR    Collection Time: 02/16/19  6:35 AM   Result Value Ref Range    PT 13.3 12.0 - 14.6 sec    INR 1.00 0.87 - 1.13   PLATELET COUNT    Collection Time: 02/16/19  6:35 AM   Result Value Ref Range    Platelet Count 379 164 - 446 K/uL       Imaging:  EC-ECHOCARDIOGRAM COMPLETE W/O CONT    (Results Pending)   US-CAROTID DOPPLER BILAT    (Results Pending)           EKG : personally reviewed by me sinus rhythm nonspecific repolarization changes    All pertinent features of laboratory and imaging reviewed including primary images where applicable      Principal Problem:    Elevated troponin POA: Yes  Active Problems:    Type 2 diabetes mellitus without complication (HCC) POA: Yes    CAD (coronary artery disease) POA: Yes    Ischemic cardiomyopathy POA: Yes      Overview: December 2018: Echocardiogram with LVEF 25-30%. Global hypokinesis.       Moderately dilated RV. Mild TR.    Hyperlipidemia POA: Yes    Near syncope POA: Yes    Anemia POA: Yes  Resolved Problems:    * No resolved hospital problems. *      Assessment / Plan:  Elevated troponin -unclear if this is an acute process or still remaining from his recent history of bypass surgery will be a little atypical to be still around but perhaps he did have hypoglycemia on this prompted adrenaline surge and he had mild demand ischemia in the setting of his multivessel disease there is no evidence for acute coronary syndrome otherwise so if his troponin is flat he can likely be safely discharged and stop the anticoagulation he is on dual antiplatelet therapy and has been following up as appropriate    Sternal wound  Will need close follow-up if it continues may need surgical evaluation    Chronic congestive heart failure with reduced ejection fraction  No evidence of acute decompensation    I personally discussed his case with  Dr Tyron Queen M.D.      It is my pleasure to participate in the care of Mr. Khalil.  Please do not hesitate  to contact me with questions or concerns.    Quinten Gomes MD PhD [facility]  Cardiologist [facility]    2/16/2019    Please note that this dictation was created using voice recognition software. I have worked with consultants from the vendor as well as technical experts from [facility] to optimize the interface. I have made every reasonable attempt to correct obvious errors, but I expect that there are errors of grammar and possibly content I did not discover before finalizing the note.

## 2019-02-16 NOTE — ED NOTES
"Patient is resting comfortably.  Denies CP or \"flush\" feeling.  States, \"It was just my sugar.  I just know it.\"  Requesting food.  Inst to wait for lab results.    "

## 2019-02-16 NOTE — H&P
Hospital Medicine History & Physical Note    Date of Service  2/16/2019    Primary Care Physician  MUSA Almaraz.    Consultants  Cardiology, Dr. Alcazar     Code Status  Full    Chief Complaint  Lightheadedness, sent from another facility for concern for troponin elevation    History of Presenting Illness  43 y.o. male past medical history of coronary artery disease status post coronary artery bypass graft December 2018, poorly controlled diabetes mellitus, uncontrolled hypertension who was transferred from another facility, [facility] 2/16/2019 with for concern of non-ST elevation myocardial infarction, after the patient was having a history of lightheadedness, near fall and dizziness that started around 10:00 PM on 2/15/2019.  The patient himself thought that he was having a low blood sugar however he reports that he checked his blood sugar at that time and it was 141.  The patient reports that his wife took his blood pressure at that time and it was 90/30.  The patient reports that low dose lisinopril 2.5 mg was recently added to his medication list by his primary care 10 days prior to admission. The patient denies having associated spinning sensation, nausea, vomiting.  He denies having blurred vision or tinnitus.  This troponin was found to be elevated to 0.141 ng/mL at the transferring facility, and was transferred to [facility] for concern for non-ST elevation myocardial infarction.  Cardiology has been consulted by the emergency department physician who recommended starting heparin drip and trending his troponin.         Review of Systems  Review of Systems   Constitutional: Positive for malaise/fatigue. Negative for chills and fever.   HENT: Negative for congestion and sore throat.    Eyes: Negative for pain, discharge and redness.   Respiratory: Negative for cough, hemoptysis, sputum production, shortness of breath and stridor.    Cardiovascular: Negative for chest  "pain, palpitations and leg swelling.   Gastrointestinal: Negative for abdominal pain, blood in stool, diarrhea and vomiting.   Genitourinary: Negative for flank pain, hematuria and urgency.   Musculoskeletal: Negative for myalgias.   Skin: Negative.    Neurological: Positive for dizziness. Negative for speech change, focal weakness, seizures and loss of consciousness.   Endo/Heme/Allergies: Does not bruise/bleed easily.   Psychiatric/Behavioral: Negative for hallucinations and suicidal ideas. The patient is not nervous/anxious.        Past Medical History  Coronary artery disease  Hyperlipidemia   Ischemic cardiomyopathy   Type II or unspecified type diabetes mellitus without mention of complication   Hypertension   Anemia    Surgical History   has a past surgical history that includes low anterior resection robotic xi (7/14/2015); colostomy creation laparoscopic (7/14/2015); ileostomy (9/29/2015); multiple coronary artery bypass endo vein harvest (12/28/2018); and allegra (12/28/2018).     Family History  family history includes Heart Disease in his mother; Hypertension in his mother; No Known Problems in his father; Stroke in his mother.     Social History   reports that he quit smoking about 13 months ago. His smoking use included Cigarettes. He has a 36.00 pack-year smoking history. He has never used smokeless tobacco. He reports that he uses drugs, including Inhaled. He reports that he does not drink alcohol.    Allergies  Allergies   Allergen Reactions   • Penicillins Unspecified     \"Blue baby\"  RXN= as a child  Tolerated ceftriaxone 6/2015   • Fentanyl      Hallucinations       Medications  Prior to Admission Medications   Prescriptions Last Dose Informant Patient Reported? Taking?   HUMALOG KWIKPEN 100 UNIT/ML Solution Pen-injector injection 2/15/2019 at pm Patient Yes No   Sig: Inject 10-15 Units as instructed 3 times a day before meals.   aspirin EC 81 MG EC tablet 2/15/2019 at am Patient Yes No   Sig: Take 1 " Tab by mouth every day.   atorvastatin (LIPITOR) 80 MG tablet 2/15/2019 at pm Patient No No   Sig: Take 1 Tab by mouth every day.   clopidogrel (PLAVIX) 75 MG Tab 2/15/2019 at am Patient No No   Sig: Take 1 Tab by mouth every day.   gabapentin (NEURONTIN) 100 MG Cap 2/15/2019 at pm Patient No No   Sig: Take 1 Cap by mouth 3 times a day.   insulin glargine (LANTUS) 100 UNIT/ML Solution 2/15/2019 at pm Patient Yes Yes   Sig: Inject 50 Units as instructed every evening.   lisinopril (PRINIVIL) 2.5 MG Tab 2/15/2019 at am Patient Yes Yes   Sig: Take 2.5 mg by mouth every day.   metFORMIN ER (GLUCOPHAGE XR) 500 MG TABLET SR 24 HR 2/15/2019 at am Patient Yes Yes   Sig: Take 1,000 mg by mouth every day.   metoprolol (LOPRESSOR) 50 MG Tab 2/15/2019 at pm Patient No No   Sig: Take 1 Tab by mouth 2 times a day.      Facility-Administered Medications: None       Physical Exam  Temp:  [36.2 °C (97.2 °F)] 36.2 °C (97.2 °F)  Pulse:  [] 91  Resp:  [8-35] 20  SpO2:  [95 %-100 %] 95 %    Physical Exam   Constitutional: He is oriented to person, place, and time. He appears well-developed and well-nourished.   HENT:   Head: Normocephalic and atraumatic.   Right Ear: External ear normal.   Left Ear: External ear normal.   Eyes: Pupils are equal, round, and reactive to light. Right eye exhibits no discharge. Left eye exhibits no discharge. No scleral icterus.   Neck: Normal range of motion. Neck supple. No JVD present. No tracheal deviation present. No thyromegaly present.   Carotid bruit on the right side   Cardiovascular: Normal rate and regular rhythm.  Exam reveals no friction rub.    Murmur (Systolic) heard.  Carotid bruit on the right side   Pulmonary/Chest: Effort normal. No stridor. No respiratory distress. He has no wheezes. He has no rales.   Midline sternotomy scar   Abdominal: Soft. He exhibits no distension. There is no tenderness. There is no rebound.   Musculoskeletal: He exhibits no edema, tenderness or deformity.    Neurological: He is alert and oriented to person, place, and time. No cranial nerve deficit. Coordination normal.   Skin: Skin is warm and dry.   Psychiatric: He has a normal mood and affect. Judgment normal.   Nursing note and vitals reviewed.      Laboratory:  Recent Labs      02/16/19   0635   PLATELETCT  379         No results for input(s): ALTSGPT, ASTSGOT, ALKPHOSPHAT, TBILIRUBIN, DBILIRUBIN, GAMMAGT, AMYLASE, LIPASE, ALB, PREALBUMIN, GLUCOSE in the last 72 hours.  Recent Labs      02/16/19   0635   APTT  29.6   INR  1.00             Recent Labs      02/16/19   0635   TROPONINI  0.10*       Urinalysis:    No results found     I personally reviewed the patient's EKG, it shows sinus rhythm with a rate of 91, no ST elevation, no significant ST-depression, QTc 463     Imaging:  EC-ECHOCARDIOGRAM COMPLETE W/O CONT    (Results Pending)   US-CAROTID DOPPLER BILAT    (Results Pending)         Assessment/Plan:  I anticipate this patient is appropriate for observation status at this time.    * Elevated troponin- (present on admission)   Assessment & Plan    Without chest pain however positive dizziness and lightheadedness  Cardiology consulted by the emergency department physician  Was recommended to start the patient on heparin drip and follow his troponin every 4 hours     Near syncope- (present on admission)   Assessment & Plan    EKG shows sinus rhythm with a rate of 91, no ST elevation, no significant ST-depression, QTc 463    This is most likely orthostatic hypotension due to neuropathy from uncontrolled diabetes, in addition to the recent addition of low-dose lisinopril.  Orthostatic vital  Telemetry monitor  Echocardiography   Carotid ultrasounds, positive carotid bruit on the right carotid artery     Hyperlipidemia- (present on admission)   Assessment & Plan    High intensity statin     Ischemic cardiomyopathy- (present on admission)   Assessment & Plan    Last echocardiography showed a reduced ejection  fraction of 25%  Not currently in acute exacerbation   On beta-blockers and ACE inhibitors     CAD (coronary artery disease)- (present on admission)   Assessment & Plan    Dual antiplatelet therapy  Metoprolol, lisinopril  High intensity statin, cardiology consulted     Type 2 diabetes mellitus without complication (HCC)- (present on admission)   Assessment & Plan    With hyperglycemia, poorly controlled  Glycated hemoglobin 11.6  Long & short acting insulin  Accu-Checks, hypoglycemia protocol     Anemia- (present on admission)   Assessment & Plan    Appears to be chronic  Continue to monitor  Transfuse for hemoglobin below 7  Patient is counseled to get age-appropriate cancer screening         VTE prophylaxis: Heparin drip

## 2019-02-17 ENCOUNTER — APPOINTMENT (OUTPATIENT)
Dept: CARDIOLOGY | Facility: MEDICAL CENTER | Age: 44
End: 2019-02-17
Attending: HOSPITALIST
Payer: MEDICAID

## 2019-02-17 ENCOUNTER — PATIENT OUTREACH (OUTPATIENT)
Dept: HEALTH INFORMATION MANAGEMENT | Facility: OTHER | Age: 44
End: 2019-02-17

## 2019-02-17 VITALS
TEMPERATURE: 97.8 F | OXYGEN SATURATION: 98 % | SYSTOLIC BLOOD PRESSURE: 157 MMHG | RESPIRATION RATE: 17 BRPM | HEIGHT: 72 IN | HEART RATE: 92 BPM | DIASTOLIC BLOOD PRESSURE: 94 MMHG | WEIGHT: 206.57 LBS | BODY MASS INDEX: 27.98 KG/M2

## 2019-02-17 PROBLEM — R55 NEAR SYNCOPE: Status: RESOLVED | Noted: 2019-02-16 | Resolved: 2019-02-17

## 2019-02-17 LAB
ANION GAP SERPL CALC-SCNC: 7 MMOL/L (ref 0–11.9)
APTT PPP: 42.5 SEC (ref 24.7–36)
APTT PPP: 64.4 SEC (ref 24.7–36)
BUN SERPL-MCNC: 14 MG/DL (ref 8–22)
CALCIUM SERPL-MCNC: 9.6 MG/DL (ref 8.5–10.5)
CHLORIDE SERPL-SCNC: 105 MMOL/L (ref 96–112)
CO2 SERPL-SCNC: 24 MMOL/L (ref 20–33)
CREAT SERPL-MCNC: 0.6 MG/DL (ref 0.5–1.4)
ERYTHROCYTE [DISTWIDTH] IN BLOOD BY AUTOMATED COUNT: 54.2 FL (ref 35.9–50)
GLUCOSE BLD-MCNC: 139 MG/DL (ref 65–99)
GLUCOSE BLD-MCNC: 212 MG/DL (ref 65–99)
GLUCOSE SERPL-MCNC: 152 MG/DL (ref 65–99)
HCT VFR BLD AUTO: 41.8 % (ref 42–52)
HGB BLD-MCNC: 13.2 G/DL (ref 14–18)
LV EJECT FRACT  99904: 35
LV EJECT FRACT MOD 2C 99903: 37.1
LV EJECT FRACT MOD 4C 99902: 34.56
LV EJECT FRACT MOD BP 99901: 35.32
MAGNESIUM SERPL-MCNC: 1.6 MG/DL (ref 1.5–2.5)
MCH RBC QN AUTO: 29.5 PG (ref 27–33)
MCHC RBC AUTO-ENTMCNC: 31.6 G/DL (ref 33.7–35.3)
MCV RBC AUTO: 93.5 FL (ref 81.4–97.8)
PLATELET # BLD AUTO: 352 K/UL (ref 164–446)
PMV BLD AUTO: 10.6 FL (ref 9–12.9)
POTASSIUM SERPL-SCNC: 3.6 MMOL/L (ref 3.6–5.5)
RBC # BLD AUTO: 4.47 M/UL (ref 4.7–6.1)
SODIUM SERPL-SCNC: 136 MMOL/L (ref 135–145)
TROPONIN I SERPL-MCNC: 0.08 NG/ML (ref 0–0.04)
WBC # BLD AUTO: 10.1 K/UL (ref 4.8–10.8)

## 2019-02-17 PROCEDURE — 93306 TTE W/DOPPLER COMPLETE: CPT | Mod: 26 | Performed by: INTERNAL MEDICINE

## 2019-02-17 PROCEDURE — A9270 NON-COVERED ITEM OR SERVICE: HCPCS | Performed by: HOSPITALIST

## 2019-02-17 PROCEDURE — 84484 ASSAY OF TROPONIN QUANT: CPT

## 2019-02-17 PROCEDURE — 96372 THER/PROPH/DIAG INJ SC/IM: CPT | Mod: XU

## 2019-02-17 PROCEDURE — 700102 HCHG RX REV CODE 250 W/ 637 OVERRIDE(OP): Performed by: HOSPITALIST

## 2019-02-17 PROCEDURE — 96376 TX/PRO/DX INJ SAME DRUG ADON: CPT | Mod: XU

## 2019-02-17 PROCEDURE — 700111 HCHG RX REV CODE 636 W/ 250 OVERRIDE (IP): Performed by: HOSPITALIST

## 2019-02-17 PROCEDURE — 85730 THROMBOPLASTIN TIME PARTIAL: CPT

## 2019-02-17 PROCEDURE — 82962 GLUCOSE BLOOD TEST: CPT

## 2019-02-17 PROCEDURE — 36415 COLL VENOUS BLD VENIPUNCTURE: CPT

## 2019-02-17 PROCEDURE — G0378 HOSPITAL OBSERVATION PER HR: HCPCS

## 2019-02-17 PROCEDURE — 96375 TX/PRO/DX INJ NEW DRUG ADDON: CPT | Mod: XU

## 2019-02-17 PROCEDURE — 93306 TTE W/DOPPLER COMPLETE: CPT

## 2019-02-17 PROCEDURE — 93880 EXTRACRANIAL BILAT STUDY: CPT | Mod: 26 | Performed by: SURGERY

## 2019-02-17 PROCEDURE — 99217 PR OBSERVATION CARE DISCHARGE: CPT | Performed by: INTERNAL MEDICINE

## 2019-02-17 RX ADMIN — INSULIN LISPRO 2 UNITS: 100 INJECTION, SOLUTION INTRAVENOUS; SUBCUTANEOUS at 11:48

## 2019-02-17 RX ADMIN — CLOPIDOGREL BISULFATE 75 MG: 75 TABLET ORAL at 05:14

## 2019-02-17 RX ADMIN — METOPROLOL TARTRATE 50 MG: 50 TABLET ORAL at 05:14

## 2019-02-17 RX ADMIN — ATORVASTATIN CALCIUM 80 MG: 80 TABLET, FILM COATED ORAL at 05:14

## 2019-02-17 RX ADMIN — LISINOPRIL 2.5 MG: 5 TABLET ORAL at 05:14

## 2019-02-17 RX ADMIN — HEPARIN SODIUM 1700 UNITS/HR: 5000 INJECTION, SOLUTION INTRAVENOUS at 03:11

## 2019-02-17 RX ADMIN — SENNOSIDES AND DOCUSATE SODIUM 2 TABLET: 8.6; 5 TABLET ORAL at 05:14

## 2019-02-17 RX ADMIN — GABAPENTIN 100 MG: 100 CAPSULE ORAL at 05:14

## 2019-02-17 RX ADMIN — HEPARIN SODIUM 3200 UNITS: 1000 INJECTION, SOLUTION INTRAVENOUS; SUBCUTANEOUS at 01:31

## 2019-02-17 RX ADMIN — INSULIN LISPRO 6 UNITS: 100 INJECTION, SOLUTION INTRAVENOUS; SUBCUTANEOUS at 07:39

## 2019-02-17 RX ADMIN — MORPHINE SULFATE 4 MG: 4 INJECTION INTRAVENOUS at 11:51

## 2019-02-17 RX ADMIN — MORPHINE SULFATE 4 MG: 4 INJECTION INTRAVENOUS at 02:32

## 2019-02-17 RX ADMIN — INSULIN LISPRO 6 UNITS: 100 INJECTION, SOLUTION INTRAVENOUS; SUBCUTANEOUS at 11:49

## 2019-02-17 RX ADMIN — GABAPENTIN 100 MG: 100 CAPSULE ORAL at 11:47

## 2019-02-17 RX ADMIN — ASPIRIN 81 MG: 81 TABLET, COATED ORAL at 05:14
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
## 2019-02-17 NOTE — PROGRESS NOTES
Assumed care of pt, beside report received from YAO Anguiano. Updated on POC, call light within reach all fall precautions within place. Bed locked and lowered. Pt instructed to call for assistance before getting up. All questions answered, no other needs at this time.

## 2019-02-17 NOTE — PROGRESS NOTES
PT complaining of 10/10 chronic back pain. MD paged. Orders received to add to PRN morphine ok to give for back pain.

## 2019-02-17 NOTE — DISCHARGE SUMMARY
Discharge Summary    CHIEF COMPLAINT ON ADMISSION  Chief Complaint   Patient presents with   • Sent by MD Rodney transferred from [place] ER for NSTEMI.    • MI (Non ST Segment Elevation MI)       Reason for Admission  EMS     Admission Date  2/16/2019    CODE STATUS  Full Code    HPI & HOSPITAL COURSE  43 y.o. male with CAD status post CABG in Dec 2018, poorly controlled type 2 diabetes mellitus, hypertension, hyperlipidemia, admitted 2/16/2019 with lightheadedness, near fall, and dizziness.  He states he did not eat much on the night prior to admission. Workup at the outside hospital showed elevated troponin at 0.141, without ST elevation on EKG. he was also found to have low blood sugars.  Patient transferred to [facility], and started on heparin drip.  Cardiology consulted who gave the opinion that there is no evidence of ACS.  His troponins remained flat, and did not increase further.  Further workup with carotid ultrasound did not show hemodynamically significant stenosis, with less than 50% stenosis of the bilateral ICA, with normal waveforms and velocity in the vertebral arteries.    He had no orthostatic drop in blood pressures.   He was monitored on telemetry, with no arrhythmias noted.  He did not have any further episodes of hypoglycemia.    He remained hemodynamically stable and afebrile.  He did not have further episodes of near syncope, or lightheadedness.  His presenting symptoms have resolved.     I have personally seen and examined the patient on the day of discharge. With his clinical improvement, he was deemed ready to discharge from the hospital as he did not have any further hospitalization needs. Patient felt comfortable going home. The discharge plan was discussed with the patient, with which he was agreeable to.     Therefore, he is discharged in good and stable condition to home with close outpatient follow-up.      Discharge Date  2/17/2019      FOLLOW UP ITEMS POST DISCHARGE  -He will  continue on his current regimen of insulin.  I counseled with him about adequate food intake when he takes his insulin, and close blood glucose monitoring at home.  He will follow-up with his PCP.  -He will continue with his cardiac medications.  Follow-up with cardiology as scheduled.    DISCHARGE DIAGNOSES  Principal Problem:    Elevated troponin due to demand ischemia POA: Yes  Active Problems:    Type 2 diabetes mellitus without complication (HCC) POA: Yes    CAD (coronary artery disease) POA: Yes      Overview: 3 V by cath 12/2018       RCA, 70-80% long mid LAD,  small LCX, mod dis large ramus    Ischemic cardiomyopathy POA: Yes      Overview: December 2018: Echocardiogram with LVEF 25-30%. Global hypokinesis.       Moderately dilated RV. Mild TR.    Hyperlipidemia POA: Yes    Anemia POA: Yes  Resolved Problems:    Near syncope, due to hypoglycemia POA: Yes      FOLLOW UP  No future appointments.  CASIE Almaraz  [address] [facility] [address]  [phone]    Schedule an appointment as soon as possible for a visit  Hospital follow-up      MEDICATIONS ON DISCHARGE     Medication List      CONTINUE taking these medications      Instructions   aspirin 81 MG EC tablet   Take 1 Tab by mouth every day.  Dose:  81 mg     atorvastatin 80 MG tablet  Commonly known as:  LIPITOR   Take 1 Tab by mouth every day.  Dose:  80 mg     clopidogrel 75 MG Tabs  Commonly known as:  PLAVIX   Take 1 Tab by mouth every day.  Dose:  75 mg     gabapentin 100 MG Caps  Commonly known as:  NEURONTIN   Take 1 Cap by mouth 3 times a day.  Dose:  100 mg     HUMALOG KWIKPEN 100 UNIT/ML Sopn injection  Generic drug:  insulin lispro (Human)   Inject 10-15 Units as instructed 3 times a day before meals.  Dose:  10-15 Units     insulin glargine 100 UNIT/ML Soln  Commonly known as:  LANTUS   Inject 50 Units as instructed every evening.  Dose:  50 Units     lisinopril 2.5 MG Tabs  Commonly known as:  PRINIVIL   Take 2.5 mg by  "mouth every day.  Dose:  2.5 mg     metFORMIN  MG Tb24  Commonly known as:  GLUCOPHAGE XR   Take 1,000 mg by mouth every day.  Dose:  1000 mg     metoprolol 50 MG Tabs  Commonly known as:  LOPRESSOR   Take 1 Tab by mouth 2 times a day.  Dose:  50 mg            Allergies  Allergies   Allergen Reactions   • Penicillins Unspecified     \"Blue baby\"  RXN= as a child  Tolerated ceftriaxone 6/2015   • Fentanyl      Hallucinations       DIET  Orders Placed This Encounter   Procedures   • Diet Order Cardiac     Standing Status:   Standing     Number of Occurrences:   1     Order Specific Question:   Diet:     Answer:   Cardiac [6]       ACTIVITY  As tolerated.  Weight bearing as tolerated    CONSULTATIONS  cardiology    PROCEDURES  none    LABORATORY  Lab Results   Component Value Date    SODIUM 136 02/16/2019    POTASSIUM 3.6 02/16/2019    CHLORIDE 105 02/16/2019    CO2 24 02/16/2019    GLUCOSE 152 (H) 02/16/2019    BUN 14 02/16/2019    CREATININE 0.60 02/16/2019        Lab Results   Component Value Date    WBC 10.1 02/16/2019    HEMOGLOBIN 13.2 (L) 02/16/2019    HEMATOCRIT 41.8 (L) 02/16/2019    PLATELETCT 352 02/16/2019        Total time of the discharge process = 34 minutes.  "

## 2019-02-17 NOTE — PROGRESS NOTES
Discharge Summary  Educated patient on making a appointment with PCP within a week and s/s to look for when returning to the ER. Patient verbalized understanding. PIV removed with no s/s of bleeding or infection at sites. Vitals WNL. Escorted patient downstairs for discharge home.

## 2019-02-17 NOTE — PROGRESS NOTES
Pt arrived from ER on zoll with RN, placed on tele monitor, verified with monitor tech Susel , updated on unit routines, verbalizes understanding.  Educated about heparin gtt, and need for further lab draws. Call light in reach, bed in lowest position.

## 2019-02-17 NOTE — DISCHARGE INSTRUCTIONS
Discharge Instructions    Discharged to home by car with relative. Discharged via wheelchair, hospital escort: Yes.  Special equipment needed: Not Applicable    Be sure to schedule a follow-up appointment with your primary care doctor or any specialists as instructed.     Discharge Plan:   Influenza Vaccine Indication: Patient Refuses    I understand that a diet low in cholesterol, fat, and sodium is recommended for good health. Unless I have been given specific instructions below for another diet, I accept this instruction as my diet prescription.   Other diet: Low sodium, diabetic    Special Instructions:     · Is patient discharged on Warfarin / Coumadin?   No     Depression / Suicide Risk    As you are discharged from this [facility] facility, it is important to learn how to keep safe from harming yourself.    Recognize the warning signs:  · Abrupt changes in personality, positive or negative- including increase in energy   · Giving away possessions  · Change in eating patterns- significant weight changes-  positive or negative  · Change in sleeping patterns- unable to sleep or sleeping all the time   · Unwillingness or inability to communicate  · Depression  · Unusual sadness, discouragement and loneliness  · Talk of wanting to die  · Neglect of personal appearance   · Rebelliousness- reckless behavior  · Withdrawal from people/activities they love  · Confusion- inability to concentrate     If you or a loved one observes any of these behaviors or has concerns about self-harm, here's what you can do:  · Talk about it- your feelings and reasons for harming yourself  · Remove any means that you might use to hurt yourself (examples: pills, rope, extension cords, firearm)  · Get professional help from the community (Mental Health, Substance Abuse, psychological counseling)  · Do not be alone:Call your Safe Contact- someone whom you trust who will be there for you.  · Call your local CRISIS HOTLINE 799-3153 or  884-024-4005  · Call your local Children's Mobile Crisis Response Team [place] (055) 036-2972 or www.[employer]  · Call the toll free National Suicide Prevention Hotlines   · National Suicide Prevention Lifeline 312-280-PJTS (5183)  · National [employer] Line Network 800-SUICIDE (298-8556)

## 2019-02-24 LAB
MYCOBACTERIUM SPEC CULT: NORMAL
RHODAMINE-AURAMINE STN SPEC: NORMAL
SIGNIFICANT IND 70042: NORMAL
SITE SITE: NORMAL
SOURCE SOURCE: NORMAL

## 2019-03-14 ENCOUNTER — OFFICE VISIT (OUTPATIENT)
Dept: CARDIOLOGY | Facility: PHYSICIAN GROUP | Age: 44
End: 2019-03-14
Payer: MEDICAID

## 2019-03-14 VITALS
WEIGHT: 218 LBS | SYSTOLIC BLOOD PRESSURE: 146 MMHG | DIASTOLIC BLOOD PRESSURE: 98 MMHG | HEIGHT: 72 IN | HEART RATE: 82 BPM | BODY MASS INDEX: 29.53 KG/M2 | OXYGEN SATURATION: 99 %

## 2019-03-14 DIAGNOSIS — I25.10 CORONARY ARTERY DISEASE DUE TO LIPID RICH PLAQUE: ICD-10-CM

## 2019-03-14 DIAGNOSIS — Z79.4 TYPE 2 DIABETES MELLITUS WITHOUT COMPLICATION, WITH LONG-TERM CURRENT USE OF INSULIN (HCC): ICD-10-CM

## 2019-03-14 DIAGNOSIS — Z95.1 S/P CABG X 3: ICD-10-CM

## 2019-03-14 DIAGNOSIS — I25.83 CORONARY ARTERY DISEASE DUE TO LIPID RICH PLAQUE: ICD-10-CM

## 2019-03-14 DIAGNOSIS — E11.9 TYPE 2 DIABETES MELLITUS WITHOUT COMPLICATION, WITH LONG-TERM CURRENT USE OF INSULIN (HCC): ICD-10-CM

## 2019-03-14 DIAGNOSIS — I25.5 ISCHEMIC CARDIOMYOPATHY: ICD-10-CM

## 2019-03-14 DIAGNOSIS — D64.9 ANEMIA, UNSPECIFIED TYPE: ICD-10-CM

## 2019-03-14 DIAGNOSIS — E78.2 MIXED HYPERLIPIDEMIA: ICD-10-CM

## 2019-03-14 PROBLEM — J18.9 PNEUMONIA: Status: RESOLVED | Noted: 2018-12-30 | Resolved: 2019-03-14

## 2019-03-14 PROCEDURE — 99214 OFFICE O/P EST MOD 30 MIN: CPT | Performed by: NURSE PRACTITIONER

## 2019-03-14 RX ORDER — HEPARIN SODIUM 1000 [USP'U]/ML
INJECTION, SOLUTION INTRAVENOUS; SUBCUTANEOUS
COMMUNITY
Start: 2019-02-17 | End: 2019-03-14

## 2019-03-14 RX ORDER — GABAPENTIN 100 MG/1
CAPSULE ORAL
COMMUNITY
Start: 2019-03-13 | End: 2019-03-14

## 2019-03-14 RX ORDER — MORPHINE SULFATE 4 MG/ML
INJECTION, SOLUTION INTRAMUSCULAR; INTRAVENOUS
COMMUNITY
Start: 2019-02-17 | End: 2019-03-14

## 2019-03-14 RX ORDER — LISINOPRIL 5 MG/1
TABLET ORAL
Refills: 2 | COMMUNITY
Start: 2019-03-04 | End: 2019-03-14

## 2019-03-14 RX ORDER — BLOOD SUGAR DIAGNOSTIC
STRIP MISCELLANEOUS
Refills: 11 | COMMUNITY
Start: 2019-03-11 | End: 2019-03-14

## 2019-03-14 RX ORDER — GABAPENTIN 100 MG/1
CAPSULE ORAL
Refills: 3 | COMMUNITY
Start: 2019-03-13 | End: 2019-03-14

## 2019-03-14 RX ORDER — INSULIN GLARGINE 100 [IU]/ML
INJECTION, SOLUTION SUBCUTANEOUS
Refills: 11 | COMMUNITY
Start: 2019-03-13 | End: 2019-03-14

## 2019-03-14 RX ORDER — PEN NEEDLE, DIABETIC 32GX 5/32"
NEEDLE, DISPOSABLE MISCELLANEOUS
Refills: 1 | COMMUNITY
Start: 2019-03-04 | End: 2019-03-14

## 2019-03-14 RX ORDER — LANCETS 33 GAUGE
EACH MISCELLANEOUS
Refills: 11 | COMMUNITY
Start: 2019-03-12 | End: 2019-03-14

## 2019-03-14 RX ORDER — LIDOCAINE 50 MG/G
OINTMENT TOPICAL
Refills: 3 | COMMUNITY
Start: 2019-03-04 | End: 2019-03-14

## 2019-03-14 ASSESSMENT — ENCOUNTER SYMPTOMS
BRUISES/BLEEDS EASILY: 0
DIZZINESS: 0
COUGH: 0
WEAKNESS: 0
PND: 0
FEVER: 0
ABDOMINAL PAIN: 0
SHORTNESS OF BREATH: 0
ORTHOPNEA: 0
HEADACHES: 0
PALPITATIONS: 0
CHILLS: 0
LOSS OF CONSCIOUSNESS: 0
MYALGIAS: 0
NAUSEA: 0
INSOMNIA: 0

## 2019-03-14 NOTE — LETTER
[facility] [address]  Phone: [phone]  Fax: [phone]              Rikki Khalil  1975    Encounter Date: 3/14/2019    SHYAM Varela          PROGRESS NOTE:  Chief Complaint   Patient presents with   • Hospital Follow-up   • Hypoglycemia   • Diabetes Mellitus   • Coronary Artery Disease   • Cardiomyopathy (Ischemic)   • HTN (Controlled)   • Hyperlipidemia       Subjective:   Rikki Khalil is a 43 y.o. male who presents today for hospital follow-up of hypoglycemic reaction.    Rikki is a 43 year old male with history of SVT and diabetes. In late December 2018, he had CABG x 3 done. Echocardiogram showed LVEF 25-30%. Medical therapy was adjusted to ASA, Plavix, BB, ACE and statin. He has had a rather slow recovery, mostly due to difficulty getting DM under control.  He was last seen by me in [place] on 2/7/2019.    About a week later, he had severe weakness and dizziness, and was taken to [facility], where he was admitted for low glucose levels. His medications were adjusted, and he did fine. Echocardiogram showed LVEF 35% and carotid US showed mild plaque bilaterally. Troponins levels were normal. He stabilized and was discharged home.    He is here today for follow-up. He is feeling much better, although glucose is running 150-300+, and insulin was recently readjusted. No chest pain, pressure or discomfort; no palpitations; no shortness of breath, orthopnea or PND; no lightheadedness or dizziness; no syncope. No LE edema. He has stopped smoking. His sternotomy incision is all healed. 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    Past Medical History:   Diagnosis Date   • Arthritis     All joints   • Back pain    • Bowel obstruction (HCC)    • CAD (coronary artery disease) 12/2018    CABG x 3 (LIMA to distal LAD, rSVG to distal diagonal, rSVG to distal RCA) by   Denis.   • Glaucoma     Bilateral eyes   • Hyperlipidemia    • Ischemic cardiomyopathy 12/2018    Echocardiogram with LVEF 25-30%. Global hypokinesis. Moderately dilated RV. Mild TR. February 2019: Echocardiogram with normal LV size, LVEF 35%. Trace MR, trace TR.   • NSTEMI (non-ST elevated myocardial infarction) ([facility]) 12/27/2018   • Other depressive disorder 1/16/2019   • Pneumonia 12/30/2018   • Scoliosis    • SVT (supraventricular tachycardia) ([facility])    • Systolic congestive heart failure ([facility])    • Type II or unspecified type diabetes mellitus without mention of complication, not stated as uncontrolled     Insulin and oral meds     Past Surgical History:   Procedure Laterality Date   • MULTIPLE CORONARY ARTERY BYPASS ENDO VEIN HARVEST  12/28/2018    Procedure: MULTIPLE CORONARY ARTERY BYPASS x 3,  ENDO VEIN HARVEST LEFT LEG;  Surgeon: Tomas Barrios M.D.;  Location: [facility];  Service: Cardiothoracic   • DONNA  12/28/2018    Procedure: DONNA;  Surgeon: Tomas Barrios M.D.;  Location: [facility];  Service: Cardiothoracic   • ILEOSTOMY  9/29/2015    Procedure: ILEOSTOMY TAKE DOWN;  Surgeon: Carter Kumar M.D.;  Location: [facility];  Service:    • LOW ANTERIOR RESECTION ROBOTIC XI  7/14/2015    Procedure: Robotic low anterior resection, takedown enterocutaneous fistula, omental flap, ileostomy creation;  Surgeon: Carter Kumar M.D.;  Location: [facility];  Service:    • COLOSTOMY CREATION LAPAROSCOPIC  7/14/2015    Procedure: COLOSTOMY CREATION LAPAROSCOPIC FOR: LAP ILEOSTOMY;  Surgeon: Carter Kumar M.D.;  Location: [facility];  Service:      Family History   Problem Relation Age of Onset   • Hypertension Mother    • Heart Disease Mother    • Stroke Mother    • No Known Problems Father      Social History     Social History   • Marital status:      Spouse name: N/A   • Number of children: N/A   • Years of education: N/A      "    Occupational History   • Not on file.     Social History Main Topics   • Smoking status: Former Smoker     Packs/day: 1.50     Years: 24.00     Types: Cigarettes     Quit date: 2018   • Smokeless tobacco: Never Used      Comment: HAS RECENTLY QUIT   • Alcohol use No   • Drug use: Yes     Types: Inhaled      Comment: marijuana daily   • Sexual activity: Not on file     Other Topics Concern   • Not on file     Social History Narrative   • No narrative on file     Allergies   Allergen Reactions   • Penicillins Unspecified     \"Blue baby\"  RXN= as a child  Tolerated ceftriaxone 6/2015   • Fentanyl      Hallucinations     Outpatient Encounter Prescriptions as of 3/14/2019   Medication Sig Dispense Refill   • insulin glargine (LANTUS) 100 UNIT/ML Solution Inject 50 Units as instructed every evening.     • lisinopril (PRINIVIL) 2.5 MG Tab Take 2.5 mg by mouth every day.     • metFORMIN ER (GLUCOPHAGE XR) 500 MG TABLET SR 24 HR Take 1,000 mg by mouth every day.     • metoprolol (LOPRESSOR) 50 MG Tab Take 1 Tab by mouth 2 times a day. 60 Tab 3   • HUMALOG KWIKPEN 100 UNIT/ML Solution Pen-injector injection Inject 10-15 Units as instructed 3 times a day before meals.     • aspirin EC 81 MG EC tablet Take 1 Tab by mouth every day. 30 Tab    • gabapentin (NEURONTIN) 100 MG Cap Take 1 Cap by mouth 3 times a day. 90 Cap 1   • atorvastatin (LIPITOR) 80 MG tablet Take 1 Tab by mouth every day. 30 Tab 2   • clopidogrel (PLAVIX) 75 MG Tab Take 1 Tab by mouth every day. 30 Tab 2   • [DISCONTINUED] gabapentin (NEURONTIN) 100 MG Cap      • [DISCONTINUED] gabapentin (NEURONTIN) 100 MG Cap   3   • [DISCONTINUED] ONETOUCH VERIO strip   11   • [DISCONTINUED] heparin 50 units/ml Solution      • [DISCONTINUED] heparin 1000 units/mL Solution      • [DISCONTINUED] LANTUS SOLOSTAR 100 UNIT/ML Solution Pen-injector injection   11   • [DISCONTINUED] GLOBAL EASE INJECT PEN NEEDLES   1   • [DISCONTINUED] ONETOUCH DELICA LANCETS 33G Misc   11   • " [DISCONTINUED] lidocaine (XYLOCAINE) 5 % Ointment   3   • [DISCONTINUED] lisinopril (PRINIVIL) 5 MG Tab   2   • [DISCONTINUED] Morphine Sulfate (MORPHINE, PF, 4 MG/ML) 4 MG/ML Solution        No facility-administered encounter medications on file as of 3/14/2019.      Review of Systems   Constitutional: Negative for chills, fever and malaise/fatigue.        Energy level is much better, anxious to return to work.   HENT: Negative for congestion.    Respiratory: Negative for cough and shortness of breath.    Cardiovascular: Negative for chest pain, palpitations, orthopnea, leg swelling and PND.   Gastrointestinal: Negative for abdominal pain and nausea.   Musculoskeletal: Negative for myalgias.   Skin: Negative for rash.   Neurological: Negative for dizziness, loss of consciousness, weakness and headaches.   Endo/Heme/Allergies: Does not bruise/bleed easily.   Psychiatric/Behavioral: The patient does not have insomnia.         Objective:   /98 (BP Location: Left arm, Patient Position: Sitting, BP Cuff Size: Adult)   Pulse 82   Ht 1.829 m (6')   Wt 98.9 kg (218 lb)   SpO2 99%   BMI 29.57 kg/m²      Physical Exam   Constitutional: He is oriented to person, place, and time. He appears well-developed and well-nourished.   HENT:   Head: Normocephalic.   Eyes: EOM are normal.   Neck: Normal range of motion. Neck supple. No JVD present.   Cardiovascular: Normal rate, regular rhythm and normal heart sounds.    HR 80-85bpm.   Pulmonary/Chest: Effort normal and breath sounds normal. No respiratory distress. He has no wheezes. He has no rales.   Sternotomy incision is all healed.   Abdominal: Soft. Bowel sounds are normal. He exhibits no distension. There is no tenderness.   Musculoskeletal: Normal range of motion. He exhibits no edema.   Neurological: He is alert and oriented to person, place, and time.   Skin: Skin is warm and dry. No rash noted. No pallor.   Psychiatric: He has a normal mood and affect.          CONCLUSIONS OF ECHOCARDIOGRAM OF 2/17/2019:  Compared to the images of the study done on 12/31/2018 there has been   no significant changes.  Left ventricular ejection fraction is visually estimated to be 35%.  Estimated right ventricular systolic pressure  is 30 mmHg.    CONCLUSIONS OF CAROTID ULTRASOUND OF 2/16/2019:   Mild bilateral internal carotid artery stenosis (<50%).    Antegrade flow, bilateral vertebral arteries.     Lab Results   Component Value Date/Time    WBC 10.1 02/16/2019 11:44 PM    RBC 4.47 (L) 02/16/2019 11:44 PM    HEMOGLOBIN 13.2 (L) 02/16/2019 11:44 PM    HEMATOCRIT 41.8 (L) 02/16/2019 11:44 PM    MCV 93.5 02/16/2019 11:44 PM    MCH 29.5 02/16/2019 11:44 PM    MCHC 31.6 (L) 02/16/2019 11:44 PM    MPV 10.6 02/16/2019 11:44 PM      Lab Results   Component Value Date/Time    SODIUM 136 02/16/2019 11:44 PM    POTASSIUM 3.6 02/16/2019 11:44 PM    CHLORIDE 105 02/16/2019 11:44 PM    CO2 24 02/16/2019 11:44 PM    GLUCOSE 152 (H) 02/16/2019 11:44 PM    BUN 14 02/16/2019 11:44 PM    CREATININE 0.60 02/16/2019 11:44 PM       Assessment:     1. Coronary artery disease due to lipid rich plaque  Comp Metabolic Panel    Lipid Profile    EC-ECHOCARDIOGRAM COMPLETE W/O CONT   2. Ischemic cardiomyopathy  EC-ECHOCARDIOGRAM COMPLETE W/O CONT   3. S/P CABG x 3  EC-ECHOCARDIOGRAM COMPLETE W/O CONT   4. Mixed hyperlipidemia  Comp Metabolic Panel    Lipid Profile   5. Type 2 diabetes mellitus without complication, with long-term current use of insulin (HCC)  Comp Metabolic Panel    HEMOGLOBIN A1C   6. Anemia, unspecified type  CBC WITH DIFFERENTIAL       Medical Decision Making:  Today's Assessment / Status / Plan:     1. CAD/ischemic cardiomyopathy with LVEF 35%, status post CABG x 3 in December 2018. Discussed possible referral to EP to consider AICD; patient would like to wait until June 2019 to recheck echocardiogram. In the meantime, can stop Plavix at the end of the month; continue ASA, BB, ACE and  statin.    2. Hyperlipidemia, treated with Lipitor. Recheck fasting CMP and lipid panel.    3. Diabetes mellitus, treated, slowly getting better control.    4. Anemia, to recheck CBC.    5. History of SVT, stable on Metoprolol.    Plan as above: repeat echo in 3 months, continue medical therapy. Labs as above. FU with me in 3 months. FU sooner if clinical condition changes. 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    Collaborating MD: TK Queen      No Recipients

## 2019-03-14 NOTE — LETTER
2019      RE:  Rikki Khalil ( 1975)  [address]  [facility] [address]        To Whom It May Concern:    Rikki is able to return to work full time, without any restrictions.    If you have any questions or concerns, please don't hesitate to call.        Sincerely,        CARLEY Varela.    Electronically Signed

## 2019-03-14 NOTE — PROGRESS NOTES
Chief Complaint   Patient presents with   • Hospital Follow-up   • Hypoglycemia   • Diabetes Mellitus   • Coronary Artery Disease   • Cardiomyopathy (Ischemic)   • HTN (Controlled)   • Hyperlipidemia       Subjective:   Rikki Khalil is a 43 y.o. male who presents today for hospital follow-up of hypoglycemic reaction.    Rikki is a 43 year old male with history of SVT and diabetes. In late December 2018, he had CABG x 3 done. Echocardiogram showed LVEF 25-30%. Medical therapy was adjusted to ASA, Plavix, BB, ACE and statin. He has had a rather slow recovery, mostly due to difficulty getting DM under control.  He was last seen by me in [place] on 2/7/2019.    About a week later, he had severe weakness and dizziness, and was taken to [facility], where he was admitted for low glucose levels. His medications were adjusted, and he did fine. Echocardiogram showed LVEF 35% and carotid US showed mild plaque bilaterally. Troponins levels were normal. He stabilized and was discharged home.    He is here today for follow-up. He is feeling much better, although glucose is running 150-300+, and insulin was recently readjusted. No chest pain, pressure or discomfort; no palpitations; no shortness of breath, orthopnea or PND; no lightheadedness or dizziness; no syncope. No LE edema. He has stopped smoking. His sternotomy incision is all healed. 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    Past Medical History:   Diagnosis Date   • Arthritis     All joints   • Back pain    • Bowel obstruction (HCC)    • CAD (coronary artery disease) 12/2018    CABG x 3 (LIMA to distal LAD, rSVG to distal diagonal, rSVG to distal RCA) by Dr. Barrios.   • Glaucoma     Bilateral eyes   • Hyperlipidemia    • Ischemic cardiomyopathy 12/2018    Echocardiogram with LVEF 25-30%. Global hypokinesis. Moderately dilated RV. Mild TR. February 2019: Echocardiogram with normal LV size, LVEF 35%. Trace MR, trace TR.   •  NSTEMI (non-ST elevated myocardial infarction) (Formerly [facility]) 12/27/2018   • Other depressive disorder 1/16/2019   • Pneumonia 12/30/2018   • Scoliosis    • SVT (supraventricular tachycardia) (Former[facility])    • Systolic congestive heart failure ([facility])    • Type II or unspecified type diabetes mellitus without mention of complication, not stated as uncontrolled     Insulin and oral meds     Past Surgical History:   Procedure Laterality Date   • MULTIPLE CORONARY ARTERY BYPASS ENDO VEIN HARVEST  12/28/2018    Procedure: MULTIPLE CORONARY ARTERY BYPASS x 3,  ENDO VEIN HARVEST LEFT LEG;  Surgeon: Tomas Barrios M.D.;  Location: SURGERY [facility];  Service: Cardiothoracic   • DONNA  12/28/2018    Procedure: DONNA;  Surgeon: Tomas Barrios M.D.;  Location: SURGERY [facility];  Service: Cardiothoracic   • ILEOSTOMY  9/29/2015    Procedure: ILEOSTOMY TAKE DOWN;  Surgeon: Carter Kumar M.D.;  Location: SURGERY [facility];  Service:    • LOW ANTERIOR RESECTION ROBOTIC XI  7/14/2015    Procedure: Robotic low anterior resection, takedown enterocutaneous fistula, omental flap, ileostomy creation;  Surgeon: Carter Kumar M.D.;  Location: SURGERY [facility];  Service:    • COLOSTOMY CREATION LAPAROSCOPIC  7/14/2015    Procedure: COLOSTOMY CREATION LAPAROSCOPIC FOR: LAP ILEOSTOMY;  Surgeon: Carter Kumar M.D.;  Location: [facility];  Service:      Family History   Problem Relation Age of Onset   • Hypertension Mother    • Heart Disease Mother    • Stroke Mother    • No Known Problems Father      Social History     Social History   • Marital status:      Spouse name: N/A   • Number of children: N/A   • Years of education: N/A     Occupational History   • Not on file.     Social History Main Topics   • Smoking status: Former Smoker     Packs/day: 1.50     Years: 24.00     Types: Cigarettes     Quit date: 2018   • Smokeless tobacco: Never Used      Comment: HAS RECENTLY QUIT   • Alcohol use No   • Drug  "use: Yes     Types: Inhaled      Comment: marijuana daily   • Sexual activity: Not on file     Other Topics Concern   • Not on file     Social History Narrative   • No narrative on file     Allergies   Allergen Reactions   • Penicillins Unspecified     \"Blue baby\"  RXN= as a child  Tolerated ceftriaxone 6/2015   • Fentanyl      Hallucinations     Outpatient Encounter Prescriptions as of 3/14/2019   Medication Sig Dispense Refill   • insulin glargine (LANTUS) 100 UNIT/ML Solution Inject 50 Units as instructed every evening.     • lisinopril (PRINIVIL) 2.5 MG Tab Take 2.5 mg by mouth every day.     • metFORMIN ER (GLUCOPHAGE XR) 500 MG TABLET SR 24 HR Take 1,000 mg by mouth every day.     • metoprolol (LOPRESSOR) 50 MG Tab Take 1 Tab by mouth 2 times a day. 60 Tab 3   • HUMALOG KWIKPEN 100 UNIT/ML Solution Pen-injector injection Inject 10-15 Units as instructed 3 times a day before meals.     • aspirin EC 81 MG EC tablet Take 1 Tab by mouth every day. 30 Tab    • gabapentin (NEURONTIN) 100 MG Cap Take 1 Cap by mouth 3 times a day. 90 Cap 1   • atorvastatin (LIPITOR) 80 MG tablet Take 1 Tab by mouth every day. 30 Tab 2   • clopidogrel (PLAVIX) 75 MG Tab Take 1 Tab by mouth every day. 30 Tab 2   • [DISCONTINUED] gabapentin (NEURONTIN) 100 MG Cap      • [DISCONTINUED] gabapentin (NEURONTIN) 100 MG Cap   3   • [DISCONTINUED] ONETOUCH VERIO strip   11   • [DISCONTINUED] heparin 50 units/ml Solution      • [DISCONTINUED] heparin 1000 units/mL Solution      • [DISCONTINUED] LANTUS SOLOSTAR 100 UNIT/ML Solution Pen-injector injection   11   • [DISCONTINUED] GLOBAL EASE INJECT PEN NEEDLES   1   • [DISCONTINUED] ONETOUCH DELICA LANCETS 33G Misc   11   • [DISCONTINUED] lidocaine (XYLOCAINE) 5 % Ointment   3   • [DISCONTINUED] lisinopril (PRINIVIL) 5 MG Tab   2   • [DISCONTINUED] Morphine Sulfate (MORPHINE, PF, 4 MG/ML) 4 MG/ML Solution        No facility-administered encounter medications on file as of 3/14/2019.      Review of " Systems   Constitutional: Negative for chills, fever and malaise/fatigue.        Energy level is much better, anxious to return to work.   HENT: Negative for congestion.    Respiratory: Negative for cough and shortness of breath.    Cardiovascular: Negative for chest pain, palpitations, orthopnea, leg swelling and PND.   Gastrointestinal: Negative for abdominal pain and nausea.   Musculoskeletal: Negative for myalgias.   Skin: Negative for rash.   Neurological: Negative for dizziness, loss of consciousness, weakness and headaches.   Endo/Heme/Allergies: Does not bruise/bleed easily.   Psychiatric/Behavioral: The patient does not have insomnia.         Objective:   /98 (BP Location: Left arm, Patient Position: Sitting, BP Cuff Size: Adult)   Pulse 82   Ht 1.829 m (6')   Wt 98.9 kg (218 lb)   SpO2 99%   BMI 29.57 kg/m²     Physical Exam   Constitutional: He is oriented to person, place, and time. He appears well-developed and well-nourished.   HENT:   Head: Normocephalic.   Eyes: EOM are normal.   Neck: Normal range of motion. Neck supple. No JVD present.   Cardiovascular: Normal rate, regular rhythm and normal heart sounds.    HR 80-85bpm.   Pulmonary/Chest: Effort normal and breath sounds normal. No respiratory distress. He has no wheezes. He has no rales.   Sternotomy incision is all healed.   Abdominal: Soft. Bowel sounds are normal. He exhibits no distension. There is no tenderness.   Musculoskeletal: Normal range of motion. He exhibits no edema.   Neurological: He is alert and oriented to person, place, and time.   Skin: Skin is warm and dry. No rash noted. No pallor.   Psychiatric: He has a normal mood and affect.     CONCLUSIONS OF ECHOCARDIOGRAM OF 2/17/2019:  Compared to the images of the study done on 12/31/2018 there has been   no significant changes.  Left ventricular ejection fraction is visually estimated to be 35%.  Estimated right ventricular systolic pressure  is 30 mmHg.    CONCLUSIONS OF  CAROTID ULTRASOUND OF 2/16/2019:   Mild bilateral internal carotid artery stenosis (<50%).    Antegrade flow, bilateral vertebral arteries.     Lab Results   Component Value Date/Time    WBC 10.1 02/16/2019 11:44 PM    RBC 4.47 (L) 02/16/2019 11:44 PM    HEMOGLOBIN 13.2 (L) 02/16/2019 11:44 PM    HEMATOCRIT 41.8 (L) 02/16/2019 11:44 PM    MCV 93.5 02/16/2019 11:44 PM    MCH 29.5 02/16/2019 11:44 PM    MCHC 31.6 (L) 02/16/2019 11:44 PM    MPV 10.6 02/16/2019 11:44 PM      Lab Results   Component Value Date/Time    SODIUM 136 02/16/2019 11:44 PM    POTASSIUM 3.6 02/16/2019 11:44 PM    CHLORIDE 105 02/16/2019 11:44 PM    CO2 24 02/16/2019 11:44 PM    GLUCOSE 152 (H) 02/16/2019 11:44 PM    BUN 14 02/16/2019 11:44 PM    CREATININE 0.60 02/16/2019 11:44 PM       Assessment:     1. Coronary artery disease due to lipid rich plaque  Comp Metabolic Panel    Lipid Profile    EC-ECHOCARDIOGRAM COMPLETE W/O CONT   2. Ischemic cardiomyopathy  EC-ECHOCARDIOGRAM COMPLETE W/O CONT   3. S/P CABG x 3  EC-ECHOCARDIOGRAM COMPLETE W/O CONT   4. Mixed hyperlipidemia  Comp Metabolic Panel    Lipid Profile   5. Type 2 diabetes mellitus without complication, with long-term current use of insulin (HCC)  Comp Metabolic Panel    HEMOGLOBIN A1C   6. Anemia, unspecified type  CBC WITH DIFFERENTIAL       Medical Decision Making:  Today's Assessment / Status / Plan:     1. CAD/ischemic cardiomyopathy with LVEF 35%, status post CABG x 3 in December 2018. Discussed possible referral to EP to consider AICD; patient would like to wait until June 2019 to recheck echocardiogram. In the meantime, can stop Plavix at the end of the month; continue ASA, BB, ACE and statin.    2. Hyperlipidemia, treated with Lipitor. Recheck fasting CMP and lipid panel.    3. Diabetes mellitus, treated, slowly getting better control.    4. Anemia, to recheck CBC.    5. History of SVT, stable on Metoprolol.    Plan as above: repeat echo in 3 months, continue medical therapy.  Labs as above. FU with me in 3 months. FU sooner if clinical condition changes. 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    Collaborating MD: TK Queen

## 2019-05-16 ENCOUNTER — OFFICE VISIT (OUTPATIENT)
Dept: CARDIOLOGY | Facility: PHYSICIAN GROUP | Age: 44
End: 2019-05-16
Payer: MEDICAID

## 2019-05-16 VITALS
HEIGHT: 72 IN | BODY MASS INDEX: 31.29 KG/M2 | OXYGEN SATURATION: 96 % | DIASTOLIC BLOOD PRESSURE: 78 MMHG | SYSTOLIC BLOOD PRESSURE: 124 MMHG | WEIGHT: 231 LBS | HEART RATE: 84 BPM

## 2019-05-16 DIAGNOSIS — I25.5 ISCHEMIC CARDIOMYOPATHY: ICD-10-CM

## 2019-05-16 DIAGNOSIS — I25.83 CORONARY ARTERY DISEASE DUE TO LIPID RICH PLAQUE: ICD-10-CM

## 2019-05-16 DIAGNOSIS — Z79.4 TYPE 2 DIABETES MELLITUS WITHOUT COMPLICATION, WITH LONG-TERM CURRENT USE OF INSULIN (HCC): ICD-10-CM

## 2019-05-16 DIAGNOSIS — Z95.1 S/P CABG X 3: ICD-10-CM

## 2019-05-16 DIAGNOSIS — E11.9 TYPE 2 DIABETES MELLITUS WITHOUT COMPLICATION, WITH LONG-TERM CURRENT USE OF INSULIN (HCC): ICD-10-CM

## 2019-05-16 DIAGNOSIS — I25.10 CORONARY ARTERY DISEASE DUE TO LIPID RICH PLAQUE: ICD-10-CM

## 2019-05-16 PROCEDURE — 99214 OFFICE O/P EST MOD 30 MIN: CPT | Performed by: NURSE PRACTITIONER

## 2019-05-16 RX ORDER — METFORMIN HYDROCHLORIDE 500 MG/1
1000 TABLET, EXTENDED RELEASE ORAL DAILY
Qty: 90 TAB | Refills: 3 | Status: SHIPPED | OUTPATIENT
Start: 2019-05-16 | End: 2020-02-27

## 2019-05-16 RX ORDER — ATORVASTATIN CALCIUM 80 MG/1
80 TABLET, FILM COATED ORAL DAILY
Qty: 90 TAB | Refills: 3 | Status: SHIPPED | OUTPATIENT
Start: 2019-05-16 | End: 2020-10-29 | Stop reason: SDUPTHER

## 2019-05-16 RX ORDER — METOPROLOL TARTRATE 50 MG/1
50 TABLET, FILM COATED ORAL 2 TIMES DAILY
Qty: 180 TAB | Refills: 3 | Status: SHIPPED | OUTPATIENT
Start: 2019-05-16 | End: 2020-07-29 | Stop reason: SDUPTHER

## 2019-05-16 RX ORDER — LISINOPRIL 2.5 MG/1
2.5 TABLET ORAL DAILY
Qty: 90 TAB | Refills: 3 | Status: SHIPPED | OUTPATIENT
Start: 2019-05-16 | End: 2020-07-29 | Stop reason: SDUPTHER

## 2019-05-16 ASSESSMENT — ENCOUNTER SYMPTOMS
PALPITATIONS: 0
WEAKNESS: 0
MYALGIAS: 0
INSOMNIA: 0
FEVER: 0
NAUSEA: 0
BRUISES/BLEEDS EASILY: 0
LOSS OF CONSCIOUSNESS: 0
CHILLS: 0
HEADACHES: 0
DIZZINESS: 0
ORTHOPNEA: 0
SHORTNESS OF BREATH: 0
PND: 0
ABDOMINAL PAIN: 0
COUGH: 0

## 2019-05-16 NOTE — PROGRESS NOTES
Chief Complaint   Patient presents with   • Follow-Up   • Coronary Artery Disease   • Cardiomyopathy (Ischemic)   • Supraventricular Tachycardia (SVT)   • Hyperlipidemia   • Diabetes (Controlled)       Subjective:   Rikki Khalil is a 43 y.o. male who presents today for two month follow-up of CAD/ischemic cardiomyopathy.    Rikki is a 43 year old male with history of SVT and diabetes. In December 2018, he had CABG x 3 done. Echocardiogram showed LVEF 25-30%. Medical therapy was adjusted to ASA, Plavix, BB, ACE and statin. He had a rather slow recovery, mostly due to difficulty getting DM under control.  About a week later, he had severe weakness and dizziness, and was taken to [facility], where he was admitted for low glucose levels. His medications were adjusted, and he did fine. Echocardiogram showed LVEF 35% and carotid US showed mild plaque bilaterally.    At follow-up with me two months ago, he was doing better. He was released to return to work, which has been going well. He does still occasionally smoke. Glucose is much better controlled.  No chest pain, pressure or discomfort; no palpitations; no shortness of breath, orthopnea or PND; no lightheadedness or dizziness; no syncope. No LE edema. His incisions are all healed. He feels quite good.    Past Medical History:   Diagnosis Date   • Arthritis     All joints   • Back pain    • Bowel obstruction ([facility])    • CAD (coronary artery disease) 12/2018    CABG x 3 (LIMA to distal LAD, rSVG to distal diagonal, rSVG to distal RCA) by Dr. Barrios.   • Glaucoma     Bilateral eyes   • Hyperlipidemia    • Ischemic cardiomyopathy 12/2018    Echocardiogram with LVEF 25-30%. Global hypokinesis. Moderately dilated RV. Mild TR. February 2019: Echocardiogram with normal LV size, LVEF 35%. Trace MR, trace TR.   • NSTEMI (non-ST elevated myocardial infarction) ([facility]) 12/27/2018   • Other depressive disorder 1/16/2019   • Pneumonia 12/30/2018   • Scoliosis    • SVT (supraventricular  tachycardia) (HCC)    • Systolic congestive heart failure (HCC)    • Type II or unspecified type diabetes mellitus without mention of complication, not stated as uncontrolled     Insulin and oral meds     Past Surgical History:   Procedure Laterality Date   • MULTIPLE CORONARY ARTERY BYPASS ENDO VEIN HARVEST  12/28/2018    Procedure: MULTIPLE CORONARY ARTERY BYPASS x 3,  ENDO VEIN HARVEST LEFT LEG;  Surgeon: Tomas Barrios M.D.;  Location: SURGERY [facility];  Service: Cardiothoracic   • DONNA  12/28/2018    Procedure: DONNA;  Surgeon: Tomas Barrios M.D.;  Location: SURGERY [facility];  Service: Cardiothoracic   • ILEOSTOMY  9/29/2015    Procedure: ILEOSTOMY TAKE DOWN;  Surgeon: Carter Kumar M.D.;  Location: SURGERY [facility];  Service:    • LOW ANTERIOR RESECTION ROBOTIC XI  7/14/2015    Procedure: Robotic low anterior resection, takedown enterocutaneous fistula, omental flap, ileostomy creation;  Surgeon: Carter Kumar M.D.;  Location: SURGERY [facility];  Service:    • COLOSTOMY CREATION LAPAROSCOPIC  7/14/2015    Procedure: COLOSTOMY CREATION LAPAROSCOPIC FOR: LAP ILEOSTOMY;  Surgeon: Carter Kumar M.D.;  Location: SURGERY [facility];  Service:      Family History   Problem Relation Age of Onset   • Hypertension Mother    • Heart Disease Mother    • Stroke Mother    • No Known Problems Father      Social History     Social History   • Marital status:      Spouse name: N/A   • Number of children: N/A   • Years of education: N/A     Occupational History   • Not on file.     Social History Main Topics   • Smoking status: Former Smoker     Packs/day: 1.50     Years: 24.00     Types: Cigarettes     Quit date: 2018   • Smokeless tobacco: Never Used      Comment: HAS RECENTLY QUIT   • Alcohol use No   • Drug use: Yes     Types: Inhaled      Comment: marijuana daily   • Sexual activity: Not on file     Other Topics Concern   • Not on file     Social History Narrative   • No  "narrative on file     Allergies   Allergen Reactions   • Penicillins Unspecified     \"Blue baby\"  RXN= as a child  Tolerated ceftriaxone 6/2015   • Fentanyl      Hallucinations     Outpatient Encounter Prescriptions as of 5/16/2019   Medication Sig Dispense Refill   • atorvastatin (LIPITOR) 80 MG tablet Take 1 Tab by mouth every day. 90 Tab 3   • lisinopril (PRINIVIL) 2.5 MG Tab Take 1 Tab by mouth every day. 90 Tab 3   • metFORMIN ER (GLUCOPHAGE XR) 500 MG TABLET SR 24 HR Take 2 Tabs by mouth every day. 90 Tab 3   • metoprolol (LOPRESSOR) 50 MG Tab Take 1 Tab by mouth 2 times a day. 180 Tab 3   • insulin glargine (LANTUS) 100 UNIT/ML Solution Inject 50 Units as instructed every evening.     • HUMALOG KWIKPEN 100 UNIT/ML Solution Pen-injector injection Inject 10-15 Units as instructed 3 times a day before meals.     • aspirin EC 81 MG EC tablet Take 1 Tab by mouth every day. 30 Tab    • gabapentin (NEURONTIN) 100 MG Cap Take 1 Cap by mouth 3 times a day. 90 Cap 1   • [DISCONTINUED] lisinopril (PRINIVIL) 2.5 MG Tab Take 2.5 mg by mouth every day.     • [DISCONTINUED] metFORMIN ER (GLUCOPHAGE XR) 500 MG TABLET SR 24 HR Take 1,000 mg by mouth every day.     • [DISCONTINUED] metoprolol (LOPRESSOR) 50 MG Tab Take 1 Tab by mouth 2 times a day. 60 Tab 3   • [DISCONTINUED] atorvastatin (LIPITOR) 80 MG tablet Take 1 Tab by mouth every day. 30 Tab 2   • [DISCONTINUED] clopidogrel (PLAVIX) 75 MG Tab Take 1 Tab by mouth every day. 30 Tab 2     No facility-administered encounter medications on file as of 5/16/2019.      Review of Systems   Constitutional: Negative for chills, fever and malaise/fatigue.   HENT: Negative for congestion.    Respiratory: Negative for cough and shortness of breath.    Cardiovascular: Negative for chest pain, palpitations, orthopnea, leg swelling and PND.   Gastrointestinal: Negative for abdominal pain and nausea.   Musculoskeletal: Negative for myalgias.   Skin: Negative for rash.   Neurological: " Negative for dizziness, loss of consciousness, weakness and headaches.   Endo/Heme/Allergies: Does not bruise/bleed easily.   Psychiatric/Behavioral: The patient does not have insomnia.         Objective:   /78 (BP Location: Left arm, Patient Position: Sitting, BP Cuff Size: Adult)   Pulse 84   Ht 1.829 m (6')   Wt 104.8 kg (231 lb)   SpO2 96%   BMI 31.33 kg/m²     Physical Exam   Constitutional: He is oriented to person, place, and time. He appears well-developed and well-nourished.   HENT:   Head: Normocephalic.   Eyes: EOM are normal.   Neck: Normal range of motion. Neck supple. No JVD present.   Cardiovascular: Normal rate, regular rhythm and normal heart sounds.    HR 80-85bpm.   Pulmonary/Chest: Effort normal and breath sounds normal. No respiratory distress. He has no wheezes. He has no rales.   Sternotomy incision is all healed.   Abdominal: Soft. Bowel sounds are normal. He exhibits no distension. There is no tenderness.   Musculoskeletal: Normal range of motion. He exhibits no edema.   Neurological: He is alert and oriented to person, place, and time.   Skin: Skin is warm and dry. No rash noted. No pallor.   Psychiatric: He has a normal mood and affect.       Assessment:     1. Coronary artery disease due to lipid rich plaque  atorvastatin (LIPITOR) 80 MG tablet    lisinopril (PRINIVIL) 2.5 MG Tab    metoprolol (LOPRESSOR) 50 MG Tab   2. Ischemic cardiomyopathy     3. S/P CABG x 3  metoprolol (LOPRESSOR) 50 MG Tab   4. Type 2 diabetes mellitus without complication, with long-term current use of insulin (HCC)  metFORMIN ER (GLUCOPHAGE XR) 500 MG TABLET SR 24 HR       Medical Decision Making:  Today's Assessment / Status / Plan:     1. CAD/ischemic cardiomyopathy, status post CABG x 3, with LVEF 35% on last echo. He is due to have another echo done next month; depending on results, either continue medical therapy or refer to EP for consideration of AICD.    2. Diabetes mellitus, treated and  improved.    3. Hyperlipidemia, treated.    Same medications for now, including ASA, statin, BB and ACEI. Keep echo appointment next month, as well as FU with me.    Collaborating MD: TK Queen

## 2019-06-20 ENCOUNTER — OFFICE VISIT (OUTPATIENT)
Dept: CARDIOLOGY | Facility: PHYSICIAN GROUP | Age: 44
End: 2019-06-20
Payer: MEDICAID

## 2019-06-20 VITALS
SYSTOLIC BLOOD PRESSURE: 118 MMHG | DIASTOLIC BLOOD PRESSURE: 80 MMHG | OXYGEN SATURATION: 96 % | HEIGHT: 72 IN | WEIGHT: 221 LBS | BODY MASS INDEX: 29.93 KG/M2 | HEART RATE: 84 BPM

## 2019-06-20 DIAGNOSIS — E11.9 TYPE 2 DIABETES MELLITUS WITHOUT COMPLICATION, WITH LONG-TERM CURRENT USE OF INSULIN (HCC): ICD-10-CM

## 2019-06-20 DIAGNOSIS — Z95.1 S/P CABG X 3: ICD-10-CM

## 2019-06-20 DIAGNOSIS — I25.83 CORONARY ARTERY DISEASE DUE TO LIPID RICH PLAQUE: ICD-10-CM

## 2019-06-20 DIAGNOSIS — I50.21 ACUTE SYSTOLIC CONGESTIVE HEART FAILURE (HCC): ICD-10-CM

## 2019-06-20 DIAGNOSIS — E78.2 MIXED HYPERLIPIDEMIA: ICD-10-CM

## 2019-06-20 DIAGNOSIS — I25.10 CORONARY ARTERY DISEASE DUE TO LIPID RICH PLAQUE: ICD-10-CM

## 2019-06-20 DIAGNOSIS — I25.5 ISCHEMIC CARDIOMYOPATHY: ICD-10-CM

## 2019-06-20 DIAGNOSIS — Z79.4 TYPE 2 DIABETES MELLITUS WITHOUT COMPLICATION, WITH LONG-TERM CURRENT USE OF INSULIN (HCC): ICD-10-CM

## 2019-06-20 PROBLEM — R79.89 ELEVATED TROPONIN: Status: RESOLVED | Noted: 2019-02-16 | Resolved: 2019-06-20

## 2019-06-20 PROCEDURE — 99214 OFFICE O/P EST MOD 30 MIN: CPT | Performed by: NURSE PRACTITIONER

## 2019-06-20 RX ORDER — LANCETS 33 GAUGE
EACH MISCELLANEOUS
Refills: 11 | COMMUNITY
Start: 2019-06-14 | End: 2019-06-20

## 2019-06-20 RX ORDER — BLOOD SUGAR DIAGNOSTIC
STRIP MISCELLANEOUS
Refills: 11 | COMMUNITY
Start: 2019-06-14 | End: 2019-06-20

## 2019-06-20 RX ORDER — BUSPIRONE HYDROCHLORIDE 5 MG/1
TABLET ORAL
Refills: 1 | COMMUNITY
Start: 2019-06-14 | End: 2019-06-20

## 2019-06-20 RX ORDER — INSULIN GLARGINE 100 [IU]/ML
INJECTION, SOLUTION SUBCUTANEOUS
Refills: 11 | COMMUNITY
Start: 2019-05-28 | End: 2023-11-05

## 2019-06-20 ASSESSMENT — ENCOUNTER SYMPTOMS
COUGH: 0
DIZZINESS: 0
FEVER: 0
NAUSEA: 0
WEAKNESS: 0
MYALGIAS: 0
HEADACHES: 0
CHILLS: 0
INSOMNIA: 0
BRUISES/BLEEDS EASILY: 0
LOSS OF CONSCIOUSNESS: 0
PND: 0
SHORTNESS OF BREATH: 0
ORTHOPNEA: 0
PALPITATIONS: 0
ABDOMINAL PAIN: 0

## 2019-06-20 NOTE — PROGRESS NOTES
Chief Complaint   Patient presents with   • Follow-Up   • Coronary Artery Disease   • Cardiomyopathy (Ischemic)   • CHF (Compensated)   • Hyperlipidemia   • Diabetes Mellitus       Subjective:   Rikki Khalil is a 43 y.o. male who presents today for follow-up of CAD/ischemic cardiomyopathy.    Rikki is a 43 year old male with history of SVT and diabetes. In December 2018, he had CABG x 3 done. Echocardiogram showed LVEF 25-30%. Medical therapy was adjusted to ASA, Plavix, BB, ACE and statin. He had a rather slow recovery, mostly due to difficulty getting DM under control.      Today he had a repeat echocardiogram. Generally, he is doing very well. He is back at work, and tolerates exercise/exertion without any problems. He does still occasionally smoke. He is compliant with his medications. Glucose is still labile, but slowly coming down. He does have some periodic hypoglycemic reactions. No chest pain, pressure or discomfort; no palpitations; no shortness of breath, orthopnea or PND; no lightheadedness or dizziness; no syncope. No LE edema.      Past Medical History:   Diagnosis Date   • Arthritis     All joints   • Back pain    • Bowel obstruction (HCC)    • CAD (coronary artery disease) 12/2018    CABG x 3 (LIMA to distal LAD, rSVG to distal diagonal, rSVG to distal RCA) by Dr. Barrios.   • Glaucoma     Bilateral eyes   • Hyperlipidemia    • Ischemic cardiomyopathy 12/2018    Echocardiogram with LVEF 25-30%. Global hypokinesis. Moderately dilated RV. Mild TR. February 2019: Echocardiogram with normal LV size, LVEF 35%. Trace MR, trace TR.   • NSTEMI (non-ST elevated myocardial infarction) ([facility]) 12/27/2018   • Other depressive disorder 1/16/2019   • Pneumonia 12/30/2018   • Scoliosis    • SVT (supraventricular tachycardia) ([facility])    • Systolic congestive heart failure (HCC)    • Type II or unspecified type diabetes mellitus without mention of complication, not stated as uncontrolled     Insulin and oral meds  "    Past Surgical History:   Procedure Laterality Date   • MULTIPLE CORONARY ARTERY BYPASS ENDO VEIN HARVEST  12/28/2018    Procedure: MULTIPLE CORONARY ARTERY BYPASS x 3,  ENDO VEIN HARVEST LEFT LEG;  Surgeon: Tomas Barrios M.D.;  Location: SURGERY [facility];  Service: Cardiothoracic   • DONNA  12/28/2018    Procedure: DONNA;  Surgeon: Tomas Barrios M.D.;  Location: SURGERY [facility];  Service: Cardiothoracic   • ILEOSTOMY  9/29/2015    Procedure: ILEOSTOMY TAKE DOWN;  Surgeon: Carter Kumar M.D.;  Location: SURGERY [facility];  Service:    • LOW ANTERIOR RESECTION ROBOTIC XI  7/14/2015    Procedure: Robotic low anterior resection, takedown enterocutaneous fistula, omental flap, ileostomy creation;  Surgeon: Carter Kumar M.D.;  Location: SURGERY [facility];  Service:    • COLOSTOMY CREATION LAPAROSCOPIC  7/14/2015    Procedure: COLOSTOMY CREATION LAPAROSCOPIC FOR: LAP ILEOSTOMY;  Surgeon: Carter Kumar M.D.;  Location: SURGERY [facility];  Service:      Family History   Problem Relation Age of Onset   • Hypertension Mother    • Heart Disease Mother    • Stroke Mother    • No Known Problems Father      Social History     Social History   • Marital status:      Spouse name: N/A   • Number of children: N/A   • Years of education: N/A     Occupational History   • Not on file.     Social History Main Topics   • Smoking status: Former Smoker     Packs/day: 1.50     Years: 24.00     Types: Cigarettes     Quit date: 2018   • Smokeless tobacco: Never Used      Comment: HAS RECENTLY QUIT   • Alcohol use No   • Drug use: Yes     Types: Inhaled      Comment: marijuana daily   • Sexual activity: Not on file     Other Topics Concern   • Not on file     Social History Narrative   • No narrative on file     Allergies   Allergen Reactions   • Penicillins Unspecified     \"Blue baby\"  RXN= as a child  Tolerated ceftriaxone 6/2015   • Fentanyl      Hallucinations     Outpatient Encounter " Prescriptions as of 6/20/2019   Medication Sig Dispense Refill   • LANTUS SOLOSTAR 100 UNIT/ML Solution Pen-injector injection   11   • atorvastatin (LIPITOR) 80 MG tablet Take 1 Tab by mouth every day. 90 Tab 3   • lisinopril (PRINIVIL) 2.5 MG Tab Take 1 Tab by mouth every day. 90 Tab 3   • metFORMIN ER (GLUCOPHAGE XR) 500 MG TABLET SR 24 HR Take 2 Tabs by mouth every day. 90 Tab 3   • metoprolol (LOPRESSOR) 50 MG Tab Take 1 Tab by mouth 2 times a day. 180 Tab 3   • HUMALOG KWIKPEN 100 UNIT/ML Solution Pen-injector injection Inject 10-15 Units as instructed 3 times a day before meals.     • aspirin EC 81 MG EC tablet Take 1 Tab by mouth every day. 30 Tab    • gabapentin (NEURONTIN) 100 MG Cap Take 1 Cap by mouth 3 times a day. 90 Cap 1   • [DISCONTINUED] busPIRone (BUSPAR) 5 MG tablet   1   • [DISCONTINUED] ONETOUCH VERIO strip   11   • [DISCONTINUED] ONETOUCH DELICA LANCETS 33G Misc   11   • [DISCONTINUED] insulin glargine (LANTUS) 100 UNIT/ML Solution Inject 50 Units as instructed every evening.       No facility-administered encounter medications on file as of 6/20/2019.      Review of Systems   Constitutional: Negative for chills, fever and malaise/fatigue.   HENT: Negative for congestion.    Respiratory: Negative for cough and shortness of breath.    Cardiovascular: Negative for chest pain, palpitations, orthopnea, leg swelling and PND.   Gastrointestinal: Negative for abdominal pain and nausea.   Musculoskeletal: Negative for myalgias.   Skin: Negative for rash.   Neurological: Negative for dizziness, loss of consciousness, weakness and headaches.   Endo/Heme/Allergies: Does not bruise/bleed easily.   Psychiatric/Behavioral: The patient does not have insomnia.         Objective:   /80 (BP Location: Left arm, Patient Position: Sitting, BP Cuff Size: Adult)   Pulse 84   Ht 1.829 m (6')   Wt 100.2 kg (221 lb)   SpO2 96%   BMI 29.97 kg/m²     Physical Exam   Constitutional: He is oriented to person,  place, and time. He appears well-developed and well-nourished.   HENT:   Head: Normocephalic.   Eyes: EOM are normal.   Neck: Normal range of motion. Neck supple. No JVD present.   Cardiovascular: Normal rate, regular rhythm and normal heart sounds.    HR is well controlled.   Pulmonary/Chest: Effort normal and breath sounds normal. No respiratory distress. He has no wheezes. He has no rales.   Sternotomy incision is all healed.   Abdominal: Soft. Bowel sounds are normal. He exhibits no distension. There is no tenderness.   Musculoskeletal: Normal range of motion. He exhibits no edema.   Neurological: He is alert and oriented to person, place, and time.   Skin: Skin is warm and dry. No rash noted. No pallor.   Psychiatric: He has a normal mood and affect.     PRELIMINARY RESULTS OF ECHOCARDIOGRAM OF 6/20/2019: LVEF 45%    CONCLUSIONS OF ECHOCARDIOGRAM OF 2/17/2019:  Compared to the images of the study done on 12/31/2018 there has been   no significant changes.  Left ventricular ejection fraction is visually estimated to be 35%.  Estimated right ventricular systolic pressure  is 30 mmHg.    Will be doing labwork next week.    Assessment:     1. Coronary artery disease due to lipid rich plaque     2. Ischemic cardiomyopathy     3. S/P CABG x 3     4. Acute systolic congestive heart failure (HCC)     5. Mixed hyperlipidemia     6. Type 2 diabetes mellitus without complication, with long-term current use of insulin (HCC)         Medical Decision Making:  Today's Assessment / Status / Plan:     1. CAD/ischemic cardiomyopathy with LVEF 35% in February 2019, with improvement to 45% today. Continue ASA, ACEI, BB and statin.  No need for AICD at this point.    2. History of acute CHF, currently stable. He has not had any CHF symptoms.    3. Hyperlipidemia, treated. He will be getting labwork next week.    4. History of diabetes mellitus, treated. Consider addition of SGLT2 inhibitor, given CAD. Followed by PCP.    Same  medications for now. Follow-up with us in 6 months, sooner if clinical condition changes.    Collaborating MD: Indu

## 2019-06-20 NOTE — LETTER
[facility] [address]  Phone: [phone]  Fax: [phone]              Rikki Khalil  1975    Encounter Date: 6/20/2019    SHYAM Varela          PROGRESS NOTE:  Chief Complaint   Patient presents with   • Follow-Up   • Coronary Artery Disease   • Cardiomyopathy (Ischemic)   • CHF (Compensated)   • Hyperlipidemia   • Diabetes Mellitus       Subjective:   Rikki Khalil is a 43 y.o. male who presents today for follow-up of CAD/ischemic cardiomyopathy.    Rikki is a 43 year old male with history of SVT and diabetes. In December 2018, he had CABG x 3 done. Echocardiogram showed LVEF 25-30%. Medical therapy was adjusted to ASA, Plavix, BB, ACE and statin. He had a rather slow recovery, mostly due to difficulty getting DM under control.      Today he had a repeat echocardiogram. Generally, he is doing very well. He is back at work, and tolerates exercise/exertion without any problems. He does still occasionally smoke. He is compliant with his medications. Glucose is still labile, but slowly coming down. He does have some periodic hypoglycemic reactions. No chest pain, pressure or discomfort; no palpitations; no shortness of breath, orthopnea or PND; no lightheadedness or dizziness; no syncope. No LE edema.      Past Medical History:   Diagnosis Date   • Arthritis     All joints   • Back pain    • Bowel obstruction (HCC)    • CAD (coronary artery disease) 12/2018    CABG x 3 (LIMA to distal LAD, rSVG to distal diagonal, rSVG to distal RCA) by Dr. Barrios.   • Glaucoma     Bilateral eyes   • Hyperlipidemia    • Ischemic cardiomyopathy 12/2018    Echocardiogram with LVEF 25-30%. Global hypokinesis. Moderately dilated RV. Mild TR. February 2019: Echocardiogram with normal LV size, LVEF 35%. Trace MR, trace TR.   • NSTEMI (non-ST elevated myocardial infarction) (HCC) 12/27/2018   • Other depressive disorder 1/16/2019   •  Pneumonia 12/30/2018   • Scoliosis    • SVT (supraventricular tachycardia) (HCC)    • Systolic congestive heart failure (HCC)    • Type II or unspecified type diabetes mellitus without mention of complication, not stated as uncontrolled     Insulin and oral meds     Past Surgical History:   Procedure Laterality Date   • MULTIPLE CORONARY ARTERY BYPASS ENDO VEIN HARVEST  12/28/2018    Procedure: MULTIPLE CORONARY ARTERY BYPASS x 3,  ENDO VEIN HARVEST LEFT LEG;  Surgeon: Tomas Barrios M.D.;  Location: SURGERY [facility];  Service: Cardiothoracic   • DONNA  12/28/2018    Procedure: DONNA;  Surgeon: Tomas Bariros M.D.;  Location: SURGERY [facility];  Service: Cardiothoracic   • ILEOSTOMY  9/29/2015    Procedure: ILEOSTOMY TAKE DOWN;  Surgeon: Carter Kumar M.D.;  Location: SURGERY [facility];  Service:    • LOW ANTERIOR RESECTION ROBOTIC XI  7/14/2015    Procedure: Robotic low anterior resection, takedown enterocutaneous fistula, omental flap, ileostomy creation;  Surgeon: Carter Kumar M.D.;  Location: SURGERY [facility];  Service:    • COLOSTOMY CREATION LAPAROSCOPIC  7/14/2015    Procedure: COLOSTOMY CREATION LAPAROSCOPIC FOR: LAP ILEOSTOMY;  Surgeon: Carter Kumar M.D.;  Location: SURGERY [facility];  Service:      Family History   Problem Relation Age of Onset   • Hypertension Mother    • Heart Disease Mother    • Stroke Mother    • No Known Problems Father      Social History     Social History   • Marital status:      Spouse name: N/A   • Number of children: N/A   • Years of education: N/A     Occupational History   • Not on file.     Social History Main Topics   • Smoking status: Former Smoker     Packs/day: 1.50     Years: 24.00     Types: Cigarettes     Quit date: 2018   • Smokeless tobacco: Never Used      Comment: HAS RECENTLY QUIT   • Alcohol use No   • Drug use: Yes     Types: Inhaled      Comment: marijuana daily   • Sexual activity: Not on file     Other Topics  "Concern   • Not on file     Social History Narrative   • No narrative on file     Allergies   Allergen Reactions   • Penicillins Unspecified     \"Blue baby\"  RXN= as a child  Tolerated ceftriaxone 6/2015   • Fentanyl      Hallucinations     Outpatient Encounter Prescriptions as of 6/20/2019   Medication Sig Dispense Refill   • LANTUS SOLOSTAR 100 UNIT/ML Solution Pen-injector injection   11   • atorvastatin (LIPITOR) 80 MG tablet Take 1 Tab by mouth every day. 90 Tab 3   • lisinopril (PRINIVIL) 2.5 MG Tab Take 1 Tab by mouth every day. 90 Tab 3   • metFORMIN ER (GLUCOPHAGE XR) 500 MG TABLET SR 24 HR Take 2 Tabs by mouth every day. 90 Tab 3   • metoprolol (LOPRESSOR) 50 MG Tab Take 1 Tab by mouth 2 times a day. 180 Tab 3   • HUMALOG KWIKPEN 100 UNIT/ML Solution Pen-injector injection Inject 10-15 Units as instructed 3 times a day before meals.     • aspirin EC 81 MG EC tablet Take 1 Tab by mouth every day. 30 Tab    • gabapentin (NEURONTIN) 100 MG Cap Take 1 Cap by mouth 3 times a day. 90 Cap 1   • [DISCONTINUED] busPIRone (BUSPAR) 5 MG tablet   1   • [DISCONTINUED] ONETOUCH VERIO strip   11   • [DISCONTINUED] ONETOUCH DELICA LANCETS 33G Misc   11   • [DISCONTINUED] insulin glargine (LANTUS) 100 UNIT/ML Solution Inject 50 Units as instructed every evening.       No facility-administered encounter medications on file as of 6/20/2019.      Review of Systems   Constitutional: Negative for chills, fever and malaise/fatigue.   HENT: Negative for congestion.    Respiratory: Negative for cough and shortness of breath.    Cardiovascular: Negative for chest pain, palpitations, orthopnea, leg swelling and PND.   Gastrointestinal: Negative for abdominal pain and nausea.   Musculoskeletal: Negative for myalgias.   Skin: Negative for rash.   Neurological: Negative for dizziness, loss of consciousness, weakness and headaches.   Endo/Heme/Allergies: Does not bruise/bleed easily.   Psychiatric/Behavioral: The patient does not have " insomnia.         Objective:   /80 (BP Location: Left arm, Patient Position: Sitting, BP Cuff Size: Adult)   Pulse 84   Ht 1.829 m (6')   Wt 100.2 kg (221 lb)   SpO2 96%   BMI 29.97 kg/m²      Physical Exam   Constitutional: He is oriented to person, place, and time. He appears well-developed and well-nourished.   HENT:   Head: Normocephalic.   Eyes: EOM are normal.   Neck: Normal range of motion. Neck supple. No JVD present.   Cardiovascular: Normal rate, regular rhythm and normal heart sounds.    HR is well controlled.   Pulmonary/Chest: Effort normal and breath sounds normal. No respiratory distress. He has no wheezes. He has no rales.   Sternotomy incision is all healed.   Abdominal: Soft. Bowel sounds are normal. He exhibits no distension. There is no tenderness.   Musculoskeletal: Normal range of motion. He exhibits no edema.   Neurological: He is alert and oriented to person, place, and time.   Skin: Skin is warm and dry. No rash noted. No pallor.   Psychiatric: He has a normal mood and affect.     PRELIMINARY RESULTS OF ECHOCARDIOGRAM OF 6/20/2019: LVEF 45%    CONCLUSIONS OF ECHOCARDIOGRAM OF 2/17/2019:  Compared to the images of the study done on 12/31/2018 there has been   no significant changes.  Left ventricular ejection fraction is visually estimated to be 35%.  Estimated right ventricular systolic pressure  is 30 mmHg.    Will be doing labwork next week.    Assessment:     1. Coronary artery disease due to lipid rich plaque     2. Ischemic cardiomyopathy     3. S/P CABG x 3     4. Acute systolic congestive heart failure (HCC)     5. Mixed hyperlipidemia     6. Type 2 diabetes mellitus without complication, with long-term current use of insulin (HCC)         Medical Decision Making:  Today's Assessment / Status / Plan:     1. CAD/ischemic cardiomyopathy with LVEF 35% in February 2019, with improvement to 45% today. Continue ASA, ACEI, BB and statin.  No need for AICD at this point.    2.  History of acute CHF, currently stable. He has not had any CHF symptoms.    3. Hyperlipidemia, treated. He will be getting labwork next week.    4. History of diabetes mellitus, treated. Consider addition of SGLT2 inhibitor, given CAD. Followed by PCP.    Same medications for now. Follow-up with us in 6 months, sooner if clinical condition changes.    Collaborating MD: Indu West

## 2019-06-21 ENCOUNTER — TELEPHONE (OUTPATIENT)
Dept: CARDIOLOGY | Facility: MEDICAL CENTER | Age: 44
End: 2019-06-21

## 2019-06-21 DIAGNOSIS — I25.10 CORONARY ARTERY DISEASE DUE TO LIPID RICH PLAQUE: ICD-10-CM

## 2019-06-21 DIAGNOSIS — Z95.1 S/P CABG X 3: ICD-10-CM

## 2019-06-21 DIAGNOSIS — I25.5 ISCHEMIC CARDIOMYOPATHY: ICD-10-CM

## 2019-06-21 DIAGNOSIS — I25.83 CORONARY ARTERY DISEASE DUE TO LIPID RICH PLAQUE: ICD-10-CM

## 2019-12-27 NOTE — PROGRESS NOTES
Cardiovascular Surgery Progress Note    Name: Rikki Khalil  MRN: [id number]  : 1975  Admit Date: 2018  9:54 PM  Procedure:  Procedure(s) and Anesthesia Type:     * MULTIPLE CORONARY ARTERY BYPASS x 3,  ENDO VEIN HARVEST LEFT LEG - General     * DONNA - General  5 Day Post-Op    Vitals:  Patient Vitals for the past 8 hrs:   Monitored Temp 2 SpO2 O2 Delivery Pulse Heart Rate (Monitored) Resp NIBP FiO2%   19 0654 - 96 % - - 96 - - -   19 0600 - 98 % - 97 97 13 148/97 -   19 0544 - 97 % - (!) 106 (!) 106 (!) 0 - -   19 0500 37.6 °C (99.7 °F) 96 % - (!) 115 (!) 115 (!) 24 (!) 170/103 -   19 0432 - 96 % - - (!) 108 - - -   19 0409 37.6 °C (99.7 °F) 98 % - (!) 107 (!) 108 14 (!) 165/88 -   19 0400 37.5 °C (99.5 °F) 97 % Ventilator (!) 109 (!) 109 16 (!) 165/88 30 %   19 0305 37.8 °C (100 °F) 98 % - (!) 101 (!) 101 (!) 1 - -   19 0300 37.8 °C (100 °F) 97 % - 96 93 14 133/80 -   19 0234 37.7 °C (99.9 °F) 97 % - (!) 113 (!) 112 14 (!)  -   19 0219 - 96 % - - 99 - - -   19 0200 37.6 °C (99.7 °F) 96 % - (!) 101 (!) 101 14 (!) 99 -   19 0100 37.7 °C (99.9 °F) 96 % Ventilator (!) 101 (!) 101 14 111/71 30 %   19 0019 37.7 °C (99.9 °F) 97 % - (!) 105 (!) 105 17 - -     Temp (24hrs), Av.7 °C (99.9 °F), Min:37.5 °C (99.5 °F), Max:37.9 °C (100.2 °F)      Respiratory:  [place] Vent Mode: Spont, Rate (breaths/min): 14, PEEP/CPAP: 8, FiO2: 30, P Peak (PIP): 15, P MEAN: 10 Respiration: 13, Pulse Oximetry: 96 %     Chest Tube Drains:          Fluids:    Intake/Output Summary (Last 24 hours) at 19 0815  Last data filed at 19 0600   Gross per 24 hour   Intake              769 ml   Output             3802 ml   Net            -3033 ml     Admit weight: Weight: 95.3 kg (210 lb)  Current weight: Weight: 97.7 kg (215 lb 6.2 oz) (19 0700)    Labs:  Recent Labs      18   0504  19   0600  19   0335   WBC   Patient told to follow up with OBGYN in the [facility] 11.5*  10.7  9.6   RBC  2.94*  3.28*  3.45*   HEMOGLOBIN  9.3*  10.4*  10.8*   HEMATOCRIT  28.1*  31.1*  32.9*   MCV  95.6  94.8  95.4   MCH  31.6  31.7  31.3   MCHC  33.1*  33.4*  32.8*   RDW  46.5  45.1  44.9   PLATELETCT  150*  187  266   MPV  11.7  11.1  10.5         Recent Labs      12/31/18   0504  01/01/19   0600  01/02/19   0335   SODIUM  133*  137  139   POTASSIUM  4.1  3.8  4.0   CHLORIDE  103  99  97   CO2  24  29  34*   GLUCOSE  121*  159*  199*   BUN  16  15  20   CREATININE  0.52  0.48*  0.52   CALCIUM  8.5  8.9  8.8           Medications:  • NS       • Metoprolol Tartrate  5 mg     • lisinopril  10 mg     • ceFAZolin  2 g Stopped (01/02/19 0618)   • insulin NPH  3 Units     • famotidine  20 mg     • senna-docusate  2 Tab     • ipratropium-albuterol  3 mL     • aspirin  81 mg     • atorvastatin  80 mg     • baclofen  10 mg     • clopidogrel  75 mg     • insulin lispro  0-10 Units     • enoxaparin  40 mg     • lidocaine  1 Patch          Ordered Medications:    ASA - Yes    Plavix - Yes    Post-operative Beta Blockers - Yes    Ace Inhibitor -yes      Statin - Yes         Exam:   Review of Systems   Unable to perform ROS: Intubated       Physical Exam    Quality Measures:   Quality-Core Measures   Reviewed items::  EKG reviewed, Labs reviewed, Medications reviewed and Radiology images reviewed  Solitario catheter::  Strict Intake and Output During Sepisis or Shock  Central line in place:  Need for access and Vasopressors  DVT prophylaxis pharmacological::  Contraindicated - High bleeding risk  DVT prophylaxis - mechanical:  SCDs  Ulcer Prophylaxis::  Yes  Assessed for rehabilitation services:  Patient returned to prior level of function, rehabilitation not indicated at this time      Assessment/Plan:  POD 1 HDS- no gtts- dc IABP, ST start metorolol, low EF add ace, Resp insuff- currently on bi-pap- diurese- pulm following pain- add ms contin, D/c Ct after OOB, keep solitario for strict I&O, enc IS  POD 2 HOTN -  on devora gtt- give albumin this am, ST, re-intubated yesterday for resp distress, sedated, pain on fentanyl gtt, hyponatremia- hold diuresis today, solitario for strict I&O, CPM  POD 3 HOTN- devora gtt, vent- broch w/ copious amt of secretions yesterday, sedated - on propofol/fentanyl for pain, low UO- check solitario/gently diurese  POD 4 HTN- increase po meds, 's- start esmolol, vent- pulm following, sedated, given lasix this am, CPM  POD 5 HDS, intubated vented, respiratory cultures positive, hypertensive, neuro grossly intact, abd S/NT +BS.  Plan:  Increase lisinopril.  Since unit out of IV pushes of fentanyl will increase drip slightly for pain.  Wean vent as tolerated.  On abx for positive respiratory cultures.  CPM.     Patient seen, examined and plan reviewed with midlevel provider. I agree with the plan.      Active Hospital Problems    Diagnosis   • Acute hypoxemic respiratory failure (HCC) [J96.01]     Priority: High   • S/P CABG x 3 [Z95.1]     Priority: High   • Ventilator dependent (HCC) [Z99.11]     Priority: High   • NSTEMI (non-ST elevated myocardial infarction) ([facility]) [I21.4]     Priority: High   • Acute systolic congestive heart failure (HCC) [I50.21]     Priority: High   • Pneumonia [J18.9]     Priority: Medium   • Atelectasis [J98.11]     Priority: Medium   • SVT (supraventricular tachycardia) ([facility]) [I47.1]     Priority: Medium   • Type 2 diabetes mellitus without complication ([facility]) [E11.9]     Priority: Medium   • Hypokalemia [E87.6]     Priority: Low   • Postural dizziness with presyncope [R42, R55]     Priority: Low   • Sepsis ([facility]) [A41.9]

## 2020-01-02 ENCOUNTER — OFFICE VISIT (OUTPATIENT)
Dept: CARDIOLOGY | Facility: PHYSICIAN GROUP | Age: 45
End: 2020-01-02
Payer: MEDICAID

## 2020-01-02 VITALS
HEART RATE: 80 BPM | HEIGHT: 72 IN | DIASTOLIC BLOOD PRESSURE: 84 MMHG | WEIGHT: 232 LBS | SYSTOLIC BLOOD PRESSURE: 124 MMHG | BODY MASS INDEX: 31.42 KG/M2 | OXYGEN SATURATION: 97 %

## 2020-01-02 DIAGNOSIS — I47.10 SVT (SUPRAVENTRICULAR TACHYCARDIA) (HCC): ICD-10-CM

## 2020-01-02 DIAGNOSIS — D64.9 ANEMIA, UNSPECIFIED TYPE: ICD-10-CM

## 2020-01-02 DIAGNOSIS — I25.5 ISCHEMIC CARDIOMYOPATHY: ICD-10-CM

## 2020-01-02 DIAGNOSIS — I25.10 CORONARY ARTERY DISEASE DUE TO LIPID RICH PLAQUE: ICD-10-CM

## 2020-01-02 DIAGNOSIS — E11.9 TYPE 2 DIABETES MELLITUS WITHOUT COMPLICATION, WITH LONG-TERM CURRENT USE OF INSULIN (HCC): ICD-10-CM

## 2020-01-02 DIAGNOSIS — Z95.1 S/P CABG X 3: ICD-10-CM

## 2020-01-02 DIAGNOSIS — E78.2 MIXED HYPERLIPIDEMIA: ICD-10-CM

## 2020-01-02 DIAGNOSIS — Z79.4 TYPE 2 DIABETES MELLITUS WITHOUT COMPLICATION, WITH LONG-TERM CURRENT USE OF INSULIN (HCC): ICD-10-CM

## 2020-01-02 DIAGNOSIS — I25.83 CORONARY ARTERY DISEASE DUE TO LIPID RICH PLAQUE: ICD-10-CM

## 2020-01-02 PROBLEM — I50.21 ACUTE SYSTOLIC CONGESTIVE HEART FAILURE (HCC): Status: RESOLVED | Noted: 2018-12-27 | Resolved: 2020-01-02

## 2020-01-02 PROCEDURE — 99214 OFFICE O/P EST MOD 30 MIN: CPT | Performed by: NURSE PRACTITIONER

## 2020-01-02 RX ORDER — LANCETS 33 GAUGE
EACH MISCELLANEOUS
COMMUNITY
Start: 2019-11-11 | End: 2023-11-05

## 2020-01-02 RX ORDER — BLOOD SUGAR DIAGNOSTIC
STRIP MISCELLANEOUS
COMMUNITY
Start: 2019-11-11 | End: 2023-11-05

## 2020-01-02 RX ORDER — PEN NEEDLE, DIABETIC 32GX 5/32"
NEEDLE, DISPOSABLE MISCELLANEOUS
COMMUNITY
Start: 2019-11-11 | End: 2023-11-05

## 2020-01-02 ASSESSMENT — ENCOUNTER SYMPTOMS
PND: 0
ORTHOPNEA: 0
MYALGIAS: 0
INSOMNIA: 0
HEADACHES: 0
NAUSEA: 0
DIZZINESS: 0
WEAKNESS: 0
CHILLS: 0
PALPITATIONS: 0
LOSS OF CONSCIOUSNESS: 0
FEVER: 0
ABDOMINAL PAIN: 0
SHORTNESS OF BREATH: 0
BRUISES/BLEEDS EASILY: 0
COUGH: 0

## 2020-01-02 NOTE — PROGRESS NOTES
Chief Complaint   Patient presents with   • Follow-Up   • Coronary Artery Disease   • Cardiomyopathy (Ischemic)   • Coronary Artery Bypass Graft (CABG)   • Hyperlipidemia   • Diabetes (Uncontrolled)       Subjective:   Ryley Khalil is a 44 y.o. male who presents today for six month follow-up of CAD/ischemic cardiomyopathy, hyperlipidemia, and diabetes mellitus.    Rikki is a 44 year old male with history of SVT and diabetes. In December 2018, he had CABG x 3 done. Echocardiogram showed LVEF 25-30%. Medical therapy was adjusted to ASA, Plavix, BB, ACE and statin, and echocardiogram in June 2019 showed LVEF 45%. He also has hyperlipidemia, and diabetes mellitus, which is still not optimally controlled.    He is here today for six month follow-up. He is doing well, and is working hard, and tolerates work with problems.  He does still occasionally smoke. He is compliant with his medications, but glucose is still labile, running 170-210. No chest pain, pressure or discomfort; no palpitations; no shortness of breath, orthopnea or PND; no lightheadedness or dizziness; no syncope. No LE edema.  BP has been stable.    Past Medical History:   Diagnosis Date   • Arthritis     All joints   • Back pain    • Bowel obstruction (HCC)    • CAD (coronary artery disease) 12/2018    CABG x 3 (LIMA to distal LAD, rSVG to distal diagonal, rSVG to distal RCA) by Dr. Barrios.   • Glaucoma     Bilateral eyes   • Hyperlipidemia    • Ischemic cardiomyopathy 12/2018    Echocardiogram with LVEF 25-30%. Global hypokinesis. Moderately dilated RV. Mild TR. June 2019: Echocardiogram with normal LV size, LVEF 45%. Normal RA, LA and RV. Trace MR, trace TR.   • NSTEMI (non-ST elevated myocardial infarction) ([facility]) 12/27/2018   • Other depressive disorder 1/16/2019   • Pneumonia 12/30/2018   • Scoliosis    • SVT (supraventricular tachycardia) ([facility])    • Systolic congestive heart failure ([facility])    • Type II or unspecified type diabetes mellitus without  mention of complication, not stated as uncontrolled     Insulin and oral meds     Past Surgical History:   Procedure Laterality Date   • MULTIPLE CORONARY ARTERY BYPASS ENDO VEIN HARVEST  2018    Procedure: MULTIPLE CORONARY ARTERY BYPASS x 3,  ENDO VEIN HARVEST LEFT LEG;  Surgeon: Tomas Barrios M.D.;  Location: SURGERY [facility];  Service: Cardiothoracic   • DONNA  2018    Procedure: DONNA;  Surgeon: Tomas Barrios M.D.;  Location: SURGERY [facility];  Service: Cardiothoracic   • ILEOSTOMY  2015    Procedure: ILEOSTOMY TAKE DOWN;  Surgeon: Carter Kumar M.D.;  Location: SURGERY [facility];  Service:    • LOW ANTERIOR RESECTION ROBOTIC XI  2015    Procedure: Robotic low anterior resection, takedown enterocutaneous fistula, omental flap, ileostomy creation;  Surgeon: Carter Kumar M.D.;  Location: [facility];  Service:    • COLOSTOMY CREATION LAPAROSCOPIC  2015    Procedure: COLOSTOMY CREATION LAPAROSCOPIC FOR: LAP ILEOSTOMY;  Surgeon: Carter Kumar M.D.;  Location: SURGERY [facility];  Service:      Family History   Problem Relation Age of Onset   • Hypertension Mother    • Heart Disease Mother    • Stroke Mother    • No Known Problems Father      Social History     Socioeconomic History   • Marital status:      Spouse name: Not on file   • Number of children: Not on file   • Years of education: Not on file   • Highest education level: Not on file   Occupational History   • Not on file   Social Needs   • Financial resource strain: Not on file   • Food insecurity:     Worry: Not on file     Inability: Not on file   • Transportation needs:     Medical: Not on file     Non-medical: Not on file   Tobacco Use   • Smoking status: Current Every Day Smoker     Packs/day: 0.75     Years: 24.00     Pack years: 18.00     Types: Cigarettes     Last attempt to quit: 2018     Years since quittin.0   • Smokeless tobacco: Never Used   Substance and  "Sexual Activity   • Alcohol use: No   • Drug use: Yes     Types: Inhaled     Comment: marijuana daily   • Sexual activity: Not on file   Lifestyle   • Physical activity:     Days per week: Not on file     Minutes per session: Not on file   • Stress: Not on file   Relationships   • Social connections:     Talks on phone: Not on file     Gets together: Not on file     Attends Anabaptist service: Not on file     Active member of club or organization: Not on file     Attends meetings of clubs or organizations: Not on file     Relationship status: Not on file   • Intimate partner violence:     Fear of current or ex partner: Not on file     Emotionally abused: Not on file     Physically abused: Not on file     Forced sexual activity: Not on file   Other Topics Concern   • Not on file   Social History Narrative   • Not on file     Allergies   Allergen Reactions   • Penicillins Unspecified     \"Blue baby\"  RXN= as a child  Tolerated ceftriaxone 6/2015   • Fentanyl      Hallucinations     Outpatient Encounter Medications as of 1/2/2020   Medication Sig Dispense Refill   • IRON PO Take  by mouth every day.     • LANTUS SOLOSTAR 100 UNIT/ML Solution Pen-injector injection   11   • atorvastatin (LIPITOR) 80 MG tablet Take 1 Tab by mouth every day. 90 Tab 3   • lisinopril (PRINIVIL) 2.5 MG Tab Take 1 Tab by mouth every day. 90 Tab 3   • metFORMIN ER (GLUCOPHAGE XR) 500 MG TABLET SR 24 HR Take 2 Tabs by mouth every day. 90 Tab 3   • metoprolol (LOPRESSOR) 50 MG Tab Take 1 Tab by mouth 2 times a day. 180 Tab 3   • HUMALOG KWIKPEN 100 UNIT/ML Solution Pen-injector injection Inject 10-15 Units as instructed 3 times a day before meals.     • aspirin EC 81 MG EC tablet Take 1 Tab by mouth every day. 30 Tab    • ONETOUCH VERIO strip      • ULTICARE MICRO PEN NEEDLES      • Lancets (ONETOUCH DELICA PLUS [id number]) Misc      • [DISCONTINUED] gabapentin (NEURONTIN) 100 MG Cap Take 1 Cap by mouth 3 times a day. (Patient not taking: Reported " on 1/2/2020) 90 Cap 1     No facility-administered encounter medications on file as of 1/2/2020.      Review of Systems   Constitutional: Negative for chills, fever and malaise/fatigue.   HENT: Negative for congestion.    Respiratory: Negative for cough and shortness of breath.    Cardiovascular: Negative for chest pain, palpitations, orthopnea, leg swelling and PND.   Gastrointestinal: Negative for abdominal pain and nausea.   Musculoskeletal: Negative for myalgias.   Skin: Negative for rash.   Neurological: Negative for dizziness, loss of consciousness, weakness and headaches.   Endo/Heme/Allergies: Does not bruise/bleed easily.   Psychiatric/Behavioral: The patient does not have insomnia.         Objective:   /84 (BP Location: Left arm, Patient Position: Sitting, BP Cuff Size: Adult)   Pulse 80   Ht 1.829 m (6')   Wt 105.2 kg (232 lb)   SpO2 97%   BMI 31.46 kg/m²     Physical Exam   Constitutional: He is oriented to person, place, and time. He appears well-developed and well-nourished.   HENT:   Head: Normocephalic.   Eyes: EOM are normal.   Neck: Normal range of motion. Neck supple. No JVD present.   Cardiovascular: Normal rate, regular rhythm and normal heart sounds.   HR is well controlled.   Pulmonary/Chest: Effort normal and breath sounds normal. No respiratory distress. He has no wheezes. He has no rales.   Sternotomy scar present.   Abdominal: Soft. Bowel sounds are normal. He exhibits no distension. There is no tenderness.   Musculoskeletal: Normal range of motion.         General: No edema.   Neurological: He is alert and oriented to person, place, and time.   Skin: Skin is warm and dry. No rash noted. No pallor.   Psychiatric: He has a normal mood and affect.     No recent labwork done.    ECHOCARDIOGRAM OF 6/20/2019:  Normal LV size  LVEF 45%  Normal RA, LA, and RV  Trace MR  Trace TR    Assessment:     1. Coronary artery disease due to lipid rich plaque  CBC WITH DIFFERENTIAL    Comp  Metabolic Panel    Lipid Profile   2. Ischemic cardiomyopathy  CBC WITH DIFFERENTIAL    Comp Metabolic Panel    Lipid Profile   3. S/P CABG x 3     4. SVT (supraventricular tachycardia) (HCC)  TSH   5. Mixed hyperlipidemia     6. Type 2 diabetes mellitus without complication, with long-term current use of insulin (HCC)  Comp Metabolic Panel    TSH    HEMOGLOBIN A1C   7. Anemia, unspecified type         Medical Decision Making:  Today's Assessment / Status / Plan:     1. CAD/ischemic cardiomyopathy, with CABG x 3 in December 2018, with LVEF now 45% on echo in June 2019. Continue same medications. Work on quitting smoking completely.    2. History of SVT, with no clinical recurrence.    3. Hyperlipidemia, treated with Lipitor. To check fasting CMP and lipid panel.    4. Diabetes mellitus, treated, with suboptimal control. To keep working on this. Watch diet too.    5. History of anemia, to check CBC.    Same medications for now. FU in 9 months, and will repeat echo then. FU sooner if clinical condition changes. Keep working on quitting smoking.    Collaborating MD: Diogenes

## 2020-02-27 DIAGNOSIS — Z79.4 TYPE 2 DIABETES MELLITUS WITHOUT COMPLICATION, WITH LONG-TERM CURRENT USE OF INSULIN (HCC): ICD-10-CM

## 2020-02-27 DIAGNOSIS — E11.9 TYPE 2 DIABETES MELLITUS WITHOUT COMPLICATION, WITH LONG-TERM CURRENT USE OF INSULIN (HCC): ICD-10-CM

## 2020-02-28 RX ORDER — METFORMIN HYDROCHLORIDE 500 MG/1
TABLET, EXTENDED RELEASE ORAL
Qty: 180 TAB | Refills: 2 | Status: SHIPPED | OUTPATIENT
Start: 2020-02-28 | End: 2020-12-07

## 2020-04-02 ENCOUNTER — TELEPHONE (OUTPATIENT)
Dept: CARDIOLOGY | Facility: MEDICAL CENTER | Age: 45
End: 2020-04-02

## 2020-04-02 NOTE — TELEPHONE ENCOUNTER
Patient is a  man.  Because of his multiple high- risk diagnosis', patient is requesting a letter stating that he should be off of work for now and stay home to prevent an occurrence of the Covid 19 virus.    Please advise.

## 2020-04-22 NOTE — TELEPHONE ENCOUNTER
Pt is following up on this call requesting a call back about his unemployment, no further info given. #[phone]

## 2020-04-22 NOTE — TELEPHONE ENCOUNTER
"He calls and needs an update re: his need to stay off of work for another 30 days.  He is going to fax a form with his employer's information to [phone].  Please write the letter as per \"LETTER\" in letter's tab and include the information from the form he is faxing-- and fax back to employer.  "

## 2020-04-24 NOTE — TELEPHONE ENCOUNTER
I will fax the 2 page form to RIGHT FAX.  Please have Aline pull it up on Monday and give to you.  I sent her a message as well.

## 2020-04-28 NOTE — TELEPHONE ENCOUNTER
I haven't seen any faxes come through. (Noy was gone yesterday, and is today as well).  Any way you can pull it up, and send to me?  Thanks, AB

## 2020-05-27 NOTE — TELEPHONE ENCOUNTER
AB    Patient called in to advise that they are all out of their insulin and they have tried to have it refilled by their PCP but for over 2 weeks they have not returned the Pt call or respond to the pharmacy request. Please refill the Pt rx and send to pharmacy [facility] in [place]. They would also like a month extension on their letter for work. Please call the Pt back at [phone].    Thank you,  Dee

## 2020-05-28 NOTE — TELEPHONE ENCOUNTER
I called the PCP Evelin Damon' office- was told that she is out of the office and the M.A. said there was no one else there that would fill it.  I called Ryley back today.  Patient insists that Dee has said that she would fill his Lantus insulin for him and also get him a letter for work.  To Dee.

## 2020-05-29 NOTE — TELEPHONE ENCOUNTER
I left a message that, per AB, she will not be able to fill the insulin.  Please use urgent care or discuss with primary care office again.    Dee- patient wants to be off of work for another month.

## 2020-07-29 ENCOUNTER — TELEPHONE (OUTPATIENT)
Dept: CARDIOLOGY | Facility: MEDICAL CENTER | Age: 45
End: 2020-07-29

## 2020-07-29 DIAGNOSIS — I25.83 CORONARY ARTERY DISEASE DUE TO LIPID RICH PLAQUE: ICD-10-CM

## 2020-07-29 DIAGNOSIS — I25.10 CORONARY ARTERY DISEASE DUE TO LIPID RICH PLAQUE: ICD-10-CM

## 2020-07-29 DIAGNOSIS — Z95.1 S/P CABG X 3: ICD-10-CM

## 2020-07-29 RX ORDER — LISINOPRIL 2.5 MG/1
2.5 TABLET ORAL DAILY
Qty: 90 TAB | Refills: 3 | Status: SHIPPED | OUTPATIENT
Start: 2020-07-29 | End: 2020-10-29 | Stop reason: SDUPTHER

## 2020-07-29 RX ORDER — METOPROLOL TARTRATE 50 MG/1
50 TABLET, FILM COATED ORAL 2 TIMES DAILY
Qty: 180 TAB | Refills: 3 | Status: SHIPPED | OUTPATIENT
Start: 2020-07-29 | End: 2020-10-29 | Stop reason: SDUPTHER

## 2020-07-29 NOTE — TELEPHONE ENCOUNTER
AB    Hello, patient calling for a medication refill for metoprolol (LOPRESSOR) 50 MG Tab & lisinopril (PRINIVIL) 2.5 MG Tab. Patient will like a call back when order has been sent to pharmacy. [phone]

## 2020-10-29 ENCOUNTER — OFFICE VISIT (OUTPATIENT)
Dept: CARDIOLOGY | Facility: PHYSICIAN GROUP | Age: 45
End: 2020-10-29
Payer: MEDICAID

## 2020-10-29 VITALS
OXYGEN SATURATION: 98 % | WEIGHT: 223 LBS | DIASTOLIC BLOOD PRESSURE: 74 MMHG | HEIGHT: 72 IN | HEART RATE: 70 BPM | BODY MASS INDEX: 30.2 KG/M2 | SYSTOLIC BLOOD PRESSURE: 134 MMHG

## 2020-10-29 DIAGNOSIS — Z79.4 TYPE 2 DIABETES MELLITUS WITHOUT COMPLICATION, WITH LONG-TERM CURRENT USE OF INSULIN (HCC): ICD-10-CM

## 2020-10-29 DIAGNOSIS — I25.10 CORONARY ARTERY DISEASE DUE TO LIPID RICH PLAQUE: ICD-10-CM

## 2020-10-29 DIAGNOSIS — I10 ESSENTIAL HYPERTENSION, BENIGN: ICD-10-CM

## 2020-10-29 DIAGNOSIS — Z95.1 S/P CABG X 3: ICD-10-CM

## 2020-10-29 DIAGNOSIS — E78.2 MIXED HYPERLIPIDEMIA: ICD-10-CM

## 2020-10-29 DIAGNOSIS — I25.5 ISCHEMIC CARDIOMYOPATHY: ICD-10-CM

## 2020-10-29 DIAGNOSIS — I25.83 CORONARY ARTERY DISEASE DUE TO LIPID RICH PLAQUE: ICD-10-CM

## 2020-10-29 DIAGNOSIS — E11.9 TYPE 2 DIABETES MELLITUS WITHOUT COMPLICATION, WITH LONG-TERM CURRENT USE OF INSULIN (HCC): ICD-10-CM

## 2020-10-29 DIAGNOSIS — R53.83 OTHER FATIGUE: ICD-10-CM

## 2020-10-29 PROCEDURE — 99214 OFFICE O/P EST MOD 30 MIN: CPT | Performed by: NURSE PRACTITIONER

## 2020-10-29 RX ORDER — METOPROLOL TARTRATE 50 MG/1
50 TABLET, FILM COATED ORAL 2 TIMES DAILY
Qty: 180 TAB | Refills: 3 | Status: SHIPPED | OUTPATIENT
Start: 2020-10-29 | End: 2021-11-05 | Stop reason: SDUPTHER

## 2020-10-29 RX ORDER — ATORVASTATIN CALCIUM 80 MG/1
80 TABLET, FILM COATED ORAL DAILY
Qty: 90 TAB | Refills: 3 | Status: SHIPPED | OUTPATIENT
Start: 2020-10-29 | End: 2021-11-05 | Stop reason: SDUPTHER

## 2020-10-29 RX ORDER — LISINOPRIL 2.5 MG/1
2.5 TABLET ORAL DAILY
Qty: 90 TAB | Refills: 3 | Status: SHIPPED | OUTPATIENT
Start: 2020-10-29 | End: 2021-11-05 | Stop reason: SDUPTHER

## 2020-10-29 ASSESSMENT — FIBROSIS 4 INDEX: FIB4 SCORE: 0.46

## 2020-10-29 ASSESSMENT — ENCOUNTER SYMPTOMS
NAUSEA: 0
BRUISES/BLEEDS EASILY: 0
PALPITATIONS: 0
MYALGIAS: 0
PND: 0
ORTHOPNEA: 0
COUGH: 0
ABDOMINAL PAIN: 0
LOSS OF CONSCIOUSNESS: 0
WEAKNESS: 0
CHILLS: 0
INSOMNIA: 0
SHORTNESS OF BREATH: 0
FEVER: 0
HEADACHES: 0
DIZZINESS: 0

## 2020-10-29 NOTE — PROGRESS NOTES
Chief Complaint   Patient presents with   • Follow-Up   • Coronary Artery Disease   • Cardiomyopathy (Ischemic)   • HTN (Controlled)   • Hyperlipidemia   • Diabetes Mellitus       Subjective:   Ryley Khalil is a 45 y.o. male who presents today for overdue six month follow-up for CAD/ischemic cardiomyopathy, hypertension and hyperlipidemia.    Rikki is a 45 year old male with history of SVT and diabetes. In December 2018, he had CABG x 3 done. Echocardiogram showed LVEF 25-30%. Medical therapy was adjusted to ASA, Plavix, BB, ACE and statin, and echocardiogram in June 2019 showed LVEF 45%. He also has hyperlipidemia, and diabetes mellitus, which is still not optimally controlled.     He is here today for overdue six month follow-up. He is mostly stable; he does have a new PCP to help manage his glucose, which they are working on. He is tired a lot, and is still smoking about 1/2-3/4 ppd.  No chest pain, pressure or discomfort; no palpitations; no shortness of breath, orthopnea or PND; no lightheadedness or dizziness; no syncope. No LE edema. He is working about 30 hours per week.    Past Medical History:   Diagnosis Date   • Arthritis     All joints   • Back pain    • Bowel obstruction (HCC)    • CAD (coronary artery disease) 12/2018    CABG x 3 (LIMA to distal LAD, rSVG to distal diagonal, rSVG to distal RCA) by Dr. Barrios.   • Essential hypertension, benign 10/29/2020   • Glaucoma     Bilateral eyes   • Hyperlipidemia    • Ischemic cardiomyopathy 12/2018    Echocardiogram with LVEF 25-30%. Global hypokinesis. Moderately dilated RV. Mild TR. June 2019: Echocardiogram with normal LV size, LVEF 45%. Normal RA, LA and RV. Trace MR, trace TR.   • NSTEMI (non-ST elevated myocardial infarction) ([facility]) 12/27/2018   • Other depressive disorder 1/16/2019   • Pneumonia 12/30/2018   • Scoliosis    • SVT (supraventricular tachycardia) ([facility])    • Systolic congestive heart failure ([facility])    • Type II or unspecified type diabetes  mellitus without mention of complication, not stated as uncontrolled     Insulin and oral meds     Past Surgical History:   Procedure Laterality Date   • MULTIPLE CORONARY ARTERY BYPASS ENDO VEIN HARVEST  2018    Procedure: MULTIPLE CORONARY ARTERY BYPASS x 3,  ENDO VEIN HARVEST LEFT LEG;  Surgeon: Tomas Barrios M.D.;  Location: SURGERY [facility];  Service: Cardiothoracic   • DONNA  2018    Procedure: DONNA;  Surgeon: Tomas Barrios M.D.;  Location: SURGERY [facility];  Service: Cardiothoracic   • ILEOSTOMY  2015    Procedure: ILEOSTOMY TAKE DOWN;  Surgeon: Carter Kumar M.D.;  Location: SURGERY [facility];  Service:    • LOW ANTERIOR RESECTION ROBOTIC XI  2015    Procedure: Robotic low anterior resection, takedown enterocutaneous fistula, omental flap, ileostomy creation;  Surgeon: Carter Kumar M.D.;  Location: SURGERY [facility];  Service:    • COLOSTOMY CREATION LAPAROSCOPIC  2015    Procedure: COLOSTOMY CREATION LAPAROSCOPIC FOR: LAP ILEOSTOMY;  Surgeon: Carter Kumar M.D.;  Location: SURGERY [facility];  Service:      Family History   Problem Relation Age of Onset   • Hypertension Mother    • Heart Disease Mother    • Stroke Mother    • No Known Problems Father      Social History     Socioeconomic History   • Marital status:      Spouse name: Not on file   • Number of children: Not on file   • Years of education: Not on file   • Highest education level: Not on file   Occupational History   • Not on file   Social Needs   • Financial resource strain: Not on file   • Food insecurity     Worry: Not on file     Inability: Not on file   • Transportation needs     Medical: Not on file     Non-medical: Not on file   Tobacco Use   • Smoking status: Current Every Day Smoker     Packs/day: 0.75     Years: 24.00     Pack years: 18.00     Types: Cigarettes     Last attempt to quit: 2018     Years since quittin.8   • Smokeless tobacco: Never Used  "  Substance and Sexual Activity   • Alcohol use: No   • Drug use: Yes     Types: Inhaled     Comment: marijuana daily   • Sexual activity: Not on file   Lifestyle   • Physical activity     Days per week: Not on file     Minutes per session: Not on file   • Stress: Not on file   Relationships   • Social connections     Talks on phone: Not on file     Gets together: Not on file     Attends Scientology service: Not on file     Active member of club or organization: Not on file     Attends meetings of clubs or organizations: Not on file     Relationship status: Not on file   • Intimate partner violence     Fear of current or ex partner: Not on file     Emotionally abused: Not on file     Physically abused: Not on file     Forced sexual activity: Not on file   Other Topics Concern   • Not on file   Social History Narrative   • Not on file     Allergies   Allergen Reactions   • Penicillins Unspecified     \"Blue baby\"  RXN= as a child  Tolerated ceftriaxone 6/2015   • Fentanyl      Hallucinations     Outpatient Encounter Medications as of 10/29/2020   Medication Sig Dispense Refill   • atorvastatin (LIPITOR) 80 MG tablet Take 1 Tab by mouth every day. 90 Tab 3   • lisinopril (PRINIVIL) 2.5 MG Tab Take 1 Tab by mouth every day. 90 Tab 3   • metoprolol (LOPRESSOR) 50 MG Tab Take 1 Tab by mouth 2 times a day. 180 Tab 3   • metFORMIN ER (GLUCOPHAGE XR) 500 MG TABLET SR 24 HR TAKE TWO TABLETS BY MOUTH ONCE DAILY 180 Tab 2   • IRON PO Take  by mouth every day.     • LANTUS SOLOSTAR 100 UNIT/ML Solution Pen-injector injection   11   • HUMALOG KWIKPEN 100 UNIT/ML Solution Pen-injector injection Inject 10-15 Units as instructed 3 times a day before meals.     • aspirin EC 81 MG EC tablet Take 1 Tab by mouth every day. 30 Tab    • [DISCONTINUED] lisinopril (PRINIVIL) 2.5 MG Tab Take 1 Tab by mouth every day. 90 Tab 3   • [DISCONTINUED] metoprolol (LOPRESSOR) 50 MG Tab Take 1 Tab by mouth 2 times a day. 180 Tab 3   • ONETOUCH VERIO " strip      • ULTICARE MICRO PEN NEEDLES      • Lancets (ONETOUCH DELICA PLUS [id number]) Misc      • [DISCONTINUED] atorvastatin (LIPITOR) 80 MG tablet Take 1 Tab by mouth every day. 90 Tab 3     No facility-administered encounter medications on file as of 10/29/2020.      Review of Systems   Constitutional: Positive for malaise/fatigue. Negative for chills and fever.   HENT: Negative for congestion.    Respiratory: Negative for cough and shortness of breath.    Cardiovascular: Negative for chest pain, palpitations, orthopnea, leg swelling and PND.   Gastrointestinal: Negative for abdominal pain and nausea.   Musculoskeletal: Negative for myalgias.   Skin: Negative for rash.   Neurological: Negative for dizziness, loss of consciousness, weakness and headaches.   Endo/Heme/Allergies: Does not bruise/bleed easily.   Psychiatric/Behavioral: The patient does not have insomnia.         Objective:   /74 (BP Location: Left arm, Patient Position: Sitting, BP Cuff Size: Adult)   Pulse 70   Ht 1.829 m (6')   Wt 101.2 kg (223 lb)   SpO2 98%   BMI 30.24 kg/m²     Physical Exam   Constitutional: He is oriented to person, place, and time. He appears well-developed and well-nourished.   HENT:   Head: Normocephalic.   Eyes: EOM are normal.   Neck: Normal range of motion. Neck supple. No JVD present.   Cardiovascular: Normal rate, regular rhythm and normal heart sounds.   Pulmonary/Chest: Effort normal and breath sounds normal. No respiratory distress. He has no wheezes. He has no rales.   Sternotomy scar present.   Abdominal: Soft. Bowel sounds are normal. He exhibits no distension. There is no abdominal tenderness.   Musculoskeletal: Normal range of motion.         General: No edema.   Neurological: He is alert and oriented to person, place, and time.   Skin: Skin is warm and dry. No rash noted. No pallor.   Psychiatric: He has a normal mood and affect.     No recent labwork done.    ECHOCARDIOGRAM OF 6/20/2019:  Normal  LV size  LVEF 45%  Normal RA, LA, and RV  Trace MR  Trace TR    Assessment:     1. Coronary artery disease due to lipid rich plaque  EC-ECHOCARDIOGRAM COMPLETE W/O CONT    atorvastatin (LIPITOR) 80 MG tablet    lisinopril (PRINIVIL) 2.5 MG Tab    metoprolol (LOPRESSOR) 50 MG Tab   2. Ischemic cardiomyopathy  EC-ECHOCARDIOGRAM COMPLETE W/O CONT   3. S/P CABG x 3  metoprolol (LOPRESSOR) 50 MG Tab   4. Essential hypertension, benign  CBC WITH DIFFERENTIAL   5. Mixed hyperlipidemia  Comp Metabolic Panel    Lipid Profile    atorvastatin (LIPITOR) 80 MG tablet   6. Type 2 diabetes mellitus without complication, with long-term current use of insulin (HCC)  Comp Metabolic Panel    HEMOGLOBIN A1C   7. Other fatigue  TSH       Medical Decision Making:  Today's Assessment / Status / Plan:     1. CAD/ischemic cardiomyopathy, status post CABG x 3, on ASA, BB, ACEI and statin. To repeat echocardiogram, given fatigue and mild shortness of breath. He is again urged to quit smoking.    2. Hypertension, treated with BB and ACEI, stable.     3. Hyperlipidemia, treated with statin. To check fasting CMP and lipid panel.    4. Diabetes mellitus, followed by new PCP. To check HgbA1c.    5. Fatigue, to check TSH.    Same medications for now, which are renewed. Labs as above, and echocardiogram. FU with me in 6 months, unless gross abnormalities on labs or echo.

## 2020-10-30 DIAGNOSIS — I47.10 SVT (SUPRAVENTRICULAR TACHYCARDIA) (HCC): ICD-10-CM

## 2020-10-30 DIAGNOSIS — I25.10 CORONARY ARTERY DISEASE DUE TO LIPID RICH PLAQUE: ICD-10-CM

## 2020-10-30 DIAGNOSIS — Z79.4 TYPE 2 DIABETES MELLITUS WITHOUT COMPLICATION, WITH LONG-TERM CURRENT USE OF INSULIN (HCC): ICD-10-CM

## 2020-10-30 DIAGNOSIS — I25.5 ISCHEMIC CARDIOMYOPATHY: ICD-10-CM

## 2020-10-30 DIAGNOSIS — I25.83 CORONARY ARTERY DISEASE DUE TO LIPID RICH PLAQUE: ICD-10-CM

## 2020-10-30 DIAGNOSIS — E11.9 TYPE 2 DIABETES MELLITUS WITHOUT COMPLICATION, WITH LONG-TERM CURRENT USE OF INSULIN (HCC): ICD-10-CM

## 2020-12-02 DIAGNOSIS — Z79.4 TYPE 2 DIABETES MELLITUS WITHOUT COMPLICATION, WITH LONG-TERM CURRENT USE OF INSULIN (HCC): ICD-10-CM

## 2020-12-02 DIAGNOSIS — E11.9 TYPE 2 DIABETES MELLITUS WITHOUT COMPLICATION, WITH LONG-TERM CURRENT USE OF INSULIN (HCC): ICD-10-CM

## 2020-12-04 ENCOUNTER — TELEPHONE (OUTPATIENT)
Dept: CARDIOLOGY | Facility: MEDICAL CENTER | Age: 45
End: 2020-12-04

## 2020-12-04 DIAGNOSIS — I25.5 ISCHEMIC CARDIOMYOPATHY: ICD-10-CM

## 2020-12-04 DIAGNOSIS — I25.10 CORONARY ARTERY DISEASE DUE TO LIPID RICH PLAQUE: ICD-10-CM

## 2020-12-04 DIAGNOSIS — I25.83 CORONARY ARTERY DISEASE DUE TO LIPID RICH PLAQUE: ICD-10-CM

## 2020-12-07 RX ORDER — METFORMIN HYDROCHLORIDE 500 MG/1
TABLET, EXTENDED RELEASE ORAL
Qty: 180 TAB | Refills: 2 | Status: SHIPPED | OUTPATIENT
Start: 2020-12-07 | End: 2021-11-05 | Stop reason: SDUPTHER

## 2020-12-09 ENCOUNTER — TELEPHONE (OUTPATIENT)
Dept: CARDIOLOGY | Facility: MEDICAL CENTER | Age: 45
End: 2020-12-09

## 2020-12-09 NOTE — TELEPHONE ENCOUNTER
AW  TO: /Wed/  NM: Natalya Khalil   PH: [phone]   PT NM: Rikki Khalil    : 71   REG DR: Dee Kolb   RE: Did you receive labs from

## 2020-12-10 NOTE — TELEPHONE ENCOUNTER
Last lab work from [facility] completed 10/29 and received 10/30. S/w wife who explains that Dee ARMAS told them during 10/29 appt that we would call them with the lab results but they hadn't heard anything. Pt also got an echo 12/4 at [facility]. Wife states that pt has an appt with his PCP tomorrow and this office did not have these results.       - Labs faxed to PCP, Leighton Young PA office at [phone] as requested.     To AB: PCP will review labs tomorrow but pts requesting your review as well and review of echo. Thank you

## 2020-12-10 NOTE — TELEPHONE ENCOUNTER
Please let patient know that echo from 12/4 looks good - LVEF is now 50% (which is great news!).  Labs are all good; glucose is still a little high, but I know patient is working with his PCP to get this down. (HgbA1c was 7.7, which is better, but glucose was 229).  Continue same meds, and keep FU with me as scheduled.  Thanks!  AB

## 2021-04-27 NOTE — THERAPY
"Physical Therapy Treatment completed.   Bed Mobility:  Supine to Sit: Moderate Assist (allowed increased time; raised HOB)  Transfers: Sit to Stand: Moderate Assist (with B UE support on therapist )  Gait: Level Of Assist: Minimal Assist with hand hold assist x 5ft   Plan of Care: Will benefit from Physical Therapy 4 times per week  Discharge Recommendations: Equipment: Will Continue to Assess for Equipment Needs.     Pt with improving activity tolerance though limited by heart rate, reaching 133 with minimal standing activities and reporting 'hard' effort; appears to depend heavily on rate for increased cardiac outpt during physical activity given reduced EF; pt will require placement, tolerance of which will depend on rate control though given age and PLOF once medically stable should progress quickly. will follow.       See \"Rehab Therapy-Acute\" Patient Summary Report for complete documentation.       " AOCSTA in th setting of cefepime, rash and eosinophilia.  Most likely AIN with some contribution from congestive renal failure (B lines on POCUS + JVD).  Recommend hold cefepime, continue diuretics with a goal of net negative fluid balance.  Patient on aspirin and would be a high risk of kidney biopsy.  If the creatinine fails to improve then will consider an empiric trial of steroids.

## 2021-04-29 ENCOUNTER — OFFICE VISIT (OUTPATIENT)
Dept: CARDIOLOGY | Facility: PHYSICIAN GROUP | Age: 46
End: 2021-04-29
Payer: MEDICAID

## 2021-04-29 VITALS
BODY MASS INDEX: 30.07 KG/M2 | DIASTOLIC BLOOD PRESSURE: 62 MMHG | WEIGHT: 222 LBS | HEART RATE: 82 BPM | OXYGEN SATURATION: 97 % | SYSTOLIC BLOOD PRESSURE: 126 MMHG | HEIGHT: 72 IN

## 2021-04-29 DIAGNOSIS — Z79.4 TYPE 2 DIABETES MELLITUS WITHOUT COMPLICATION, WITH LONG-TERM CURRENT USE OF INSULIN (HCC): ICD-10-CM

## 2021-04-29 DIAGNOSIS — E11.9 TYPE 2 DIABETES MELLITUS WITHOUT COMPLICATION, WITH LONG-TERM CURRENT USE OF INSULIN (HCC): ICD-10-CM

## 2021-04-29 DIAGNOSIS — I25.5 ISCHEMIC CARDIOMYOPATHY: ICD-10-CM

## 2021-04-29 DIAGNOSIS — I10 ESSENTIAL HYPERTENSION, BENIGN: ICD-10-CM

## 2021-04-29 DIAGNOSIS — E78.2 MIXED HYPERLIPIDEMIA: ICD-10-CM

## 2021-04-29 DIAGNOSIS — I25.83 CORONARY ARTERY DISEASE DUE TO LIPID RICH PLAQUE: ICD-10-CM

## 2021-04-29 DIAGNOSIS — Z95.1 S/P CABG X 3: ICD-10-CM

## 2021-04-29 DIAGNOSIS — I25.10 CORONARY ARTERY DISEASE DUE TO LIPID RICH PLAQUE: ICD-10-CM

## 2021-04-29 PROBLEM — R42 DIZZINESS: Status: RESOLVED | Noted: 2018-12-27 | Resolved: 2021-04-29

## 2021-04-29 PROCEDURE — 99214 OFFICE O/P EST MOD 30 MIN: CPT | Performed by: NURSE PRACTITIONER

## 2021-04-29 ASSESSMENT — ENCOUNTER SYMPTOMS
PND: 0
ABDOMINAL PAIN: 0
PALPITATIONS: 0
HEADACHES: 0
MYALGIAS: 0
FEVER: 0
COUGH: 0
LOSS OF CONSCIOUSNESS: 0
DIZZINESS: 0
WEAKNESS: 0
SHORTNESS OF BREATH: 0
NAUSEA: 0
BRUISES/BLEEDS EASILY: 0
INSOMNIA: 0
CHILLS: 0
ORTHOPNEA: 0

## 2021-04-29 NOTE — PROGRESS NOTES
Chief Complaint   Patient presents with   • Follow-Up   • Coronary Artery Disease   • Coronary Artery Bypass Graft (CABG)   • Cardiomyopathy (Ischemic)   • HTN (Controlled)   • Hyperlipidemia   • Diabetes Mellitus       Subjective:   Ryley Khalil is a 45 y.o. male who presents today for six month follow-up of CAD, hypertension and hyperilpidemia.    Rikki is a 45 year old male with history of SVT and diabetes. In December 2018, he had CABG x 3 done. Most recent echocardiogram showed LVEF 50%. He also has hyperlipidemia, and diabetes mellitus.     He is here today for six month follow-up. In general, he is doing well. He is back to working full time, and tolerating well. He is still smoking, about 1/2 ppd.  No chest pain, pressure or discomfort; no palpitations; no shortness of breath, orthopnea or PND; no lightheadedness or dizziness; no syncope. No LE edema. BP is good, but glucose is still not optimally controlled; last HgbA1c was 7.7.    Past Medical History:   Diagnosis Date   • Arthritis     All joints   • Back pain    • Bowel obstruction (HCC)    • CAD (coronary artery disease) 12/2018    CABG x 3 (LIMA to distal LAD, rSVG to distal diagonal, rSVG to distal RCA) by Dr. Barrios.   • Essential hypertension, benign 10/29/2020   • Glaucoma     Bilateral eyes   • Hyperlipidemia    • Ischemic cardiomyopathy 12/2018    Echocardiogram with LVEF 25-30%. Global hypokinesis. Moderately dilated RV. Mild TR. December 2020: Echocardiogram with normal LV size, LVEF 50%. Grade I diastolic dysfunction. Normal RA, LA and RV. Trace MR, mild TR. RVSP 25mmHg.   • NSTEMI (non-ST elevated myocardial infarction) ([facility]) 12/27/2018   • Other depressive disorder 1/16/2019   • Pneumonia 12/30/2018   • Scoliosis    • SVT (supraventricular tachycardia) ([facility])    • Systolic congestive heart failure ([facility])    • Type II or unspecified type diabetes mellitus without mention of complication, not stated as uncontrolled     Insulin and oral meds      Past Surgical History:   Procedure Laterality Date   • MULTIPLE CORONARY ARTERY BYPASS ENDO VEIN HARVEST  12/28/2018    Procedure: MULTIPLE CORONARY ARTERY BYPASS x 3,  ENDO VEIN HARVEST LEFT LEG;  Surgeon: Tomas Barrios M.D.;  Location: SURGERY [facility];  Service: Cardiothoracic   • DONNA  12/28/2018    Procedure: DONNA;  Surgeon: Tomas Barrios M.D.;  Location: SURGERY [facility];  Service: Cardiothoracic   • ILEOSTOMY  9/29/2015    Procedure: ILEOSTOMY TAKE DOWN;  Surgeon: Carter Kumar M.D.;  Location: SURGERY [facility];  Service:    • LOW ANTERIOR RESECTION ROBOTIC XI  7/14/2015    Procedure: Robotic low anterior resection, takedown enterocutaneous fistula, omental flap, ileostomy creation;  Surgeon: Carter Kumar M.D.;  Location: [facility];  Service:    • COLOSTOMY CREATION LAPAROSCOPIC  7/14/2015    Procedure: COLOSTOMY CREATION LAPAROSCOPIC FOR: LAP ILEOSTOMY;  Surgeon: Carter Kumar M.D.;  Location: SURGERY [facility];  Service:      Family History   Problem Relation Age of Onset   • Hypertension Mother    • Heart Disease Mother    • Stroke Mother    • No Known Problems Father      Social History     Socioeconomic History   • Marital status:      Spouse name: Not on file   • Number of children: Not on file   • Years of education: Not on file   • Highest education level: Not on file   Occupational History   • Not on file   Tobacco Use   • Smoking status: Current Every Day Smoker     Packs/day: 0.75     Years: 24.00     Pack years: 18.00     Types: Cigarettes     Last attempt to quit: 2018     Years since quitting: 3.3   • Smokeless tobacco: Never Used   Substance and Sexual Activity   • Alcohol use: No   • Drug use: Yes     Types: Inhaled     Comment: marijuana daily   • Sexual activity: Not on file   Other Topics Concern   • Not on file   Social History Narrative   • Not on file     Social Determinants of Health     Financial Resource Strain:    •  "Difficulty of Paying Living Expenses:    Food Insecurity:    • Worried About Running Out of Food in the Last Year:    • Ran Out of Food in the Last Year:    Transportation Needs:    • Lack of Transportation (Medical):    • Lack of Transportation (Non-Medical):    Physical Activity:    • Days of Exercise per Week:    • Minutes of Exercise per Session:    Stress:    • Feeling of Stress :    Social Connections:    • Frequency of Communication with Friends and Family:    • Frequency of Social Gatherings with Friends and Family:    • Attends Worship Services:    • Active Member of Clubs or Organizations:    • Attends Club or Organization Meetings:    • Marital Status:    Intimate Partner Violence:    • Fear of Current or Ex-Partner:    • Emotionally Abused:    • Physically Abused:    • Sexually Abused:      Allergies   Allergen Reactions   • Penicillins Unspecified     \"Blue baby\"  RXN= as a child  Tolerated ceftriaxone 6/2015   • Fentanyl      Hallucinations     Outpatient Encounter Medications as of 4/29/2021   Medication Sig Dispense Refill   • metFORMIN ER (GLUCOPHAGE XR) 500 MG TABLET SR 24 HR TAKE TWO TABLETS BY MOUTH ONCE DAILY 180 Tab 2   • atorvastatin (LIPITOR) 80 MG tablet Take 1 Tab by mouth every day. 90 Tab 3   • lisinopril (PRINIVIL) 2.5 MG Tab Take 1 Tab by mouth every day. 90 Tab 3   • metoprolol (LOPRESSOR) 50 MG Tab Take 1 Tab by mouth 2 times a day. 180 Tab 3   • ONETOUCH VERIO strip      • ULTICARE MICRO PEN NEEDLES      • Lancets (ONETOUCH DELICA PLUS [id number]) Misc      • LANTUS SOLOSTAR 100 UNIT/ML Solution Pen-injector injection   11   • HUMALOG KWIKPEN 100 UNIT/ML Solution Pen-injector injection Inject 10-15 Units as instructed 3 times a day before meals.     • aspirin EC 81 MG EC tablet Take 1 Tab by mouth every day. 30 Tab    • [DISCONTINUED] IRON PO Take  by mouth every day.       No facility-administered encounter medications on file as of 4/29/2021.     Review of Systems "   Constitutional: Negative for chills, fever and malaise/fatigue.   HENT: Negative for congestion.    Respiratory: Negative for cough and shortness of breath.    Cardiovascular: Negative for chest pain, palpitations, orthopnea, leg swelling and PND.   Gastrointestinal: Negative for abdominal pain and nausea.   Musculoskeletal: Negative for myalgias.   Skin: Negative for rash.   Neurological: Negative for dizziness, loss of consciousness, weakness and headaches.   Endo/Heme/Allergies: Does not bruise/bleed easily.   Psychiatric/Behavioral: The patient does not have insomnia.         Objective:   /62 (BP Location: Left arm, Patient Position: Sitting, BP Cuff Size: Adult)   Pulse 82   Ht 1.829 m (6')   Wt 101 kg (222 lb)   SpO2 97%   BMI 30.11 kg/m²     Physical Exam   Constitutional: He is oriented to person, place, and time. He appears well-developed and well-nourished.   HENT:   Head: Normocephalic.   Eyes: EOM are normal.   Neck: No JVD present.   Cardiovascular: Normal rate, regular rhythm and normal heart sounds.   Pulmonary/Chest: Effort normal and breath sounds normal. No respiratory distress. He has no wheezes. He has no rales.   Sternotomy scar present.   Abdominal: Soft. Bowel sounds are normal. He exhibits no distension. There is no abdominal tenderness.   Musculoskeletal:         General: No edema. Normal range of motion.      Cervical back: Normal range of motion and neck supple.   Neurological: He is alert and oriented to person, place, and time.   Skin: Skin is warm and dry. No rash noted. No pallor.   Psychiatric: He has a normal mood and affect.     RESULTS OF ECHOCARDIOGRAM OF 12/3/2020:  Normal LV size  LVEF 50%  Normal RA, LA and RV  No valvular abnormalities    LABS AS OF 10/29/2020:  HgbA1c 7.7  Glucose 229  BUN 14  Creatinine 0.84    Potassium 4.2  AST 12  ALT 15  Cholesterol 137  Triglycerides 129  HDL 41  LDL 70  Cholesterol/HDL ration 3.3  TSH 2.44    Assessment:     1.  Coronary artery disease due to lipid rich plaque     2. S/P CABG x 3     3. Ischemic cardiomyopathy     4. Essential hypertension, benign     5. Mixed hyperlipidemia     6. Type 2 diabetes mellitus without complication, with long-term current use of insulin ([facility])         Medical Decision Making:  Today's Assessment / Status / Plan:     1. CAD/ischemic cardiomyopathy, status post CABG x 3, stable. Recent echocardiogram showed LVEF 50%. He remains on ASA, BB, ACEI and statin. No angina or shortness of breath. Urged to quit smoking completely.    2. Hypertension, treated with BB and ACEI, stable. BP is good today.    3. Hyperlipidemia, treated with statin. Last LDL was 70, at goal.    4. Diabetes mellitus, treated, but still not optimally controlled, but definitely better.    Same medications for now. FU annually, sooner if clinical condition changes. Labs per PCP. FU sooner if clinical condition changes.

## 2021-11-05 DIAGNOSIS — E78.2 MIXED HYPERLIPIDEMIA: ICD-10-CM

## 2021-11-05 DIAGNOSIS — I25.10 CORONARY ARTERY DISEASE DUE TO LIPID RICH PLAQUE: ICD-10-CM

## 2021-11-05 DIAGNOSIS — Z95.1 S/P CABG X 3: ICD-10-CM

## 2021-11-05 DIAGNOSIS — I25.83 CORONARY ARTERY DISEASE DUE TO LIPID RICH PLAQUE: ICD-10-CM

## 2021-11-05 DIAGNOSIS — E11.9 TYPE 2 DIABETES MELLITUS WITHOUT COMPLICATION, WITH LONG-TERM CURRENT USE OF INSULIN (HCC): ICD-10-CM

## 2021-11-05 DIAGNOSIS — Z79.4 TYPE 2 DIABETES MELLITUS WITHOUT COMPLICATION, WITH LONG-TERM CURRENT USE OF INSULIN (HCC): ICD-10-CM

## 2021-11-11 RX ORDER — METOPROLOL TARTRATE 50 MG/1
50 TABLET, FILM COATED ORAL 2 TIMES DAILY
Qty: 180 TABLET | Refills: 3 | Status: SHIPPED | OUTPATIENT
Start: 2021-11-11 | End: 2023-11-05

## 2021-11-11 RX ORDER — ATORVASTATIN CALCIUM 80 MG/1
80 TABLET, FILM COATED ORAL DAILY
Qty: 90 TABLET | Refills: 3 | Status: SHIPPED | OUTPATIENT
Start: 2021-11-11 | End: 2023-11-05

## 2021-11-11 RX ORDER — METFORMIN HYDROCHLORIDE 500 MG/1
1000 TABLET, EXTENDED RELEASE ORAL DAILY
Qty: 180 TABLET | Refills: 3 | Status: SHIPPED | OUTPATIENT
Start: 2021-11-11 | End: 2023-11-05

## 2021-11-11 RX ORDER — LISINOPRIL 2.5 MG/1
2.5 TABLET ORAL DAILY
Qty: 90 TABLET | Refills: 3 | Status: SHIPPED | OUTPATIENT
Start: 2021-11-11 | End: 2023-11-05

## 2023-11-05 ENCOUNTER — HOSPITAL ENCOUNTER (EMERGENCY)
Facility: MEDICAL CENTER | Age: 48
End: 2023-11-05
Attending: STUDENT IN AN ORGANIZED HEALTH CARE EDUCATION/TRAINING PROGRAM
Payer: MEDICAID

## 2023-11-05 ENCOUNTER — APPOINTMENT (OUTPATIENT)
Dept: RADIOLOGY | Facility: MEDICAL CENTER | Age: 48
End: 2023-11-05
Attending: STUDENT IN AN ORGANIZED HEALTH CARE EDUCATION/TRAINING PROGRAM
Payer: MEDICAID

## 2023-11-05 VITALS
DIASTOLIC BLOOD PRESSURE: 97 MMHG | WEIGHT: 222.66 LBS | TEMPERATURE: 97.5 F | SYSTOLIC BLOOD PRESSURE: 171 MMHG | HEIGHT: 72 IN | RESPIRATION RATE: 18 BRPM | HEART RATE: 112 BPM | OXYGEN SATURATION: 98 % | BODY MASS INDEX: 30.16 KG/M2

## 2023-11-05 DIAGNOSIS — I25.5 ISCHEMIC CARDIOMYOPATHY: ICD-10-CM

## 2023-11-05 DIAGNOSIS — I21.4 NSTEMI (NON-ST ELEVATED MYOCARDIAL INFARCTION) (HCC): ICD-10-CM

## 2023-11-05 DIAGNOSIS — I47.20 VENTRICULAR TACHYCARDIA (HCC): ICD-10-CM

## 2023-11-05 LAB
ANION GAP SERPL CALC-SCNC: 11 MMOL/L (ref 7–16)
APTT PPP: 24.9 SEC (ref 24.7–36)
BASOPHILS # BLD AUTO: 0.6 % (ref 0–1.8)
BASOPHILS # BLD: 0.06 K/UL (ref 0–0.12)
BUN SERPL-MCNC: 16 MG/DL (ref 8–22)
CALCIUM SERPL-MCNC: 9.4 MG/DL (ref 8.5–10.5)
CHLORIDE SERPL-SCNC: 109 MMOL/L (ref 96–112)
CO2 SERPL-SCNC: 21 MMOL/L (ref 20–33)
CREAT SERPL-MCNC: 0.88 MG/DL (ref 0.5–1.4)
EKG IMPRESSION: NORMAL
EOSINOPHIL # BLD AUTO: 0.12 K/UL (ref 0–0.51)
EOSINOPHIL NFR BLD: 1.1 % (ref 0–6.9)
ERYTHROCYTE [DISTWIDTH] IN BLOOD BY AUTOMATED COUNT: 47 FL (ref 35.9–50)
GFR SERPLBLD CREATININE-BSD FMLA CKD-EPI: 105 ML/MIN/1.73 M 2
GLUCOSE BLD STRIP.AUTO-MCNC: 165 MG/DL (ref 65–99)
GLUCOSE SERPL-MCNC: 180 MG/DL (ref 65–99)
HCT VFR BLD AUTO: 48.9 % (ref 42–52)
HGB BLD-MCNC: 16.4 G/DL (ref 14–18)
IMM GRANULOCYTES # BLD AUTO: 0.05 K/UL (ref 0–0.11)
IMM GRANULOCYTES NFR BLD AUTO: 0.5 % (ref 0–0.9)
INR PPP: 1.05 (ref 0.87–1.13)
LYMPHOCYTES # BLD AUTO: 2.14 K/UL (ref 1–4.8)
LYMPHOCYTES NFR BLD: 20.1 % (ref 22–41)
MAGNESIUM SERPL-MCNC: 1.6 MG/DL (ref 1.5–2.5)
MCH RBC QN AUTO: 31.8 PG (ref 27–33)
MCHC RBC AUTO-ENTMCNC: 33.5 G/DL (ref 32.3–36.5)
MCV RBC AUTO: 94.8 FL (ref 81.4–97.8)
MONOCYTES # BLD AUTO: 0.57 K/UL (ref 0–0.85)
MONOCYTES NFR BLD AUTO: 5.4 % (ref 0–13.4)
NEUTROPHILS # BLD AUTO: 7.69 K/UL (ref 1.82–7.42)
NEUTROPHILS NFR BLD: 72.3 % (ref 44–72)
NRBC # BLD AUTO: 0 K/UL
NRBC BLD-RTO: 0 /100 WBC (ref 0–0.2)
PHOSPHATE SERPL-MCNC: 3.4 MG/DL (ref 2.5–4.5)
PLATELET # BLD AUTO: 215 K/UL (ref 164–446)
PMV BLD AUTO: 12 FL (ref 9–12.9)
POTASSIUM SERPL-SCNC: 3.9 MMOL/L (ref 3.6–5.5)
PROTHROMBIN TIME: 13.9 SEC (ref 12–14.6)
RBC # BLD AUTO: 5.16 M/UL (ref 4.7–6.1)
SODIUM SERPL-SCNC: 141 MMOL/L (ref 135–145)
TROPONIN T SERPL-MCNC: 44 NG/L (ref 6–19)
UFH PPP CHRO-ACNC: <0.1 IU/ML
WBC # BLD AUTO: 10.6 K/UL (ref 4.8–10.8)

## 2023-11-05 PROCEDURE — 36415 COLL VENOUS BLD VENIPUNCTURE: CPT

## 2023-11-05 PROCEDURE — 83735 ASSAY OF MAGNESIUM: CPT

## 2023-11-05 PROCEDURE — 99283 EMERGENCY DEPT VISIT LOW MDM: CPT

## 2023-11-05 PROCEDURE — 93005 ELECTROCARDIOGRAM TRACING: CPT | Performed by: STUDENT IN AN ORGANIZED HEALTH CARE EDUCATION/TRAINING PROGRAM

## 2023-11-05 PROCEDURE — 85730 THROMBOPLASTIN TIME PARTIAL: CPT

## 2023-11-05 PROCEDURE — 85610 PROTHROMBIN TIME: CPT

## 2023-11-05 PROCEDURE — 71045 X-RAY EXAM CHEST 1 VIEW: CPT

## 2023-11-05 PROCEDURE — 84100 ASSAY OF PHOSPHORUS: CPT

## 2023-11-05 PROCEDURE — 85025 COMPLETE CBC W/AUTO DIFF WBC: CPT

## 2023-11-05 PROCEDURE — 82962 GLUCOSE BLOOD TEST: CPT

## 2023-11-05 PROCEDURE — 84484 ASSAY OF TROPONIN QUANT: CPT

## 2023-11-05 PROCEDURE — 85520 HEPARIN ASSAY: CPT

## 2023-11-05 PROCEDURE — 80048 BASIC METABOLIC PNL TOTAL CA: CPT

## 2023-11-05 RX ORDER — HEPARIN SODIUM 1000 [USP'U]/ML
4000 INJECTION, SOLUTION INTRAVENOUS; SUBCUTANEOUS ONCE
Status: DISCONTINUED | OUTPATIENT
Start: 2023-11-05 | End: 2023-11-06 | Stop reason: HOSPADM

## 2023-11-05 RX ORDER — HEPARIN SODIUM 5000 [USP'U]/100ML
0-30 INJECTION, SOLUTION INTRAVENOUS CONTINUOUS
Status: DISCONTINUED | OUTPATIENT
Start: 2023-11-05 | End: 2023-11-06 | Stop reason: HOSPADM

## 2023-11-05 RX ORDER — ASPIRIN 325 MG
325 TABLET ORAL DAILY
Status: DISCONTINUED | OUTPATIENT
Start: 2023-11-06 | End: 2023-11-06 | Stop reason: HOSPADM

## 2023-11-05 RX ORDER — METOPROLOL TARTRATE 50 MG/1
50 TABLET, FILM COATED ORAL 2 TIMES DAILY
Status: SHIPPED | COMMUNITY
End: 2023-11-05 | Stop reason: SDUPTHER

## 2023-11-05 RX ORDER — ATORVASTATIN CALCIUM 80 MG/1
80 TABLET, FILM COATED ORAL NIGHTLY
COMMUNITY

## 2023-11-05 RX ORDER — METOPROLOL TARTRATE 50 MG/1
50 TABLET, FILM COATED ORAL 2 TIMES DAILY
Qty: 60 TABLET | Refills: 0 | Status: SHIPPED | OUTPATIENT
Start: 2023-11-05

## 2023-11-05 RX ORDER — METFORMIN HYDROCHLORIDE 500 MG/1
500 TABLET, EXTENDED RELEASE ORAL DAILY
COMMUNITY

## 2023-11-05 RX ORDER — HEPARIN SODIUM 1000 [USP'U]/ML
2000 INJECTION, SOLUTION INTRAVENOUS; SUBCUTANEOUS PRN
Status: DISCONTINUED | OUTPATIENT
Start: 2023-11-05 | End: 2023-11-06 | Stop reason: HOSPADM

## 2023-11-06 NOTE — ED NOTES
Med rec updated and complete . Allergies reviewed. Confirmed name and date of birth.   Pt denies antibiotic use in last 30 days  Pt denies anticoagulant medications.  Last asa dose 11/05/23.      Home pharmacy   [facility] = [phone]

## 2023-11-06 NOTE — ED NOTES
Rikki Khalil expresses desire to leave. Risks in leaving ED before treatment given and diagnostics completed discussed with patient. EP completed AMA form and patient  signed form.

## 2023-11-06 NOTE — ED TRIAGE NOTES
Chief Complaint   Patient presents with    Lightheadedness     Pt BIB EMS for sudden onset lightheadedness around 6:30pm with profuse sweating. Pt asked Fire department to assess him, pt found to be in Vtach with pulses. PTA given 324 aspirin, 150 amiodarone and fluids, in which resolved Vtach.      Pt admits to noncompliance on Metoprolol x1 week

## 2023-11-06 NOTE — DISCHARGE INSTRUCTIONS
You were seen in the emergency department for life-threatening cardiac arrhythmia.  Labs reveal you are having a small heart attack.  You should stay in the hospital for further evaluation and management.  You elected to go home.  Please follow-up with your cardiologist.  Come back to the emergency department if you develop worsening symptoms.

## 2023-11-06 NOTE — ED NOTES
"Pt requesting to leave MD MAGNO is aware of this. MD spoke with pt and explained risks of leaving, RN also explained risks but pt is wanting to go home and \"be with my wife.\" Pt states he will call 911 and follow up with his cardiologist.   "

## 2023-11-06 NOTE — ED PROVIDER NOTES
ED Provider Note    CHIEF COMPLAINT  Chief Complaint   Patient presents with    Lightheadedness     Pt BIB EMS for sudden onset lightheadedness around 6:30pm with profuse sweating. Pt asked Fire department to assess him, pt found to be in Vtach with pulses. PTA given 324 aspirin, 150 amiodarone and fluids, in which resolved Vtach.        EXTERNAL RECORDS REVIEWED  EMS run sheet regarding treatment of ventricular tachycardia    HPI/ROS  LIMITATION TO HISTORY   Select: : None  OUTSIDE HISTORIAN(S):  EMS    Rikki Khalil is a 48 y.o. male with past medical history of coronary disease status post three-vessel CABG, hyperlipidemia, hypertension, ischemic cardiomyopathy, type 2 diabetes presenting to the emergency department for palpitations.  Patient reports that while he was at work, felt diaphoretic, saw on his smart watch that his heart rate was over 200 so he called EMS.  On EMS arrival, they found that he was in ventricular tachycardia, they treated him with 150 mg of amiodarone, 324 mg of aspirin and his rhythm converted spontaneously to a sinus tachycardia.  Patient denies having chest pain or shortness of breath during this episode.  Up until today, he was in his normal state of health  Says that now he feels better than even prior to this episode,, feels 100% back to normal    PAST MEDICAL HISTORY   has a past medical history of Arthritis, Back pain, Bowel obstruction ([facility]), CAD (coronary artery disease) (12/2018), Essential hypertension, benign (10/29/2020), Glaucoma, Hyperlipidemia, Ischemic cardiomyopathy (12/2018), NSTEMI (non-ST elevated myocardial infarction) ([facility]) (12/27/2018), Other depressive disorder (1/16/2019), Pneumonia (12/30/2018), Scoliosis, SVT (supraventricular tachycardia), Systolic congestive heart failure ([facility]), and Type II or unspecified type diabetes mellitus without mention of complication, not stated as uncontrolled.    SURGICAL HISTORY   has a past surgical history that includes low  "anterior resection robotic xi (2015); colostomy creation laparoscopic (2015); ileostomy (2015); multiple coronary artery bypass endo vein harvest (2018); and allegra (2018).    FAMILY HISTORY  Family History   Problem Relation Age of Onset    Hypertension Mother     Heart Disease Mother     Stroke Mother     No Known Problems Father        SOCIAL HISTORY  Social History     Tobacco Use    Smoking status: Every Day     Current packs/day: 0.00     Average packs/day: 0.8 packs/day for 24.0 years (18.0 ttl pk-yrs)     Types: Cigarettes     Start date:      Last attempt to quit: 2018     Years since quittin.8    Smokeless tobacco: Never   Substance and Sexual Activity    Alcohol use: No    Drug use: Yes     Types: Inhaled     Comment: marijuana daily    Sexual activity: Not on file       CURRENT MEDICATIONS  Home Medications       Reviewed by Catherine White (Pharmacy Tech) on 23 at 2102  Med List Status: Complete     Medication Last Dose Status   aspirin EC 81 MG EC tablet 2023 Active   atorvastatin (LIPITOR) 80 MG tablet 2023 Active   metFORMIN ER (GLUCOPHAGE XR) 500 MG TABLET SR 24 HR 2023 Active   metoprolol tartrate (LOPRESSOR) 50 MG Tab 2023 Active                    ALLERGIES  Allergies   Allergen Reactions    Penicillins Unspecified     \"Blue baby\"  RXN= as a child  Tolerated ceftriaxone 2015    Fentanyl      Hallucinations       PHYSICAL EXAM  VITAL SIGNS: BP (!) 171/97   Pulse (!) 112   Temp 36.4 °C (97.5 °F) (Temporal)   Resp 18   Ht 1.829 m (6')   Wt 101 kg (222 lb 10.6 oz)   SpO2 98%   BMI 30.20 kg/m²    General: non-toxic, no acute distress  Neuro: oriented x 3, moving all extremities.   HEENT:   - Head: Normocephalic, atraumatic  - Eyes: PERRL  - Ears/Nose: normal external nose and ears  - Mouth: moist mucosal membranes  Neck: No JVD  Chest: Well-healed sternotomy scar  Resp: clear to auscultation, no increased work of breathing  CV: " Tachycardic, regular rhythm, no murmur  Abd: Soft, non-tender, non-distended  Extremities: No peripheral edema  Psych: lucid and conversational             DIAGNOSTIC STUDIES / PROCEDURES    EKG  My independent EKG interpretation:  Sinus tachycardia, no acute ST or T changes.    LABS  Results for orders placed or performed during the hospital encounter of 11/05/23   CBC WITH DIFFERENTIAL   Result Value Ref Range    WBC 10.6 4.8 - 10.8 K/uL    RBC 5.16 4.70 - 6.10 M/uL    Hemoglobin 16.4 14.0 - 18.0 g/dL    Hematocrit 48.9 42.0 - 52.0 %    MCV 94.8 81.4 - 97.8 fL    MCH 31.8 27.0 - 33.0 pg    MCHC 33.5 32.3 - 36.5 g/dL    RDW 47.0 35.9 - 50.0 fL    Platelet Count 215 164 - 446 K/uL    MPV 12.0 9.0 - 12.9 fL    Neutrophils-Polys 72.30 (H) 44.00 - 72.00 %    Lymphocytes 20.10 (L) 22.00 - 41.00 %    Monocytes 5.40 0.00 - 13.40 %    Eosinophils 1.10 0.00 - 6.90 %    Basophils 0.60 0.00 - 1.80 %    Immature Granulocytes 0.50 0.00 - 0.90 %    Nucleated RBC 0.00 0.00 - 0.20 /100 WBC    Neutrophils (Absolute) 7.69 (H) 1.82 - 7.42 K/uL    Lymphs (Absolute) 2.14 1.00 - 4.80 K/uL    Monos (Absolute) 0.57 0.00 - 0.85 K/uL    Eos (Absolute) 0.12 0.00 - 0.51 K/uL    Baso (Absolute) 0.06 0.00 - 0.12 K/uL    Immature Granulocytes (abs) 0.05 0.00 - 0.11 K/uL    NRBC (Absolute) 0.00 K/uL   Basic Metabolic Panel   Result Value Ref Range    Sodium 141 135 - 145 mmol/L    Potassium 3.9 3.6 - 5.5 mmol/L    Chloride 109 96 - 112 mmol/L    Co2 21 20 - 33 mmol/L    Glucose 180 (H) 65 - 99 mg/dL    Bun 16 8 - 22 mg/dL    Creatinine 0.88 0.50 - 1.40 mg/dL    Calcium 9.4 8.5 - 10.5 mg/dL    Anion Gap 11.0 7.0 - 16.0   MAGNESIUM   Result Value Ref Range    Magnesium 1.6 1.5 - 2.5 mg/dL   PHOSPHORUS   Result Value Ref Range    Phosphorus 3.4 2.5 - 4.5 mg/dL   TROPONIN   Result Value Ref Range    Troponin T 44 (H) 6 - 19 ng/L   ESTIMATED GFR   Result Value Ref Range    GFR (CKD-EPI) 105 >60 mL/min/1.73 m 2   aPTT   Result Value Ref Range    APTT  24.9 24.7 - 36.0 sec   Prothrombin Time   Result Value Ref Range    PT 13.9 12.0 - 14.6 sec    INR 1.05 0.87 - 1.13   Heparin Xa (Unfractionated)   Result Value Ref Range    Heparin Xa (UFH) <0.10 IU/mL   POCT glucose device results   Result Value Ref Range    POC Glucose, Blood 165 (H) 65 - 99 mg/dL       RADIOLOGY  I have independently interpreted the diagnostic imaging associated with this visit and am waiting the final reading from the radiologist.   My preliminary interpretation is as follows:   - Plain film of the chest without evidence of obvious acute infiltrate  Radiologist interpretation:   DX-CHEST-PORTABLE (1 VIEW)   Final Result         1.  Slight hazy density at the left lung base could represent subtle infiltrate.              MEDICAL DECISION MAKING    ED Observation Status? No; Patient does not meet criteria for ED Observation.     ED COURSE AND PLAN    Rikki Khalil is a 48 y.o. male with above medical history presenting to the emergency department for an episode of tachycardia, lightheadedness, diaphoresis.  I reviewed the EKG leads obtained by EMS, patient was in ventricular tachycardia on EMS arrival with a rate in the 200s.  Blood pressure was stable, he was treated with 150 mg of amiodarone by EMS and converted to sinus tachycardia.  In the emergency department, patient felt markedly better, had no residual symptoms, no active chest pain.  EKG shows no ischemic changes.  Troponin is elevated at 44.  I ordered aspirin, heparin infusion and plan to admit patient to hospital for NSTEMI and ventricular tachycardia, patient adamantly refused admission.  Did not want to stay in the hospital for further management.  We had a long discussion regarding his past medical history, the implications of his dysrhythmia today.  He has capacity to make his own decisions.  Despite this conversation, patient continued to refuse admission.  I recommended that he follow-up with his primary care physician and his  cardiologist and come back to the emergency department for any worsening symptoms.  I will refill his metoprolol per his request.      ---Pertinent ED Course---:    8:12 PM I reviewed the patient's old records in Epic, medication list, allergies, past medical history and performed a physical examination.     1041 left emergency department AGAINST MEDICAL ADVICE      HTN/IDDM FOLLOW UP:  The patient has known hypertension and is being followed by their primary care doctor      Procedures:      ----------------------------------------------------------------------------------  DISCUSSIONS    I have discussed management of the patient with the following physicians and KAREN's:      Discussion of management with other [facility] or appropriate source(s): Pharmacy    Escalation of care considered, and ultimately not performed: Recommended admission to the hospital for NSTEMI and ventricular tachycardia, patient adamantly refused    Barriers to care at this time, including but not limited to: Limited financial capacity    Decision tools and prescription drugs considered including, but not limited to: .      FINAL IMPRESSION    1. Ventricular tachycardia (HCC)    2. NSTEMI (non-ST elevated myocardial infarction) (HCC)    3. Ischemic cardiomyopathy          DISPOSITION    Left hospital AGAINST MEDICAL ADVICE      This chart was dictated using an electronic voice recognition software. The chart has been reviewed and edited but there is still possibility for dictation errors due to limitation of software.    Radhames Sweeney DO 11/5/2023

## 2023-11-30 LAB
CHOLEST SERPL-MCNC: 160 MG/DL
HDLC SERPL-MCNC: 41 MG/DL
LDLC SERPL CALC-MCNC: 100 MG/DL
TRIGL SERPL-MCNC: 86 MG/DL

## 2024-01-03 ENCOUNTER — TELEPHONE (OUTPATIENT)
Dept: CARDIOLOGY | Facility: PHYSICIAN GROUP | Age: 49
End: 2024-01-03

## 2024-01-03 ENCOUNTER — OFFICE VISIT (OUTPATIENT)
Dept: CARDIOLOGY | Facility: PHYSICIAN GROUP | Age: 49
End: 2024-01-03
Payer: MEDICAID

## 2024-01-03 VITALS
HEIGHT: 72 IN | SYSTOLIC BLOOD PRESSURE: 128 MMHG | HEART RATE: 91 BPM | RESPIRATION RATE: 16 BRPM | BODY MASS INDEX: 29.93 KG/M2 | OXYGEN SATURATION: 97 % | DIASTOLIC BLOOD PRESSURE: 70 MMHG | WEIGHT: 221 LBS

## 2024-01-03 DIAGNOSIS — E11.9 TYPE 2 DIABETES MELLITUS WITHOUT COMPLICATION, WITH LONG-TERM CURRENT USE OF INSULIN (HCC): ICD-10-CM

## 2024-01-03 DIAGNOSIS — Z79.4 TYPE 2 DIABETES MELLITUS WITHOUT COMPLICATION, WITH LONG-TERM CURRENT USE OF INSULIN (HCC): ICD-10-CM

## 2024-01-03 DIAGNOSIS — I25.5 ISCHEMIC CARDIOMYOPATHY: ICD-10-CM

## 2024-01-03 DIAGNOSIS — Z95.1 S/P CABG X 3: ICD-10-CM

## 2024-01-03 DIAGNOSIS — E78.2 MIXED HYPERLIPIDEMIA: ICD-10-CM

## 2024-01-03 DIAGNOSIS — I25.10 CORONARY ARTERY DISEASE DUE TO LIPID RICH PLAQUE: ICD-10-CM

## 2024-01-03 DIAGNOSIS — I25.83 CORONARY ARTERY DISEASE DUE TO LIPID RICH PLAQUE: ICD-10-CM

## 2024-01-03 DIAGNOSIS — I10 ESSENTIAL HYPERTENSION, BENIGN: ICD-10-CM

## 2024-01-03 DIAGNOSIS — I47.20 VENTRICULAR TACHYCARDIA (HCC): ICD-10-CM

## 2024-01-03 PROCEDURE — 3074F SYST BP LT 130 MM HG: CPT | Performed by: NURSE PRACTITIONER

## 2024-01-03 PROCEDURE — 99214 OFFICE O/P EST MOD 30 MIN: CPT | Performed by: NURSE PRACTITIONER

## 2024-01-03 PROCEDURE — 3078F DIAST BP <80 MM HG: CPT | Performed by: NURSE PRACTITIONER

## 2024-01-03 ASSESSMENT — ENCOUNTER SYMPTOMS
ORTHOPNEA: 0
MYALGIAS: 0
SHORTNESS OF BREATH: 1
BRUISES/BLEEDS EASILY: 0
COUGH: 0
NAUSEA: 0
LOSS OF CONSCIOUSNESS: 0
PALPITATIONS: 0
CHILLS: 0
FEVER: 0
HEADACHES: 0
DIZZINESS: 0
PND: 0
INSOMNIA: 0
ABDOMINAL PAIN: 0

## 2024-01-03 NOTE — PROGRESS NOTES
Chief Complaint   Patient presents with    Hospital Follow-up    Ventricular Tachycardia    Coronary Artery Disease    Cardiomyopathy (Ischemic)    Coronary Artery Bypass Graft (CABG)    HTN (Controlled)    Hyperlipidemia       Subjective     Ryley Khalil is a 48 y.o. male who presents today for hospital follow-up of VT.    Rikki is a 48 year old male with history of CAD/ischemic cardiomyopathy, status post CABG x 3 in December 2018, HTN, hyperlipidemia,  SVT and diabetes, last seen by me in April 2021. Last echo in December 2020 showed LVEF 50%.    On 11/5/2023, he did not feel good at work, having lightheadedness and sweating; EMTs nearby found him to be in VT, and he was given ASA and Amiodarone and fluids. He was taken to [facility], and they wanted to admit him, but he left AMA. He was restarted on Metoprolol.    He is here today for hospital follow-up. He is back on Metoprolol, and is taking ASA and Lipitor and Metformin. He did have labs done for his PCP recently.    In general, he is not feeling great. No overt chest pain, pressure or discomfort; some palpitations and shortness of breath; no orthopnea or PND; no further lightheadedness or syncope; no LE edema. He is smoking, about 1/2-1ppm. Occasional marijuana use; no EtOH use.    Past Medical History:   Diagnosis Date    Arthritis     All joints    Back pain     Bowel obstruction (HCC)     CAD (coronary artery disease) 12/2018    CABG x 3 (LIMA to distal LAD, rSVG to distal diagonal, rSVG to distal RCA) by Dr. Barrios.    Essential hypertension, benign 10/29/2020    Glaucoma     Bilateral eyes    Hyperlipidemia     Ischemic cardiomyopathy 12/2018    Echocardiogram with LVEF 25-30%. Global hypokinesis. Moderately dilated RV. Mild TR. December 2020: Echocardiogram with normal LV size, LVEF 50%. Grade I diastolic dysfunction. Normal RA, LA and RV. Trace MR, mild TR. RVSP 25mmHg.    NSTEMI (non-ST elevated myocardial infarction) ([facility]) 12/27/2018    Other  depressive disorder 2019    Pneumonia 2018    Scoliosis     SVT (supraventricular tachycardia)     Systolic congestive heart failure (HCC)     Type II or unspecified type diabetes mellitus without mention of complication, not stated as uncontrolled     Insulin and oral meds     Past Surgical History:   Procedure Laterality Date    MULTIPLE CORONARY ARTERY BYPASS ENDO VEIN HARVEST  2018    Procedure: MULTIPLE CORONARY ARTERY BYPASS x 3,  ENDO VEIN HARVEST LEFT LEG;  Surgeon: Tomas Barrios M.D.;  Location: SURGERY [facility];  Service: Cardiothoracic    DONNA  2018    Procedure: DONNA;  Surgeon: Tomas Barrios M.D.;  Location: SURGERY [facility];  Service: Cardiothoracic    ILEOSTOMY  2015    Procedure: ILEOSTOMY TAKE DOWN;  Surgeon: Carter Kumar M.D.;  Location: SURGERY [facility];  Service:     LOW ANTERIOR RESECTION ROBOTIC XI  2015    Procedure: Robotic low anterior resection, takedown enterocutaneous fistula, omental flap, ileostomy creation;  Surgeon: Carter Kumar M.D.;  Location: SURGERY [facility];  Service:     COLOSTOMY CREATION LAPAROSCOPIC  2015    Procedure: COLOSTOMY CREATION LAPAROSCOPIC FOR: LAP ILEOSTOMY;  Surgeon: Carter Kumar M.D.;  Location: SURGERY [facility];  Service:      Family History   Problem Relation Age of Onset    Hypertension Mother     Heart Disease Mother     Stroke Mother     No Known Problems Father      Social History     Socioeconomic History    Marital status:      Spouse name: Not on file    Number of children: Not on file    Years of education: Not on file    Highest education level: Some college, no degree   Occupational History    Not on file   Tobacco Use    Smoking status: Every Day     Current packs/day: 0.00     Average packs/day: 0.8 packs/day for 24.0 years (18.0 ttl pk-yrs)     Types: Cigarettes     Start date:      Last attempt to quit: 2018     Years since quittin.0     "Smokeless tobacco: Never   Substance and Sexual Activity    Alcohol use: No    Drug use: Yes     Types: Inhaled     Comment: marijuana daily    Sexual activity: Not on file   Other Topics Concern    Not on file   Social History Narrative    Not on file     Social Determinants of Health     Financial Resource Strain: Medium Risk (11/5/2021)    Overall Financial Resource Strain (CARDIA)     Difficulty of Paying Living Expenses: Somewhat hard   Food Insecurity: Food Insecurity Present (11/5/2021)    Hunger Vital Sign     Worried About Running Out of Food in the Last Year: Sometimes true     Ran Out of Food in the Last Year: Sometimes true   Transportation Needs: No Transportation Needs (11/5/2021)    PRAPARE - Transportation     Lack of Transportation (Medical): No     Lack of Transportation (Non-Medical): No   Physical Activity: Sufficiently Active (11/5/2021)    Exercise Vital Sign     Days of Exercise per Week: 5 days     Minutes of Exercise per Session: 150+ min   Stress: Stress Concern Present (11/5/2021)    [place] [employer] - Occupational Stress Questionnaire     Feeling of Stress : To some extent   Social Connections: Moderately Isolated (11/5/2021)    Social Connection and Isolation Panel [NHANES]     Frequency of Communication with Friends and Family: More than three times a week     Frequency of Social Gatherings with Friends and Family: More than three times a week     Attends Alevism Services: Never     Active Member of Clubs or Organizations: No     Attends Club or Organization Meetings: Never     Marital Status:    Intimate Partner Violence: Not on file   Housing Stability: Low Risk  (11/5/2021)    Housing Stability Vital Sign     Unable to Pay for Housing in the Last Year: No     Number of Places Lived in the Last Year: 1     Unstable Housing in the Last Year: No     Allergies   Allergen Reactions    Penicillins Unspecified     \"Blue baby\"  RXN= as a child  Tolerated " ceftriaxone 6/2015    Fentanyl      Hallucinations     Outpatient Encounter Medications as of 1/3/2024   Medication Sig Dispense Refill    atorvastatin (LIPITOR) 80 MG tablet Take 80 mg by mouth every evening.      metFORMIN ER (GLUCOPHAGE XR) 500 MG TABLET SR 24 HR Take 500 mg by mouth every day.      metoprolol tartrate (LOPRESSOR) 50 MG Tab Take 1 Tablet by mouth 2 times a day. 60 Tablet 0    aspirin EC 81 MG EC tablet Take 1 Tab by mouth every day. 30 Tab      No facility-administered encounter medications on file as of 1/3/2024.     Review of Systems   Constitutional:  Positive for malaise/fatigue. Negative for chills and fever.   HENT:  Negative for congestion.    Respiratory:  Positive for shortness of breath. Negative for cough.    Cardiovascular:  Negative for chest pain, palpitations, orthopnea, leg swelling and PND.   Gastrointestinal:  Negative for abdominal pain and nausea.   Musculoskeletal:  Negative for myalgias.   Skin:  Negative for rash.   Neurological:  Negative for dizziness, loss of consciousness and headaches.   Endo/Heme/Allergies:  Does not bruise/bleed easily.   Psychiatric/Behavioral:  The patient does not have insomnia.               Objective     /70 (BP Location: Left arm, Patient Position: Sitting, BP Cuff Size: Adult)   Pulse 91   Resp 16   Ht 1.829 m (6')   Wt 100 kg (221 lb)   SpO2 97%   BMI 29.97 kg/m²     Physical Exam  Constitutional:       Appearance: He is well-developed.      Comments: BMI 29.97   HENT:      Head: Normocephalic.   Neck:      Vascular: No JVD.   Cardiovascular:      Rate and Rhythm: Normal rate and regular rhythm.      Heart sounds: Normal heart sounds.      Comments: Sternotomy scar present.  Pulmonary:      Effort: Pulmonary effort is normal. No respiratory distress.      Breath sounds: Normal breath sounds. No wheezing or rales.   Abdominal:      General: Bowel sounds are normal. There is no distension.      Palpations: Abdomen is soft.       Tenderness: There is no abdominal tenderness.   Musculoskeletal:         General: Normal range of motion.      Cervical back: Normal range of motion and neck supple.   Skin:     General: Skin is warm and dry.      Coloration: Skin is pale.      Findings: No rash.   Neurological:      Mental Status: He is alert and oriented to person, place, and time.   Psychiatric:         Mood and Affect: Mood normal.         Behavior: Behavior normal.       RESULTS OF ECHOCARDIOGRAM OF 12/3/2020:  Normal LV size  LVEF 50%  Normal RA, LA and RV  No valvular abnormalities     CONCLUSIONS OF TTE OF 2/17/2019:  Compared to the images of the study done on 12/31/2018 there has been no significant changes.  Left ventricular ejection fraction is visually estimated to be 35%.  Estimated right ventricular systolic pressure  is 30 mmHg.    Component      Latest Ref Rng 11/5/2023   Magnesium      1.5 - 2.5 mg/dL 1.6    Troponin T      6 - 19 ng/L 44 (H)    GFR (CKD-EPI)      >60 mL/min/1.73 m 2 105       Lab Results   Component Value Date/Time    WBC 10.6 11/05/2023 07:56 PM    RBC 5.16 11/05/2023 07:56 PM    HEMOGLOBIN 16.4 11/05/2023 07:56 PM    HEMATOCRIT 48.9 11/05/2023 07:56 PM    MCV 94.8 11/05/2023 07:56 PM    MCH 31.8 11/05/2023 07:56 PM    MCHC 33.5 11/05/2023 07:56 PM    MPV 12.0 11/05/2023 07:56 PM       Lab Results   Component Value Date/Time    SODIUM 141 11/05/2023 09:16 PM    POTASSIUM 3.9 11/05/2023 09:16 PM    CHLORIDE 109 11/05/2023 09:16 PM    CO2 21 11/05/2023 09:16 PM    GLUCOSE 180 (H) 11/05/2023 09:16 PM    BUN 16 11/05/2023 09:16 PM    CREATININE 0.88 11/05/2023 09:16 PM        Assessment & Plan     1. Ventricular tachycardia (HCC)  Holter Monitor Study      2. Coronary artery disease due to lipid rich plaque  EC-ECHOCARDIOGRAM COMPLETE W/O CONT    Holter Monitor Study      3. Ischemic cardiomyopathy  EC-ECHOCARDIOGRAM COMPLETE W/O CONT    Holter Monitor Study      4. S/P CABG x 3  EC-ECHOCARDIOGRAM COMPLETE W/O CONT     Holter Monitor Study      5. Essential hypertension, benign        6. Mixed hyperlipidemia        7. Type 2 diabetes mellitus without complication, with long-term current use of insulin (HCC)            Medical Decision Making: Today's Assessment/Status/Plan:      1. Ventricular tachycardia, found by EMTs at work, treated with ASA and Amiodarone. He is back on Metoprolol BID. To obtain echocardiogram and ZioPatch. He is given anticipatory guidance.    2. CAD/ischemia cardiomyopathy, status post CABG x 3 in 2018, to obtain echocardiogram. Continue:  ASA 81mg once daily  Metoprolol 50mg twice daily  Lipitor 80mg once daily    3. Hypertension, treated with Metoprolol, currently stable. Consider adding ACE or ARB or ARNI, depending on how echocardiogram comes back.    4. Hyperlipidemia, treated with Lipitor 80mg. We have requested most recent labs from PCP.    5. Diabetes mellitus, treated with Metformin. To obtain most recent labs.    6. Smoking, urged to cut down further, and ideally quit. We discussed using Nicotine patches or gum.    As above, ZioPatch, echocardiogram and most recent labs. Follow-up with me in 4-6 weeks to discuss results and plan of care.

## 2024-01-04 ENCOUNTER — NON-PROVIDER VISIT (OUTPATIENT)
Dept: CARDIOLOGY | Facility: MEDICAL CENTER | Age: 49
End: 2024-01-04
Attending: NURSE PRACTITIONER
Payer: MEDICAID

## 2024-01-04 DIAGNOSIS — I47.20 VENTRICULAR TACHYCARDIA (HCC): ICD-10-CM

## 2024-01-04 DIAGNOSIS — I25.10 CORONARY ARTERY DISEASE DUE TO LIPID RICH PLAQUE: ICD-10-CM

## 2024-01-04 DIAGNOSIS — I47.10 SVT (SUPRAVENTRICULAR TACHYCARDIA) (HCC): ICD-10-CM

## 2024-01-04 DIAGNOSIS — Z95.1 S/P CABG X 3: ICD-10-CM

## 2024-01-04 DIAGNOSIS — I25.5 ISCHEMIC CARDIOMYOPATHY: ICD-10-CM

## 2024-01-04 DIAGNOSIS — I49.3 PVC'S (PREMATURE VENTRICULAR CONTRACTIONS): ICD-10-CM

## 2024-01-04 DIAGNOSIS — I25.83 CORONARY ARTERY DISEASE DUE TO LIPID RICH PLAQUE: ICD-10-CM

## 2024-01-04 DIAGNOSIS — I47.29 NSVT (NONSUSTAINED VENTRICULAR TACHYCARDIA) (HCC): ICD-10-CM

## 2024-01-04 NOTE — PROGRESS NOTES
Home enrollment completed in the 14 day [facility] [place] monitor per PAWEL Varela,.  Monitor will be shipped to patient's daughter Genna Khalil [address] [place] [id number] per Rkiki via Chain.  Pending EOS.

## 2024-01-10 ENCOUNTER — APPOINTMENT (OUTPATIENT)
Dept: CARDIOLOGY | Facility: MEDICAL CENTER | Age: 49
End: 2024-01-10
Attending: NURSE PRACTITIONER
Payer: MEDICAID

## 2024-01-18 DIAGNOSIS — I10 ESSENTIAL HYPERTENSION, BENIGN: ICD-10-CM

## 2024-01-18 DIAGNOSIS — I25.5 ISCHEMIC CARDIOMYOPATHY: ICD-10-CM

## 2024-01-18 DIAGNOSIS — I25.10 CORONARY ARTERY DISEASE DUE TO LIPID RICH PLAQUE: ICD-10-CM

## 2024-01-18 DIAGNOSIS — I25.83 CORONARY ARTERY DISEASE DUE TO LIPID RICH PLAQUE: ICD-10-CM

## 2024-01-18 RX ORDER — LISINOPRIL 2.5 MG/1
2.5 TABLET ORAL DAILY
Qty: 100 TABLET | Refills: 3
Start: 2024-01-18

## 2024-01-18 NOTE — TELEPHONE ENCOUNTER
I saw this patient two weeks ago in CC - he apparently labs done in [place] (at [facility]).    I still don't see them in Media - would you please be willing to call and get them?    Thank you!!  AB

## 2024-01-18 NOTE — TELEPHONE ENCOUNTER
I called [facility] and they said they didn't have a request on file, I will request again and scan into chart once recieved

## 2024-01-29 ENCOUNTER — TELEPHONE (OUTPATIENT)
Dept: CARDIOLOGY | Facility: MEDICAL CENTER | Age: 49
End: 2024-01-29
Payer: MEDICAID

## 2024-01-29 PROCEDURE — 93228 REMOTE 30 DAY ECG REV/REPORT: CPT | Performed by: INTERNAL MEDICINE

## 2024-01-29 NOTE — TELEPHONE ENCOUNTER
EOS noted in media dated today.     MK: Please see EOS in media dated today and read for final results per care team. Thank you!      AB: Please review and advise. Thank you.

## 2024-01-30 NOTE — TELEPHONE ENCOUNTER
----- Message from SHYAM Varela sent at 1/29/2024 11:47 AM PST -----  Madelyn showed two brief episodes of VT.  Important that he continue taking Metoprolol 50mg twice daily.  I see him in February 2024, and he should keep this.  Please make sure he gets echo in Feb 2024 too.  Thanks, AB

## 2024-01-30 NOTE — TELEPHONE ENCOUNTER
"Attempted to call patient, message states there are \"calling restrictions that prevent completion of call\". Unable to leave message. MyChart to patient.   "

## 2024-01-31 ENCOUNTER — PATIENT MESSAGE (OUTPATIENT)
Dept: CARDIOLOGY | Facility: MEDICAL CENTER | Age: 49
End: 2024-01-31
Payer: MEDICAID

## 2024-02-14 ENCOUNTER — APPOINTMENT (OUTPATIENT)
Dept: CARDIOLOGY | Facility: PHYSICIAN GROUP | Age: 49
End: 2024-02-14
Payer: MEDICAID

## 2024-02-26 ENCOUNTER — APPOINTMENT (OUTPATIENT)
Dept: CARDIOLOGY | Facility: MEDICAL CENTER | Age: 49
End: 2024-02-26
Attending: NURSE PRACTITIONER
Payer: MEDICAID

## 2024-03-29 ENCOUNTER — TELEPHONE (OUTPATIENT)
Dept: CARDIOLOGY | Facility: MEDICAL CENTER | Age: 49
End: 2024-03-29
Payer: MEDICAID

## 2024-04-10 ENCOUNTER — APPOINTMENT (OUTPATIENT)
Dept: CARDIOLOGY | Facility: PHYSICIAN GROUP | Age: 49
End: 2024-04-10
Payer: MEDICAID

## 2025-04-22 ENCOUNTER — APPOINTMENT (OUTPATIENT)
Dept: RADIOLOGY | Facility: MEDICAL CENTER | Age: 50
End: 2025-04-22
Payer: MEDICAID

## 2025-04-22 ENCOUNTER — HOSPITAL ENCOUNTER (OUTPATIENT)
Facility: MEDICAL CENTER | Age: 50
End: 2025-04-25
Attending: EMERGENCY MEDICINE | Admitting: INTERNAL MEDICINE
Payer: MEDICAID

## 2025-04-22 DIAGNOSIS — I25.118 CORONARY ARTERY DISEASE INVOLVING NATIVE CORONARY ARTERY OF NATIVE HEART WITH OTHER FORM OF ANGINA PECTORIS (HCC): ICD-10-CM

## 2025-04-22 DIAGNOSIS — I25.83 CORONARY ARTERY DISEASE DUE TO LIPID RICH PLAQUE: ICD-10-CM

## 2025-04-22 DIAGNOSIS — Z72.0 TOBACCO ABUSE: ICD-10-CM

## 2025-04-22 DIAGNOSIS — I25.5 ISCHEMIC CARDIOMYOPATHY: ICD-10-CM

## 2025-04-22 DIAGNOSIS — R45.851 SUICIDAL IDEATION: ICD-10-CM

## 2025-04-22 DIAGNOSIS — G89.29 OTHER CHRONIC PAIN: ICD-10-CM

## 2025-04-22 DIAGNOSIS — R07.9 CHEST PAIN WITH HIGH RISK FOR CARDIAC ETIOLOGY: ICD-10-CM

## 2025-04-22 DIAGNOSIS — I25.10 CORONARY ARTERY DISEASE DUE TO LIPID RICH PLAQUE: ICD-10-CM

## 2025-04-22 DIAGNOSIS — Z79.4 TYPE 2 DIABETES MELLITUS WITHOUT COMPLICATION, WITH LONG-TERM CURRENT USE OF INSULIN (HCC): ICD-10-CM

## 2025-04-22 DIAGNOSIS — E11.9 TYPE 2 DIABETES MELLITUS WITHOUT COMPLICATION, WITH LONG-TERM CURRENT USE OF INSULIN (HCC): ICD-10-CM

## 2025-04-22 PROBLEM — Z59.41 FOOD INSECURITY: Status: ACTIVE | Noted: 2025-04-22

## 2025-04-22 PROBLEM — E43 SEVERE PROTEIN-CALORIE MALNUTRITION (GOMEZ: LESS THAN 60% OF STANDARD WEIGHT) (HCC): Status: ACTIVE | Noted: 2025-04-22

## 2025-04-22 PROBLEM — I47.20 VENTRICULAR TACHYARRHYTHMIA (HCC): Status: ACTIVE | Noted: 2025-04-22

## 2025-04-22 LAB
ALBUMIN SERPL BCP-MCNC: 4 G/DL (ref 3.2–4.9)
ALBUMIN/GLOB SERPL: 1.1 G/DL
ALP SERPL-CCNC: 110 U/L (ref 30–99)
ALT SERPL-CCNC: 8 U/L (ref 2–50)
ANION GAP SERPL CALC-SCNC: 14 MMOL/L (ref 7–16)
AST SERPL-CCNC: 15 U/L (ref 12–45)
BASOPHILS # BLD AUTO: 0.7 % (ref 0–1.8)
BASOPHILS # BLD: 0.05 K/UL (ref 0–0.12)
BILIRUB SERPL-MCNC: 0.8 MG/DL (ref 0.1–1.5)
BUN SERPL-MCNC: 15 MG/DL (ref 8–22)
CALCIUM ALBUM COR SERPL-MCNC: 9.4 MG/DL (ref 8.5–10.5)
CALCIUM SERPL-MCNC: 9.4 MG/DL (ref 8.5–10.5)
CHLORIDE SERPL-SCNC: 107 MMOL/L (ref 96–112)
CO2 SERPL-SCNC: 19 MMOL/L (ref 20–33)
CREAT SERPL-MCNC: 0.8 MG/DL (ref 0.5–1.4)
EKG IMPRESSION: NORMAL
EOSINOPHIL # BLD AUTO: 0.04 K/UL (ref 0–0.51)
EOSINOPHIL NFR BLD: 0.6 % (ref 0–6.9)
ERYTHROCYTE [DISTWIDTH] IN BLOOD BY AUTOMATED COUNT: 46.6 FL (ref 35.9–50)
GFR SERPLBLD CREATININE-BSD FMLA CKD-EPI: 107 ML/MIN/1.73 M 2
GLOBULIN SER CALC-MCNC: 3.7 G/DL (ref 1.9–3.5)
GLUCOSE BLD STRIP.AUTO-MCNC: 204 MG/DL (ref 65–99)
GLUCOSE SERPL-MCNC: 197 MG/DL (ref 65–99)
HCT VFR BLD AUTO: 48.6 % (ref 42–52)
HGB BLD-MCNC: 16.9 G/DL (ref 14–18)
IMM GRANULOCYTES # BLD AUTO: 0.03 K/UL (ref 0–0.11)
IMM GRANULOCYTES NFR BLD AUTO: 0.4 % (ref 0–0.9)
LYMPHOCYTES # BLD AUTO: 1.48 K/UL (ref 1–4.8)
LYMPHOCYTES NFR BLD: 21.9 % (ref 22–41)
MCH RBC QN AUTO: 31.7 PG (ref 27–33)
MCHC RBC AUTO-ENTMCNC: 34.8 G/DL (ref 32.3–36.5)
MCV RBC AUTO: 91.2 FL (ref 81.4–97.8)
MONOCYTES # BLD AUTO: 0.49 K/UL (ref 0–0.85)
MONOCYTES NFR BLD AUTO: 7.2 % (ref 0–13.4)
NEUTROPHILS # BLD AUTO: 4.67 K/UL (ref 1.82–7.42)
NEUTROPHILS NFR BLD: 69.2 % (ref 44–72)
NRBC # BLD AUTO: 0 K/UL
NRBC BLD-RTO: 0 /100 WBC (ref 0–0.2)
NT-PROBNP SERPL IA-MCNC: 529 PG/ML (ref 0–125)
PLATELET # BLD AUTO: 212 K/UL (ref 164–446)
PMV BLD AUTO: 10.9 FL (ref 9–12.9)
POTASSIUM SERPL-SCNC: 3.7 MMOL/L (ref 3.6–5.5)
PROT SERPL-MCNC: 7.7 G/DL (ref 6–8.2)
RBC # BLD AUTO: 5.33 M/UL (ref 4.7–6.1)
SODIUM SERPL-SCNC: 140 MMOL/L (ref 135–145)
TROPONIN T SERPL-MCNC: 186 NG/L (ref 6–19)
TROPONIN T SERPL-MCNC: 20 NG/L (ref 6–19)
WBC # BLD AUTO: 6.8 K/UL (ref 4.8–10.8)

## 2025-04-22 PROCEDURE — G0378 HOSPITAL OBSERVATION PER HR: HCPCS

## 2025-04-22 PROCEDURE — 93005 ELECTROCARDIOGRAM TRACING: CPT | Mod: TC | Performed by: EMERGENCY MEDICINE

## 2025-04-22 PROCEDURE — 36415 COLL VENOUS BLD VENIPUNCTURE: CPT

## 2025-04-22 PROCEDURE — 99285 EMERGENCY DEPT VISIT HI MDM: CPT

## 2025-04-22 PROCEDURE — A9270 NON-COVERED ITEM OR SERVICE: HCPCS | Mod: UD | Performed by: NURSE PRACTITIONER

## 2025-04-22 PROCEDURE — 80053 COMPREHEN METABOLIC PANEL: CPT

## 2025-04-22 PROCEDURE — 96372 THER/PROPH/DIAG INJ SC/IM: CPT

## 2025-04-22 PROCEDURE — 83880 ASSAY OF NATRIURETIC PEPTIDE: CPT

## 2025-04-22 PROCEDURE — 700102 HCHG RX REV CODE 250 W/ 637 OVERRIDE(OP): Mod: UD | Performed by: NURSE PRACTITIONER

## 2025-04-22 PROCEDURE — 85025 COMPLETE CBC W/AUTO DIFF WBC: CPT

## 2025-04-22 PROCEDURE — 82962 GLUCOSE BLOOD TEST: CPT

## 2025-04-22 PROCEDURE — 84484 ASSAY OF TROPONIN QUANT: CPT

## 2025-04-22 PROCEDURE — 99222 1ST HOSP IP/OBS MODERATE 55: CPT | Performed by: INTERNAL MEDICINE

## 2025-04-22 PROCEDURE — 71045 X-RAY EXAM CHEST 1 VIEW: CPT

## 2025-04-22 RX ORDER — DEXTROSE MONOHYDRATE 25 G/50ML
25 INJECTION, SOLUTION INTRAVENOUS
Status: DISCONTINUED | OUTPATIENT
Start: 2025-04-22 | End: 2025-04-25 | Stop reason: HOSPADM

## 2025-04-22 RX ORDER — ASPIRIN 81 MG/1
81 TABLET ORAL DAILY
Status: DISCONTINUED | OUTPATIENT
Start: 2025-04-23 | End: 2025-04-25 | Stop reason: HOSPADM

## 2025-04-22 RX ORDER — MORPHINE SULFATE 4 MG/ML
2 INJECTION INTRAVENOUS
Status: DISCONTINUED | OUTPATIENT
Start: 2025-04-22 | End: 2025-04-25 | Stop reason: HOSPADM

## 2025-04-22 RX ORDER — ONDANSETRON 4 MG/1
4 TABLET, ORALLY DISINTEGRATING ORAL EVERY 6 HOURS PRN
Status: DISCONTINUED | OUTPATIENT
Start: 2025-04-22 | End: 2025-04-25 | Stop reason: HOSPADM

## 2025-04-22 RX ORDER — HEPARIN SODIUM 5000 [USP'U]/ML
5000 INJECTION, SOLUTION INTRAVENOUS; SUBCUTANEOUS EVERY 8 HOURS
Status: DISCONTINUED | OUTPATIENT
Start: 2025-04-22 | End: 2025-04-25 | Stop reason: HOSPADM

## 2025-04-22 RX ORDER — ACETAMINOPHEN 325 MG/1
650 TABLET ORAL EVERY 6 HOURS PRN
Status: DISCONTINUED | OUTPATIENT
Start: 2025-04-22 | End: 2025-04-25 | Stop reason: HOSPADM

## 2025-04-22 RX ORDER — NICOTINE 21 MG/24HR
21 PATCH, TRANSDERMAL 24 HOURS TRANSDERMAL
Status: DISCONTINUED | OUTPATIENT
Start: 2025-04-22 | End: 2025-04-23

## 2025-04-22 RX ORDER — INSULIN LISPRO 100 [IU]/ML
1-6 INJECTION, SOLUTION INTRAVENOUS; SUBCUTANEOUS
Status: DISCONTINUED | OUTPATIENT
Start: 2025-04-22 | End: 2025-04-25 | Stop reason: HOSPADM

## 2025-04-22 RX ADMIN — INSULIN LISPRO 2 UNITS: 100 INJECTION, SOLUTION INTRAVENOUS; SUBCUTANEOUS at 20:13

## 2025-04-22 RX ADMIN — NICOTINE TRANSDERMAL SYSTEM 21 MG: 21 PATCH, EXTENDED RELEASE TRANSDERMAL at 17:33

## 2025-04-22 SDOH — ECONOMIC STABILITY: TRANSPORTATION INSECURITY
IN THE PAST 12 MONTHS, HAS LACK OF RELIABLE TRANSPORTATION KEPT YOU FROM MEDICAL APPOINTMENTS, MEETINGS, WORK OR FROM GETTING THINGS NEEDED FOR DAILY LIVING?: NO

## 2025-04-22 SDOH — ECONOMIC STABILITY: TRANSPORTATION INSECURITY
IN THE PAST 12 MONTHS, HAS THE LACK OF TRANSPORTATION KEPT YOU FROM MEDICAL APPOINTMENTS OR FROM GETTING MEDICATIONS?: NO

## 2025-04-22 ASSESSMENT — COGNITIVE AND FUNCTIONAL STATUS - GENERAL
SUGGESTED CMS G CODE MODIFIER MOBILITY: CH
DAILY ACTIVITIY SCORE: 24
SUGGESTED CMS G CODE MODIFIER DAILY ACTIVITY: CH
MOBILITY SCORE: 24

## 2025-04-22 ASSESSMENT — PATIENT HEALTH QUESTIONNAIRE - PHQ9
5. POOR APPETITE OR OVEREATING: NEARLY EVERY DAY
6. FEELING BAD ABOUT YOURSELF - OR THAT YOU ARE A FAILURE OR HAVE LET YOURSELF OR YOUR FAMILY DOWN: NEARLY EVERY DAY
1. LITTLE INTEREST OR PLEASURE IN DOING THINGS: NEARLY EVERY DAY
9. THOUGHTS THAT YOU WOULD BE BETTER OFF DEAD, OR OF HURTING YOURSELF: NEARLY EVERY DAY
SUM OF ALL RESPONSES TO PHQ9 QUESTIONS 1 AND 2: 6
2. FEELING DOWN, DEPRESSED, IRRITABLE, OR HOPELESS: NEARLY EVERY DAY
3. TROUBLE FALLING OR STAYING ASLEEP OR SLEEPING TOO MUCH: NEARLY EVERY DAY
4. FEELING TIRED OR HAVING LITTLE ENERGY: NEARLY EVERY DAY
8. MOVING OR SPEAKING SO SLOWLY THAT OTHER PEOPLE COULD HAVE NOTICED. OR THE OPPOSITE, BEING SO FIGETY OR RESTLESS THAT YOU HAVE BEEN MOVING AROUND A LOT MORE THAN USUAL: NEARLY EVERY DAY
SUM OF ALL RESPONSES TO PHQ QUESTIONS 1-9: 27
7. TROUBLE CONCENTRATING ON THINGS, SUCH AS READING THE NEWSPAPER OR WATCHING TELEVISION: NEARLY EVERY DAY

## 2025-04-22 ASSESSMENT — HEART SCORE
ECG: NON-SPECIFIC REPOLARIZATION DISTURBANCE
HEART SCORE: 7
RISK FACTORS: >2 RISK FACTORS OR HX OF ATHEROSCLEROTIC DISEASE
TROPONIN: 1-3 TIMES NORMAL LIMIT
HISTORY: HIGHLY SUSPICIOUS
AGE: 45-64

## 2025-04-22 ASSESSMENT — SOCIAL DETERMINANTS OF HEALTH (SDOH)
WITHIN THE LAST YEAR, HAVE TO BEEN RAPED OR FORCED TO HAVE ANY KIND OF SEXUAL ACTIVITY BY YOUR PARTNER OR EX-PARTNER?: PATIENT DECLINED
IN THE PAST 12 MONTHS, HAS THE ELECTRIC, GAS, OIL, OR WATER COMPANY THREATENED TO SHUT OFF SERVICE IN YOUR HOME?: PATIENT DECLINED
WITHIN THE LAST YEAR, HAVE YOU BEEN HUMILIATED OR EMOTIONALLY ABUSED IN OTHER WAYS BY YOUR PARTNER OR EX-PARTNER?: PATIENT DECLINED
WITHIN THE LAST YEAR, HAVE YOU BEEN KICKED, HIT, SLAPPED, OR OTHERWISE PHYSICALLY HURT BY YOUR PARTNER OR EX-PARTNER?: PATIENT DECLINED
WITHIN THE PAST 12 MONTHS, THE FOOD YOU BOUGHT JUST DIDN'T LAST AND YOU DIDN'T HAVE MONEY TO GET MORE: SOMETIMES TRUE
WITHIN THE LAST YEAR, HAVE YOU BEEN AFRAID OF YOUR PARTNER OR EX-PARTNER?: PATIENT DECLINED
WITHIN THE PAST 12 MONTHS, YOU WORRIED THAT YOUR FOOD WOULD RUN OUT BEFORE YOU GOT THE MONEY TO BUY MORE: SOMETIMES TRUE

## 2025-04-22 ASSESSMENT — PAIN DESCRIPTION - PAIN TYPE
TYPE: ACUTE PAIN
TYPE: ACUTE PAIN

## 2025-04-22 ASSESSMENT — LIFESTYLE VARIABLES
HAVE PEOPLE ANNOYED YOU BY CRITICIZING YOUR DRINKING: NO
ALCOHOL_USE: NO
TOTAL SCORE: 0
HAVE YOU EVER FELT YOU SHOULD CUT DOWN ON YOUR DRINKING: NO
DOES PATIENT WANT TO STOP DRINKING: NO
TOTAL SCORE: 0
HOW MANY TIMES IN THE PAST YEAR HAVE YOU HAD 5 OR MORE DRINKS IN A DAY: 0
TOTAL SCORE: 0
CONSUMPTION TOTAL: NEGATIVE
AVERAGE NUMBER OF DAYS PER WEEK YOU HAVE A DRINK CONTAINING ALCOHOL: 0
EVER FELT BAD OR GUILTY ABOUT YOUR DRINKING: NO
EVER HAD A DRINK FIRST THING IN THE MORNING TO STEADY YOUR NERVES TO GET RID OF A HANGOVER: NO
ON A TYPICAL DAY WHEN YOU DRINK ALCOHOL HOW MANY DRINKS DO YOU HAVE: 0

## 2025-04-22 ASSESSMENT — ENCOUNTER SYMPTOMS
WEIGHT LOSS: 1
DIZZINESS: 0
WHEEZING: 0
CONSTIPATION: 0
BACK PAIN: 0
SHORTNESS OF BREATH: 0
VOMITING: 0
COUGH: 0
NERVOUS/ANXIOUS: 0
NAUSEA: 0
SINUS PAIN: 0
MYALGIAS: 0
FEVER: 0
FLANK PAIN: 0
FALLS: 1
ORTHOPNEA: 0
SORE THROAT: 0
DIARRHEA: 0
CHILLS: 0
DIAPHORESIS: 0
HEADACHES: 0
ABDOMINAL PAIN: 0
DEPRESSION: 0

## 2025-04-22 ASSESSMENT — FIBROSIS 4 INDEX: FIB4 SCORE: 1.28

## 2025-04-22 NOTE — ED NOTES
Med Rec completed per patient   Allergies reviewed  No ORAL antibiotics in last 30 days  Dispense history available in EPIC? No    Patient is not taking anticoagulants

## 2025-04-22 NOTE — H&P
Hospital Medicine History & Physical Note    Date of Service  4/22/2025    Primary Care Physician  Leighton Young P.A.-C.      Code Status  Full Code    Chief Complaint  Chief Complaint   Patient presents with    Chest Pain     X 1 hour while moving a small stack of books. Central chest tightness, no other symptoms. Chest pain resolved with x 1 spray of nitro from EMS. Also given 324 mg ASA.        History of Presenting Illness  Rikki Khalil is a 49 y.o. male who presented 4/22/2025 with a pmh of VT, CAD s/p CABG x3, ischemic cardiomyopathy, HTN, HLD, DM type 2 who presented to the ED on 4/22/25 with complaints of chest pain. The patient has history of three vessel CABG in 2019. He has not followed up with his cardiologist for last two years, and is not been taken medications. The patient says that today he was moving a small amount of books to another shelf and suddenly developed left sided chest pain that was so severe it dropped to his knees.The patient tells me he has had intermittent chest pain over the last couple years and was last seen last January 2024 for chest pain unfortunately, the patient chose to leave the ER instead of remaining in the hospital for further workup. Patient says he has lived in  at the [facility] for the last two weeks years. According to his son, who is at bedside, the patient has lost about 100 pounds over the last years because he is afraid to eat for fear of his family not having enough food. Patient is also developed some paranoia and says he does not want to go outside or see people because he is feeling like he is being judged. He is had physical decline over that amount of time. His wife insisted that he presented the ED today for evaluation of his chest pain. Patient denies shortness of breath. He has had some lightheadedness, denies palpitations, orthopnea, nausea or vomiting. He is not had any cold or flulike symptoms lately, denies G.I. or   complaints.    Cardiology visit 1/3/24, marcy was on ASA 81 mg, metoprolol 50 mg BID and lipitor 80 daily, metformin. He had a ziopatch placed, which noted 2 brief episodes of VT. He was to continue metoprolol 50 mg BID and obtain echo, which he did not do.      I discussed the plan of care with patient and ERP .    Review of Systems  Review of Systems   Constitutional:  Positive for weight loss. Negative for chills, diaphoresis, fever and malaise/fatigue.   HENT:  Negative for sinus pain and sore throat.    Respiratory:  Negative for cough, shortness of breath and wheezing.    Cardiovascular:  Positive for chest pain. Negative for orthopnea and leg swelling.   Gastrointestinal:  Negative for abdominal pain, constipation, diarrhea, nausea and vomiting.   Genitourinary:  Negative for dysuria, flank pain, frequency and urgency.   Musculoskeletal:  Positive for falls. Negative for back pain and myalgias.   Skin:  Negative for itching and rash.   Neurological:  Negative for dizziness and headaches.        Lightheadedness   Psychiatric/Behavioral:  Negative for depression. The patient is not nervous/anxious.        Past Medical History   has a past medical history of Arthritis, Back pain, Bowel obstruction ([facility]), CAD (coronary artery disease) (12/2018), Essential hypertension, benign (10/29/2020), Glaucoma, Hyperlipidemia, Ischemic cardiomyopathy (12/2018), NSTEMI (non-ST elevated myocardial infarction) ([facility]) (12/27/2018), Other depressive disorder (1/16/2019), Pneumonia (12/30/2018), Scoliosis, SVT (supraventricular tachycardia), Systolic congestive heart failure ([facility]), and Type II or unspecified type diabetes mellitus without mention of complication, not stated as uncontrolled.    Surgical History   has a past surgical history that includes low anterior resection robotic xi (7/14/2015); colostomy creation laparoscopic (7/14/2015); ileostomy (9/29/2015); multiple coronary artery bypass endo vein harvest (12/28/2018);  "and allegra (12/28/2018).     Family History  family history includes Heart Disease in his mother; Hypertension in his mother; No Known Problems in his father; Stroke in his mother.   Family history reviewed with patient. There is no family history that is pertinent to the chief complaint.     Social History   reports that he has been smoking cigarettes. He started smoking about 31 years ago. He has a 18 pack-year smoking history. He has never used smokeless tobacco. He reports current drug use. Drug: Inhaled. He reports that he does not drink alcohol.    Allergies  Allergies   Allergen Reactions    Penicillins Unspecified     \"Blue baby\"  RXN= as a child  Tolerated ceftriaxone 6/2015    Fentanyl      Hallucinations       Medications  Prior to Admission Medications   Prescriptions Last Dose Informant Patient Reported? Taking?   aspirin EC 81 MG EC tablet  Patient Yes No   Sig: Take 1 Tab by mouth every day.   atorvastatin (LIPITOR) 80 MG tablet  Patient Yes No   Sig: Take 80 mg by mouth every evening.   lisinopril (PRINIVIL) 2.5 MG Tab   No No   Sig: Take 1 Tablet by mouth every day.   metFORMIN ER (GLUCOPHAGE XR) 500 MG TABLET SR 24 HR  Patient Yes No   Sig: Take 500 mg by mouth every day.   metoprolol tartrate (LOPRESSOR) 50 MG Tab   No No   Sig: Take 1 Tablet by mouth 2 times a day.      Facility-Administered Medications: None       Physical Exam  Temp:  [36.7 °C (98 °F)] 36.7 °C (98 °F)  Pulse:  [105-112] 105  Resp:  [16] 16  BP: (132-139)/(85-87) 139/87  SpO2:  [98 %-99 %] 98 %  Blood Pressure: 139/87   Temperature: 36.7 °C (98 °F)   Pulse: (!) 105   Respiration: 16   Pulse Oximetry: 98 %       Physical Exam  Vitals and nursing note reviewed.   Constitutional:       General: He is not in acute distress.     Appearance: He is not ill-appearing or diaphoretic.      Comments: Frail, gaunt appear   HENT:      Head: Normocephalic and atraumatic.      Nose: Nose normal. No congestion or rhinorrhea.      Mouth/Throat:     "  Mouth: Mucous membranes are dry.      Pharynx: Oropharynx is clear.   Eyes:      Extraocular Movements: Extraocular movements intact.      Pupils: Pupils are equal, round, and reactive to light.   Cardiovascular:      Rate and Rhythm: Normal rate and regular rhythm.      Pulses: Normal pulses.      Heart sounds: Normal heart sounds.   Pulmonary:      Effort: Pulmonary effort is normal. No respiratory distress.      Breath sounds: Normal breath sounds.   Abdominal:      General: Bowel sounds are normal. There is no distension.      Tenderness: There is no abdominal tenderness.   Musculoskeletal:         General: Normal range of motion.      Cervical back: Normal range of motion.      Right lower leg: No edema.      Left lower leg: No edema.   Skin:     General: Skin is warm and dry.      Capillary Refill: Capillary refill takes less than 2 seconds.      Findings: No lesion or rash.   Neurological:      Mental Status: He is alert and oriented to person, place, and time.   Psychiatric:         Behavior: Behavior normal.      Comments: Refuses to make eye contact         Laboratory:  Recent Labs     04/22/25  1357   WBC 6.8   RBC 5.33   HEMOGLOBIN 16.9   HEMATOCRIT 48.6   MCV 91.2   MCH 31.7   MCHC 34.8   RDW 46.6   PLATELETCT 212   MPV 10.9     Recent Labs     04/22/25  1357   SODIUM 140   POTASSIUM 3.7   CHLORIDE 107   CO2 19*   GLUCOSE 197*   BUN 15   CREATININE 0.80   CALCIUM 9.4     Recent Labs     04/22/25  1357   ALTSGPT 8   ASTSGOT 15   ALKPHOSPHAT 110*   TBILIRUBIN 0.8   GLUCOSE 197*         Recent Labs     04/22/25  1357   NTPROBNP 529*         Recent Labs     04/22/25  1357   TROPONINT 20*       Imaging:  DX-CHEST-PORTABLE (1 VIEW)   Final Result      No acute cardiac or pulmonary abnormalities are identified.      NM-CARDIAC STRESS TEST    (Results Pending)   EC-ECHOCARDIOGRAM COMPLETE W/O CONT    (Results Pending)     EKG 4/22/25          ASSESSMENT/PLAN:  * Acute chest pain- (present on  admission)  Assessment & Plan  The 10-year ASCVD risk score (Hernandez MCNULTY, et al., 2019) is: 15.8%*    Values used to calculate the score:      Age: 49 years      Sex: Male      Is Non- : No      Diabetic: Yes      Tobacco smoker: Yes      Systolic Blood Pressure: 139 mmHg      Is BP treated: No      HDL Cholesterol: 41 mg/dL*      Total Cholesterol: 160 mg/dL*      * - Cholesterol units were assumed for this score calculation  - telemonitor  - Trend cardiac enzymes  - Stress test in AM if enzymes are negative  - Consult cardiology if enzymes rise or if EKG changes   - Echocardiogram  - Check A1c, TSH and lipid panel  - ASA 81 daily  - Resume Metoprolol   - Resume high dose statin  - IV morphine PRN ongoing chest pain    CAD (coronary artery disease)- (present on admission)  Assessment & Plan  Hx CABG x 3  - ASA 80 mg daily  - Resume high dose statin    Severe protein-calorie malnutrition (Garcia: less than 60% of standard weight) (HCC)- (present on admission)  Assessment & Plan  Patient has lost 100 pounds suspect that weight that is currently on patient's chart is incorrect, as patient is quite gaunt and frail  -encourage oral intake  -nutrition consult    Ventricular tachyarrhythmia (HCC)- (present on admission)  Assessment & Plan  Hx of VT  - resume metoprolol 50 mg BID    Essential hypertension, benign- (present on admission)  Assessment & Plan  - resume metoprolol 50 mg BID    Type 2 diabetes mellitus without complication (HCC)- (present on admission)  Assessment & Plan  - accucheck ac/hs with low dose ssi coverage  - A1c    Tobacco abuse- (present on admission)  Assessment & Plan  Smokes 1/2 ppd  - nicotine replacement  - encourage cessation    Food insecurity  Assessment & Plan  Recommend case management evaluate for social assistance possible      Justification for Admission Status  I anticipate this patient is appropriate for observation status at this time because patient requires  further workup for ACS, including stress test in am    Patient will need a Telemetry bed on MEDICAL service  The need is secondary to ACS workup.    VTE prophylaxis: SCDs/TEDs and heparin ppx

## 2025-04-22 NOTE — ED PROVIDER NOTES
ED Provider Note    CHIEF COMPLAINT  Chief Complaint   Patient presents with    Chest Pain     X 1 hour while moving a small stack of books. Central chest tightness, no other symptoms. Chest pain resolved with x 1 spray of nitro from EMS. Also given 324 mg ASA.        EXTERNAL RECORDS REVIEWED  Reviewed previous cardiology clinic notes baseline laboratory studies previous EKGs    HPI/ROS  LIMITATION TO HISTORY   Patient is a poor historian  OUTSIDE HISTORIAN(S):  None    Rikki Khalil is a 49 y.o. male who presents for evaluation of substernal chest pressure.  The patient has a known history of coronary artery disease and ischemic cardiomyopathy.  He has admittedly been noncompliant with all of his medications for 2 years.  He presented here with substernal chest pressure.  He did take nitroglycerin as well as a full-strength aspirin once EMS arrived.  He denies any strokelike symptoms such as focal numbness weakness tingling to the arms legs or face.  Denies any lightheadedness dizziness leg swelling or productive cough    PAST MEDICAL HISTORY   has a past medical history of Arthritis, Back pain, Bowel obstruction ([facility]), CAD (coronary artery disease) (12/2018), Essential hypertension, benign (10/29/2020), Glaucoma, Hyperlipidemia, Ischemic cardiomyopathy (12/2018), NSTEMI (non-ST elevated myocardial infarction) ([facility]) (12/27/2018), Other depressive disorder (1/16/2019), Pneumonia (12/30/2018), Scoliosis, SVT (supraventricular tachycardia), Systolic congestive heart failure ([facility]), and Type II or unspecified type diabetes mellitus without mention of complication, not stated as uncontrolled.    SURGICAL HISTORY   has a past surgical history that includes low anterior resection robotic xi (7/14/2015); colostomy creation laparoscopic (7/14/2015); ileostomy (9/29/2015); multiple coronary artery bypass endo vein harvest (12/28/2018); and allegra (12/28/2018).    FAMILY HISTORY  Family History   Problem Relation Age of  "Onset    Hypertension Mother     Heart Disease Mother     Stroke Mother     No Known Problems Father        SOCIAL HISTORY  Social History     Tobacco Use    Smoking status: Every Day     Current packs/day: 0.00     Average packs/day: 0.8 packs/day for 24.0 years (18.0 ttl pk-yrs)     Types: Cigarettes     Start date:      Last attempt to quit: 2018     Years since quittin.3    Smokeless tobacco: Never   Substance and Sexual Activity    Alcohol use: No    Drug use: Yes     Types: Inhaled     Comment: marijuana daily    Sexual activity: Not on file     Extensive tobacco history  CURRENT MEDICATIONS  Home Medications    **Home medications have not yet been reviewed for this encounter**       Audit from Redirected Encounters    **Home medications have not yet been reviewed for this encounter**     Noncompliant with medications    ALLERGIES  Allergies   Allergen Reactions    Penicillins Unspecified     \"Blue baby\"  RXN= as a child  Tolerated ceftriaxone 2015    Fentanyl      Hallucinations       PHYSICAL EXAM  VITAL SIGNS: /87   Pulse (!) 105   Temp 36.7 °C (98 °F) (Temporal)   Resp 16   Ht 1.829 m (6')   Wt 100 kg (221 lb)   SpO2 98%   BMI 29.97 kg/m²    Pulse ox interpretation: I interpret this pulse ox as normal.  Constitutional: Alert and oriented x 3, no acute distress  HEENT: Atraumatic normocephalic, pupils are equal round reactive to light extraocular movements are intact. The nares is clear, external ears are normal, mouth shows moist mucous membranes normal dentition for age  Neck: Supple, no JVD no tracheal deviation  Cardiovascular: Mild tachycardia no murmur rub or gallop 2+ pulses peripherally x4  Thorax & Lungs: No respiratory distress, no wheezes rales or rhonchi, No chest tenderness.   GI: Soft nontender nondistended positive bowel sounds, no peritoneal signs no rebound guarding rigidity  Skin: Warm dry no acute rash or lesion  Musculoskeletal: Moving all extremities with full " range and 5 of 5 strength no acute  deformity no pedal edema negative Homans' sign  Neurologic: Cranial nerves III through XII are grossly intact no sensory deficit no cerebellar dysfunction   Psychiatric: Appropriate affect for situation at this time          EKG/LABS  Results for orders placed or performed during the hospital encounter of 04/22/25   CBC with Differential    Collection Time: 04/22/25  1:57 PM   Result Value Ref Range    WBC 6.8 4.8 - 10.8 K/uL    RBC 5.33 4.70 - 6.10 M/uL    Hemoglobin 16.9 14.0 - 18.0 g/dL    Hematocrit 48.6 42.0 - 52.0 %    MCV 91.2 81.4 - 97.8 fL    MCH 31.7 27.0 - 33.0 pg    MCHC 34.8 32.3 - 36.5 g/dL    RDW 46.6 35.9 - 50.0 fL    Platelet Count 212 164 - 446 K/uL    MPV 10.9 9.0 - 12.9 fL    Neutrophils-Polys 69.20 44.00 - 72.00 %    Lymphocytes 21.90 (L) 22.00 - 41.00 %    Monocytes 7.20 0.00 - 13.40 %    Eosinophils 0.60 0.00 - 6.90 %    Basophils 0.70 0.00 - 1.80 %    Immature Granulocytes 0.40 0.00 - 0.90 %    Nucleated RBC 0.00 0.00 - 0.20 /100 WBC    Neutrophils (Absolute) 4.67 1.82 - 7.42 K/uL    Lymphs (Absolute) 1.48 1.00 - 4.80 K/uL    Monos (Absolute) 0.49 0.00 - 0.85 K/uL    Eos (Absolute) 0.04 0.00 - 0.51 K/uL    Baso (Absolute) 0.05 0.00 - 0.12 K/uL    Immature Granulocytes (abs) 0.03 0.00 - 0.11 K/uL    NRBC (Absolute) 0.00 K/uL   Complete Metabolic Panel (CMP)    Collection Time: 04/22/25  1:57 PM   Result Value Ref Range    Sodium 140 135 - 145 mmol/L    Potassium 3.7 3.6 - 5.5 mmol/L    Chloride 107 96 - 112 mmol/L    Co2 19 (L) 20 - 33 mmol/L    Anion Gap 14.0 7.0 - 16.0    Glucose 197 (H) 65 - 99 mg/dL    Bun 15 8 - 22 mg/dL    Creatinine 0.80 0.50 - 1.40 mg/dL    Calcium 9.4 8.5 - 10.5 mg/dL    Correct Calcium 9.4 8.5 - 10.5 mg/dL    AST(SGOT) 15 12 - 45 U/L    ALT(SGPT) 8 2 - 50 U/L    Alkaline Phosphatase 110 (H) 30 - 99 U/L    Total Bilirubin 0.8 0.1 - 1.5 mg/dL    Albumin 4.0 3.2 - 4.9 g/dL    Total Protein 7.7 6.0 - 8.2 g/dL    Globulin 3.7 (H) 1.9 -  3.5 g/dL    A-G Ratio 1.1 g/dL   proBrain Natriuretic Peptide, NT (BNP)    Collection Time: 25  1:57 PM   Result Value Ref Range    NT-proBNP 529 (H) 0 - 125 pg/mL   Troponins NOW    Collection Time: 25  1:57 PM   Result Value Ref Range    Troponin T 20 (H) 6 - 19 ng/L   ESTIMATED GFR    Collection Time: 25  1:57 PM   Result Value Ref Range    GFR (CKD-EPI) 107 >60 mL/min/1.73 m 2   EKG    Collection Time: 25  2:13 PM   Result Value Ref Range    Report       [facility] Emergency Dept.    Test Date:  2025  Pt Name:    SY LEDESMA               Department: ER  MRN:        [id number]                      Room:       [facility]  Gender:     Male                         Technician: [id number]  :        1975                   Requested By:ER TRIAGE PROTOCOL  Order #:    [id number]                    Reading MD:    Measurements  Intervals                                Axis  Rate:       103                          P:          75  NM:         158                          QRS:        -57  QRSD:       107                          T:          54  QT:         373  QTc:        489    Interpretive Statements  Sinus tachycardia  Probable left atrial enlargement  RSR' in V1 or V2, probably normal variant  Abnormal inferior Q waves  ST elev, probable normal early repol pattern  Borderline prolonged QT interval  Compared to ECG 2023 20:16:06  RSR' in V1 or V2 now present  Inferior Q waves now present  Q waves now present  ST ( T wave) deviation now present  Myocardial infarct finding no longer present        I have independently interpreted this EKG  Twelve-lead EKG interpretation by me rate 103 sinus tachycardia nonspecific repolarization pattern without clear STEMI morphology.  No evidence of arrhythmia    RADIOLOGY/PROCEDURES   I have independently interpreted the diagnostic imaging associated with this visit and am waiting the final reading from the radiologist.   My  preliminary interpretation is as follows: No evidence of acute volume overload    Radiologist interpretation:  DX-CHEST-PORTABLE (1 VIEW)   Final Result      No acute cardiac or pulmonary abnormalities are identified.          COURSE & MEDICAL DECISION MAKING    ASSESSMENT, COURSE AND PLAN  Care Narrative:     This is a pleasant but challenging 49-year-old gentleman with a known history of underlying heart disease status post CABG who has been off all his medications including his heart medications and diabetes medications for what he reports is 2 years.  Here he developed substernal chest pressure that was relieved by nitroglycerin.  His heart score is profoundly elevated given his history.  EKG does not suggest any obvious STEMI morphology chest x-ray did not demonstrate any evidence of acute volume overload.  He already took full-strength aspirin.  Given his high risk features he will be admitted to the clinical decision unit for further treatment and evaluation          ADDITIONAL PROBLEMS MANAGED      DISPOSITION AND DISCUSSIONS  I have discussed management of the patient with the following physicians and KAREN's: Discussed plan of care with hospitalist  Discussion of management with other QHP or appropriate source(s): None    Escalation of care considered, and ultimately not performed: None    Barriers to care at this time, including but not limited to: None.     Decision tools and prescription drugs considered including, but not limited to: Heart score is elevated at 7 deeming the patient high risk.    FINAL DIAGNOSIS  1. Chest pain with high risk for cardiac etiology        2. Coronary artery disease due to lipid rich plaque  Referral to Cardiac Rehab             Electronically signed by: Augusto Castillo M.D., 4/22/2025 3:19 PM

## 2025-04-22 NOTE — ASSESSMENT & PLAN NOTE
Resolved  Discussed with cardiology  No reversible ischemia  Non compliance addressed, restarting previous meds, med mgt  Cardiology following

## 2025-04-22 NOTE — ED TRIAGE NOTES
Rikki Khalil  49 y.o.  male  Chief Complaint   Patient presents with    Chest Pain     X 1 hour while moving a small stack of books. Central chest tightness, no other symptoms. Chest pain resolved with x 1 spray of nitro from EMS. Also given 324 mg ASA.      Pt BIB [facility]. . Hx DM. Pt reports he hasn't taken any medications for 2 years.

## 2025-04-23 ENCOUNTER — APPOINTMENT (OUTPATIENT)
Dept: CARDIOLOGY | Facility: MEDICAL CENTER | Age: 50
End: 2025-04-23
Attending: NURSE PRACTITIONER
Payer: MEDICAID

## 2025-04-23 ENCOUNTER — APPOINTMENT (OUTPATIENT)
Dept: RADIOLOGY | Facility: MEDICAL CENTER | Age: 50
End: 2025-04-23
Attending: NURSE PRACTITIONER
Payer: MEDICAID

## 2025-04-23 PROBLEM — R45.851 SUICIDAL IDEATION: Status: ACTIVE | Noted: 2025-04-23

## 2025-04-23 PROBLEM — R07.9 CHEST PAIN: Status: ACTIVE | Noted: 2025-04-23

## 2025-04-23 LAB
ANION GAP SERPL CALC-SCNC: 11 MMOL/L (ref 7–16)
BUN SERPL-MCNC: 17 MG/DL (ref 8–22)
CALCIUM SERPL-MCNC: 8.9 MG/DL (ref 8.5–10.5)
CHLORIDE SERPL-SCNC: 105 MMOL/L (ref 96–112)
CHOLEST SERPL-MCNC: 142 MG/DL (ref 100–199)
CO2 SERPL-SCNC: 24 MMOL/L (ref 20–33)
CREAT SERPL-MCNC: 0.73 MG/DL (ref 0.5–1.4)
EKG IMPRESSION: NORMAL
EST. AVERAGE GLUCOSE BLD GHB EST-MCNC: 220 MG/DL
GFR SERPLBLD CREATININE-BSD FMLA CKD-EPI: 110 ML/MIN/1.73 M 2
GLUCOSE BLD STRIP.AUTO-MCNC: 155 MG/DL (ref 65–99)
GLUCOSE BLD STRIP.AUTO-MCNC: 210 MG/DL (ref 65–99)
GLUCOSE BLD STRIP.AUTO-MCNC: 215 MG/DL (ref 65–99)
GLUCOSE SERPL-MCNC: 158 MG/DL (ref 65–99)
HBA1C MFR BLD: 9.3 % (ref 4–5.6)
HDLC SERPL-MCNC: 35 MG/DL
LDLC SERPL CALC-MCNC: 88 MG/DL
LV EJECT FRACT  99904: 38
LV EJECT FRACT MOD 2C 99903: 32.7
LV EJECT FRACT MOD 4C 99902: 38.8
LV EJECT FRACT MOD BP 99901: 36.5
MAGNESIUM SERPL-MCNC: 1.9 MG/DL (ref 1.5–2.5)
POTASSIUM SERPL-SCNC: 3.4 MMOL/L (ref 3.6–5.5)
SODIUM SERPL-SCNC: 140 MMOL/L (ref 135–145)
TRIGL SERPL-MCNC: 97 MG/DL (ref 0–149)
TROPONIN T SERPL-MCNC: 92 NG/L (ref 6–19)
TSH SERPL-ACNC: 1.6 UIU/ML (ref 0.38–5.33)

## 2025-04-23 PROCEDURE — A9270 NON-COVERED ITEM OR SERVICE: HCPCS | Mod: UD | Performed by: INTERNAL MEDICINE

## 2025-04-23 PROCEDURE — 96372 THER/PROPH/DIAG INJ SC/IM: CPT | Mod: XU

## 2025-04-23 PROCEDURE — 700111 HCHG RX REV CODE 636 W/ 250 OVERRIDE (IP): Mod: UD

## 2025-04-23 PROCEDURE — A9270 NON-COVERED ITEM OR SERVICE: HCPCS | Mod: UD | Performed by: NURSE PRACTITIONER

## 2025-04-23 PROCEDURE — 80048 BASIC METABOLIC PNL TOTAL CA: CPT

## 2025-04-23 PROCEDURE — 96376 TX/PRO/DX INJ SAME DRUG ADON: CPT | Mod: XU

## 2025-04-23 PROCEDURE — 36415 COLL VENOUS BLD VENIPUNCTURE: CPT

## 2025-04-23 PROCEDURE — 80061 LIPID PANEL: CPT

## 2025-04-23 PROCEDURE — 84443 ASSAY THYROID STIM HORMONE: CPT

## 2025-04-23 PROCEDURE — 83735 ASSAY OF MAGNESIUM: CPT

## 2025-04-23 PROCEDURE — 99255 IP/OBS CONSLTJ NEW/EST HI 80: CPT | Performed by: PSYCHIATRY & NEUROLOGY

## 2025-04-23 PROCEDURE — 93005 ELECTROCARDIOGRAM TRACING: CPT | Mod: TC | Performed by: NURSE PRACTITIONER

## 2025-04-23 PROCEDURE — 96374 THER/PROPH/DIAG INJ IV PUSH: CPT | Mod: XU

## 2025-04-23 PROCEDURE — 83036 HEMOGLOBIN GLYCOSYLATED A1C: CPT

## 2025-04-23 PROCEDURE — 700102 HCHG RX REV CODE 250 W/ 637 OVERRIDE(OP): Mod: UD | Performed by: NURSE PRACTITIONER

## 2025-04-23 PROCEDURE — 82962 GLUCOSE BLOOD TEST: CPT | Mod: 91

## 2025-04-23 PROCEDURE — G0378 HOSPITAL OBSERVATION PER HR: HCPCS

## 2025-04-23 PROCEDURE — A9502 TC99M TETROFOSMIN: HCPCS

## 2025-04-23 PROCEDURE — 93306 TTE W/DOPPLER COMPLETE: CPT

## 2025-04-23 PROCEDURE — 93306 TTE W/DOPPLER COMPLETE: CPT | Mod: 26 | Performed by: INTERNAL MEDICINE

## 2025-04-23 PROCEDURE — 84484 ASSAY OF TROPONIN QUANT: CPT

## 2025-04-23 PROCEDURE — 700111 HCHG RX REV CODE 636 W/ 250 OVERRIDE (IP): Mod: UD | Performed by: NURSE PRACTITIONER

## 2025-04-23 PROCEDURE — 99233 SBSQ HOSP IP/OBS HIGH 50: CPT | Performed by: INTERNAL MEDICINE

## 2025-04-23 PROCEDURE — 700102 HCHG RX REV CODE 250 W/ 637 OVERRIDE(OP): Mod: UD | Performed by: INTERNAL MEDICINE

## 2025-04-23 RX ORDER — ARIPIPRAZOLE 10 MG/1
5 TABLET ORAL DAILY
Status: DISCONTINUED | OUTPATIENT
Start: 2025-04-23 | End: 2025-04-25 | Stop reason: HOSPADM

## 2025-04-23 RX ORDER — ATORVASTATIN CALCIUM 40 MG/1
40 TABLET, FILM COATED ORAL EVERY EVENING
Status: DISCONTINUED | OUTPATIENT
Start: 2025-04-23 | End: 2025-04-25 | Stop reason: HOSPADM

## 2025-04-23 RX ORDER — POTASSIUM CHLORIDE 1500 MG/1
40 TABLET, EXTENDED RELEASE ORAL ONCE
Status: COMPLETED | OUTPATIENT
Start: 2025-04-23 | End: 2025-04-23

## 2025-04-23 RX ORDER — METOPROLOL TARTRATE 25 MG/1
25 TABLET, FILM COATED ORAL 2 TIMES DAILY
Status: DISCONTINUED | OUTPATIENT
Start: 2025-04-23 | End: 2025-04-25 | Stop reason: HOSPADM

## 2025-04-23 RX ORDER — AMINOPHYLLINE 25 MG/ML
100 INJECTION, SOLUTION INTRAVENOUS
Status: DISCONTINUED | OUTPATIENT
Start: 2025-04-23 | End: 2025-04-25 | Stop reason: HOSPADM

## 2025-04-23 RX ORDER — REGADENOSON 0.08 MG/ML
0.4 INJECTION, SOLUTION INTRAVENOUS ONCE
Status: COMPLETED | OUTPATIENT
Start: 2025-04-23 | End: 2025-04-23

## 2025-04-23 RX ORDER — NICOTINE 21 MG/24HR
21 PATCH, TRANSDERMAL 24 HOURS TRANSDERMAL
Status: DISCONTINUED | OUTPATIENT
Start: 2025-04-23 | End: 2025-04-25 | Stop reason: HOSPADM

## 2025-04-23 RX ORDER — REGADENOSON 0.08 MG/ML
INJECTION, SOLUTION INTRAVENOUS
Status: COMPLETED
Start: 2025-04-23 | End: 2025-04-23

## 2025-04-23 RX ADMIN — NICOTINE TRANSDERMAL SYSTEM 21 MG: 21 PATCH, EXTENDED RELEASE TRANSDERMAL at 17:25

## 2025-04-23 RX ADMIN — INSULIN LISPRO 2 UNITS: 100 INJECTION, SOLUTION INTRAVENOUS; SUBCUTANEOUS at 20:41

## 2025-04-23 RX ADMIN — ATORVASTATIN CALCIUM 40 MG: 40 TABLET, FILM COATED ORAL at 17:23

## 2025-04-23 RX ADMIN — HEPARIN SODIUM 5000 UNITS: 5000 INJECTION, SOLUTION INTRAVENOUS; SUBCUTANEOUS at 15:39

## 2025-04-23 RX ADMIN — MORPHINE SULFATE 2 MG: 4 INJECTION, SOLUTION INTRAMUSCULAR; INTRAVENOUS at 23:44

## 2025-04-23 RX ADMIN — METOPROLOL TARTRATE 25 MG: 25 TABLET, FILM COATED ORAL at 12:59

## 2025-04-23 RX ADMIN — HEPARIN SODIUM 5000 UNITS: 5000 INJECTION, SOLUTION INTRAVENOUS; SUBCUTANEOUS at 20:38

## 2025-04-23 RX ADMIN — ARIPIPRAZOLE 5 MG: 10 TABLET ORAL at 17:23

## 2025-04-23 RX ADMIN — MORPHINE SULFATE 2 MG: 4 INJECTION, SOLUTION INTRAMUSCULAR; INTRAVENOUS at 20:37

## 2025-04-23 RX ADMIN — INSULIN LISPRO 2 UNITS: 100 INJECTION, SOLUTION INTRAVENOUS; SUBCUTANEOUS at 12:58

## 2025-04-23 RX ADMIN — NICOTINE TRANSDERMAL SYSTEM 21 MG: 21 PATCH, EXTENDED RELEASE TRANSDERMAL at 05:43

## 2025-04-23 RX ADMIN — ASPIRIN 81 MG: 81 TABLET, COATED ORAL at 05:43

## 2025-04-23 RX ADMIN — REGADENOSON 0.4 MG: 0.08 INJECTION, SOLUTION INTRAVENOUS at 11:08

## 2025-04-23 RX ADMIN — METOPROLOL TARTRATE 25 MG: 25 TABLET, FILM COATED ORAL at 17:23

## 2025-04-23 RX ADMIN — POTASSIUM CHLORIDE 40 MEQ: 1500 TABLET, EXTENDED RELEASE ORAL at 12:55

## 2025-04-23 RX ADMIN — INSULIN LISPRO 2 UNITS: 100 INJECTION, SOLUTION INTRAVENOUS; SUBCUTANEOUS at 17:27

## 2025-04-23 ASSESSMENT — ENCOUNTER SYMPTOMS
COUGH: 0
ABDOMINAL PAIN: 0
RESPIRATORY NEGATIVE: 1
GASTROINTESTINAL NEGATIVE: 1
FEVER: 0
EYES NEGATIVE: 1
CHILLS: 0
NEUROLOGICAL NEGATIVE: 1
MYALGIAS: 1
DEPRESSION: 1
SHORTNESS OF BREATH: 0

## 2025-04-23 ASSESSMENT — PAIN DESCRIPTION - PAIN TYPE
TYPE: ACUTE PAIN

## 2025-04-23 ASSESSMENT — COGNITIVE AND FUNCTIONAL STATUS - GENERAL
DAILY ACTIVITIY SCORE: 24
MOBILITY SCORE: 24
SUGGESTED CMS G CODE MODIFIER DAILY ACTIVITY: CH
SUGGESTED CMS G CODE MODIFIER MOBILITY: CH

## 2025-04-23 ASSESSMENT — FIBROSIS 4 INDEX: FIB4 SCORE: 1.28

## 2025-04-23 NOTE — PROGRESS NOTES
"Suicide screen came back as low risk. Per policy, \"Patients who have screened as low risk do not require initiation of observation unless directed by a QMHP. Low risk patients will be provided appropriate referrals per provider order.\"    APRN notified.  "

## 2025-04-23 NOTE — PROGRESS NOTES
4 Eyes Skin Assessment Completed by YAO Easley and YAO Salter.    Head WDL  Ears WDL  Nose WDL  Mouth WDL  Neck WDL  Breast/Chest WDL  Shoulder Blades WDL  Spine WDL  (R) Arm/Elbow/Hand WDL  (L) Arm/Elbow/Hand WDL  Abdomen WDL  Groin WDL  Scrotum/Coccyx/Buttocks WDL  (R) Leg Scar and Scab  (L) Leg Scar and Scab  (R) Heel/Foot/Toe Redness and Blanching  (L) Heel/Foot/Toe Redness and Blanching          Devices In Places Tele Box, Blood Pressure Cuff, and Pulse Ox      Interventions In Place N/A    Possible Skin Injury No    Pictures Uploaded Into Epic N/A  Wound Consult Placed N/A  RN Wound Prevention Protocol Ordered No

## 2025-04-23 NOTE — PROGRESS NOTES
2 RN Skin Assessment Completed by Emely RN and Rashmi RN.     Head: WDL  Ears: WDL  Nose: WDL  Mouth: WDL  Neck: WDL  Breasts/Chest: WDL  Shoulder Blades: WDL  Spine: WDL  (R) Arm/Elbow/hand: WDL  (L) Arm/Elbow/hand: WDL  Abdomen:WDL  Groin: WDL  Sacrum/Coccyx/Buttocks: blanching  (R) Leg: WDL  (L) Leg: WDL  (R) Heel/Foot/Toe: blanching  (L) Heel/Foot/Toe: blanching              Devices in place: BP Cuff and Pulse Ox     Interventions in place:Pillows     Possible skin injury found: No     Pictures uploaded into Epic: N/A  Wound Consult Placed: N/A

## 2025-04-23 NOTE — ASSESSMENT & PLAN NOTE
Patient has lost 100 pounds suspect that weight that is currently on patient's chart is incorrect, as patient is quite gaunt and frail  Encouraging nutrition

## 2025-04-23 NOTE — PROGRESS NOTES
Report received from YAO Moreau. Pt transferred to floor by transport and 1:1 sitter from Stress test. Pt A&O4, VSS, on RA. Pt updated on POC, all questions answered. Tele box on and monitor room notified. Pt oriented to room. Pt on legal hold with all precautions in place. Agree with previous RN assessment.

## 2025-04-23 NOTE — PROGRESS NOTES
[facility] Medicine Daily Progress Note    Date of Service  4/23/2025    Chief Complaint  Rikki Khalil is a 49 y.o. male admitted 4/22/2025 with chest pain, suicidal ideation    Hospital Course  Mr. Rikki Khalil is a 49 y.o. male who presented 4/22/2025 with a pmh of VT, CAD s/p CABG x3, ischemic cardiomyopathy, HTN, HLD, DM type 2 who presented to the ED on 4/22/25 with complaints of chest pain.     The patient has history of three vessel CABG in 2019. He has not followed up with his cardiologist for last two years, and is not been taken medications. The patient says that today he was moving a small amount of books to another shelf and suddenly developed left sided chest pain that was so severe it dropped to his knees.The patient tells me he has had intermittent chest pain over the last couple years and was last seen last January 2024 for chest pain unfortunately, the patient chose to leave the ER instead of remaining in the hospital for further workup. Patient says he has lived in  at the [facility] for the last two weeks years. According to his son, who is at bedside, the patient has lost about 100 pounds over the last years because he is afraid to eat for fear of his family not having enough food. Patient is also developed some paranoia and says he does not want to go outside or see people because he is feeling like he is being judged. He is had physical decline over that amount of time. His wife insisted that he presented the ED today for evaluation of his chest pain. Patient denies shortness of breath. He has had some lightheadedness, denies palpitations, orthopnea, nausea or vomiting. He is not had any cold or flulike symptoms lately, denies G.I. or  complaints.     Cardiology visit 1/3/24, marcy was on ASA 81 mg, metoprolol 50 mg BID and lipitor 80 daily, metformin. He had a ziopatch placed, which noted 2 brief episodes of VT. He was to continue metoprolol 50 mg BID and obtain echo, which he did  "not do.  Patient admitted to hospital medicine for management of care.    During this hospitalization, we will pursue a cardiac stress test along with an echocardiogram.  However, patient started to have suicidal ideation as verbalized to his nurse.  I also witnessed patient saying \"I do not care anymore just let me go \".  Legal hold 2000 initiated by bedside RN on 4/23/2025. Bedside sitter in place.     Pending stress test results and echocardiogram results.          Interval Problem Update  Patient seen and examined.  Patient still reports some mild chest pain that has been intermittent, 1 out of 10 in rating.  Patient has had a significant history of cardiac disease, however, patient has not followed with his cardiologist as an outpatient.  Discussed with patient pursuing cardiac stress test and echocardiogram of which he is at this time willing to do so.  Patient however has been complaining of feeling hungry and wanting to eat however patient was persuaded to pursue a cardiac stress test.  Legal hold initiated by bedside RN due to suicidal ideation verbalized to bedside nurse.  Psychiatry consulted.  Plan of care: Continue to monitor chest pain, pursue cardiac stress test and echocardiogram; pending results; follow recommendations from psychiatry due to noted suicidal ideation; legal hold 2000 in place  Disposition: Patient anticipated to stay 2-3 midnights for management of chest pain and a legal hold  Lab work: Reviewed; expected  VSS at this time    I have discussed this patient's plan of care and discharge plan at IDT rounds today with Case Management, Nursing, Nursing leadership, and other members of the IDT team.    Consultants/Specialty  NONE    Code Status  Full Code    Disposition  The patient is not medically cleared for discharge to home or a post-acute facility.  Anticipate discharge to: home with close outpatient follow-up    I have placed the appropriate orders for post-discharge needs.    Review " of Systems  Review of Systems   Constitutional:  Positive for malaise/fatigue. Negative for chills and fever.   HENT: Negative.     Eyes: Negative.    Respiratory: Negative.     Cardiovascular:  Positive for chest pain.   Gastrointestinal: Negative.    Genitourinary: Negative.    Musculoskeletal:  Positive for myalgias.   Skin: Negative.    Neurological: Negative.    Endo/Heme/Allergies: Negative.    Psychiatric/Behavioral:  Positive for depression and suicidal ideas.         Physical Exam  Temp:  [36.4 °C (97.6 °F)-37.1 °C (98.8 °F)] 36.4 °C (97.6 °F)  Pulse:  [] 73  Resp:  [16-18] 16  BP: (114-154)/(57-87) 136/78  SpO2:  [95 %-99 %] 95 %    Physical Exam  Vitals and nursing note reviewed.   HENT:      Head: Normocephalic.      Nose: Nose normal.      Mouth/Throat:      Mouth: Mucous membranes are moist.      Pharynx: Oropharynx is clear.   Eyes:      Pupils: Pupils are equal, round, and reactive to light.   Cardiovascular:      Rate and Rhythm: Normal rate and regular rhythm.      Pulses: Normal pulses.      Heart sounds: Normal heart sounds.   Pulmonary:      Effort: Pulmonary effort is normal.      Breath sounds: Normal breath sounds.   Abdominal:      General: Bowel sounds are normal.      Palpations: Abdomen is soft.   Musculoskeletal:         General: Tenderness present.      Cervical back: Normal range of motion and neck supple.   Skin:     General: Skin is dry.      Capillary Refill: Capillary refill takes 2 to 3 seconds.   Neurological:      Mental Status: He is alert. Mental status is at baseline.      Motor: Weakness present.         Fluids    Intake/Output Summary (Last 24 hours) at 4/23/2025 0929  Last data filed at 4/22/2025 2318  Gross per 24 hour   Intake 720 ml   Output --   Net 720 ml        Laboratory  Recent Labs     04/22/25  1357   WBC 6.8   RBC 5.33   HEMOGLOBIN 16.9   HEMATOCRIT 48.6   MCV 91.2   MCH 31.7   MCHC 34.8   RDW 46.6   PLATELETCT 212   MPV 10.9     Recent Labs      04/22/25  1357 04/23/25  0351   SODIUM 140 140   POTASSIUM 3.7 3.4*   CHLORIDE 107 105   CO2 19* 24   GLUCOSE 197* 158*   BUN 15 17   CREATININE 0.80 0.73   CALCIUM 9.4 8.9             Recent Labs     04/23/25  0351   TRIGLYCERIDE 97   HDL 35*   LDL 88       Imaging  DX-CHEST-PORTABLE (1 VIEW)   Final Result      No acute cardiac or pulmonary abnormalities are identified.      NM-CARDIAC STRESS TEST    (Results Pending)   EC-ECHOCARDIOGRAM COMPLETE W/O CONT    (Results Pending)        Assessment/Plan  * Acute chest pain- (present on admission)  Assessment & Plan  The 10-year ASCVD risk score (Hernandez MCNULTY, et al., 2019) is: 15.8%*    Values used to calculate the score:      Age: 49 years      Sex: Male      Is Non- : No      Diabetic: Yes      Tobacco smoker: Yes      Systolic Blood Pressure: 139 mmHg      Is BP treated: No      HDL Cholesterol: 41 mg/dL*      Total Cholesterol: 160 mg/dL*      * - Cholesterol units were assumed for this score calculation  - telemonitor  - Trend cardiac enzymes  - Stress test in AM if enzymes are negative  - Consult cardiology if enzymes rise or if EKG changes   - Echocardiogram  - Check A1c, TSH and lipid panel  - ASA 81 daily  - Resume Metoprolol   - Resume high dose statin  - IV morphine PRN ongoing chest pain    Suicidal ideation  Assessment & Plan  Verbalized suicidal ideations with bedside RN  Legal hold 2000 initiated by bedside RN  Psychiatry consulted    Severe protein-calorie malnutrition (Garcia: less than 60% of standard weight) (HCC)- (present on admission)  Assessment & Plan  Patient has lost 100 pounds suspect that weight that is currently on patient's chart is incorrect, as patient is quite gaunt and frail  -encourage oral intake  -nutrition consult    Food insecurity  Assessment & Plan  Recommend case management evaluate for social assistance possible    Tobacco abuse- (present on admission)  Assessment & Plan  Smokes 1/2 ppd  - nicotine  replacement  - encourage cessation    Ventricular tachyarrhythmia (HCC)- (present on admission)  Assessment & Plan  Hx of VT  - resume metoprolol 50 mg BID    Essential hypertension, benign- (present on admission)  Assessment & Plan  - resume metoprolol 50 mg BID    CAD (coronary artery disease)- (present on admission)  Assessment & Plan  Hx CABG x 3  - ASA 80 mg daily  - Resume high dose statin    Type 2 diabetes mellitus without complication (HCC)- (present on admission)  Assessment & Plan  - accucheck ac/hs with low dose ssi coverage  - A1c         VTE prophylaxis:    heparin ppx      I have performed a physical exam and reviewed and updated ROS and Plan today (4/23/2025). In review of yesterday's note (4/22/2025), there are no changes except as documented above.      I, Lisandra Waters DNP performed a substantiated portion of the service face-to-face with same patient on the same date of service INDEPENDENTLY FROM THE MD ON ASSESSMENT, EXAMINATION, DISCUSSION PLAN OF CARE FOR 27 MINUTES.  I was personally involved in reviewing and conducting the medical decision making, including the information as described above.

## 2025-04-23 NOTE — DISCHARGE PLANNING
Received page 1 of legal hold. Page 1 faxed to Abena FAUSTIN. Message sent to Abena FAUSTIN  to update.

## 2025-04-23 NOTE — CARE PLAN
The patient is Stable - Low risk of patient condition declining or worsening    Shift Goals  Clinical Goals: echo, stress  Patient Goals: feel better  Family Goals: answers    Progress made toward(s) clinical / shift goals:    Problem: Knowledge Deficit - Standard  Goal: Patient and family/care givers will demonstrate understanding of plan of care, disease process/condition, diagnostic tests and medications  Description: Target End Date:  1-3 days or as soon as patient condition allowsDocument in Patient Education1.  Patient and family/caregiver oriented to unit, equipment, visitation policy and means for communicating concern2.  Complete/review Learning Assessment3.  Assess knowledge level of disease process/condition, treatment plan, diagnostic tests and medications4.  Explain disease process/condition, treatment plan, diagnostic tests and medications  Outcome: Progressing     Problem: Depression  Goal: Patient and family/caregiver will verbalize accurate information about at least two of the possible causes of depression, three-four of the signs and symptoms of depression  Description: Target End Date:  1 to 3 days1.  Assess the patient's and family/caregiver's knowledge regarding depression and its causes.2.  Explain to the patient and family/caregiver regarding the major symptoms of depression.3.  Inform the patient and family/caregiver that depression can be treated through medications and psychotherapy.4.  Allow the patient to express feelings and perceptions5.  Express hope to the patient with realistic comments about the patient's strengths and resources.5.  Give positive feedback after a tasks are achieved.6.  Encourage identification of positive aspects of self.7.  Educate the patient about crisis intervention services such as suicide hotlines and other resources.  Outcome: Progressing     Problem: Provide Safe Environment  Goal: Suicide environmental safety, protocols, policies, and practices will be  implemented  Description: Target End Date:  resolve day 11.  Remove objects or personal belongings that may cause harm or injury to self or others2.  Dietary tray modifications (paperware)3.  Provide a safe environment4.  Render close patient supervision by sustaining observation or awareness of the patient at all times  Outcome: Progressing     Problem: Psychosocial  Goal: Patient's ability to identify and develop effective coping behaviors will improve  Description: Target End Date:  1 to 3 days1.  Present opportunities for the patient to express thoughts, and feelings in a nonjudgmental environment2.  Help the patient with problem-solving in a constructive manner.3.  Educate the patient on cognitive-behavioral self-management responses to suicidal thoughts.4.  Introduce the use of self-expression methods to manage suicidal feelings5.  Provide emotional support6.  Encourage identification of positive aspects of self  Outcome: Progressing  Goal: Patient's ability to identify and utilize available support systems will improve  Description: Target End Date:  1 to 3 days1.  Help patient identify available resources and support systems2.  Collaborate with interdisciplinary team3.  Collaborate with patient, family/caregiver and other support systems  Outcome: Progressing     Problem: Pain - Standard  Goal: Alleviation of pain or a reduction in pain to the patient’s comfort goal  Description: Target End Date:  Prior to discharge or change in level of careDocument on Vitals flowsheet1.  Document pain using the appropriate pain scale per order or unit policy2.  Educate and implement non-pharmacologic comfort measures (i.e. relaxation, distraction, massage, cold/heat therapy, etc.)3.  Pain management medications as ordered4.  Reassess pain after pain med administration per policy5.  If opiods administered assess patient's response to pain medication is appropriate per POSS sedation scale6.  Follow pain management plan  developed in collaboration with patient and interdisciplinary team (including palliative care or pain specialists if applicable)  Outcome: Progressing       Patient is not progressing towards the following goals:

## 2025-04-23 NOTE — ASSESSMENT & PLAN NOTE
Verbalized suicidal ideations with bedside RN  Legal hold 2000 initiated by bedside RN  Psychiatry consulted
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
## 2025-04-23 NOTE — HOSPITAL COURSE
Mr. Rikki Khalil is a 49 y.o. male who presented 4/22/2025 with a pmh of VT, CAD s/p CABG x3, ischemic cardiomyopathy, HTN, HLD, DM type 2 who presented to the ED on 4/22/25 with complaints of chest pain.     The patient has history of three vessel CABG in 2019. He has not followed up with his cardiologist for last two years, and is not been taken medications. The patient says that today he was moving a small amount of books to another shelf and suddenly developed left sided chest pain that was so severe it dropped to his knees.The patient tells me he has had intermittent chest pain over the last couple years and was last seen last January 2024 for chest pain unfortunately, the patient chose to leave the ER instead of remaining in the hospital for further workup. Patient says he has lived in  at the [facility] for the last two weeks years. According to his son, who is at bedside, the patient has lost about 100 pounds over the last years because he is afraid to eat for fear of his family not having enough food. Patient is also developed some paranoia and says he does not want to go outside or see people because he is feeling like he is being judged. He is had physical decline over that amount of time. His wife insisted that he presented the ED today for evaluation of his chest pain. Patient denies shortness of breath. He has had some lightheadedness, denies palpitations, orthopnea, nausea or vomiting. He is not had any cold or flulike symptoms lately, denies G.I. or  complaints.     Cardiology visit 1/3/24, marcy was on ASA 81 mg, metoprolol 50 mg BID and lipitor 80 daily, metformin. He had a ziopatch placed, which noted 2 brief episodes of VT. He was to continue metoprolol 50 mg BID and obtain echo, which he did not do.  Patient admitted to hospital medicine for management of care.    During this hospitalization, we will pursue a cardiac stress test along with an echocardiogram.  However, patient started to  "have suicidal ideation as verbalized to his nurse.  I also witnessed patient saying \"I do not care anymore just let me go \".  Legal hold 2000 initiated by bedside RN on 4/23/2025. Bedside sitter in place.     Pending stress test results and echocardiogram results.        "

## 2025-04-23 NOTE — PROGRESS NOTES
Report called to [facility] Easley on T7. Spoke with Nuc med RN and sitter to let them know patient will go to T7 after stress. Will take wife to new room.

## 2025-04-23 NOTE — PROGRESS NOTES
Patient expresses suicidal ideation and is very emotional this morning. MD notified that patient needs to be placed on a legal hold. Charge nurse notified. NAM notified. Sitter requested.

## 2025-04-23 NOTE — PROGRESS NOTES
Monitor Summary:  Rhythm: SR  Rate: 66-97  Ectopy: Rare PVC    0.16/0.10/0.36    Per monitor room interpretation

## 2025-04-23 NOTE — PROGRESS NOTES
Received call from lab regarding a critical troponin value of 186. Patient asymptomatic. PAWEL Schafer notified, no new orders at this time.

## 2025-04-23 NOTE — THERAPY
Physical Therapy Contact Note    Patient Name: Rikki Khalil  Age:  49 y.o., Sex:  male  Medical Record #: [id number]  Today's Date: 4/23/2025    PT cardiac rehab consult received, per chart pending stress test this AM; will monitor for need of PT cardiac rehab evaluation if rules in for ACS; if mobility evaluation needed, please place 'PT eval and Treat';    Deloris OSULLIVAN, PT,  [phone]

## 2025-04-23 NOTE — PROGRESS NOTES
Pt arrived to unit via [facility] at 1727. Pt oriented to room, unit, and plan of care. Tele-monitor placed and monitor room notified. All questions answered at this time. Call light within reach; fall precautions in place. \

## 2025-04-24 LAB
ANION GAP SERPL CALC-SCNC: 10 MMOL/L (ref 7–16)
BUN SERPL-MCNC: 20 MG/DL (ref 8–22)
CALCIUM SERPL-MCNC: 9 MG/DL (ref 8.5–10.5)
CHLORIDE SERPL-SCNC: 101 MMOL/L (ref 96–112)
CO2 SERPL-SCNC: 29 MMOL/L (ref 20–33)
CREAT SERPL-MCNC: 0.76 MG/DL (ref 0.5–1.4)
ERYTHROCYTE [DISTWIDTH] IN BLOOD BY AUTOMATED COUNT: 47.8 FL (ref 35.9–50)
GFR SERPLBLD CREATININE-BSD FMLA CKD-EPI: 108 ML/MIN/1.73 M 2
GLUCOSE BLD STRIP.AUTO-MCNC: 151 MG/DL (ref 65–99)
GLUCOSE BLD STRIP.AUTO-MCNC: 176 MG/DL (ref 65–99)
GLUCOSE BLD STRIP.AUTO-MCNC: 181 MG/DL (ref 65–99)
GLUCOSE BLD STRIP.AUTO-MCNC: 225 MG/DL (ref 65–99)
GLUCOSE BLD STRIP.AUTO-MCNC: 237 MG/DL (ref 65–99)
GLUCOSE SERPL-MCNC: 181 MG/DL (ref 65–99)
HCT VFR BLD AUTO: 42.1 % (ref 42–52)
HGB BLD-MCNC: 14.1 G/DL (ref 14–18)
MCH RBC QN AUTO: 31.6 PG (ref 27–33)
MCHC RBC AUTO-ENTMCNC: 33.5 G/DL (ref 32.3–36.5)
MCV RBC AUTO: 94.4 FL (ref 81.4–97.8)
PLATELET # BLD AUTO: 189 K/UL (ref 164–446)
PMV BLD AUTO: 11.3 FL (ref 9–12.9)
POTASSIUM SERPL-SCNC: 3.9 MMOL/L (ref 3.6–5.5)
RBC # BLD AUTO: 4.46 M/UL (ref 4.7–6.1)
SODIUM SERPL-SCNC: 140 MMOL/L (ref 135–145)
WBC # BLD AUTO: 8.6 K/UL (ref 4.8–10.8)

## 2025-04-24 PROCEDURE — 36415 COLL VENOUS BLD VENIPUNCTURE: CPT

## 2025-04-24 PROCEDURE — 700101 HCHG RX REV CODE 250: Mod: UD

## 2025-04-24 PROCEDURE — 700102 HCHG RX REV CODE 250 W/ 637 OVERRIDE(OP): Mod: UD | Performed by: INTERNAL MEDICINE

## 2025-04-24 PROCEDURE — A9270 NON-COVERED ITEM OR SERVICE: HCPCS | Mod: UD | Performed by: INTERNAL MEDICINE

## 2025-04-24 PROCEDURE — 96375 TX/PRO/DX INJ NEW DRUG ADDON: CPT

## 2025-04-24 PROCEDURE — 700102 HCHG RX REV CODE 250 W/ 637 OVERRIDE(OP): Mod: UD | Performed by: NURSE PRACTITIONER

## 2025-04-24 PROCEDURE — 82962 GLUCOSE BLOOD TEST: CPT

## 2025-04-24 PROCEDURE — 700111 HCHG RX REV CODE 636 W/ 250 OVERRIDE (IP): Mod: UD | Performed by: NURSE PRACTITIONER

## 2025-04-24 PROCEDURE — A9270 NON-COVERED ITEM OR SERVICE: HCPCS | Mod: UD | Performed by: NURSE PRACTITIONER

## 2025-04-24 PROCEDURE — 700111 HCHG RX REV CODE 636 W/ 250 OVERRIDE (IP): Mod: JZ,UD | Performed by: HOSPITALIST

## 2025-04-24 PROCEDURE — 85027 COMPLETE CBC AUTOMATED: CPT

## 2025-04-24 PROCEDURE — 96372 THER/PROPH/DIAG INJ SC/IM: CPT

## 2025-04-24 PROCEDURE — G0378 HOSPITAL OBSERVATION PER HR: HCPCS

## 2025-04-24 PROCEDURE — 99233 SBSQ HOSP IP/OBS HIGH 50: CPT | Performed by: PSYCHIATRY & NEUROLOGY

## 2025-04-24 PROCEDURE — 99233 SBSQ HOSP IP/OBS HIGH 50: CPT | Performed by: HOSPITALIST

## 2025-04-24 PROCEDURE — 700102 HCHG RX REV CODE 250 W/ 637 OVERRIDE(OP): Performed by: HOSPITALIST

## 2025-04-24 PROCEDURE — A9270 NON-COVERED ITEM OR SERVICE: HCPCS | Performed by: HOSPITALIST

## 2025-04-24 PROCEDURE — 96376 TX/PRO/DX INJ SAME DRUG ADON: CPT

## 2025-04-24 PROCEDURE — 80048 BASIC METABOLIC PNL TOTAL CA: CPT

## 2025-04-24 RX ORDER — LISINOPRIL 5 MG/1
5 TABLET ORAL
Status: DISCONTINUED | OUTPATIENT
Start: 2025-04-24 | End: 2025-04-25 | Stop reason: HOSPADM

## 2025-04-24 RX ORDER — ONDANSETRON 2 MG/ML
4 INJECTION INTRAMUSCULAR; INTRAVENOUS EVERY 4 HOURS PRN
Status: DISCONTINUED | OUTPATIENT
Start: 2025-04-24 | End: 2025-04-25 | Stop reason: HOSPADM

## 2025-04-24 RX ORDER — LIDOCAINE 4 G/G
3 PATCH TOPICAL EVERY 24 HOURS
Status: DISCONTINUED | OUTPATIENT
Start: 2025-04-24 | End: 2025-04-25 | Stop reason: HOSPADM

## 2025-04-24 RX ORDER — DAPAGLIFLOZIN 10 MG/1
10 TABLET, FILM COATED ORAL DAILY
Status: DISCONTINUED | OUTPATIENT
Start: 2025-04-24 | End: 2025-04-25 | Stop reason: HOSPADM

## 2025-04-24 RX ADMIN — HEPARIN SODIUM 5000 UNITS: 5000 INJECTION, SOLUTION INTRAVENOUS; SUBCUTANEOUS at 14:07

## 2025-04-24 RX ADMIN — METOPROLOL TARTRATE 25 MG: 25 TABLET, FILM COATED ORAL at 04:57

## 2025-04-24 RX ADMIN — HEPARIN SODIUM 5000 UNITS: 5000 INJECTION, SOLUTION INTRAVENOUS; SUBCUTANEOUS at 22:38

## 2025-04-24 RX ADMIN — METOPROLOL TARTRATE 25 MG: 25 TABLET, FILM COATED ORAL at 16:46

## 2025-04-24 RX ADMIN — INSULIN LISPRO 1 UNITS: 100 INJECTION, SOLUTION INTRAVENOUS; SUBCUTANEOUS at 16:51

## 2025-04-24 RX ADMIN — INSULIN LISPRO 1 UNITS: 100 INJECTION, SOLUTION INTRAVENOUS; SUBCUTANEOUS at 05:05

## 2025-04-24 RX ADMIN — INSULIN LISPRO 1 UNITS: 100 INJECTION, SOLUTION INTRAVENOUS; SUBCUTANEOUS at 22:43

## 2025-04-24 RX ADMIN — DAPAGLIFLOZIN 10 MG: 10 TABLET, FILM COATED ORAL at 15:45

## 2025-04-24 RX ADMIN — NICOTINE TRANSDERMAL SYSTEM 21 MG: 21 PATCH, EXTENDED RELEASE TRANSDERMAL at 04:53

## 2025-04-24 RX ADMIN — HEPARIN SODIUM 5000 UNITS: 5000 INJECTION, SOLUTION INTRAVENOUS; SUBCUTANEOUS at 04:56

## 2025-04-24 RX ADMIN — MORPHINE SULFATE 2 MG: 4 INJECTION, SOLUTION INTRAMUSCULAR; INTRAVENOUS at 04:55

## 2025-04-24 RX ADMIN — ASPIRIN 81 MG: 81 TABLET, COATED ORAL at 04:56

## 2025-04-24 RX ADMIN — ARIPIPRAZOLE 5 MG: 10 TABLET ORAL at 04:57

## 2025-04-24 RX ADMIN — LIDOCAINE HYDROCHLORIDE 15 ML: 20 SOLUTION ORAL; TOPICAL at 18:03

## 2025-04-24 RX ADMIN — ONDANSETRON 4 MG: 4 TABLET, ORALLY DISINTEGRATING ORAL at 05:08

## 2025-04-24 RX ADMIN — ATORVASTATIN CALCIUM 40 MG: 40 TABLET, FILM COATED ORAL at 16:46

## 2025-04-24 RX ADMIN — ONDANSETRON 4 MG: 2 INJECTION INTRAMUSCULAR; INTRAVENOUS at 10:18

## 2025-04-24 RX ADMIN — LIDOCAINE 3 PATCH: 4 PATCH TOPICAL at 07:47

## 2025-04-24 RX ADMIN — INSULIN LISPRO 2 UNITS: 100 INJECTION, SOLUTION INTRAVENOUS; SUBCUTANEOUS at 11:53

## 2025-04-24 RX ADMIN — MORPHINE SULFATE 2 MG: 4 INJECTION, SOLUTION INTRAMUSCULAR; INTRAVENOUS at 22:37

## 2025-04-24 RX ADMIN — LISINOPRIL 5 MG: 5 TABLET ORAL at 15:45

## 2025-04-24 ASSESSMENT — PAIN DESCRIPTION - PAIN TYPE
TYPE: ACUTE PAIN
TYPE: ACUTE PAIN;CHRONIC PAIN
TYPE: ACUTE PAIN
TYPE: ACUTE PAIN;CHRONIC PAIN
TYPE: ACUTE PAIN
TYPE: ACUTE PAIN;CHRONIC PAIN

## 2025-04-24 ASSESSMENT — ENCOUNTER SYMPTOMS
MYALGIAS: 1
NEUROLOGICAL NEGATIVE: 1
BLURRED VISION: 0
PALPITATIONS: 0
BRUISES/BLEEDS EASILY: 0
SORE THROAT: 0
DIAPHORESIS: 0
WEAKNESS: 0
RESPIRATORY NEGATIVE: 1
CHILLS: 0
FEVER: 0
EYES NEGATIVE: 1
VOMITING: 0
WEIGHT LOSS: 0
HEADACHES: 0
SHORTNESS OF BREATH: 0
FOCAL WEAKNESS: 0
BACK PAIN: 1
COUGH: 0
DEPRESSION: 1
ABDOMINAL PAIN: 0
GASTROINTESTINAL NEGATIVE: 1
DIZZINESS: 0
NAUSEA: 0

## 2025-04-24 ASSESSMENT — FIBROSIS 4 INDEX: FIB4 SCORE: 1.28

## 2025-04-24 ASSESSMENT — LIFESTYLE VARIABLES: SUBSTANCE_ABUSE: 0

## 2025-04-24 NOTE — CARE PLAN
The patient is Stable - Low risk of patient condition declining or worsening    Shift Goals  Clinical Goals: L2K, safety, cards consult  Patient Goals: updates, comfort  Family Goals: updates    Progress made toward(s) clinical / shift goals:    Problem: Knowledge Deficit - Standard  Goal: Patient and family/care givers will demonstrate understanding of plan of care, disease process/condition, diagnostic tests and medications  Description: Target End Date:  1-3 days or as soon as patient condition allowsDocument in Patient Education1.  Patient and family/caregiver oriented to unit, equipment, visitation policy and means for communicating concern2.  Complete/review Learning Assessment3.  Assess knowledge level of disease process/condition, treatment plan, diagnostic tests and medications4.  Explain disease process/condition, treatment plan, diagnostic tests and medications  Outcome: Progressing     Problem: Depression  Goal: Patient and family/caregiver will verbalize accurate information about at least two of the possible causes of depression, three-four of the signs and symptoms of depression  Description: Target End Date:  1 to 3 days1.  Assess the patient's and family/caregiver's knowledge regarding depression and its causes.2.  Explain to the patient and family/caregiver regarding the major symptoms of depression.3.  Inform the patient and family/caregiver that depression can be treated through medications and psychotherapy.4.  Allow the patient to express feelings and perceptions5.  Express hope to the patient with realistic comments about the patient's strengths and resources.5.  Give positive feedback after a tasks are achieved.6.  Encourage identification of positive aspects of self.7.  Educate the patient about crisis intervention services such as suicide hotlines and other resources.  Outcome: Progressing     Problem: Provide Safe Environment  Goal: Suicide environmental safety, protocols, policies, and  practices will be implemented  Description: Target End Date:  resolve day 11.  Remove objects or personal belongings that may cause harm or injury to self or others2.  Dietary tray modifications (paperware)3.  Provide a safe environment4.  Render close patient supervision by sustaining observation or awareness of the patient at all times  Outcome: Progressing     Problem: Psychosocial  Goal: Patient's ability to identify and develop effective coping behaviors will improve  Description: Target End Date:  1 to 3 days1.  Present opportunities for the patient to express thoughts, and feelings in a nonjudgmental environment2.  Help the patient with problem-solving in a constructive manner.3.  Educate the patient on cognitive-behavioral self-management responses to suicidal thoughts.4.  Introduce the use of self-expression methods to manage suicidal feelings5.  Provide emotional support6.  Encourage identification of positive aspects of self  Outcome: Progressing  Goal: Patient's ability to identify and utilize available support systems will improve  Description: Target End Date:  1 to 3 days1.  Help patient identify available resources and support systems2.  Collaborate with interdisciplinary team3.  Collaborate with patient, family/caregiver and other support systems  Outcome: Progressing     Problem: Pain - Standard  Goal: Alleviation of pain or a reduction in pain to the patient’s comfort goal  Description: Target End Date:  Prior to discharge or change in level of careDocument on Vitals flowsheet1.  Document pain using the appropriate pain scale per order or unit policy2.  Educate and implement non-pharmacologic comfort measures (i.e. relaxation, distraction, massage, cold/heat therapy, etc.)3.  Pain management medications as ordered4.  Reassess pain after pain med administration per policy5.  If opiods administered assess patient's response to pain medication is appropriate per POSS sedation scale6.  Follow pain  management plan developed in collaboration with patient and interdisciplinary team (including palliative care or pain specialists if applicable)  Outcome: Progressing       Patient is not progressing towards the following goals:

## 2025-04-24 NOTE — CONSULTS
PSYCHIATRIC FOLLOW-UP:(established)  *Reason for admission:      Chief Complaint   Patient presents with    Chest Pain     X 1 hour while moving a small stack of books. Central chest tightness, no other symptoms. Chest pain resolved with x 1 spray of nitro from EMS. Also given 324 mg ASA.          *Legal Hold Status:   on 72h hold          Chart reviewed.         *HPI:   Patient was laying down in bed awake when I approached him.  Wife was at bedside.  Patient reports he has been feeling depressed and anxious due to multiple psychosocial stressors.  Patient is a sexual offender, and was released from [facility] in the 90s.  Since then he has been struggling with securing housing.  He is  and lives with his 2 children in a  travel trailer, which he has to relocate every 2 weeks.  Patient is unemployed, and only income is Social Security from his wife.  Patient says he is not able to work because of his mental and physical condition.  He feels very stressed out with his finances and living situation, stating that his children are also a source of stress.  Wife and children have psychiatric conditions.  Patient spoke with frustration about his past imprisonment and how he feels that he is still being punished for something that happened 30 years ago.  He feels very hopeless and helpless about his situation.  He admits to having suicidal thoughts intermittently, last time a few days ago.  Today he denies active SI, but states that it is hard to think forward.  He is glad to be alive.  He identified his wife as a reason to live for, but states that he does not know what he would do if she was not present in his life.  Patient feels very hedonic, depressed.  Has lack of energy.  Today he denies any auditory or visual hallucinations.  No paranoia elicited today.  Patient has been having nausea and vomiting since last night.  It is unclear if it is related to opioid medication or Abilify.        Medical ROS (as  "pertinent):     Nausea and vomiting      *Psychiatric Examination:  Vitals:   Vitals:    25 1123   BP: 121/72   Pulse: 75   Resp: 18   Temp: 36.4 °C (97.5 °F)   SpO2: 96%       General Appearance: Improved hygiene today, calm and cooperative, fair eye contact  Abnormal Movements: Psychomotor retardation  Gait and Posture: Normal  Speech: Normal volume, tone and rhythm  Thought processes: Linear  Associations: No loose associations  Abnormal or Psychotic Thoughts: Denies AVH, no paranoia elicited today, does not appear to be responding to internal stimuli  Judgement and Insight: Fair/fair  Orientation: Grossly oriented  Recent and Remote Memory: Intact  Attention Span and Concentration: Intact  Language: Fluent  Fund of Knowledge: Not tested  Mood and Affect:\" Depressed and anxious\", depressed  SI/HI: Denies      *EKG:   Results for orders placed or performed during the hospital encounter of 25   EKG   Result Value Ref Range    Report       [facility]    Test Date:  2025  Pt Name:    SY LEDESMA               Department: ER  MRN:        [id number]                      Room:       [facility]  Gender:     Male                         Technician: [id number]  :        1975                   Requested By:ER TRIAGE PROTOCOL  Order #:    [id number]                    Reading MD:    Measurements  Intervals                                Axis  Rate:       103                          P:          75  VA:         158                          QRS:        -57  QRSD:       107                          T:          54  QT:         373  QTc:        489    Interpretive Statements  Sinus tachycardia  Probable left atrial enlargement  RSR' in V1 or V2, probably normal variant  Abnormal inferior Q waves  ST elev, probable normal early repol pattern  Borderline prolonged QT interval  Compared to ECG 2023 20:16:06  RSR' in V1 or V2 now present  Inferior Q waves now present  Q waves " now present  ST ( T wave) deviation now present  Myocardial infarct finding no longer present     EKG   Result Value Ref Range    Report       [facility]    Test Date:  2025  Pt Name:    SY LEDESMA               Department: CPU  MRN:        [id number]                      Room:       T205  Gender:     Male                         Technician: [id number]  :        1975                   Requested By:DANILO HUSSEIN  Order #:    [id number]                    Reading MD: Mckinley Ríos MD    Measurements  Intervals                                Axis  Rate:       73                           P:          67  NY:         169                          QRS:        -51  QRSD:       111                          T:          29  QT:         419  QTc:        462    Interpretive Statements  Sinus rhythm  Inferior infarct, old  Borderline ST elevation, anterior leads  Electronically Signed On 2025 06:52:03 PDT by Mckinley Ríos MD        *Imaging:   EC-ECHOCARDIOGRAM COMPLETE W/O CONT   Final Result      NM-CARDIAC STRESS TEST   Final Result      DX-CHEST-PORTABLE (1 VIEW)   Final Result      No acute cardiac or pulmonary abnormalities are identified.              *Labs personally reviewed:   Recent Results (from the past 24 hours)   EC-ECHOCARDIOGRAM COMPLETE W/O CONT    Collection Time: 25  2:33 PM   Result Value Ref Range    Eject.Frac. MOD BP 36.5     Eject.Frac. MOD 4C 38.8     Eject.Frac. MOD 2C 32.7     Left Ventrical Ejection Fraction 38    POCT glucose device results    Collection Time: 25  5:27 PM   Result Value Ref Range    POC Glucose, Blood 237 (H) 65 - 99 mg/dL   POCT glucose device results    Collection Time: 25  8:41 PM   Result Value Ref Range    POC Glucose, Blood 210 (H) 65 - 99 mg/dL   CBC WITHOUT DIFFERENTIAL    Collection Time: 25  3:42 AM   Result Value Ref Range    WBC 8.6 4.8 - 10.8 K/uL    RBC 4.46 (L) 4.70 - 6.10 M/uL    Hemoglobin 14.1 14.0 - 18.0 g/dL    Hematocrit  42.1 42.0 - 52.0 %    MCV 94.4 81.4 - 97.8 fL    MCH 31.6 27.0 - 33.0 pg    MCHC 33.5 32.3 - 36.5 g/dL    RDW 47.8 35.9 - 50.0 fL    Platelet Count 189 164 - 446 K/uL    MPV 11.3 9.0 - 12.9 fL   Basic Metabolic Panel    Collection Time: 04/24/25  3:42 AM   Result Value Ref Range    Sodium 140 135 - 145 mmol/L    Potassium 3.9 3.6 - 5.5 mmol/L    Chloride 101 96 - 112 mmol/L    Co2 29 20 - 33 mmol/L    Glucose 181 (H) 65 - 99 mg/dL    Bun 20 8 - 22 mg/dL    Creatinine 0.76 0.50 - 1.40 mg/dL    Calcium 9.0 8.5 - 10.5 mg/dL    Anion Gap 10.0 7.0 - 16.0   ESTIMATED GFR    Collection Time: 04/24/25  3:42 AM   Result Value Ref Range    GFR (CKD-EPI) 108 >60 mL/min/1.73 m 2   POCT glucose device results    Collection Time: 04/24/25  5:03 AM   Result Value Ref Range    POC Glucose, Blood 176 (H) 65 - 99 mg/dL   POCT glucose device results    Collection Time: 04/24/25 11:52 AM   Result Value Ref Range    POC Glucose, Blood 225 (H) 65 - 99 mg/dL       Current medications:  Current Facility-Administered Medications   Medication Dose Route Frequency Provider Last Rate Last Admin    lidocaine (Asperflex) 4 % patch 3 Patch  3 Patch Transdermal Q24HR STALIN SethiAKANDICE   3 Patch at 04/24/25 0747    ondansetron (Zofran) syringe/vial injection 4 mg  4 mg Intravenous Q4HRS PRN Izabela Damon M.D.   4 mg at 04/24/25 1018    metoprolol tartrate (Lopressor) tablet 25 mg  25 mg Oral BID Nabor Zaman M.D.   25 mg at 04/24/25 0457    atorvastatin (Lipitor) tablet 40 mg  40 mg Oral Q EVENING Nabor Zaman M.D.   40 mg at 04/23/25 1723    aminophylline injection 100 mg  100 mg Intravenous Once PRN DIRK BarillasPDeeptiR.N.        nicotine (Nicoderm) 21 MG/24HR 21 mg  21 mg Transdermal Daily-0600 Gerblairene YELITZA Waters, A.P.R.N.   21 mg at 04/24/25 0453    ARIPiprazole (Abilify) tablet 5 mg  5 mg Oral DAILY Gerblairene YELITZA Waters, A.P.R.N.   5 mg at 04/24/25 0457    aspirin EC tablet 81 mg  81 mg Oral DAILY Nieves Briones,  A.P.R.N.   81 mg at 04/24/25 0456    morphine 4 MG/ML injection 2 mg  2 mg Intravenous Q3HRS PRN Nieves Walkert, A.P.R.N.   2 mg at 04/24/25 0455    heparin injection 5,000 Units  5,000 Units Subcutaneous Q8HRS Nieves FREY Anali, A.P.R.N.   5,000 Units at 04/24/25 0456    acetaminophen (Tylenol) tablet 650 mg  650 mg Oral Q6HRS PRN Nieves FREY Anali, A.P.R.N.        ondansetron (Zofran ODT) dispertab 4 mg  4 mg Oral Q6HRS PRN Nieves FREY Anali, A.P.R.N.   4 mg at 04/24/25 0508    insulin lispro (HumaLOG,AdmeLOG) subcutaneous injection  1-6 Units Subcutaneous 4X/DAY ACHS Nieves FREY Anali, A.P.R.N.   2 Units at 04/24/25 1153    And    dextrose 50 % (D50W) injection 25 g  25 g Intravenous Q15 MIN PRN Nieves FREY Anali, A.P.R.N.           Assessment: Patient appears very depressed on evaluation, hopeless.  He denies any SI, but is having a hard time thinking forward into the future.  He feels very stressed with his psychosocial stressors, and admits to intermittent suicidal thoughts.  Patient needs further psychiatric stabilization for safe discharge, will extend his legal hold to ensure further psychiatric treatment.    Denies having nausea or vomiting today.  Unclear if it is related to Abilify or opioid medication.  Will defer from starting antidepressant today.       Dx:  Unspecified psychotic disorder  Major depressive disorder  Cannabis use in remission  History of PTSD    Medical :  Principal Problem:    Acute chest pain (POA: Yes)  Active Problems:    Type 2 diabetes mellitus without complication (HCC) (POA: Yes)    CAD (coronary artery disease) (POA: Yes)      Overview: December 2018: Three vessel CAD, status post CABG x 3.    Essential hypertension, benign (POA: Yes)    Ventricular tachyarrhythmia (HCC) (POA: Yes)    Tobacco abuse (POA: Yes)    Food insecurity (POA: Unknown)    Severe protein-calorie malnutrition (Garcia: less than 60% of standard weight) (HCC) (POA: Yes)    Chest pain (POA: Yes)    Suicidal ideation  (POA: Unknown)  Resolved Problems:    * No resolved hospital problems. *         Plan:  1- Legal hold: Extended  2- Psychotropic medications: Continue Abilify 5 mg p.o. every morning for psychosis.  Monitor for GI symptoms.  Discontinue medication if symptoms persist  3- Please transfer pt to inpatient psychiatric hospital when medically cleared and bed is available  4- being referred to inpatient psychotherapy with Dr. Felix   5- Discussed the case with: Dr Damon  6- Psychiatry will follow up    Thank you for the consult.       Sitter: telesitter  Phone: Hospital phone to speak with family and friends, supervised  Visitors: Family and friends, supervised  Personal belongings: No access    This note was created using voice recognition software (Dragon). The accuracy of the dictation is limited by the abilities of the software. I have reviewed the note prior to signing. However, error related to voice recognition software and /or scribes may still exist and should be interpreted within the appropriate context.

## 2025-04-24 NOTE — DIETARY
Nutrition Services: Initial Assessment     Day 0 of admit. Rikki Khalil is 49 y.o., male with admitting DX of Acute chest pain  Chest pain.    Consult Received for: MST - Malnutrition Screening Tool- Unintentional weight loss 34 lb or more x 6 months and poor appetite, Cardiac Education, and Supplements.     Current Hospital Problems List:    Principal Problem:    Acute chest pain   Active Problems:    Type 2 diabetes mellitus without complication     CAD (coronary artery disease)       Overview: December 2018: Three vessel CAD, status post CABG x 3.    Essential hypertension, benign     Ventricular tachyarrhythmia     Tobacco abuse     Food insecurity     Severe protein-calorie malnutrition (Garcia: less than 60% of standard weight)     Chest pain     Suicidal ideation   Nutrition Assessment:      Height: 182.9 cm (6')  Weight: 79.7 kg (175 lb 11.3 oz)  Weight taken via: Stand Up Scale  BMI Calculated: 22.72  BMI Classification: WNL       Weight Readings from Last 5 encounters:   Wt Readings from Last 5 Encounters:   04/24/25 79.7 kg (175 lb 11.3 oz)   01/03/24 100 kg (221 lb)   11/05/23 101 kg (222 lb 10.6 oz)   04/29/21 101 kg (222 lb)   10/29/20 101 kg (223 lb)       Objective:   Pertinent Medical Hx: Arthritis, Back pain, Bowel obstruction, CAD (coronary artery disease), Essential hypertension, benign, Glaucoma, Hyperlipidemia, Ischemic cardiomyopathy, NSTEMI (non-ST elevated myocardial infarction), Other depressive disorder, Pneumonia, Scoliosis, SVT (supraventricular tachycardia), Systolic congestive heart failure, and Type II or unspecified type diabetes mellitus without mention of complication, not stated as uncontrolled.   Pertinent Labs: Insulin, Zofran.   Pertinent Meds: Glucose 181, Alkaline Phosphatase 110.  Skin/Wounds:  None documented.   Food Allergies: None documented.   Last BM:     04/21/25       Current Diet Order/Intake:   Cardiac diet and Consistent CHO diet, no documented PO per  flowsheets.       Subjective:     RD visited with patient at bedside, accompanied by patient's spouse. Patient reported an unintentional weight loss of approximately 20 lbs over the past 2 years, attributed to reduced appetite secondary to psychosocial stressors. Current oral intake is limited to 1-2 meals per day.     Patient and wife reported homelessness and residing in an travel trailer and disclosed ongoing food insecurity. Patient and wife utilize a food pantry in [place] on a monthly basis and receive SNAP benefits.      Patient has significant dental limitations and requires food to be chopped or modified in texture. Patient declined heart healthy diet education; however, educational materials were left at the bedside for review. Meal preferences were obtained.     Nutrition Focused Physical Exam (NFPE)  Weight Loss: -20.3 kg (20%) x 15 months. RD Suspects this change related to food insecurity.  Muscle Mass: Moderate Wasting at Temples and Clavicles  Subcutaneous Fat: Moderate Loss at  Orbital Pads  Fluid Accumulation: None documented.   Reduced  Strength: N/A in acute care setting.    Nutrition Diagnosis:      Severe Malnutrition in context of Social or Environmental Circumstances related to inadequate oral intake due to limited access to food in the setting of homelessness as evidenced by consuming less than 50% of estimated energy needs per diet history for greater than one year, weight loss of 20.3 kg (20%) over the past 15 months, moderate muscle mass loss at temples and clavicles, moderate subcutaneous fat loss at orbital pads.      RD agrees with malnutrition hx in problem's list notified provider  [place] .     Nutrition Interventions:      RD to add chopped foods modification in health touch per patient preference.   Recommend Glucerna (provides 220 calories, 10 g protein per 8 fl oz) TID.  RD to provide additional Glucerna supplements at discharge for patient to take home.   Patient  aware of active plan of care as appropriate.       Nutrition Monitoring and Evaluation:      Monitor nutrition POC, goal for >50% intake from meals and supplements.  Additional fluids per MD/DO  Monitor vital signs pertinent to nutrition.    RD following and will provide updated recommendations as indicated.      Norma Head R.D.                                         ASP[facility]/AND CRITERIA FOR MALNUTRITION

## 2025-04-24 NOTE — PROGRESS NOTES
Bedside report received and patient care assumed. Pt is resting in bed, A&O4, with no complaints of pain, and is on RA. Tele box on. Pt was updated on POC, no questions or concerns. Legal hold precautions in place for SI. 1:1 sitter at bedside.

## 2025-04-24 NOTE — PROGRESS NOTES
Monitor Summary  Rhythm: SR  Rate: 80-89  Ectopy: (R) PVC  Measurements: .12/.13/.39  ---12 hr Chart Review---

## 2025-04-24 NOTE — THERAPY
Physical Therapy Contact Note    Patient Name: Rikki Khalil  Age:  49 y.o., Sex:  male  Medical Record #: [id number]  Today's Date: 4/24/2025    PT Cardiac Rehab consult received and chart reviewed. Per interventional cardiology consult, pt with no acute coronary syndrome and has stable coronary artery disease being managed medically. Cardiac rehab evaluation not indicated at this time.

## 2025-04-24 NOTE — CARE PLAN
The patient is Stable - Low risk of patient condition declining or worsening    Shift Goals  Clinical Goals: safety, VSS, L2K  Patient Goals: comfort  Family Goals: updates      Problem: Knowledge Deficit - Standard  Goal: Patient and family/care givers will demonstrate understanding of plan of care, disease process/condition, diagnostic tests and medications  Outcome: Progressing     Problem: Provide Safe Environment  Goal: Suicide environmental safety, protocols, policies, and practices will be implemented  Outcome: Progressing     Problem: Psychosocial  Goal: Patient's ability to identify and develop effective coping behaviors will improve  Outcome: Progressing  Goal: Patient's ability to identify and utilize available support systems will improve  Outcome: Progressing     Problem: Depression  Goal: Patient and family/caregiver will verbalize accurate information about at least two of the possible causes of depression, three-four of the signs and symptoms of depression  Outcome: Progressing     Problem: Pain - Standard  Goal: Alleviation of pain or a reduction in pain to the patient’s comfort goal  Outcome: Progressing

## 2025-04-24 NOTE — CONSULTS
Reason of Consult: Chest pain and dyspnea    Consulting Physician: Dr. Scanlon    HPI:  49-year-old male with a history of CAD and longstanding ischemic cardiomyopathy status post CABG x 3 remotely hypertension hyperlipidemia diabetes mellitus presents with chest pain after being lost to follow-up for over 2 years and discontinuing all of his medications.  Upon arrival he was noted to be suicidal and placed on suicide watch and a legal hold.  Hypertensive on arrival mildly elevated troponins and EKG without obvious ischemia.  No evidence of ACS.  Echocardiogram reveals stable ischemic cardiomyopathy.  Stress test shows infarct without ischemia.  Currently remains chest pain-free after reinstitution of his medical therapy.  Does not smoke drink excessively or do drugs and has no family history of precocious CAD.    Past Medical History:   Diagnosis Date    Arthritis     All joints    Back pain     Bowel obstruction (HCC)     CAD (coronary artery disease) 12/2018    CABG x 3 (LIMA to distal LAD, rSVG to distal diagonal, rSVG to distal RCA) by Dr. Barrios.    Chest pain 4/23/2025    Essential hypertension, benign 10/29/2020    Glaucoma     Bilateral eyes    Hyperlipidemia     Ischemic cardiomyopathy 12/2018    Echocardiogram with LVEF 25-30%. Global hypokinesis. Moderately dilated RV. Mild TR. December 2020: Echocardiogram with normal LV size, LVEF 50%. Grade I diastolic dysfunction. Normal RA, LA and RV. Trace MR, mild TR. RVSP 25mmHg.    NSTEMI (non-ST elevated myocardial infarction) ([facility]) 12/27/2018    Other depressive disorder 1/16/2019    Pneumonia 12/30/2018    Scoliosis     SVT (supraventricular tachycardia) ([facility])     Systolic congestive heart failure ([facility])     Type II or unspecified type diabetes mellitus without mention of complication, not stated as uncontrolled     Insulin and oral meds       Social History     Socioeconomic History    Marital status:      Spouse name: Not on file    Number of  children: Not on file    Years of education: Not on file    Highest education level: 12th grade   Occupational History    Not on file   Tobacco Use    Smoking status: Every Day     Current packs/day: 0.00     Average packs/day: 0.8 packs/day for 24.0 years (18.0 ttl pk-yrs)     Types: Cigarettes     Start date:      Last attempt to quit: 2018     Years since quittin.3    Smokeless tobacco: Never   Substance and Sexual Activity    Alcohol use: No    Drug use: Yes     Types: Inhaled     Comment: marijuana daily    Sexual activity: Not on file   Other Topics Concern    Not on file   Social History Narrative    Not on file     Social Drivers of Health     Financial Resource Strain: Medium Risk (2025)    Overall Financial Resource Strain (CARDIA)     Difficulty of Paying Living Expenses: Somewhat hard   Food Insecurity: Food Insecurity Present (2025)    Hunger Vital Sign     Worried About Running Out of Food in the Last Year: Often true     Ran Out of Food in the Last Year: Sometimes true   Transportation Needs: Unmet Transportation Needs (2025)    PRAPARE - Transportation     Lack of Transportation (Medical): Yes     Lack of Transportation (Non-Medical): No   Physical Activity: Inactive (2025)    Exercise Vital Sign     Days of Exercise per Week: 0 days     Minutes of Exercise per Session: 0 min   Stress: Stress Concern Present (2025)    [employer] - Occupational Stress Questionnaire     Feeling of Stress : Very much   Social Connections: Socially Isolated (2025)    Social Connection and Isolation Panel [NHANES]     Frequency of Communication with Friends and Family: Never     Frequency of Social Gatherings with Friends and Family: Never     Attends [employer] Services: Never     Active Member of Clubs or Organizations: No     Attends Club or Organization Meetings: Patient declined     Marital Status:    Intimate Partner Violence: Patient Declined  (4/22/2025)    Humiliation, Afraid, Rape, and Kick questionnaire     Fear of Current or Ex-Partner: Patient declined     Emotionally Abused: Patient declined     Physically Abused: Patient declined     Sexually Abused: Patient declined   Housing Stability: High Risk (4/22/2025)    Housing Stability Vital Sign     Unable to Pay for Housing in the Last Year: Patient declined     Number of Times Moved in the Last Year: 0     Homeless in the Last Year: Yes       No current facility-administered medications on file prior to encounter.     No current outpatient medications on file prior to encounter.       Current Facility-Administered Medications   Medication Dose Frequency Provider Last Rate Last Admin    lidocaine (Asperflex) 4 % patch 3 Patch  3 Patch Q24HR Hieu Freeman P.A.-C.   3 Patch at 04/24/25 0747    ondansetron (Zofran) syringe/vial injection 4 mg  4 mg Q4HRS PRN Izabela Damon M.D.   4 mg at 04/24/25 1018    metoprolol tartrate (Lopressor) tablet 25 mg  25 mg BID Nabor Zaman M.D.   25 mg at 04/24/25 0457    atorvastatin (Lipitor) tablet 40 mg  40 mg Q EVENING Nabor Zaman M.D.   40 mg at 04/23/25 1723    aminophylline injection 100 mg  100 mg Once PRN Nieves Briones, A.P.R.N.        nicotine (Nicoderm) 21 MG/24HR 21 mg  21 mg Daily-0600 Geraldene R Margaritoleca-Friedaaguno, A.P.R.N.   21 mg at 04/24/25 0453    ARIPiprazole (Abilify) tablet 5 mg  5 mg DAILY Geraldene R Margaritoleca-Llaguno, A.P.R.N.   5 mg at 04/24/25 0457    aspirin EC tablet 81 mg  81 mg DAILY Nieves Briones, A.P.R.N.   81 mg at 04/24/25 0456    morphine 4 MG/ML injection 2 mg  2 mg Q3HRS PRN Nieves Briones, A.P.R.N.   2 mg at 04/24/25 0455    heparin injection 5,000 Units  5,000 Units Q8HRS Nieves Briones, A.P.R.N.   5,000 Units at 04/24/25 1407    acetaminophen (Tylenol) tablet 650 mg  650 mg Q6HRS PRN DIRK BarillasP.R.N.        ondansetron (Zofran ODT) dispertab 4 mg  4 mg Q6HRS PRN DIRK BarillasP.R.N.   4 mg at 04/24/25 2996     insulin lispro (HumaLOG,AdmeLOG) subcutaneous injection  1-6 Units 4X/DAY ACHS Nieves Briones, A.P.R.N.   2 Units at 04/24/25 1153    And    dextrose 50 % (D50W) injection 25 g  25 g Q15 MIN PRN Nieves Briones, A.P.R.N.       Last reviewed on 4/22/2025  3:51 PM by Natalya Quezada, PhT     Penicillins and Fentanyl    Family History   Problem Relation Age of Onset    Hypertension Mother     Heart Disease Mother     Stroke Mother     No Known Problems Father        ROS: As per HPI all other systems reviewed and negative     Physical Exam   Blood pressure 121/72, pulse 75, temperature 36.4 °C (97.5 °F), temperature source Temporal, resp. rate 18, height 1.829 m (6'), weight 79.7 kg (175 lb 11.3 oz), SpO2 96%.    Constitutional:  Appears well-developed.   HENT: Normocephalic and atraumatic. No scleral icterus.   Neck: No JVD present.   Cardiovascular: Normal rate.   Pulmonary/Chest: Normal chest rise  Abdominal: S/NT/ND BS+   Musculoskeletal:  Pulses present. No atrophy. Strength normal.  Extremities: Exhibits no edema. No clubbing or cyanosis.   Skin: Skin is warm and dry.   Neuro: Non-focal, CN 2-12 intact grossly      Intake/Output Summary (Last 24 hours) at 4/24/2025 1440  Last data filed at 4/24/2025 0752  Gross per 24 hour   Intake 200 ml   Output 1 ml   Net 199 ml       Recent Labs     04/22/25  1357 04/24/25  0342   WBC 6.8 8.6   RBC 5.33 4.46*   HEMOGLOBIN 16.9 14.1   HEMATOCRIT 48.6 42.1   MCV 91.2 94.4   MCH 31.7 31.6   MCHC 34.8 33.5   RDW 46.6 47.8   PLATELETCT 212 189   MPV 10.9 11.3     Recent Labs     04/22/25  1357 04/23/25  0351 04/24/25  0342   SODIUM 140 140 140   POTASSIUM 3.7 3.4* 3.9   CHLORIDE 107 105 101   CO2 19* 24 29   GLUCOSE 197* 158* 181*   BUN 15 17 20   CREATININE 0.80 0.73 0.76   CALCIUM 9.4 8.9 9.0                 Recent Labs     04/23/25  0351   TRIGLYCERIDE 97   HDL 35*   LDL 88         Imaging reviewed    Impressions:  1.  Acutely decompensated heart failure reduced ejection  fraction  2.  Non-STEMI, type II demand  3.  Medication noncompliance  4.  Suicidal ideation, on legal hold  5.  Difficult social circumstances  6.  Diabetes mellitus    Recommendations:  Reinstitution of neurohumoral and guideline directed medical therapy for stable coronary artery disease.  No evidence of acute coronary syndrome.  Recommend medication compliance.  Mental health services have been engaged.  Recommend follow-up which we arranged for him as an outpatient in the cardiology clinic.  Recommend transition to equivalent dosing Toprol XL at discharge.  Have added lisinopril and Farxiga.  If tolerated can consider addition of spironolactone for low EF as well.    Thank you for this interesting consultation. It was my pleasure to see Rikki Khalil today.    Jarrod Wilde MD, FACC, [facility]  Division of Interventional Cardiology  [facility]      Discussed with the referring physician and bedside nursing.

## 2025-04-25 ENCOUNTER — PHARMACY VISIT (OUTPATIENT)
Dept: PHARMACY | Facility: MEDICAL CENTER | Age: 50
End: 2025-04-25
Payer: COMMERCIAL

## 2025-04-25 VITALS
HEIGHT: 72 IN | OXYGEN SATURATION: 95 % | WEIGHT: 170.42 LBS | HEART RATE: 81 BPM | DIASTOLIC BLOOD PRESSURE: 67 MMHG | RESPIRATION RATE: 17 BRPM | SYSTOLIC BLOOD PRESSURE: 129 MMHG | TEMPERATURE: 97.5 F | BODY MASS INDEX: 23.08 KG/M2

## 2025-04-25 PROBLEM — R07.9 CHEST PAIN: Status: RESOLVED | Noted: 2025-04-23 | Resolved: 2025-04-25

## 2025-04-25 PROBLEM — I47.20 VENTRICULAR TACHYARRHYTHMIA (HCC): Status: RESOLVED | Noted: 2025-04-22 | Resolved: 2025-04-25

## 2025-04-25 PROBLEM — R07.9 ACUTE CHEST PAIN: Status: RESOLVED | Noted: 2025-04-22 | Resolved: 2025-04-25

## 2025-04-25 LAB
ANION GAP SERPL CALC-SCNC: 11 MMOL/L (ref 7–16)
BUN SERPL-MCNC: 16 MG/DL (ref 8–22)
CALCIUM SERPL-MCNC: 9.3 MG/DL (ref 8.5–10.5)
CHLORIDE SERPL-SCNC: 102 MMOL/L (ref 96–112)
CO2 SERPL-SCNC: 22 MMOL/L (ref 20–33)
CREAT SERPL-MCNC: 0.81 MG/DL (ref 0.5–1.4)
GFR SERPLBLD CREATININE-BSD FMLA CKD-EPI: 106 ML/MIN/1.73 M 2
GLUCOSE BLD STRIP.AUTO-MCNC: 140 MG/DL (ref 65–99)
GLUCOSE BLD STRIP.AUTO-MCNC: 165 MG/DL (ref 65–99)
GLUCOSE SERPL-MCNC: 157 MG/DL (ref 65–99)
MAGNESIUM SERPL-MCNC: 2 MG/DL (ref 1.5–2.5)
PHOSPHATE SERPL-MCNC: 3.5 MG/DL (ref 2.5–4.5)
POTASSIUM SERPL-SCNC: 4.2 MMOL/L (ref 3.6–5.5)
SODIUM SERPL-SCNC: 135 MMOL/L (ref 135–145)

## 2025-04-25 PROCEDURE — 36415 COLL VENOUS BLD VENIPUNCTURE: CPT

## 2025-04-25 PROCEDURE — A9270 NON-COVERED ITEM OR SERVICE: HCPCS | Mod: UD | Performed by: NURSE PRACTITIONER

## 2025-04-25 PROCEDURE — 83735 ASSAY OF MAGNESIUM: CPT

## 2025-04-25 PROCEDURE — 84100 ASSAY OF PHOSPHORUS: CPT

## 2025-04-25 PROCEDURE — 99239 HOSP IP/OBS DSCHRG MGMT >30: CPT | Performed by: HOSPITALIST

## 2025-04-25 PROCEDURE — 82962 GLUCOSE BLOOD TEST: CPT

## 2025-04-25 PROCEDURE — 700102 HCHG RX REV CODE 250 W/ 637 OVERRIDE(OP): Mod: UD | Performed by: NURSE PRACTITIONER

## 2025-04-25 PROCEDURE — A9270 NON-COVERED ITEM OR SERVICE: HCPCS | Mod: UD | Performed by: INTERNAL MEDICINE

## 2025-04-25 PROCEDURE — RXMED WILLOW AMBULATORY MEDICATION CHARGE: Performed by: HOSPITALIST

## 2025-04-25 PROCEDURE — 96372 THER/PROPH/DIAG INJ SC/IM: CPT

## 2025-04-25 PROCEDURE — 700102 HCHG RX REV CODE 250 W/ 637 OVERRIDE(OP): Performed by: HOSPITALIST

## 2025-04-25 PROCEDURE — A9270 NON-COVERED ITEM OR SERVICE: HCPCS | Performed by: HOSPITALIST

## 2025-04-25 PROCEDURE — 700102 HCHG RX REV CODE 250 W/ 637 OVERRIDE(OP): Mod: UD | Performed by: INTERNAL MEDICINE

## 2025-04-25 PROCEDURE — 80048 BASIC METABOLIC PNL TOTAL CA: CPT

## 2025-04-25 PROCEDURE — 700101 HCHG RX REV CODE 250: Mod: UD

## 2025-04-25 PROCEDURE — 700111 HCHG RX REV CODE 636 W/ 250 OVERRIDE (IP): Mod: UD | Performed by: NURSE PRACTITIONER

## 2025-04-25 PROCEDURE — G0378 HOSPITAL OBSERVATION PER HR: HCPCS

## 2025-04-25 RX ORDER — LISINOPRIL 5 MG/1
5 TABLET ORAL DAILY
Status: SHIPPED
Start: 2025-04-26 | End: 2026-05-31

## 2025-04-25 RX ORDER — NICOTINE 21 MG/24HR
1 PATCH, TRANSDERMAL 24 HOURS TRANSDERMAL EVERY 24 HOURS
Status: SHIPPED
Start: 2025-04-25

## 2025-04-25 RX ORDER — INSULIN LISPRO 100 [IU]/ML
2 INJECTION, SOLUTION INTRAVENOUS; SUBCUTANEOUS
Status: ACTIVE | DISCHARGE
Start: 2025-04-25

## 2025-04-25 RX ORDER — DAPAGLIFLOZIN 10 MG/1
10 TABLET, FILM COATED ORAL DAILY
Qty: 100 TABLET | Refills: 3 | Status: SHIPPED | OUTPATIENT
Start: 2025-04-26 | End: 2026-05-31

## 2025-04-25 RX ORDER — ATORVASTATIN CALCIUM 40 MG/1
40 TABLET, FILM COATED ORAL EVERY EVENING
Status: SHIPPED
Start: 2025-04-25 | End: 2026-05-30

## 2025-04-25 RX ORDER — METOPROLOL TARTRATE 25 MG/1
25 TABLET, FILM COATED ORAL 2 TIMES DAILY
Status: SHIPPED
Start: 2025-04-25

## 2025-04-25 RX ORDER — LIDOCAINE 4 G/G
3 PATCH TOPICAL EVERY 24 HOURS
Status: SHIPPED
Start: 2025-04-26

## 2025-04-25 RX ORDER — ARIPIPRAZOLE 5 MG/1
5 TABLET ORAL DAILY
Status: SHIPPED
Start: 2025-04-26

## 2025-04-25 RX ORDER — ASPIRIN 81 MG/1
81 TABLET ORAL DAILY
Status: SHIPPED
Start: 2025-04-26

## 2025-04-25 RX ADMIN — INSULIN LISPRO 1 UNITS: 100 INJECTION, SOLUTION INTRAVENOUS; SUBCUTANEOUS at 06:48

## 2025-04-25 RX ADMIN — LISINOPRIL 5 MG: 5 TABLET ORAL at 08:12

## 2025-04-25 RX ADMIN — NICOTINE TRANSDERMAL SYSTEM 21 MG: 21 PATCH, EXTENDED RELEASE TRANSDERMAL at 06:47

## 2025-04-25 RX ADMIN — LIDOCAINE HYDROCHLORIDE 15 ML: 20 SOLUTION ORAL; TOPICAL at 06:45

## 2025-04-25 RX ADMIN — METOPROLOL TARTRATE 25 MG: 25 TABLET, FILM COATED ORAL at 08:12

## 2025-04-25 RX ADMIN — LIDOCAINE HYDROCHLORIDE 15 ML: 20 SOLUTION ORAL; TOPICAL at 12:08

## 2025-04-25 RX ADMIN — ARIPIPRAZOLE 5 MG: 10 TABLET ORAL at 08:11

## 2025-04-25 RX ADMIN — ASPIRIN 81 MG: 81 TABLET, COATED ORAL at 08:12

## 2025-04-25 RX ADMIN — HEPARIN SODIUM 5000 UNITS: 5000 INJECTION, SOLUTION INTRAVENOUS; SUBCUTANEOUS at 06:48

## 2025-04-25 RX ADMIN — LIDOCAINE 3 PATCH: 4 PATCH TOPICAL at 06:45

## 2025-04-25 RX ADMIN — DAPAGLIFLOZIN 10 MG: 10 TABLET, FILM COATED ORAL at 08:13

## 2025-04-25 ASSESSMENT — ANXIETY QUESTIONNAIRES
2. NOT BEING ABLE TO STOP OR CONTROL WORRYING: MORE THAN HALF THE DAYS
7. FEELING AFRAID AS IF SOMETHING AWFUL MIGHT HAPPEN: SEVERAL DAYS
4. TROUBLE RELAXING: SEVERAL DAYS
1. FEELING NERVOUS, ANXIOUS, OR ON EDGE: SEVERAL DAYS
5. BEING SO RESTLESS THAT IT IS HARD TO SIT STILL: NOT AT ALL
3. WORRYING TOO MUCH ABOUT DIFFERENT THINGS: MORE THAN HALF THE DAYS
GAD7 TOTAL SCORE: 7
6. BECOMING EASILY ANNOYED OR IRRITABLE: NOT AT ALL

## 2025-04-25 ASSESSMENT — PATIENT HEALTH QUESTIONNAIRE - PHQ9
SUM OF ALL RESPONSES TO PHQ QUESTIONS 1-9: 6
5. POOR APPETITE OR OVEREATING: 1 - SEVERAL DAYS
CLINICAL INTERPRETATION OF PHQ2 SCORE: 2

## 2025-04-25 ASSESSMENT — FIBROSIS 4 INDEX: FIB4 SCORE: 1.43

## 2025-04-25 ASSESSMENT — PAIN DESCRIPTION - PAIN TYPE: TYPE: CHRONIC PAIN

## 2025-04-25 NOTE — DISCHARGE PLANNING
Alert Team/Behavioral Health   Note:        Mental Health Transfer     Referral: transfer to mental health facility.     Intervention: Intake RN (Leidy) at [facility] called to accept patient.     The patient's accepting provider is Saeed STEELE.     Transport was arranged through [facility] at [facility] and Hilda at [facility].     The patient will be picked up at 1300.     Notified Attending Provider (Nelson STEELE), Bedside RN (Lyndsey GROSS), and LSW (Beronica PETE) via Voalte message of the departure time as well as accepting facility.     [facility] PCS form scanned in .     Transfer packet with COBRA form to be created and placed in chart.     Plan[facility].

## 2025-04-25 NOTE — PROGRESS NOTES
RN attempted to call report to [facility] 3 times. RN left contact information in voicemail. RN can be reached at [phone].

## 2025-04-25 NOTE — CARE PLAN
The patient is Stable - Low risk of patient condition declining or worsening    Shift Goals  Clinical Goals: safety, VSS, L2K  Patient Goals: comfort  Family Goals: updates        Problem: Knowledge Deficit - Standard  Goal: Patient and family/care givers will demonstrate understanding of plan of care, disease process/condition, diagnostic tests and medications  Outcome: Progressing     Problem: Depression  Goal: Patient and family/caregiver will verbalize accurate information about at least two of the possible causes of depression, three-four of the signs and symptoms of depression  Outcome: Progressing     Problem: Provide Safe Environment  Goal: Suicide environmental safety, protocols, policies, and practices will be implemented  Outcome: Progressing     Problem: Psychosocial  Goal: Patient's ability to identify and develop effective coping behaviors will improve  Outcome: Progressing  Goal: Patient's ability to identify and utilize available support systems will improve  Outcome: Progressing     Problem: Pain - Standard  Goal: Alleviation of pain or a reduction in pain to the patient’s comfort goal  Outcome: Progressing

## 2025-04-25 NOTE — PROGRESS NOTES
Received report from night shift RN.  Pt is alert and orientated x4. Bed is in lower locked position. Fall risk precautions in place. Call light and belongings within reach. Patient had no signs of distress at this time.

## 2025-04-25 NOTE — PROGRESS NOTES
[facility] Medicine Daily Progress Note    Date of Service  4/24/2025    Chief Complaint  Rikki Khalil is a 49 y.o. male admitted 4/22/2025 with chest pain, suicidal ideation    Hospital Course  Mr. Rikki Khalil is a 49 y.o. male who presented 4/22/2025 with a pmh of VT, CAD s/p CABG x3, ischemic cardiomyopathy, HTN, HLD, DM type 2 who presented to the ED on 4/22/25 with complaints of chest pain.     The patient has history of three vessel CABG in 2019. He has not followed up with his cardiologist for last two years, and is not been taken medications. The patient says that today he was moving a small amount of books to another shelf and suddenly developed left sided chest pain that was so severe it dropped to his knees.The patient tells me he has had intermittent chest pain over the last couple years and was last seen last January 2024 for chest pain unfortunately, the patient chose to leave the ER instead of remaining in the hospital for further workup. Patient says he has lived in  at the [facility] for the last two weeks years. According to his son, who is at bedside, the patient has lost about 100 pounds over the last years because he is afraid to eat for fear of his family not having enough food. Patient is also developed some paranoia and says he does not want to go outside or see people because he is feeling like he is being judged. He is had physical decline over that amount of time. His wife insisted that he presented the ED today for evaluation of his chest pain. Patient denies shortness of breath. He has had some lightheadedness, denies palpitations, orthopnea, nausea or vomiting. He is not had any cold or flulike symptoms lately, denies G.I. or  complaints.     Cardiology visit 1/3/24, marcy was on ASA 81 mg, metoprolol 50 mg BID and lipitor 80 daily, metformin. He had a ziopatch placed, which noted 2 brief episodes of VT. He was to continue metoprolol 50 mg BID and obtain echo, which he did  not do.     He expressed SI and severe paranoia and was placed on a legal hold by psychiatry        Interval Problem Update  Axox3, sitter and wife at bedside, flat affect but attentive, denies cp, denies sob, reports indigestion which he states he chronic, had some non bloody emesis. Discussed with [facility], they feel his abilify is contributing to his GI symptoms - trial of gi cocktail. Stress test showed old infarct, no reversible ischemia, I spoke with cardiology who will re establish with him and resume previous meds, no plan for intervention. Pt currently denies SI. Psych extended hold    I have discussed this patient's plan of care and discharge plan at IDT rounds today with Case Management, Nursing, Nursing leadership, and other members of the IDT team.    Consultants/Specialty  Psych  Cardiology    Code Status  Full Code    Disposition  The patient is not medically cleared for discharge to home or a post-acute facility.      I have placed the appropriate orders for post-discharge needs.    Review of Systems  Review of Systems   Constitutional:  Positive for malaise/fatigue. Negative for chills, diaphoresis, fever and weight loss.   HENT: Negative.  Negative for sore throat.    Eyes: Negative.  Negative for blurred vision.   Respiratory: Negative.  Negative for cough and shortness of breath.    Cardiovascular:  Negative for chest pain, palpitations and leg swelling.   Gastrointestinal: Negative.  Negative for abdominal pain, nausea and vomiting.   Genitourinary: Negative.  Negative for dysuria.   Musculoskeletal:  Positive for back pain (chronic) and myalgias.   Skin: Negative.  Negative for itching and rash.   Neurological: Negative.  Negative for dizziness, focal weakness, weakness and headaches.   Endo/Heme/Allergies: Negative.  Does not bruise/bleed easily.   Psychiatric/Behavioral:  Positive for depression. Negative for substance abuse and suicidal ideas.    All other systems reviewed and are negative.        Physical Exam  Temp:  [36.4 °C (97.5 °F)-36.7 °C (98.1 °F)] 36.4 °C (97.5 °F)  Pulse:  [73-84] 84  Resp:  [15-20] 18  BP: (115-145)/(56-79) 134/74  SpO2:  [95 %-96 %] 96 %    Physical Exam  Vitals and nursing note reviewed.   HENT:      Head: Normocephalic.      Nose: Nose normal.      Mouth/Throat:      Mouth: Mucous membranes are moist.      Pharynx: Oropharynx is clear.   Eyes:      Pupils: Pupils are equal, round, and reactive to light.   Cardiovascular:      Rate and Rhythm: Normal rate and regular rhythm.      Pulses: Normal pulses.      Heart sounds: Normal heart sounds.   Pulmonary:      Effort: Pulmonary effort is normal.      Breath sounds: Normal breath sounds.   Abdominal:      General: Bowel sounds are normal.      Palpations: Abdomen is soft.   Musculoskeletal:         General: No tenderness.      Cervical back: Normal range of motion and neck supple.   Skin:     General: Skin is dry.      Capillary Refill: Capillary refill takes 2 to 3 seconds.   Neurological:      Mental Status: He is alert. Mental status is at baseline.      Motor: Weakness present.         Fluids    Intake/Output Summary (Last 24 hours) at 4/24/2025 1709  Last data filed at 4/24/2025 0752  Gross per 24 hour   Intake 200 ml   Output 1 ml   Net 199 ml        Laboratory  Recent Labs     04/22/25  1357 04/24/25  0342   WBC 6.8 8.6   RBC 5.33 4.46*   HEMOGLOBIN 16.9 14.1   HEMATOCRIT 48.6 42.1   MCV 91.2 94.4   MCH 31.7 31.6   MCHC 34.8 33.5   RDW 46.6 47.8   PLATELETCT 212 189   MPV 10.9 11.3     Recent Labs     04/22/25  1357 04/23/25  0351 04/24/25  0342   SODIUM 140 140 140   POTASSIUM 3.7 3.4* 3.9   CHLORIDE 107 105 101   CO2 19* 24 29   GLUCOSE 197* 158* 181*   BUN 15 17 20   CREATININE 0.80 0.73 0.76   CALCIUM 9.4 8.9 9.0             Recent Labs     04/23/25  0351   TRIGLYCERIDE 97   HDL 35*   LDL 88       Imaging  EC-ECHOCARDIOGRAM COMPLETE W/O CONT   Final Result      NM-CARDIAC STRESS TEST   Final Result       DX-CHEST-PORTABLE (1 VIEW)   Final Result      No acute cardiac or pulmonary abnormalities are identified.           Assessment/Plan  * Acute chest pain- (present on admission)  Assessment & Plan  Resolved  Discussed with cardiology  No reversible ischemia  Non compliance addressed, restarting previous meds, med mgt  Cardiology following    Suicidal ideation  Assessment & Plan  Verbalized suicidal ideations with bedside RN  Legal hold 2000 initiated by bedside RN  Psychiatry consulted    Severe protein-calorie malnutrition (Garcia: less than 60% of standard weight) (HCC)- (present on admission)  Assessment & Plan  Patient has lost 100 pounds suspect that weight that is currently on patient's chart is incorrect, as patient is quite gaunt and frail  Encouraging nutrition    Food insecurity  Assessment & Plan  Recommend case management evaluate for social assistance possible    Tobacco abuse- (present on admission)  Assessment & Plan  Smokes 1/2 ppd  - nicotine replacement  - encourage cessation    Ventricular tachyarrhythmia (HCC)- (present on admission)  Assessment & Plan  Hx of VT  - resume metoprolol 50 mg BID    Essential hypertension, benign- (present on admission)  Assessment & Plan  Metoprolol as tolerated    CAD (coronary artery disease)- (present on admission)  Assessment & Plan  Hx CABG x 3  Resume cardiac meds    Type 2 diabetes mellitus without complication (HCC)- (present on admission)  Assessment & Plan  Diabetic diet   ssi         VTE prophylaxis: heparin    I have performed a physical exam and reviewed and updated ROS and Plan today (4/24/2025). In review of yesterday's note (4/23/2025), there are no changes except as documented above.

## 2025-04-25 NOTE — CONSULTS
"[facility] Psychotherapy Session Summary (New)    Name: Rikki Khalil  MRN: [id number]  : 1975  Age: 49 y.o.  Date of assessment: 25  PCP: Leighton Young P.A.-C.  Persons in attendance: Patient and wife    HPI  \"Rikki Khalil is a 49 y.o. male who presented 2025 with a pmh of VT, CAD s/p CABG x3, ischemic cardiomyopathy, HTN, HLD, DM type 2 who presented to the ED on 25 with complaints of chest pain. The patient has history of three vessel CABG in 2019. He has not followed up with his cardiologist for last two years, and is not been taken medications. The patient says that today he was moving a small amount of books to another shelf and suddenly developed left sided chest pain that was so severe it dropped to his knees.The patient tells me he has had intermittent chest pain over the last couple years and was last seen last 2024 for chest pain unfortunately, the patient chose to leave the ER instead of remaining in the hospital for further workup. Patient says he has lived in  at the [facility] for the last two weeks years. According to his son, who is at bedside, the patient has lost about 100 pounds over the last years because he is afraid to eat for fear of his family not having enough food. Patient is also developed some paranoia and says he does not want to go outside or see people because he is feeling like he is being judged. He is had physical decline over that amount of time. His wife insisted that he presented the ED today for evaluation of his chest pain. Patient denies shortness of breath. He has had some lightheadedness, denies palpitations, orthopnea, nausea or vomiting. He is not had any cold or flulike symptoms lately, denies G.I. or  complaints.\" Behavioral Health Care team consulted for psychotherapy.     Psychotherapy Session Summary  Rikki Khalil is a 49 year old male, seen laying in hospital bed with wife at his bedside. Patient " is alert, oriented, speech is soft but clear. Patient was pleasant, cooperative and easy to engage. Patient reports visual and auditory hallucinations for 30+ years. Patient states his hallucinations are not constant and is unsure of what triggers them. Patient reports not having received mental health treatment for his visual and auditory hallucinations and states he has been ignoring the voices. At present, the patient denies experiencing visual and auditory hallucinations. According to the patient's wife, both of their children have a history of mental health issues. Both their son and daughter have been diagnosed with Bipolar disorder, and their daughter also has a diagnosis of ADHD. Patient states a preference to avoid inpatient treatment but is not opposed if it becomes necessary.     Patient reports experiencing anxiety due to being on a legal hold but denies any active suicidal or homicidal ideation. He states he is financially stressed, having been unemployed for the past year and a half due to his medical condition. Additionally, the patient shares that he is soon to be a grandfather and he identified this as a reason to live. Clinician used strength-based perspective to highlight the patient's resilience and strong support system, including his wife and children and soon-to-be grandchild. Clinician encouraged patient to seek outpatient therapy to continue addressing their mental health needs.    Chief Complaint   Patient presents with    Chest Pain     X 1 hour while moving a small stack of books. Central chest tightness, no other symptoms. Chest pain resolved with x 1 spray of nitro from EMS. Also given 324 mg ASA.         PSYCHIATRIC REVIEW OF SYSTEMS:   Mood:      Depressed mood  Anxiety:      Excessive worry  Psychosis:      Actively hallucinating  Trauma History:      Experiences symptoms related to a traumatic event      MELLO-7 Questionnaire  Feeling nervous, anxious, or on edge:  Several days   Not  being able to sop or control worrying:  More than half the days   Worrying too much about different things:  More than half the days   Trouble relaxing:  Several days   Being so restless that it's hard to sit still:  Not at all   Becoming easily annoyed or irritable:  Not at all   Feeling afraid as if something awful might happen:  Several days   Total:  7   How difficult  have these problems made it for you to do your work, take care of things at home, or get along with other people? -      Interpretation of MELLO 7 Total Score   Score Severity: 0-4 No Anxiety, 5-9 Mild Anxiety, 10-14 Moderate Anxiety, 15-21 Severe Anxiety    PHQ-9 Depression Screening    Little interest or pleasure in doing things?  1 - several days   Feeling down, depressed, or hopeless?  1 - several days   Trouble falling or staying asleep, or sleeping too much?   1 - several days   Feeling tired or having little energy?  1 - several days   Poor appetite or overeating?   1 - several days   Feeling bad about yourself - or that you are a failure or have let yourself or your family down?  0 - not at all   Trouble concentrating on things, such as reading the newspaper or watching television?  0 - not at all   Moving or speaking so slowly that other people could have noticed.  Or the opposite - being so fidgety or restless that you have been moving around a lot more than usual?   1 - several days   Thoughts that you would be better off dead, or of hurting yourself?   0 - not at all   Patient Health Questionnaire Score:  6     Interpretation of PHQ-9 Total Score   Score Severity: 1-4 No Depression, 5-9 Mild Depression, 10-14 Moderate Depression, 15-19 Moderately Severe Depression, 20-27 Severe Depression    MoCA Performed?:  N/A      Behavioral Health Treatment History  Does patient/parent report a history of prior behavioral health treatment for patient? Yes: Patient reports attending therapy for a year in 2009 but discontinued after his therapist  "retired.      SAFETY ASSESSMENT - SELF  Does patient acknowledge current or past symptoms of dangerousness to self? Yes, patient currently denies suicidal ideation   Does parent/significant other report patient has current or past symptoms of dangerousness to self? no     Does presenting problem suggest symptoms of dangerousness to self? No      SAFETY ASSESSMENT - OTHERS  Does patient acknowledge current or past symptoms of aggressive behavior or risk to others? no   Does parent/significant other report patient has current or past symptoms of aggressive behavior or risk to others? no     Does presenting problem suggest symptoms of dangerousness to others?  No    Crisis Safety Plan completed and copy given to patient? no    SUBSTANCE USE SCREENING  Yes:  Warren all substances used in the past 30 days: Patient reports using Marijuana 1.5 years ago but stopped due to his paranoia worsening.       Last Use Amount   []   Alcohol     [x]   Marijuana 1.5 years ago Unknown   []   Heroin     []   Prescription Opioids  (used without prescription, for    recreation, or in excess of prescribed amount)     []   Other Prescription  (used without prescription, for    recreation, or in excess of prescribed amount)     []   Cocaine      []   Methamphetamine     []   \"\" drugs (ectasy, MDMA)     []   Other substances        UDS results: Not assessed  Breathalyzer results: Not assessed    What consequences does the patient associate with any of the above substance use and or addictive behaviors? Health problems    Risk factors for detox (check all that apply):  []  Seizures   []  Diaphoretic (sweating)   []  Tremors   []  Hallucinations   []  Increased blood pressure   []  Decreased blood pressure   []  Other   []  None      [] Patient education on risk factors for detoxification and instructed to return to ER as needed.    MENTAL STATUS  Participation Active verbal participation   Grooming Disheveled   Orientation Alert "   Behavior Tense   Eye contact Limited   Mood Depressed and Anxious   Affect Congruent with content   Thought Process Goal-directed   Thought Content Preoccupation   Speech Soft   Perception History of visual and auditory hallucinations, although denies currently   Memory No gross evidence of memory deficits   Insight Limited   Judgement Limited   Other        Collateral Information:   Source:  Significant other present in person and [facility]    Unable to complete full assessment due to:  NA - Assessment completed    CLINICAL IMPRESSIONS:  Primary:  Unspecified Psychotic Disorder  Secondary:  Major Depressive Disorder                                       Recommendations and Observation Level:  Sitter: telesitter  Phone: Hospital phone to speak with family and friends, supervised  Visitors: Family and friends, supervised  Personal belongings: No access    Legal Hold: Omar Bender, Student  4/25/2025    Length of Intervention:  30 minutes

## 2025-04-25 NOTE — DISCHARGE PLANNING
Alert Team/Behavioral Health   Note:      [facility] ALERT TEAM DISCHARGE PLANNING NOTE    Date:  4/25/25  Patient Name:  Rikki Khalil - 49 y.o. - Discharge Planning  MRN:  [id number]   YOB: 1975  ADMISSION DATE:  4/22/2025     Writer forwarded referral packet for inpatient psychiatric care to the following community providers: [facility], [facility], [facility],  [facility]     Items included in the referral packet:   _x____Face Sheet   _x____Pages 1 and 2 of completed legal hold   _x____Alert Team/Psych Assessment   _x____H&P   _____UDS   _____Blood Alcohol   _x____Vital signs   _n/a____Pregnancy Test (if applicable)   _x____Medications List   _____Covid Screen

## 2025-04-25 NOTE — DISCHARGE SUMMARY
Discharge Summary    CHIEF COMPLAINT ON ADMISSION  Chief Complaint   Patient presents with    Chest Pain     X 1 hour while moving a small stack of books. Central chest tightness, no other symptoms. Chest pain resolved with x 1 spray of nitro from EMS. Also given 324 mg ASA.        Reason for Admission  EMS    Admission Date  4/22/2025     CODE STATUS  Full Code    HPI & HOSPITAL COURSE  Mr. Rikki Khalil is a 49 y.o. male who presented to [facility] on 4/22/2025 with chest pain. He has a history of VT, CAD s/p CABG x3, ischemic cardiomyopathy, HTN, HLD and DM type 2 who      The patient has history of three vessel CABG in 2019. He has not followed up with his cardiologist for last two years, and is not been taken medications. The patient says that today he was moving a small amount of books to another shelf and suddenly developed left sided chest pain that was so severe it dropped to his knees.The patient tells me he has had intermittent chest pain over the last couple years and was last seen last January 2024 for chest pain unfortunately, the patient chose to leave the ER instead of remaining in the hospital for further workup. Patient says he has lived in  at the [facility] for the last two weeks years. According to his son, who is at bedside, the patient has lost about 100 pounds over the last years because he is afraid to eat for fear of his family not having enough food. Patient is also developed some paranoia and says he does not want to go outside or see people because he is feeling like he is being judged. He is had physical decline over that amount of time.      Cardiology visit 1/3/24, marcy was on ASA 81 mg, metoprolol 50 mg BID and lipitor 80 daily, metformin. He had a ziopatch placed, which noted 2 brief episodes of VT. He was to continue metoprolol 50 mg BID and obtain echo, which he did not do.      He expressed SI and severe paranoia and was placed on a legal hold by psychiatry. His acute chest pain  resolved, his trops peaked at 186 and then trended down. A cardiac stress test showed an old infarct but no reversible or acute ischemia. His chronic systolic chf was found to be stable, with no evidence of exacerbation. He was seen by cardiology and restarted on his cardiac medications - highly encouraged to follow up as an outpatient. Per cardiology, medical management only at this time. He did have some gerd, likely related to abilify and this is improving. He is now medically cleared. Psychiatry continued to follow him and extended his legal hold. He is being transferred to a psychiatric facility at this time.    Therefore, he is discharged in fair and stable condition to a psychiatric hospital.    The patient met 2-midnight criteria for an inpatient stay at the time of discharge.      FOLLOW UP ITEMS POST DISCHARGE  Pcp  Cardiology [facility]  Psychiatry    DISCHARGE DIAGNOSES  Principal Problem (Resolved):    Acute chest pain (POA: Yes)  Active Problems:    Type 2 diabetes mellitus without complication (HCC) (POA: Yes)    CAD (coronary artery disease) (POA: Yes)      Overview: December 2018: Three vessel CAD, status post CABG x 3.    Essential hypertension, benign (POA: Yes)    Tobacco abuse (POA: Yes)    Food insecurity (POA: Unknown)    Severe protein-calorie malnutrition (Garcia: less than 60% of standard weight) (HCC) (POA: Yes)    Suicidal ideation (POA: Unknown)  Resolved Problems:    Ventricular tachyarrhythmia (HCC) (POA: Yes)    Chest pain (POA: Yes)      FOLLOW UP  No future appointments.  No follow-up provider specified.    MEDICATIONS ON DISCHARGE     Medication List        START taking these medications        Instructions   ARIPiprazole 5 MG tablet  Start taking on: April 26, 2025  Commonly known as: Abilify   Take 1 Tablet by mouth every day.  Dose: 5 mg     aspirin 81 MG EC tablet  Start taking on: April 26, 2025   Take 1 Tablet by mouth every day. Indications: Acute Heart Attack  Dose: 81 mg    "  atorvastatin 40 MG Tabs  Commonly known as: Lipitor   Take 1 Tablet by mouth every evening.  Dose: 40 mg     dapagliflozin propanediol 10 MG Tabs  Start taking on: April 26, 2025  Commonly known as: Farxiga   Take 1 Tablet by mouth every day.  Dose: 10 mg     insulin lispro 100 UNIT/ML Sopn injection PEN  Commonly known as: HumaLOG/AdmeLOG   Doctor's comments: SSI 2 units for bs 150-200, 4 units 201-250, 6 units 251-300, 8 units 301-350  Inject 2 Units under the skin 4 Times a Day,Before Meals and at Bedtime.  Dose: 2 Units     lidocaine 4 % Ptch  Start taking on: April 26, 2025  Commonly known as: Asperflex   Place 3 Patches on the skin every 24 hours.  Dose: 3 Patch     lisinopril 5 MG Tabs  Start taking on: April 26, 2025  Commonly known as: Prinivil   Doctor's comments: Hold sbp less 90  Take 1 Tablet by mouth every day.  Dose: 5 mg     metoprolol tartrate 25 MG Tabs  Commonly known as: Lopressor   Doctor's comments: Hold sbp less 100 or hr less 55  Take 1 Tablet by mouth 2 times a day.  Dose: 25 mg     nicotine 21 MG/24HR Pt24  Commonly known as: Nicoderm   Place 1 Patch on the skin every 24 hours.  Dose: 1 Patch              Allergies  Allergies   Allergen Reactions    Penicillins Unspecified     \"Blue baby\"  RXN= as a child  Tolerated ceftriaxone 6/2015    Fentanyl      Hallucinations       DIET  Orders Placed This Encounter   Procedures    Diet Order Diet: Consistent CHO (Diabetic); Second Modifier: (optional): Cardiac     Standing Status:   Standing     Number of Occurrences:   1     Diet::   Consistent CHO (Diabetic) [4]     Second Modifier: (optional):   Cardiac [6]       ACTIVITY  As tolerated.  Weight bearing as tolerated    LINES, DRAINS, AND WOUNDS  This is an automated list. Peripheral IVs will be removed prior to discharge.                     MENTAL STATUS ON TRANSFER   axox3       CONSULTATIONS  [facility]  [facility]    PROCEDURES  EC-ECHOCARDIOGRAM COMPLETE W/O CONT   Final " Result      NM-CARDIAC STRESS TEST   Final Result      DX-CHEST-PORTABLE (1 VIEW)   Final Result      No acute cardiac or pulmonary abnormalities are identified.        Echocardiography Laboratory     CONCLUSIONS  Moderately reduced left ventricular systolic function.  Global hypokinesis - most pronounced inferiorly.  Reduced right ventricular systolic function.  Mild mitral regurgitation.     SY LEDESMA  Exam Date:         04/23/2025     LABORATORY  Lab Results   Component Value Date    SODIUM 135 04/25/2025    POTASSIUM 4.2 04/25/2025    CHLORIDE 102 04/25/2025    CO2 22 04/25/2025    GLUCOSE 157 (H) 04/25/2025    BUN 16 04/25/2025    CREATININE 0.81 04/25/2025        Lab Results   Component Value Date    WBC 8.6 04/24/2025    HEMOGLOBIN 14.1 04/24/2025    HEMATOCRIT 42.1 04/24/2025    PLATELETCT 189 04/24/2025        Total time of the discharge process exceeds 45 minutes.

## 2025-05-19 ENCOUNTER — OFFICE VISIT (OUTPATIENT)
Dept: CARDIOLOGY | Facility: MEDICAL CENTER | Age: 50
End: 2025-05-19
Attending: NURSE PRACTITIONER
Payer: MEDICAID

## 2025-05-19 VITALS
WEIGHT: 168 LBS | BODY MASS INDEX: 22.75 KG/M2 | HEIGHT: 72 IN | SYSTOLIC BLOOD PRESSURE: 122 MMHG | RESPIRATION RATE: 16 BRPM | OXYGEN SATURATION: 99 % | HEART RATE: 105 BPM | DIASTOLIC BLOOD PRESSURE: 70 MMHG

## 2025-05-19 DIAGNOSIS — Z95.1 S/P CABG X 3: ICD-10-CM

## 2025-05-19 DIAGNOSIS — I25.5 ISCHEMIC CARDIOMYOPATHY: ICD-10-CM

## 2025-05-19 DIAGNOSIS — Z79.4 TYPE 2 DIABETES MELLITUS WITHOUT COMPLICATION, WITH LONG-TERM CURRENT USE OF INSULIN (HCC): Primary | ICD-10-CM

## 2025-05-19 DIAGNOSIS — I10 ESSENTIAL HYPERTENSION, BENIGN: ICD-10-CM

## 2025-05-19 DIAGNOSIS — I25.118 CORONARY ARTERY DISEASE INVOLVING NATIVE CORONARY ARTERY OF NATIVE HEART WITH OTHER FORM OF ANGINA PECTORIS (HCC): ICD-10-CM

## 2025-05-19 DIAGNOSIS — E78.2 MIXED HYPERLIPIDEMIA: ICD-10-CM

## 2025-05-19 DIAGNOSIS — E11.9 TYPE 2 DIABETES MELLITUS WITHOUT COMPLICATION, WITH LONG-TERM CURRENT USE OF INSULIN (HCC): Primary | ICD-10-CM

## 2025-05-19 DIAGNOSIS — Z72.0 TOBACCO ABUSE: ICD-10-CM

## 2025-05-19 PROCEDURE — 3074F SYST BP LT 130 MM HG: CPT | Performed by: NURSE PRACTITIONER

## 2025-05-19 PROCEDURE — 99213 OFFICE O/P EST LOW 20 MIN: CPT | Performed by: NURSE PRACTITIONER

## 2025-05-19 PROCEDURE — 99214 OFFICE O/P EST MOD 30 MIN: CPT | Performed by: NURSE PRACTITIONER

## 2025-05-19 PROCEDURE — 3078F DIAST BP <80 MM HG: CPT | Performed by: NURSE PRACTITIONER

## 2025-05-19 RX ORDER — NICOTINE 21 MG/24HR
1 PATCH, TRANSDERMAL 24 HOURS TRANSDERMAL EVERY 24 HOURS
Qty: 30 PATCH | Refills: 3 | Status: SHIPPED | OUTPATIENT
Start: 2025-05-19

## 2025-05-19 RX ORDER — METOPROLOL TARTRATE 25 MG/1
25 TABLET, FILM COATED ORAL 2 TIMES DAILY
Qty: 200 TABLET | Refills: 3 | Status: SHIPPED | OUTPATIENT
Start: 2025-05-19

## 2025-05-19 RX ORDER — LISINOPRIL 5 MG/1
5 TABLET ORAL DAILY
Qty: 100 TABLET | Refills: 3 | Status: SHIPPED | OUTPATIENT
Start: 2025-05-19 | End: 2026-06-23

## 2025-05-19 RX ORDER — ATORVASTATIN CALCIUM 40 MG/1
40 TABLET, FILM COATED ORAL EVERY EVENING
Qty: 100 TABLET | Refills: 3 | Status: SHIPPED | OUTPATIENT
Start: 2025-05-19 | End: 2026-06-23

## 2025-05-19 ASSESSMENT — ENCOUNTER SYMPTOMS
PALPITATIONS: 0
BRUISES/BLEEDS EASILY: 0
MYALGIAS: 0
HEADACHES: 0
INSOMNIA: 0
COUGH: 0
SHORTNESS OF BREATH: 1
FEVER: 0
PND: 0
NERVOUS/ANXIOUS: 1
ABDOMINAL PAIN: 0
CHILLS: 0
NAUSEA: 0
LOSS OF CONSCIOUSNESS: 0
DIZZINESS: 0
ORTHOPNEA: 0

## 2025-05-19 ASSESSMENT — FIBROSIS 4 INDEX: FIB4 SCORE: 1.43

## 2025-05-19 NOTE — PROGRESS NOTES
Chief Complaint   Patient presents with    Hospital Follow-up    Chest Pain    Coronary Artery Disease    Cardiomyopathy (Ischemic)    HTN (Controlled)    Hyperlipidemia    Diabetes (Uncontrolled)    Nicotine Dependence           Depression       Subjective     Ryley Khalil is a 49 y.o. male who presents today for hospital follow-up of chest pain, CAD/ischemic cardiomyopathy, CABG, HTN, hyperlipidemia, and diabetes.    Rikki is a 49 year old male with history of CAD/ischemic cardiomyopathy, status post CABG x 3 in December 2018, HTN, hyperlipidemia,  SVT and diabetes, last seen by me in January 2024.     In November 2023, he did not feel good at work, having lightheadedness and sweating; EMTs nearby found him to be in [place], and he was given ASA and Amiodarone and fluids. He was taken to [facility], and they wanted to admit him, but he left AMA. He was restarted on Metoprolol. Follow-up echocardiogram was not completed.    On 4/22/2025, he presented to [facility] with chest pain; he had been off of all of cardiac medications for some time. He developed some chest pain after doing some heavy lifting; he has also lost weight, due to food insecurity. He has also developed some paranoia and depression.    MPI showed no infarct or ischemia, but LVEF 33%; echocardiogram showed LVEF 35-40%. Medical therapy was restarted, and he was discharged to a psychiatric facility for just 24 hours.     He is here today for hospital follow-up. He is only taking ASA and Farxiga, as he was not able to get any of his other medications.     In general, he is not feeling great. No overt chest pain, pressure or discomfort; some palpitations and shortness of breath; no orthopnea or PND; no further lightheadedness or syncope; no LE edema. He is still smoking. He is still having a lot of paranoia symptoms and depression; he does plan to follow-up with psychiatry in [place].       Past Medical History[1]  Past Surgical History[2]  Family History    Problem Relation Age of Onset    Hypertension Mother     Heart Disease Mother     Stroke Mother     No Known Problems Father      Social History     Socioeconomic History    Marital status:      Spouse name: Not on file    Number of children: Not on file    Years of education: Not on file    Highest education level: 12th grade   Occupational History    Not on file   Tobacco Use    Smoking status: Every Day     Current packs/day: 0.00     Average packs/day: 0.8 packs/day for 24.0 years (18.0 ttl pk-yrs)     Types: Cigarettes     Start date:      Last attempt to quit: 2018     Years since quittin.3    Smokeless tobacco: Never   Substance and Sexual Activity    Alcohol use: No    Drug use: Yes     Types: Inhaled     Comment: marijuana daily    Sexual activity: Not on file   Other Topics Concern    Not on file   Social History Narrative    Not on file     Social Drivers of Health     Financial Resource Strain: Medium Risk (2025)    Overall Financial Resource Strain (CARDIA)     Difficulty of Paying Living Expenses: Somewhat hard   Food Insecurity: Food Insecurity Present (2025)    Hunger Vital Sign     Worried About Running Out of Food in the Last Year: Often true     Ran Out of Food in the Last Year: Sometimes true   Transportation Needs: Unmet Transportation Needs (2025)    PRAPARE - Transportation     Lack of Transportation (Medical): Yes     Lack of Transportation (Non-Medical): No   Physical Activity: Inactive (2025)    Exercise Vital Sign     Days of Exercise per Week: 0 days     Minutes of Exercise per Session: 0 min   Stress: Stress Concern Present (2025)    Citizen of [place] [employer] - Occupational Stress Questionnaire     Feeling of Stress : Very much   Social Connections: Socially Isolated (2025)    Social Connection and Isolation Panel [NHANES]     Frequency of Communication with Friends and Family: Never     Frequency of Social Gatherings with  Friends and Family: Never     Attends [employer] Services: Never     Active Member of Clubs or Organizations: No     Attends Club or Organization Meetings: Patient declined     Marital Status:    Intimate Partner Violence: Patient Declined (4/22/2025)    Humiliation, Afraid, Rape, and Kick questionnaire     Fear of Current or Ex-Partner: Patient declined     Emotionally Abused: Patient declined     Physically Abused: Patient declined     Sexually Abused: Patient declined   Housing Stability: High Risk (4/22/2025)    Housing Stability Vital Sign     Unable to Pay for Housing in the Last Year: Patient declined     Number of Times Moved in the Last Year: 0     Homeless in the Last Year: Yes     Allergies[3]  Encounter Medications[4]  Review of Systems   Constitutional:  Positive for malaise/fatigue. Negative for chills and fever.   HENT:  Negative for congestion.    Respiratory:  Positive for shortness of breath. Negative for cough.    Cardiovascular:  Negative for chest pain, palpitations, orthopnea, leg swelling and PND.   Gastrointestinal:  Negative for abdominal pain and nausea.   Musculoskeletal:  Negative for myalgias.   Skin:  Negative for rash.   Neurological:  Negative for dizziness, loss of consciousness and headaches.   Endo/Heme/Allergies:  Does not bruise/bleed easily.   Psychiatric/Behavioral:  Positive for suicidal ideas. The patient is nervous/anxious. The patient does not have insomnia.               Objective     /70 (BP Location: Left arm, Patient Position: Sitting, BP Cuff Size: Adult)   Pulse (!) 105   Resp 16   Ht 1.829 m (6')   Wt 76.2 kg (168 lb)   SpO2 99%   BMI 22.78 kg/m²     Physical Exam  Constitutional:       Appearance: He is well-developed.      Comments: BMI 22.78 (weight is down)   HENT:      Head: Normocephalic.   Neck:      Vascular: No JVD.   Cardiovascular:      Rate and Rhythm: Normal rate and regular rhythm.      Heart sounds: Normal heart sounds.      Comments:  Sternotomy scar present.  Pulmonary:      Effort: Pulmonary effort is normal. No respiratory distress.      Breath sounds: Normal breath sounds. No wheezing or rales.   Abdominal:      General: Bowel sounds are normal. There is no distension.      Palpations: Abdomen is soft.      Tenderness: There is no abdominal tenderness.   Musculoskeletal:         General: Normal range of motion.      Cervical back: Normal range of motion and neck supple.   Skin:     General: Skin is warm and dry.      Coloration: Skin is pale.      Findings: No rash.   Neurological:      Mental Status: He is alert and oriented to person, place, and time.   Psychiatric:         Mood and Affect: Mood normal.         Behavior: Behavior normal.       ECHOCARDIOGRAPHY:    CONCLUSIONS OF TTE OF 4/23/2025:  Moderately reduced left ventricular systolic function.  Global hypokinesis - most pronounced inferiorly.  Reduced right ventricular systolic function.  Mild mitral regurgitation.     RESULTS OF ECHOCARDIOGRAM OF 12/3/2020:  Normal LV size  LVEF 50%  Normal RA, LA and RV  No valvular abnormalities    CONCLUSIONS OF TTE OF 2/17/2019:  Compared to the images of the study done on 12/31/2018 there has been no significant changes.  Left ventricular ejection fraction is visually estimated to be 35%.  Estimated right ventricular systolic pressure  is 30 mmHg.          STRESS TESTING:    NUCLEAR IMAGING INTERPRETATION OF 4/23/2025:  There is a moderate sized infarct involving the inferior wall.  No evidence of ischemia.  The left ventricle is enlarged.  LVEF is severely decreased at 33%.  There is globally decreased wall motion.  ECG INTERPRETATION  Negative stress ECG for ischemia.    SURGICAL PROCEDURE(S):    OPERATIONS OF 12/18/2018:  Coronary artery bypass grafting x3, left SANDRA to distal LAD,   reverse saphenous vein graft to distal diagonal, reverse saphenous vein graft to the distal right coronary artery, intraaortic balloon pump, endoscopic vein  harvest, right leg and thigh.    LABS:    Lab Results   Component Value Date/Time    SODIUM 135 04/25/2025 07:00 AM    POTASSIUM 4.2 04/25/2025 07:00 AM    CHLORIDE 102 04/25/2025 07:00 AM    CO2 22 04/25/2025 07:00 AM    GLUCOSE 157 (H) 04/25/2025 07:00 AM    BUN 16 04/25/2025 07:00 AM    CREATININE 0.81 04/25/2025 07:00 AM      Lab Results   Component Value Date/Time    ASTSGOT 15 04/22/2025 01:57 PM    ALTSGPT 8 04/22/2025 01:57 PM       Lab Results   Component Value Date/Time    CHOLSTRLTOT 142 04/23/2025 03:51 AM    LDL 88 04/23/2025 03:51 AM    HDL 35 (A) 04/23/2025 03:51 AM    TRIGLYCERIDE 97 04/23/2025 03:51 AM        Lab Results   Component Value Date/Time    WBC 8.6 04/24/2025 03:42 AM    RBC 4.46 (L) 04/24/2025 03:42 AM    HEMOGLOBIN 14.1 04/24/2025 03:42 AM    HEMATOCRIT 42.1 04/24/2025 03:42 AM    MCV 94.4 04/24/2025 03:42 AM    MCH 31.6 04/24/2025 03:42 AM    MCHC 33.5 04/24/2025 03:42 AM    MPV 11.3 04/24/2025 03:42 AM          Assessment & Plan     1. Type 2 diabetes mellitus without complication, with long-term current use of insulin ([facility])        2. Coronary artery disease involving native coronary artery of native heart with other form of angina pectoris (HCC)  atorvastatin (LIPITOR) 40 MG Tab      3. Ischemic cardiomyopathy  lisinopril (PRINIVIL) 5 MG Tab    metoprolol tartrate (LOPRESSOR) 25 MG Tab      4. S/P CABG x 3        5. Essential hypertension, benign        6. Mixed hyperlipidemia  Comp Metabolic Panel    Lipid Profile      7. Tobacco abuse  nicotine (NICODERM) 21 MG/24HR PATCH 24 HR          Medical Decision Making: Today's Assessment/Status/Plan:         CAD/ischemia cardiomyopathy, status post CABG x 3 in 2018, with LVEF 35-40% on recent MPI/echo, during hospitalization for chest pain. We discussed getting good follow-up care, including psychiatric; we also discussed good dietary intake and hydration. We will resume cardiac medications:  ASA 81mg once daily  Metoprolol 25mg twice  daily  Lisionpril 5mg once daily  Lipitor 40mg once daily  Farxiga 10mg once daily     2. Hypertension, to resume Lisinopril and Metoprolol. Watch BP at home.     3. Hyperlipidemia, to resume Lipitor 40mg. Repeat labs prior to next follow-up.     4. Diabetes mellitus, treated with Farxiga. He will be following up with his PCP to discuss resuming insulin.     5. Smoking, urged to cut down further, and ideally quit. He is given new Rx for Nicoderm patch.    6.  Depression/paranoia symptoms. He does plan to follow-up with psychiatry in [place].    Resume ASA, ACEi, BB and statin.  Continue Farxiga.  Keep follow-up with PCP and mental health services.  Labs prior to next follow-up appointment in [place].  Work on quitting smoking.     Follow-up with me in 6-8 weeks.                     [1]   Past Medical History:  Diagnosis Date    Arthritis     All joints    Back pain     Bowel obstruction (HCC)     CAD (coronary artery disease) 12/2018    CABG x 3 (LIMA to distal LAD, rSVG to distal diagonal, rSVG to distal RCA) by Dr. Barrios.    Chest pain 04/23/2025    Essential hypertension, benign 10/29/2020    Glaucoma     Bilateral eyes    Hyperlipidemia     Ischemic cardiomyopathy 04/2025    Echocardiogram with severely dilated LV, LVEF 35-40%. Normal RA, LA and RV. Mild MR, mild TR. RVSP 20mmHg.    NSTEMI (non-ST elevated myocardial infarction) ([facility]) 12/27/2018    Other depressive disorder 01/16/2019    Pneumonia 12/30/2018    Scoliosis     SVT (supraventricular tachycardia) ([facility])     Systolic congestive heart failure ([facility])     Type II or unspecified type diabetes mellitus without mention of complication, not stated as uncontrolled     Insulin and oral meds   [2]   Past Surgical History:  Procedure Laterality Date    MULTIPLE CORONARY ARTERY BYPASS ENDO VEIN HARVEST  12/28/2018    Procedure: MULTIPLE CORONARY ARTERY BYPASS x 3,  ENDO VEIN HARVEST LEFT LEG;  Surgeon: Tomas Barrios M.D.;  Location: SURGERY [facility]  "ORS;  Service: Cardiothoracic    DONNA  12/28/2018    Procedure: DONNA;  Surgeon: Tomas Barrios M.D.;  Location: SURGERY [facility];  Service: Cardiothoracic    ILEOSTOMY  9/29/2015    Procedure: ILEOSTOMY TAKE DOWN;  Surgeon: Carter Kumar M.D.;  Location: SURGERY [facility];  Service:     LOW ANTERIOR RESECTION ROBOTIC XI  7/14/2015    Procedure: Robotic low anterior resection, takedown enterocutaneous fistula, omental flap, ileostomy creation;  Surgeon: Carter Kumar M.D.;  Location: SURGERY [facility];  Service:     COLOSTOMY CREATION LAPAROSCOPIC  7/14/2015    Procedure: COLOSTOMY CREATION LAPAROSCOPIC FOR: LAP ILEOSTOMY;  Surgeon: Carter Kumar M.D.;  Location: SURGERY [facility];  Service:    [3]   Allergies  Allergen Reactions    Penicillins Unspecified     \"Blue baby\"  RXN= as a child  Tolerated ceftriaxone 6/2015    Fentanyl      Hallucinations   [4]   Outpatient Encounter Medications as of 5/19/2025   Medication Sig Dispense Refill    lisinopril (PRINIVIL) 5 MG Tab Take 1 Tablet by mouth every day. 100 Tablet 3    atorvastatin (LIPITOR) 40 MG Tab Take 1 Tablet by mouth every evening. 100 Tablet 3    metoprolol tartrate (LOPRESSOR) 25 MG Tab Take 1 Tablet by mouth 2 times a day. 200 Tablet 3    nicotine (NICODERM) 21 MG/24HR PATCH 24 HR Place 1 Patch on the skin every 24 hours. 30 Patch 3    aspirin 81 MG EC tablet Take 1 Tablet by mouth every day. Indications: Acute Heart Attack      dapagliflozin propanediol (FARXIGA) 10 MG Tab Take 1 Tablet by mouth every day. 100 Tablet 3    insulin lispro 100 UNIT/ML SC SOPN injection PEN Inject 2 Units under the skin 4 Times a Day,Before Meals and at Bedtime.      [DISCONTINUED] ARIPiprazole (ABILIFY) 5 MG tablet Take 1 Tablet by mouth every day.      [DISCONTINUED] atorvastatin (LIPITOR) 40 MG Tab Take 1 Tablet by mouth every evening.      [DISCONTINUED] lidocaine (ASPERFLEX) 4 % Patch Place 3 Patches on the skin every 24 hours.      " [DISCONTINUED] lisinopril (PRINIVIL) 5 MG Tab Take 1 Tablet by mouth every day.      [DISCONTINUED] metoprolol tartrate (LOPRESSOR) 25 MG Tab Take 1 Tablet by mouth 2 times a day.      [DISCONTINUED] nicotine (NICODERM) 21 MG/24HR PATCH 24 HR Place 1 Patch on the skin every 24 hours.       No facility-administered encounter medications on file as of 5/19/2025.

## 2025-06-30 ENCOUNTER — APPOINTMENT (OUTPATIENT)
Dept: LAB | Facility: MEDICAL CENTER | Age: 50
End: 2025-06-30
Payer: MEDICAID

## 2025-07-10 ENCOUNTER — OFFICE VISIT (OUTPATIENT)
Dept: CARDIOLOGY | Facility: PHYSICIAN GROUP | Age: 50
End: 2025-07-10
Payer: MEDICAID

## 2025-07-10 VITALS
SYSTOLIC BLOOD PRESSURE: 126 MMHG | DIASTOLIC BLOOD PRESSURE: 70 MMHG | RESPIRATION RATE: 16 BRPM | HEIGHT: 72 IN | HEART RATE: 98 BPM | OXYGEN SATURATION: 97 % | BODY MASS INDEX: 22.62 KG/M2 | WEIGHT: 167 LBS

## 2025-07-10 DIAGNOSIS — I10 ESSENTIAL HYPERTENSION, BENIGN: ICD-10-CM

## 2025-07-10 DIAGNOSIS — R53.83 OTHER FATIGUE: ICD-10-CM

## 2025-07-10 DIAGNOSIS — I25.118 CORONARY ARTERY DISEASE INVOLVING NATIVE CORONARY ARTERY OF NATIVE HEART WITH OTHER FORM OF ANGINA PECTORIS (HCC): Primary | ICD-10-CM

## 2025-07-10 DIAGNOSIS — E78.2 MIXED HYPERLIPIDEMIA: ICD-10-CM

## 2025-07-10 DIAGNOSIS — I25.5 ISCHEMIC CARDIOMYOPATHY: ICD-10-CM

## 2025-07-10 DIAGNOSIS — Z79.4 TYPE 2 DIABETES MELLITUS WITHOUT COMPLICATION, WITH LONG-TERM CURRENT USE OF INSULIN (HCC): ICD-10-CM

## 2025-07-10 DIAGNOSIS — E11.9 TYPE 2 DIABETES MELLITUS WITHOUT COMPLICATION, WITH LONG-TERM CURRENT USE OF INSULIN (HCC): ICD-10-CM

## 2025-07-10 DIAGNOSIS — Z95.1 S/P CABG X 3: ICD-10-CM

## 2025-07-10 PROCEDURE — 3074F SYST BP LT 130 MM HG: CPT | Performed by: NURSE PRACTITIONER

## 2025-07-10 PROCEDURE — 99214 OFFICE O/P EST MOD 30 MIN: CPT | Performed by: NURSE PRACTITIONER

## 2025-07-10 PROCEDURE — 3078F DIAST BP <80 MM HG: CPT | Performed by: NURSE PRACTITIONER

## 2025-07-10 RX ORDER — METOPROLOL TARTRATE 50 MG
50 TABLET ORAL 2 TIMES DAILY
Qty: 200 TABLET | Refills: 3 | Status: SHIPPED | OUTPATIENT
Start: 2025-07-10

## 2025-07-10 ASSESSMENT — ENCOUNTER SYMPTOMS
DIZZINESS: 0
HALLUCINATIONS: 1
INSOMNIA: 0
NERVOUS/ANXIOUS: 1
HEADACHES: 0
PND: 0
LOSS OF CONSCIOUSNESS: 0
COUGH: 0
ORTHOPNEA: 0
PALPITATIONS: 0
MYALGIAS: 0
NAUSEA: 0
FEVER: 0
DEPRESSION: 1
SHORTNESS OF BREATH: 1
CHILLS: 0
BRUISES/BLEEDS EASILY: 0
ABDOMINAL PAIN: 0

## 2025-07-10 ASSESSMENT — FIBROSIS 4 INDEX: FIB4 SCORE: 1.43

## 2025-07-10 NOTE — PROGRESS NOTES
Chief Complaint   Patient presents with    Follow-Up    Coronary Artery Disease    Cardiomyopathy (Ischemic)    Coronary Artery Bypass Graft (CABG)    HTN (Controlled)    Hyperlipidemia    Diabetes (Uncontrolled)       Subjective     Ryley Khalil is a 49 y.o. male who presents today for three month follow-up of CAD/ischemic cardiomyopathy, CABG, HTN, hyperlipidemia, and DM.    Rikki is a 49 year old male with history of CAD/ischemic cardiomyopathy, status post CABG x 3 in December 2018, HTN, hyperlipidemia,  SVT and diabetes, last seen by me in April 2025.     In November 2023, he did not feel good at work, having lightheadedness and sweating; EMTs nearby found him to be in [place], and he was given ASA and Amiodarone and fluids. He was taken to [facility], and they wanted to admit him, but he left AMA. He was restarted on Metoprolol. Follow-up echocardiogram was not completed.     In April 2025, he presented to [facility] with chest pain; he had been off of all of cardiac medications for some time. He developed some chest pain after doing some heavy lifting; he has also lost weight, due to food insecurity. He also developed some paranoia and depression.     MPI showed no infarct or ischemia, but LVEF 33%; echocardiogram showed LVEF 35-40%. Medical therapy was restarted, and he was discharged to a psychiatric facility for just 24 hours.    I saw him for follow-up in April 2025, and we resumed his cardiac medications, including ASA, Metoprolol, Lisinopril, LIpitor and Farxiga.     He is here today for three month follow-up. He is taking his medications, and he is getting psychiatric care in [place].    In general, he is not feeling great. He denies any overt chest pain, pressure or discomfort; some palpitations and shortness of breath; no orthopnea or PND; no further lightheadedness or syncope; no LE edema. He is still smoking, but has cut down.. He is still having a lot of paranoia symptoms and depression. He is not  eating much. He was not able to get his labs done.    Past Medical History[1]  Past Surgical History[2]  Family History   Problem Relation Age of Onset    Hypertension Mother     Heart Disease Mother     Stroke Mother     No Known Problems Father      Social History     Socioeconomic History    Marital status:      Spouse name: Not on file    Number of children: Not on file    Years of education: Not on file    Highest education level: 12th grade   Occupational History    Not on file   Tobacco Use    Smoking status: Every Day     Current packs/day: 0.00     Average packs/day: 0.8 packs/day for 24.0 years (18.0 ttl pk-yrs)     Types: Cigarettes     Start date:      Last attempt to quit: 2018     Years since quittin.5    Smokeless tobacco: Never   Substance and Sexual Activity    Alcohol use: No    Drug use: Yes     Types: Inhaled     Comment: marijuana daily    Sexual activity: Not on file   Other Topics Concern    Not on file   Social History Narrative    Not on file     Social Drivers of Health     Financial Resource Strain: Medium Risk (2025)    Overall Financial Resource Strain (CARDIA)     Difficulty of Paying Living Expenses: Somewhat hard   Food Insecurity: Food Insecurity Present (2025)    Hunger Vital Sign     Worried About Running Out of Food in the Last Year: Often true     Ran Out of Food in the Last Year: Sometimes true   Transportation Needs: Unmet Transportation Needs (2025)    PRAPARE - Transportation     Lack of Transportation (Medical): Yes     Lack of Transportation (Non-Medical): No   Physical Activity: Inactive (2025)    Exercise Vital Sign     Days of Exercise per Week: 0 days     Minutes of Exercise per Session: 0 min   Stress: Stress Concern Present (2025)    [employer] [place] [employer] - Occupational Stress Questionnaire     Feeling of Stress : Very much   Social Connections: Socially Isolated (2025)    Social Connection and Isolation  Panel [NHANES]     Frequency of Communication with Friends and Family: Never     Frequency of Social Gatherings with Friends and Family: Never     Attends Synagogue Services: Never     Active Member of Clubs or Organizations: No     Attends Club or Organization Meetings: Patient declined     Marital Status:    Intimate Partner Violence: Patient Declined (4/22/2025)    Humiliation, Afraid, Rape, and Kick questionnaire     Fear of Current or Ex-Partner: Patient declined     Emotionally Abused: Patient declined     Physically Abused: Patient declined     Sexually Abused: Patient declined   Housing Stability: High Risk (4/22/2025)    Housing Stability Vital Sign     Unable to Pay for Housing in the Last Year: Patient declined     Number of Times Moved in the Last Year: 0     Homeless in the Last Year: Yes     Allergies[3]  Encounter Medications[4]  Review of Systems   Constitutional:  Positive for malaise/fatigue. Negative for chills and fever.   HENT:  Negative for congestion.    Respiratory:  Positive for shortness of breath. Negative for cough.    Cardiovascular:  Negative for chest pain, palpitations, orthopnea, leg swelling and PND.   Gastrointestinal:  Negative for abdominal pain and nausea.   Musculoskeletal:  Negative for myalgias.   Skin:  Negative for rash.   Neurological:  Negative for dizziness, loss of consciousness and headaches.   Endo/Heme/Allergies:  Does not bruise/bleed easily.   Psychiatric/Behavioral:  Positive for depression and hallucinations. Negative for suicidal ideas. The patient is nervous/anxious. The patient does not have insomnia.               Objective     /70 (BP Location: Left arm, Patient Position: Sitting, BP Cuff Size: Adult)   Pulse 98   Resp 16   Ht 1.829 m (6')   Wt 75.8 kg (167 lb)   SpO2 97%   BMI 22.65 kg/m²     Physical Exam  Constitutional:       Appearance: He is well-developed.      Comments: BMI 22.65 (weight is stable)   HENT:      Head: Normocephalic.    Neck:      Vascular: No JVD.   Cardiovascular:      Rate and Rhythm: Normal rate and regular rhythm.      Heart sounds: Normal heart sounds.      Comments: Sternotomy scar present.  Pulmonary:      Effort: Pulmonary effort is normal. No respiratory distress.      Breath sounds: Normal breath sounds. No wheezing or rales.   Abdominal:      General: Bowel sounds are normal. There is no distension.      Palpations: Abdomen is soft.      Tenderness: There is no abdominal tenderness.   Musculoskeletal:         General: Normal range of motion.      Cervical back: Normal range of motion and neck supple.   Skin:     General: Skin is warm and dry.      Coloration: Skin is pale.      Findings: No rash.   Neurological:      Mental Status: He is alert and oriented to person, place, and time.   Psychiatric:         Mood and Affect: Mood normal.         Behavior: Behavior normal.       ECHOCARDIOGRAPHY:     CONCLUSIONS OF TTE OF 4/23/2025:  Moderately reduced left ventricular systolic function.  Global hypokinesis - most pronounced inferiorly.  Reduced right ventricular systolic function.  Mild mitral regurgitation.     RESULTS OF ECHOCARDIOGRAM OF 12/3/2020:  Normal LV size  LVEF 50%  Normal RA, LA and RV  No valvular abnormalities     CONCLUSIONS OF TTE OF 2/17/2019:  Compared to the images of the study done on 12/31/2018 there has been no significant changes.  Left ventricular ejection fraction is visually estimated to be 35%.  Estimated right ventricular systolic pressure  is 30 mmHg.     STRESS TESTING:     NUCLEAR IMAGING INTERPRETATION OF 4/23/2025:  There is a moderate sized infarct involving the inferior wall.  No evidence of ischemia.  The left ventricle is enlarged.  LVEF is severely decreased at 33%.  There is globally decreased wall motion.  ECG INTERPRETATION  Negative stress ECG for ischemia.     SURGICAL PROCEDURE(S):     OPERATIONS OF 12/18/2018:  Coronary artery bypass grafting x3, left SANDRA to distal LAD,    reverse saphenous vein graft to distal diagonal, reverse saphenous vein graft to the distal right coronary artery, intraaortic balloon pump, endoscopic vein harvest, right leg and thigh.      LABS:    No recent labs done.    Lab Results   Component Value Date/Time    CHOLSTRLTOT 142 04/23/2025 03:51 AM    LDL 88 04/23/2025 03:51 AM    HDL 35 (A) 04/23/2025 03:51 AM    TRIGLYCERIDE 97 04/23/2025 03:51 AM        Lab Results   Component Value Date/Time    ASTSGOT 15 04/22/2025 01:57 PM    ALTSGPT 8 04/22/2025 01:57 PM       Lab Results   Component Value Date/Time    SODIUM 135 04/25/2025 07:00 AM    POTASSIUM 4.2 04/25/2025 07:00 AM    CHLORIDE 102 04/25/2025 07:00 AM    CO2 22 04/25/2025 07:00 AM    GLUCOSE 157 (H) 04/25/2025 07:00 AM    BUN 16 04/25/2025 07:00 AM    CREATININE 0.81 04/25/2025 07:00 AM          Assessment & Plan     1. Coronary artery disease involving native coronary artery of native heart with other form of angina pectoris (HCC)  CBC WITHOUT DIFFERENTIAL    TSH      2. Ischemic cardiomyopathy  CBC WITHOUT DIFFERENTIAL    TSH    metoprolol tartrate (LOPRESSOR) 50 MG Tab      3. S/P CABG x 3        4. Essential hypertension, benign  CBC WITHOUT DIFFERENTIAL      5. Mixed hyperlipidemia  Comp Metabolic Panel    Lipid Profile      6. Type 2 diabetes mellitus without complication, with long-term current use of insulin (HCC)  Comp Metabolic Panel      7. Other fatigue  TSH          Medical Decision Making: Today's Assessment/Status/Plan:         CAD/ischemia cardiomyopathy, status post CABG x 3 in 2018, with LVEF 35-40% on recent MPI/echo in April 2025, during hospitalization for chest pain. No further chest pain, and he is compliant with his medications. He is still getting psychiatric care.  ASA 81mg once daily  Metoprolol 50mg twice daily (increased from 25mg BID)  Lisionpril 5mg once daily  Lipitor 40mg once daily  Farxiga 10mg once daily     2. Hypertension, treated with Lisinopril and Metoprolol.  To increase Metoprolol from 25mg to 50mg BID for better BP and HR control.     3. Hyperlipidemia, treated with Lipitor 40mg. To check fasting lipid panel.     4. Diabetes mellitus, treated with Farxiga. He is followed by his PCP, and is not back on insulin yet.     5. Smoking, urged to cut down further, and ideally quit. He has been smoking less.     6.  Depression/paranoia symptoms. Followed by psychiatry in [place]. He is not currently on any Rx therapy.     Increase Metoprolol from 25mg to 50mg BID for better BP and HR control.  Keep follow-up with PCP and mental health services.  Labs prior to next follow-up (CBC, CMP, lipid panel and TSH).  Work on quitting smoking.     Follow-up with me in 3 months.                     [1]   Past Medical History:  Diagnosis Date    Arthritis     All joints    Back pain     Bowel obstruction (HCC)     CAD (coronary artery disease) 12/2018    CABG x 3 (LIMA to distal LAD, rSVG to distal diagonal, rSVG to distal RCA) by Dr. Barrios.    Chest pain 04/23/2025    Essential hypertension, benign 10/29/2020    Glaucoma     Bilateral eyes    Hyperlipidemia     Ischemic cardiomyopathy 04/2025    Echocardiogram with severely dilated LV, LVEF 35-40%. Normal RA, LA and RV. Mild MR, mild TR. RVSP 20mmHg.    NSTEMI (non-ST elevated myocardial infarction) ([facility]) 12/27/2018    Other depressive disorder 01/16/2019    Pneumonia 12/30/2018    Scoliosis     SVT (supraventricular tachycardia) ([facility])     Systolic congestive heart failure ([facility])     Type II or unspecified type diabetes mellitus without mention of complication, not stated as uncontrolled     Insulin and oral meds   [2]   Past Surgical History:  Procedure Laterality Date    MULTIPLE CORONARY ARTERY BYPASS ENDO VEIN HARVEST  12/28/2018    Procedure: MULTIPLE CORONARY ARTERY BYPASS x 3,  ENDO VEIN HARVEST LEFT LEG;  Surgeon: Tomas Barrios M.D.;  Location: SURGERY [facility];  Service: Cardiothoracic    DONNA  12/28/2018     "Procedure: DONNA;  Surgeon: Tomas Barrios M.D.;  Location: SURGERY [facility];  Service: Cardiothoracic    ILEOSTOMY  9/29/2015    Procedure: ILEOSTOMY TAKE DOWN;  Surgeon: Carter Kumar M.D.;  Location: SURGERY [facility];  Service:     LOW ANTERIOR RESECTION ROBOTIC XI  7/14/2015    Procedure: Robotic low anterior resection, takedown enterocutaneous fistula, omental flap, ileostomy creation;  Surgeon: Carter Kumar M.D.;  Location: SURGERY [facility];  Service:     COLOSTOMY CREATION LAPAROSCOPIC  7/14/2015    Procedure: COLOSTOMY CREATION LAPAROSCOPIC FOR: LAP ILEOSTOMY;  Surgeon: Carter Kumar M.D.;  Location: SURGERY [facility];  Service:    [3]   Allergies  Allergen Reactions    Penicillins Unspecified     \"Blue baby\"  RXN= as a child  Tolerated ceftriaxone 6/2015    Fentanyl      Hallucinations   [4]   Outpatient Encounter Medications as of 7/10/2025   Medication Sig Dispense Refill    metoprolol tartrate (LOPRESSOR) 50 MG Tab Take 1 Tablet by mouth 2 times a day. 200 Tablet 3    lisinopril (PRINIVIL) 5 MG Tab Take 1 Tablet by mouth every day. 100 Tablet 3    atorvastatin (LIPITOR) 40 MG Tab Take 1 Tablet by mouth every evening. 100 Tablet 3    nicotine (NICODERM) 21 MG/24HR PATCH 24 HR Place 1 Patch on the skin every 24 hours. 30 Patch 3    aspirin 81 MG EC tablet Take 1 Tablet by mouth every day. Indications: Acute Heart Attack      dapagliflozin propanediol (FARXIGA) 10 MG Tab Take 1 Tablet by mouth every day. 100 Tablet 3    [DISCONTINUED] metoprolol tartrate (LOPRESSOR) 25 MG Tab Take 1 Tablet by mouth 2 times a day. 200 Tablet 3    [DISCONTINUED] insulin lispro 100 UNIT/ML SC SOPN injection PEN Inject 2 Units under the skin 4 Times a Day,Before Meals and at Bedtime. (Patient not taking: Reported on 7/10/2025)       No facility-administered encounter medications on file as of 7/10/2025.     "

## 2025-08-01 ENCOUNTER — PHARMACY VISIT (OUTPATIENT)
Dept: PHARMACY | Facility: MEDICAL CENTER | Age: 50
End: 2025-08-01
Payer: COMMERCIAL

## 2025-08-01 ENCOUNTER — HOSPITAL ENCOUNTER (EMERGENCY)
Facility: MEDICAL CENTER | Age: 50
End: 2025-08-01
Attending: EMERGENCY MEDICINE
Payer: MEDICAID

## 2025-08-01 ENCOUNTER — HOSPITAL ENCOUNTER (OUTPATIENT)
Dept: LAB | Facility: MEDICAL CENTER | Age: 50
End: 2025-08-01
Attending: NURSE PRACTITIONER
Payer: MEDICAID

## 2025-08-01 VITALS
RESPIRATION RATE: 16 BRPM | BODY MASS INDEX: 22.51 KG/M2 | TEMPERATURE: 96.9 F | HEIGHT: 72 IN | WEIGHT: 166.23 LBS | DIASTOLIC BLOOD PRESSURE: 89 MMHG | SYSTOLIC BLOOD PRESSURE: 137 MMHG | HEART RATE: 87 BPM | OXYGEN SATURATION: 99 %

## 2025-08-01 DIAGNOSIS — R53.83 OTHER FATIGUE: ICD-10-CM

## 2025-08-01 DIAGNOSIS — Z79.4 TYPE 2 DIABETES MELLITUS WITHOUT COMPLICATION, WITH LONG-TERM CURRENT USE OF INSULIN (HCC): ICD-10-CM

## 2025-08-01 DIAGNOSIS — E11.9 TYPE 2 DIABETES MELLITUS WITHOUT COMPLICATION, WITH LONG-TERM CURRENT USE OF INSULIN (HCC): ICD-10-CM

## 2025-08-01 DIAGNOSIS — I10 ESSENTIAL HYPERTENSION, BENIGN: ICD-10-CM

## 2025-08-01 DIAGNOSIS — I25.118 CORONARY ARTERY DISEASE INVOLVING NATIVE CORONARY ARTERY OF NATIVE HEART WITH OTHER FORM OF ANGINA PECTORIS (HCC): ICD-10-CM

## 2025-08-01 DIAGNOSIS — I25.5 ISCHEMIC CARDIOMYOPATHY: ICD-10-CM

## 2025-08-01 DIAGNOSIS — E78.2 MIXED HYPERLIPIDEMIA: ICD-10-CM

## 2025-08-01 DIAGNOSIS — K40.90 RIGHT INGUINAL HERNIA: Primary | ICD-10-CM

## 2025-08-01 LAB
ALBUMIN SERPL BCP-MCNC: 3.9 G/DL (ref 3.2–4.9)
ALBUMIN SERPL BCP-MCNC: 4 G/DL (ref 3.2–4.9)
ALBUMIN/GLOB SERPL: 1.1 G/DL
ALBUMIN/GLOB SERPL: 1.1 G/DL
ALP SERPL-CCNC: 92 U/L (ref 30–99)
ALP SERPL-CCNC: 96 U/L (ref 30–99)
ALT SERPL-CCNC: 35 U/L (ref 2–50)
ALT SERPL-CCNC: 38 U/L (ref 2–50)
ANION GAP SERPL CALC-SCNC: 13 MMOL/L (ref 7–16)
ANION GAP SERPL CALC-SCNC: 14 MMOL/L (ref 7–16)
AST SERPL-CCNC: 26 U/L (ref 12–45)
AST SERPL-CCNC: 26 U/L (ref 12–45)
BASOPHILS # BLD AUTO: 0.6 % (ref 0–1.8)
BASOPHILS # BLD: 0.07 K/UL (ref 0–0.12)
BILIRUB SERPL-MCNC: 0.3 MG/DL (ref 0.1–1.5)
BILIRUB SERPL-MCNC: 0.3 MG/DL (ref 0.1–1.5)
BUN SERPL-MCNC: 15 MG/DL (ref 8–22)
BUN SERPL-MCNC: 16 MG/DL (ref 8–22)
CALCIUM ALBUM COR SERPL-MCNC: 9.7 MG/DL (ref 8.5–10.5)
CALCIUM ALBUM COR SERPL-MCNC: 9.9 MG/DL (ref 8.5–10.5)
CALCIUM SERPL-MCNC: 9.6 MG/DL (ref 8.5–10.5)
CALCIUM SERPL-MCNC: 9.9 MG/DL (ref 8.5–10.5)
CHLORIDE SERPL-SCNC: 109 MMOL/L (ref 96–112)
CHLORIDE SERPL-SCNC: 110 MMOL/L (ref 96–112)
CHOLEST SERPL-MCNC: 147 MG/DL (ref 100–199)
CO2 SERPL-SCNC: 16 MMOL/L (ref 20–33)
CO2 SERPL-SCNC: 19 MMOL/L (ref 20–33)
CREAT SERPL-MCNC: 0.71 MG/DL (ref 0.5–1.4)
CREAT SERPL-MCNC: 0.71 MG/DL (ref 0.5–1.4)
EOSINOPHIL # BLD AUTO: 0.29 K/UL (ref 0–0.51)
EOSINOPHIL NFR BLD: 2.7 % (ref 0–6.9)
ERYTHROCYTE [DISTWIDTH] IN BLOOD BY AUTOMATED COUNT: 46 FL (ref 35.9–50)
ERYTHROCYTE [DISTWIDTH] IN BLOOD BY AUTOMATED COUNT: 46.4 FL (ref 35.9–50)
GFR SERPLBLD CREATININE-BSD FMLA CKD-EPI: 110 ML/MIN/1.73 M 2
GFR SERPLBLD CREATININE-BSD FMLA CKD-EPI: 110 ML/MIN/1.73 M 2
GLOBULIN SER CALC-MCNC: 3.6 G/DL (ref 1.9–3.5)
GLOBULIN SER CALC-MCNC: 3.7 G/DL (ref 1.9–3.5)
GLUCOSE SERPL-MCNC: 148 MG/DL (ref 65–99)
GLUCOSE SERPL-MCNC: 165 MG/DL (ref 65–99)
HCT VFR BLD AUTO: 46.9 % (ref 42–52)
HCT VFR BLD AUTO: 47.9 % (ref 42–52)
HDLC SERPL-MCNC: 38 MG/DL
HGB BLD-MCNC: 15.9 G/DL (ref 14–18)
HGB BLD-MCNC: 16.1 G/DL (ref 14–18)
IMM GRANULOCYTES # BLD AUTO: 0.03 K/UL (ref 0–0.11)
IMM GRANULOCYTES NFR BLD AUTO: 0.3 % (ref 0–0.9)
LDLC SERPL CALC-MCNC: 83 MG/DL
LIPASE SERPL-CCNC: 67 U/L (ref 11–82)
LYMPHOCYTES # BLD AUTO: 3.09 K/UL (ref 1–4.8)
LYMPHOCYTES NFR BLD: 28.3 % (ref 22–41)
MCH RBC QN AUTO: 31.1 PG (ref 27–33)
MCH RBC QN AUTO: 31.4 PG (ref 27–33)
MCHC RBC AUTO-ENTMCNC: 33.6 G/DL (ref 32.3–36.5)
MCHC RBC AUTO-ENTMCNC: 33.9 G/DL (ref 32.3–36.5)
MCV RBC AUTO: 92.6 FL (ref 81.4–97.8)
MCV RBC AUTO: 92.7 FL (ref 81.4–97.8)
MONOCYTES # BLD AUTO: 0.72 K/UL (ref 0–0.85)
MONOCYTES NFR BLD AUTO: 6.6 % (ref 0–13.4)
NEUTROPHILS # BLD AUTO: 6.72 K/UL (ref 1.82–7.42)
NEUTROPHILS NFR BLD: 61.5 % (ref 44–72)
NRBC # BLD AUTO: 0 K/UL
NRBC BLD-RTO: 0 /100 WBC (ref 0–0.2)
PLATELET # BLD AUTO: 187 K/UL (ref 164–446)
PLATELET # BLD AUTO: 193 K/UL (ref 164–446)
PMV BLD AUTO: 11.4 FL (ref 9–12.9)
PMV BLD AUTO: 12.1 FL (ref 9–12.9)
POTASSIUM SERPL-SCNC: 3.8 MMOL/L (ref 3.6–5.5)
POTASSIUM SERPL-SCNC: 4.1 MMOL/L (ref 3.6–5.5)
PROT SERPL-MCNC: 7.5 G/DL (ref 6–8.2)
PROT SERPL-MCNC: 7.7 G/DL (ref 6–8.2)
RBC # BLD AUTO: 5.06 M/UL (ref 4.7–6.1)
RBC # BLD AUTO: 5.17 M/UL (ref 4.7–6.1)
SODIUM SERPL-SCNC: 139 MMOL/L (ref 135–145)
SODIUM SERPL-SCNC: 142 MMOL/L (ref 135–145)
TRIGL SERPL-MCNC: 128 MG/DL (ref 0–149)
TSH SERPL-ACNC: 1.82 UIU/ML (ref 0.38–5.33)
WBC # BLD AUTO: 10.9 K/UL (ref 4.8–10.8)
WBC # BLD AUTO: 10.9 K/UL (ref 4.8–10.8)

## 2025-08-01 PROCEDURE — 80061 LIPID PANEL: CPT

## 2025-08-01 PROCEDURE — RXMED WILLOW AMBULATORY MEDICATION CHARGE: Performed by: HOSPITALIST

## 2025-08-01 PROCEDURE — 83690 ASSAY OF LIPASE: CPT

## 2025-08-01 PROCEDURE — 80053 COMPREHEN METABOLIC PANEL: CPT | Mod: 91

## 2025-08-01 PROCEDURE — 36415 COLL VENOUS BLD VENIPUNCTURE: CPT

## 2025-08-01 PROCEDURE — 85027 COMPLETE CBC AUTOMATED: CPT | Mod: XU

## 2025-08-01 PROCEDURE — 84443 ASSAY THYROID STIM HORMONE: CPT

## 2025-08-01 PROCEDURE — 80053 COMPREHEN METABOLIC PANEL: CPT

## 2025-08-01 PROCEDURE — 85025 COMPLETE CBC W/AUTO DIFF WBC: CPT

## 2025-08-01 PROCEDURE — 99284 EMERGENCY DEPT VISIT MOD MDM: CPT

## 2025-08-01 ASSESSMENT — FIBROSIS 4 INDEX: FIB4 SCORE: 1.46

## 2025-08-04 ENCOUNTER — RESULTS FOLLOW-UP (OUTPATIENT)
Dept: CARDIOLOGY | Facility: MEDICAL CENTER | Age: 50
End: 2025-08-04
Payer: MEDICAID

## 2025-08-04 DIAGNOSIS — Z95.1 S/P CABG X 3: ICD-10-CM

## 2025-08-04 DIAGNOSIS — E78.2 MIXED HYPERLIPIDEMIA: ICD-10-CM

## 2025-08-04 DIAGNOSIS — I25.118 CORONARY ARTERY DISEASE INVOLVING NATIVE CORONARY ARTERY OF NATIVE HEART WITH OTHER FORM OF ANGINA PECTORIS (HCC): Primary | ICD-10-CM

## 2025-08-04 DIAGNOSIS — I25.10 CORONARY ARTERY DISEASE DUE TO LIPID RICH PLAQUE: ICD-10-CM

## 2025-08-04 DIAGNOSIS — I25.83 CORONARY ARTERY DISEASE DUE TO LIPID RICH PLAQUE: ICD-10-CM

## 2025-08-05 RX ORDER — ROSUVASTATIN CALCIUM 40 MG/1
40 TABLET, COATED ORAL DAILY
Qty: 90 TABLET | Refills: 3 | Status: SHIPPED | OUTPATIENT
Start: 2025-08-05 | End: 2026-09-09

## 2025-11-06 ENCOUNTER — APPOINTMENT (OUTPATIENT)
Dept: CARDIOLOGY | Facility: PHYSICIAN GROUP | Age: 50
End: 2025-11-06
Payer: MEDICAID

## (undated) DEVICE — KIT ARTERIAL LINE 20 GA - (10/BX)

## (undated) DEVICE — SET BIFURCATED BLOOD - (48EA/CS)

## (undated) DEVICE — SPRING BULLDOG 1/2 FORCE BLUE - (10/BX)

## (undated) DEVICE — INSERT STEALTH #1 - 10/BX

## (undated) DEVICE — SET LEADWIRE 5 LEAD BEDSIDE DISPOSABLE ECG (1SET OF 5/EA)

## (undated) DEVICE — BANDAGE ELASTIC 4 IN X 5 YDS - LATEX FREE(10/BX 5BX/CA)

## (undated) DEVICE — TRANSDUCER BIFURCATED MONITORING KIT (10EA/CA)

## (undated) DEVICE — TRAY MULTI-LUMEN 7FR PRESSURE W/MAX BARRIER AND BIOPATCH - (5/CA)

## (undated) DEVICE — DRESSING TRANSPARENT FILM TEGADERM 2.375 X 2.75"  (100EA/BX)"

## (undated) DEVICE — SYRINGE 30 ML LL (56/BX)

## (undated) DEVICE — BAG RESUSCITATION DISPOSABLE - WITH MASK (10 EA/CA)

## (undated) DEVICE — SOLUTION NORMOSOL-4 INJ 1000ML

## (undated) DEVICE — SOD. CHL. INJ. 0.9% 1000 ML - (14EA/CA 60CA/PF)

## (undated) DEVICE — SPONGE XRAY 8X4 STERL. 12PL - (10EA/TY 80TY/CA)

## (undated) DEVICE — DRAIN CHEST ADULT (6EA/CA) DELETED ITEM  ORDER #15909

## (undated) DEVICE — DRESSING TRANSPARENT FILM TEGADERM 4 X 4.75" (50EA/BX)"

## (undated) DEVICE — SUTURE 0 ETHIBOND MO6 C/R - (12/BX) 8-18 INCH ETHICON

## (undated) DEVICE — SODIUM CHL. INJ. 0.9% 500ML (24EA/CA 50CA/PF)

## (undated) DEVICE — SUTURE 4-0 MONOCRYL PLUS PS-2 - 27 INCH (36/BX)

## (undated) DEVICE — TUBING CLEARLINK DUO-VENT - C-FLO (48EA/CA)

## (undated) DEVICE — CATHETER IV ANGIO 20GA X 2IN - ANGIOCATH (50/BX)

## (undated) DEVICE — MICRODRIP PRIMARY VENTED 60 (48EA/CA) THIS WAS PART #2C8428 WHICH WAS DISCONTINUED

## (undated) DEVICE — STAPLER SKIN DISP - (6/BX 10BX/CA) VISISTAT

## (undated) DEVICE — ELECTRODE DUAL RETURN W/ CORD - (50/PK)

## (undated) DEVICE — CANISTER SUCTION 3000ML MECHANICAL FILTER AUTO SHUTOFF MEDI-VAC NONSTERILE LF DISP  (40EA/CA)

## (undated) DEVICE — PACK VEIN - (19/CA)

## (undated) DEVICE — SUTURE 5-0 PROLENE C-1 D/A 24 (36PK/BX)"

## (undated) DEVICE — CLIP SM INTNL HRZN TI ESCP LGT - (24EA/PK 25PK/BX)

## (undated) DEVICE — BLANKET WARMING CARDIAC STRL - (5/CA)

## (undated) DEVICE — PACK E SUTURE USED FOR - OPEN HEART  (5/BX)

## (undated) DEVICE — KIT ANESTHESIA W/CIRCUIT & 3/LT BAG W/FILTER (20EA/CA)

## (undated) DEVICE — ARMBOARD  SMALL IV 9 INLONG - (25EA/CA)

## (undated) DEVICE — NEEDLE SAFETY 18 GA X 1 1/2 IN (100EA/BX)

## (undated) DEVICE — Device

## (undated) DEVICE — SENSOR CEREBRAL AND SOMATIC MONITORING (20/CA)

## (undated) DEVICE — BLADE BEAVER 6900 MINI SHARP ALL AROUND (20/CA)

## (undated) DEVICE — GOWN SURGEONS X-LARGE - DISP. (30/CA)

## (undated) DEVICE — BAG, SPONGE COUNT 50600

## (undated) DEVICE — GLOVE BIOGEL INDICATOR SZ 7SURGICAL PF LTX - (50/BX 4BX/CA)

## (undated) DEVICE — NEPTUNE 4 PORT MANIFOLD - (20/PK)

## (undated) DEVICE — SODIUM CHL IRRIGATION 0.9% 1000ML (12EA/CA)

## (undated) DEVICE — SUTURE 2-0 ETHIBOND SH D/A 36 (36PK/BX)"

## (undated) DEVICE — HEAD HOLDER JUNIOR/ADULT

## (undated) DEVICE — CONNECTOR 5-IN-1 STERILE - (25EA/BX)

## (undated) DEVICE — CONNECTOR HUBLESS DRAINAGE - ONE WAY (20/BX)

## (undated) DEVICE — SUCTION INSTRUMENT YANKAUER BULBOUS TIP W/O VENT (50EA/CA)

## (undated) DEVICE — KIT RADIAL ARTERY 20GA W/MAX BARRIER AND BIOPATCH  (5EA/CA) #10740 IS FOR THE SET RADIAL ARTERIAL

## (undated) DEVICE — RETRACTOR OFF PUMP OCTO ONLY - 10/BX

## (undated) DEVICE — SET FLUID WARMING STANDARD FLOW - (10/CA)

## (undated) DEVICE — KIT INTROPERCUTANEOUS SHEATH - 8.5 FR W/MAX BARRIER AND BIOPATCH  (5/CA)

## (undated) DEVICE — SUTURE 6-0 PROLENE RB-2 D/A 30 (36PK/BX)"

## (undated) DEVICE — SUTURE 4-0 30CM STRATAFIX SPIRAL PS-2 (12EA/BX)

## (undated) DEVICE — PUNCH DISP VASCULAR 4.4 - 6/BX

## (undated) DEVICE — ELECTRODE 850 FOAM ADHESIVE - HYDROGEL RADIOTRNSPRNT (50/PK)

## (undated) DEVICE — SYSTEM PREVENA INCISION MNGM - (1/EA)

## (undated) DEVICE — INSERT STEALTH #5 - (10/BX)

## (undated) DEVICE — TOWELS CLOTH SURGICAL - (4/PK 20PK/CA)

## (undated) DEVICE — GLOVE SURGICAL LATEX POWDER FREE STIRLE SZ 8 ENCORE MICROPTIC (200PR/CA)

## (undated) DEVICE — PACK CV DRAPING/BASIN 2PART - (1/CA)

## (undated) DEVICE — BLADE STERNUM SAW SURGICAL 32.0 X 6.4 MM STERILE (1/EA)

## (undated) DEVICE — PROTECTOR ULNA NERVE - (36PR/CA)

## (undated) DEVICE — SUTURE OHS

## (undated) DEVICE — STOPCOCK MALE 4-WAY - (50/CA)

## (undated) DEVICE — SET EXTENSION WITH 2 PORTS (48EA/CA) ***PART #2C8610 IS A SUBSTITUTE*****

## (undated) DEVICE — LEAD PACING TEMP MYO - (12/BX)

## (undated) DEVICE — TUBE TRACHEAL ORAL/NASAL E-T HI-LO CUFF 9.0MM (10EA/PK)

## (undated) DEVICE — KIT ENDOHARVEST SYSTEM 8 - MUST ORDER 5 AT A TIME

## (undated) DEVICE — DILATORS PARSONNET 1.0MM - (5EA/CA)

## (undated) DEVICE — SENSOR SPO2 NEO LNCS ADHESIVE (20/BX) SEE USER NOTES

## (undated) DEVICE — SYRINGE SAFETY 3 ML 18 GA X 1 1/2 BLUNT LL (100/BX 8BX/CA)

## (undated) DEVICE — SUTURE 8-0 PROLENE BV175-8 EVERPONT

## (undated) DEVICE — LACTATED RINGERS INJ 1000 ML - (14EA/CA 60CA/PF)

## (undated) DEVICE — SYS DLV COST CLS RM TEMP - INJECTATE (CO-SET II) (10EA/CA)

## (undated) DEVICE — BLOWER/MISTER (5EA/PK)

## (undated) DEVICE — KIT ROOM DECONTAMINATION

## (undated) DEVICE — CATHETER THERMALDILUTION SWAN - (5EA/CA)

## (undated) DEVICE — BAG DECANTER (50EA/CS)

## (undated) DEVICE — MASK ANESTHESIA ADULT  - (100/CA)

## (undated) DEVICE — TUBE CHEST 36FR. STRAIGHT - (10EA/CA)

## (undated) DEVICE — KIT TOURNIQUET DLP (40EA/PK)

## (undated) DEVICE — GLOVE BIOGEL SZ 6.5 SURGICAL PF LTX (50PR/BX 4BX/CA)

## (undated) DEVICE — WIRE STEEL 5-0 B&S 20 OHS - 5/PK 12PK/BX ITEM. D5329 OR D6625 CAN BE USED AS A SUB

## (undated) DEVICE — TRAY SURESTEP FOLEY TEMP SENSING 16FR (10EA/CA) ORDER  #18764 FOR TEMP FOLEY ONLY

## (undated) DEVICE — WIRE TEMPORARY CARDIAC PACING ATRIUM SS 24 (12PK/BX)"

## (undated) DEVICE — TUBING PRSS MNTR 72IN M/ M LL - (25/BX) MONIT. LINE W/MALE L/L

## (undated) DEVICE — TUBING INSUFFLATION - (10/BX)

## (undated) DEVICE — DRAPE MAYO STAND - (30/CA)